# Patient Record
Sex: MALE | Race: WHITE | NOT HISPANIC OR LATINO | Employment: FULL TIME | URBAN - METROPOLITAN AREA
[De-identification: names, ages, dates, MRNs, and addresses within clinical notes are randomized per-mention and may not be internally consistent; named-entity substitution may affect disease eponyms.]

---

## 2017-01-04 ENCOUNTER — HOSPITAL ENCOUNTER (EMERGENCY)
Facility: MEDICAL CENTER | Age: 42
End: 2017-01-05
Attending: EMERGENCY MEDICINE
Payer: COMMERCIAL

## 2017-01-04 DIAGNOSIS — R73.9 HYPERGLYCEMIA: ICD-10-CM

## 2017-01-04 DIAGNOSIS — R11.2 NON-INTRACTABLE VOMITING WITH NAUSEA, UNSPECIFIED VOMITING TYPE: ICD-10-CM

## 2017-01-04 DIAGNOSIS — R11.0 CHRONIC NAUSEA: ICD-10-CM

## 2017-01-04 LAB
BASOPHILS # BLD AUTO: 0.3 % (ref 0–1.8)
BASOPHILS # BLD: 0.04 K/UL (ref 0–0.12)
EOSINOPHIL # BLD AUTO: 0.01 K/UL (ref 0–0.51)
EOSINOPHIL NFR BLD: 0.1 % (ref 0–6.9)
ERYTHROCYTE [DISTWIDTH] IN BLOOD BY AUTOMATED COUNT: 40.6 FL (ref 35.9–50)
HCT VFR BLD AUTO: 48.8 % (ref 42–52)
HGB BLD-MCNC: 17.3 G/DL (ref 14–18)
IMM GRANULOCYTES # BLD AUTO: 0.05 K/UL (ref 0–0.11)
IMM GRANULOCYTES NFR BLD AUTO: 0.4 % (ref 0–0.9)
LYMPHOCYTES # BLD AUTO: 1.59 K/UL (ref 1–4.8)
LYMPHOCYTES NFR BLD: 11.9 % (ref 22–41)
MCH RBC QN AUTO: 29.9 PG (ref 27–33)
MCHC RBC AUTO-ENTMCNC: 35.5 G/DL (ref 33.7–35.3)
MCV RBC AUTO: 84.3 FL (ref 81.4–97.8)
MONOCYTES # BLD AUTO: 0.3 K/UL (ref 0–0.85)
MONOCYTES NFR BLD AUTO: 2.2 % (ref 0–13.4)
NEUTROPHILS # BLD AUTO: 11.42 K/UL (ref 1.82–7.42)
NEUTROPHILS NFR BLD: 85.1 % (ref 44–72)
NRBC # BLD AUTO: 0 K/UL
NRBC BLD AUTO-RTO: 0 /100 WBC
PLATELET # BLD AUTO: 292 K/UL (ref 164–446)
PMV BLD AUTO: 9 FL (ref 9–12.9)
RBC # BLD AUTO: 5.79 M/UL (ref 4.7–6.1)
WBC # BLD AUTO: 13.4 K/UL (ref 4.8–10.8)

## 2017-01-04 PROCEDURE — 85025 COMPLETE CBC W/AUTO DIFF WBC: CPT

## 2017-01-04 PROCEDURE — 700111 HCHG RX REV CODE 636 W/ 250 OVERRIDE (IP): Performed by: EMERGENCY MEDICINE

## 2017-01-04 PROCEDURE — 99285 EMERGENCY DEPT VISIT HI MDM: CPT

## 2017-01-04 PROCEDURE — 700105 HCHG RX REV CODE 258: Performed by: EMERGENCY MEDICINE

## 2017-01-04 PROCEDURE — 96374 THER/PROPH/DIAG INJ IV PUSH: CPT

## 2017-01-04 PROCEDURE — 80053 COMPREHEN METABOLIC PANEL: CPT

## 2017-01-04 PROCEDURE — 96361 HYDRATE IV INFUSION ADD-ON: CPT

## 2017-01-04 PROCEDURE — 83690 ASSAY OF LIPASE: CPT

## 2017-01-04 RX ORDER — SODIUM CHLORIDE 9 MG/ML
1000 INJECTION, SOLUTION INTRAVENOUS ONCE
Status: COMPLETED | OUTPATIENT
Start: 2017-01-04 | End: 2017-01-05

## 2017-01-04 RX ORDER — METOCLOPRAMIDE 10 MG/1
10 TABLET ORAL 4 TIMES DAILY
Status: SHIPPED | COMMUNITY
End: 2017-04-12

## 2017-01-04 RX ORDER — HYDROCODONE BITARTRATE AND ACETAMINOPHEN 10; 325 MG/1; MG/1
1-2 TABLET ORAL EVERY 6 HOURS PRN
Status: SHIPPED | COMMUNITY
End: 2017-11-06

## 2017-01-04 RX ORDER — ONDANSETRON 2 MG/ML
4 INJECTION INTRAMUSCULAR; INTRAVENOUS ONCE
Status: COMPLETED | OUTPATIENT
Start: 2017-01-04 | End: 2017-01-04

## 2017-01-04 RX ADMIN — ONDANSETRON HYDROCHLORIDE 4 MG: 2 INJECTION, SOLUTION INTRAMUSCULAR; INTRAVENOUS at 23:39

## 2017-01-04 RX ADMIN — SODIUM CHLORIDE 1000 ML: 9 INJECTION, SOLUTION INTRAVENOUS at 23:39

## 2017-01-04 ASSESSMENT — PAIN SCALES - GENERAL: PAINLEVEL_OUTOF10: 6

## 2017-01-04 NOTE — ED AVS SNAPSHOT
Press Play Access Code: [email or web address]  Expires: 1/19/2017  8:46 AM    Press Play  A secure, online tool to manage your health information     [employer]’s Press Play® is a secure, online tool that connects you to your personalized health information from the privacy of your home -- day or night - making it very easy for you to manage your healthcare. Once the activation process is completed, you can even access your medical information using the Press Play anish, which is available for free in the Apple Anish store or Google Play store.     Press Play provides the following levels of access (as shown below):   My Chart Features   [facility] Primary Care Doctor [facility]  Specialists [facility]  Urgent  Care Non-[facility]  Primary Care  Doctor   Email your healthcare team securely and privately 24/7 X X X X   Manage appointments: schedule your next appointment; view details of past/upcoming appointments X      Request prescription refills. X      View recent personal medical records, including lab and immunizations X X X X   View health record, including health history, allergies, medications X X X X   Read reports about your outpatient visits, procedures, consult and ER notes X X X X   See your discharge summary, which is a recap of your hospital and/or ER visit that includes your diagnosis, lab results, and care plan. X X       How to register for Press Play:  1. Go to  [email or web address]  2. Click on the Sign Up Now box, which takes you to the New Member Sign Up page. You will need to provide the following information:  a. Enter your Press Play Access Code exactly as it appears at the top of this page. (You will not need to use this code after you’ve completed the sign-up process. If you do not sign up before the expiration date, you must request a new code.)   b. Enter your date of birth.   c. Enter your home email address.   d. Click Submit, and follow the next screen’s instructions.  3. Create a Press Play ID. This will be your Press Play  login ID and cannot be changed, so think of one that is secure and easy to remember.  4. Create a [employer] password. You can change your password at any time.  5. Enter your Password Reset Question and Answer. This can be used at a later time if you forget your password.   6. Enter your e-mail address. This allows you to receive e-mail notifications when new information is available in Locappy.  7. Click Sign Up. You can now view your health information.    For assistance activating your Loc[employer] account, call [phone]

## 2017-01-04 NOTE — ED AVS SNAPSHOT
After Visit Summary                                                                                                                Cayetano Hawkins   MRN: [id number]    Department:  [facility], Emergency Dept   Date of Visit:  1/4/2017            [facility], Emergency Dept    [address]    [place] [address]    Phone:  [phone]      You were seen by     Praneeth Tracey M.D.      Your Diagnosis Was     Non-intractable vomiting with nausea, unspecified vomiting type     R11.2       These are the medications you received during your hospitalization from 01/04/2017 2052 to 01/05/2017 0055     Date/Time Order Dose Route Action    01/04/2017 2339 NS infusion 1,000 mL 1,000 mL Intravenous New Bag    01/04/2017 2339 ondansetron (ZOFRAN) syringe/vial injection 4 mg 4 mg Intravenous Given    01/05/2017 0030 NS infusion 1,000 mL 1,000 mL Intravenous New Bag      Follow-up Information     1. Follow up with Landon Collins D.O. In 2 days.    Specialty:  Family Medicine    Contact information    [address]   [address]  [place] [address]  [phone]          2. Follow up with [facility], Emergency Dept.    Specialty:  Emergency Medicine    Why:  As needed, If symptoms worsen    Contact information    [address] [address] [phone]      Medication Information     Review all of your home medications and newly ordered medications with your primary doctor and/or pharmacist as soon as possible. Follow medication instructions as directed by your doctor and/or pharmacist.     Please keep your complete medication list with you and share with your physician. Update the information when medications are discontinued, doses are changed, or new medications (including over-the-counter products) are added; and carry medication information at all times in the event of emergency situations.               Medication List      ASK your  doctor about these medications        Instructions    gabapentin 100 MG Caps   Commonly known as:  NEURONTIN    Take 100 mg by mouth every evening.   Dose:  100 mg       hydrocodone/acetaminophen  MG Tabs   Commonly known as:  NORCO    Take 1-2 Tabs by mouth every 6 hours as needed.   Dose:  1-2 Tab       insulin detemir 100 UNIT/ML Soln   Commonly known as:  LEVEMIR    Inject 30 Units as instructed every morning.   Dose:  30 Units       metformin 500 MG Tabs   Commonly known as:  GLUCOPHAGE    Take 1,000 mg by mouth 2 times a day, with meals.   Dose:  1000 mg       * metoclopramide 10 MG Tabs   What changed:  Another medication with the same name was added. Make sure you understand how and when to take each.   Commonly known as:  REGLAN   Ask about: Which instructions should I use?    Take 10 mg by mouth 4 times a day.   Dose:  10 mg       * metoclopramide 10 MG Tabs   What changed:  You were already taking a medication with the same name, and this prescription was added. Make sure you understand how and when to take each.   Commonly known as:  REGLAN   Ask about: Which instructions should I use?    Take 1 Tab by mouth 3 times a day before meals.   Dose:  10 mg       ondansetron 4 MG Tbdp   Commonly known as:  ZOFRAN ODT    Take 1 Tab by mouth every 6 hours as needed for Nausea/Vomiting.   Dose:  4 mg       pantoprazole 40 MG Tbec   Commonly known as:  PROTONIX    Take 1 Tab by mouth every day.   Dose:  40 mg       promethazine 25 MG Tabs   Commonly known as:  PHENERGAN    Take 1 Tab by mouth every 6 hours as needed for Nausea/Vomiting.   Dose:  25 mg       * Notice:  This list has 2 medication(s) that are the same as other medications prescribed for you. Read the directions carefully, and ask your doctor or other care provider to review them with you.            Procedures and tests performed during your visit     CBC WITH DIFFERENTIAL    COMP METABOLIC PANEL    ESTIMATED GFR    IV Saline Lock    LIPASE     PO CHALLENGE        Discharge Instructions       Nausea and Vomiting  Nausea is a sick feeling that often comes before throwing up (vomiting). Vomiting is a reflex where stomach contents come out of your mouth. Vomiting can cause severe loss of body fluids (dehydration). Children and elderly adults can become dehydrated quickly, especially if they also have diarrhea. Nausea and vomiting are symptoms of a condition or disease. It is important to find the cause of your symptoms.  CAUSES   · Direct irritation of the stomach lining. This irritation can result from increased acid production (gastroesophageal reflux disease), infection, food poisoning, taking certain medicines (such as nonsteroidal anti-inflammatory drugs), alcohol use, or tobacco use.  · Signals from the brain. These signals could be caused by a headache, heat exposure, an inner ear disturbance, increased pressure in the brain from injury, infection, a tumor, or a concussion, pain, emotional stimulus, or metabolic problems.  · An obstruction in the gastrointestinal tract (bowel obstruction).  · Illnesses such as diabetes, hepatitis, gallbladder problems, appendicitis, kidney problems, cancer, sepsis, atypical symptoms of a heart attack, or eating disorders.  · Medical treatments such as chemotherapy and radiation.  · Receiving medicine that makes you sleep (general anesthetic) during surgery.  DIAGNOSIS  Your caregiver may ask for tests to be done if the problems do not improve after a few days. Tests may also be done if symptoms are severe or if the reason for the nausea and vomiting is not clear. Tests may include:  · Urine tests.  · Blood tests.  · Stool tests.  · Cultures (to look for evidence of infection).  · X-rays or other imaging studies.  Test results can help your caregiver make decisions about treatment or the need for additional tests.  TREATMENT  You need to stay well hydrated. Drink frequently but in small amounts. You may wish to drink  water, sports drinks, clear broth, or eat frozen ice pops or gelatin dessert to help stay hydrated. When you eat, eating slowly may help prevent nausea. There are also some antinausea medicines that may help prevent nausea.  HOME CARE INSTRUCTIONS   · Take all medicine as directed by your caregiver.  · If you do not have an appetite, do not force yourself to eat. However, you must continue to drink fluids.  · If you have an appetite, eat a normal diet unless your caregiver tells you differently.  · Eat a variety of complex carbohydrates (rice, wheat, potatoes, bread), lean meats, yogurt, fruits, and vegetables.  · Avoid high-fat foods because they are more difficult to digest.  · Drink enough water and fluids to keep your urine clear or pale yellow.  · If you are dehydrated, ask your caregiver for specific rehydration instructions. Signs of dehydration may include:  · Severe thirst.  · Dry lips and mouth.  · Dizziness.  · Dark urine.  · Decreasing urine frequency and amount.  · Confusion.  · Rapid breathing or pulse.  SEEK IMMEDIATE MEDICAL CARE IF:   · You have blood or brown flecks (like coffee grounds) in your vomit.  · You have black or bloody stools.  · You have a severe headache or stiff neck.  · You are confused.  · You have severe abdominal pain.  · You have chest pain or trouble breathing.  · You do not urinate at least once every 8 hours.  · You develop cold or clammy skin.  · You continue to vomit for longer than 24 to 48 hours.  · You have a fever.  MAKE SURE YOU:   · Understand these instructions.  · Will watch your condition.  · Will get help right away if you are not doing well or get worse.     This information is not intended to replace advice given to you by your health care provider. Make sure you discuss any questions you have with your health care provider.     Document Released: 12/18/2006 Document Revised: 03/11/2013 Document Reviewed: 05/16/2012  [facility] Interactive Patient Education ©2016  Elsevier Inc.      Hyperglycemia  High blood sugar (hyperglycemia) means that the level of sugar in your blood is higher than it should be. Signs of high blood sugar include:  · Feeling thirsty.  · Frequent peeing (urinating).  · Feeling tired or sleepy.  · Dry mouth.  · Vision changes.  · Feeling weak.  · Feeling hungry but losing weight.  · Numbness and tingling in your hands or feet.  · Headache.  When you ignore these signs, your blood sugar may keep going up. These problems may get worse, and other problems may begin.  HOME CARE  · Check your blood sugars as told by your doctor. Write down the numbers with the date and time.  · Take the right amount of insulin or diabetes pills at the right time. Write down the dose with date and time.  · Refill your insulin or diabetes pills before running out.  · Watch what you eat. Follow your meal plan.  · Drink liquids without sugar, such as water. Check with your doctor if you have kidney or heart disease.  · Follow your doctor's orders for exercise. Exercise at the same time of day.  · Keep your doctor's appointments.  GET HELP RIGHT AWAY IF:   · You have trouble thinking or are confused.  · You have fast breathing with fruity smelling breath.  · You pass out (faint).  · You have 2 to 3 days of high blood sugars and you do not know why.  · You have chest pain.  · You are feeling sick to your stomach (nauseous) or throwing up (vomiting).  · You have sudden vision changes.  MAKE SURE YOU:   · Understand these instructions.  · Will watch your condition.  · Will get help right away if you are not doing well or get worse.     This information is not intended to replace advice given to you by your health care provider. Make sure you discuss any questions you have with your health care provider.     Document Released: 10/15/2010 Document Revised: 01/08/2016 Document Reviewed: 10/15/2010  [employer] Interactive Patient Education ©2016 Elsevier Inc.            Patient Information       Patient Information    Following emergency treatment: all patient requiring follow-up care must return either to a private physician or a clinic if your condition worsens before you are able to obtain further medical attention, please return to the emergency room.     Billing Information    At [facility], we work to make the billing process streamlined for our patients.  Our Representatives are here to answer any questions you may have regarding your hospital bill.  If you have insurance coverage and have supplied your insurance information to us, we will submit a claim to your insurer on your behalf.  Should you have any questions regarding your bill, we can be reached online or by phone as follows:  Online: You are able pay your bills online or live chat with our representatives about any billing questions you may have. We are here to help Monday - Friday from 8:00am to 7:30pm and 9:00am - 12:00pm on Saturdays.  Please visit [email or web address]  for more information.   Phone:  [phone] or [phone]    Please note that your emergency physician, surgeon, pathologist, radiologist, anesthesiologist, and other specialists are not employed by [facility] and will therefore bill separately for their services.  Please contact them directly for any questions concerning their bills at the numbers below:     Emergency Physician Services:  [phone]  [facility]:  [phone]  [facility]:  [phone]  [facility]:  [phone]    1. Your final bill may vary from the amount quoted upon discharge if all procedures are not complete at that time, or if your doctor has additional procedures of which we are not aware. You will receive an additional bill if you return to the Emergency Department at [facility] for suture removal regardless of the facility of which the sutures were placed.     2. Please arrange for settlement of  this account at the emergency registration.    3. All self-pay accounts are due in full at the time of treatment.  If you are unable to meet this obligation then payment is expected within 4-5 days.     4. If you have had radiology studies (CT, X-ray, Ultrasound, MRI), you have received a preliminary result during your emergency department visit. Please contact the radiology department [phone] to receive a copy of your final result. Please discuss the Final result with your primary physician or with the follow up physician provided.     Crisis Hotline:  [place] Crisis Hotline:  9-850-GEWKSCP or 1-112.485.8336  [place] Crisis Hotline:    1-761.640.2331 or 666-915-7769         ED Discharge Follow Up Questions    1. In order to provide you with very good care, we would like to follow up with a phone call in the next few days.  May we have your permission to contact you?     YES /  NO    2. What is the best phone number to call you? (       )_____-__________    3. What is the best time to call you?      Morning  /  Afternoon  /  Evening                   Patient Signature:  ____________________________________________________________    Date:  ____________________________________________________________      Your appointments     Feb 14, 2017  8:30 AM   NM-GASTRIC EMPTYING(TOUGAS METHOD) with [facility]   [facility]    [address]  [place] [id number] [phone]

## 2017-01-04 NOTE — ED AVS SNAPSHOT
1/5/2017          Cayetano Godinez Colon  [address] [place] [address] [place] [address]    Dear Cayetano:    [facility] wants to ensure your discharge home is safe and you or your loved ones have had all your questions answered regarding your care after you leave the hospital.    You may receive a telephone call within two days of your discharge.  This call is to make certain you understand your discharge instructions as well as ensure we provided you with the best care possible during your stay with us.     The call will only last approximately 3-5 minutes and will be done by a nurse.    Once again, we want to ensure your discharge home is safe and that you have a clear understanding of any next steps in your care.  If you have any questions or concerns, please do not hesitate to contact us, we are here for you.  Thank you for choosing [facility] for your healthcare needs.    Sincerely,    Catalino Mary    [facility]

## 2017-01-05 VITALS
RESPIRATION RATE: 16 BRPM | HEART RATE: 79 BPM | TEMPERATURE: 98.5 F | DIASTOLIC BLOOD PRESSURE: 71 MMHG | OXYGEN SATURATION: 97 % | HEIGHT: 71 IN | SYSTOLIC BLOOD PRESSURE: 149 MMHG | BODY MASS INDEX: 25.9 KG/M2 | WEIGHT: 185 LBS

## 2017-01-05 LAB
ALBUMIN SERPL BCP-MCNC: 4.6 G/DL (ref 3.2–4.9)
ALBUMIN/GLOB SERPL: 1.5 G/DL
ALP SERPL-CCNC: 135 U/L (ref 30–99)
ALT SERPL-CCNC: 88 U/L (ref 2–50)
ANION GAP SERPL CALC-SCNC: 14 MMOL/L (ref 0–11.9)
AST SERPL-CCNC: 43 U/L (ref 12–45)
BILIRUB SERPL-MCNC: 1.2 MG/DL (ref 0.1–1.5)
BUN SERPL-MCNC: 7 MG/DL (ref 8–22)
CALCIUM SERPL-MCNC: 10.5 MG/DL (ref 8.4–10.2)
CHLORIDE SERPL-SCNC: 102 MMOL/L (ref 96–112)
CO2 SERPL-SCNC: 25 MMOL/L (ref 20–33)
CREAT SERPL-MCNC: 0.96 MG/DL (ref 0.5–1.4)
GFR SERPL CREATININE-BSD FRML MDRD: >60 ML/MIN/1.73 M 2
GLOBULIN SER CALC-MCNC: 3.1 G/DL (ref 1.9–3.5)
GLUCOSE SERPL-MCNC: 224 MG/DL (ref 65–99)
LIPASE SERPL-CCNC: 25 U/L (ref 7–58)
POTASSIUM SERPL-SCNC: 3.4 MMOL/L (ref 3.6–5.5)
PROT SERPL-MCNC: 7.7 G/DL (ref 6–8.2)
SODIUM SERPL-SCNC: 141 MMOL/L (ref 135–145)

## 2017-01-05 PROCEDURE — 700105 HCHG RX REV CODE 258: Performed by: EMERGENCY MEDICINE

## 2017-01-05 RX ORDER — HYDROCODONE BITARTRATE AND ACETAMINOPHEN 5; 325 MG/1; MG/1
1 TABLET ORAL ONCE
Status: DISCONTINUED | OUTPATIENT
Start: 2017-01-05 | End: 2017-01-05

## 2017-01-05 RX ORDER — SODIUM CHLORIDE 9 MG/ML
1000 INJECTION, SOLUTION INTRAVENOUS ONCE
Status: COMPLETED | OUTPATIENT
Start: 2017-01-05 | End: 2017-01-05

## 2017-01-05 RX ORDER — METOCLOPRAMIDE 10 MG/1
10 TABLET ORAL
Qty: 15 TAB | Refills: 0 | Status: SHIPPED | OUTPATIENT
Start: 2017-01-05 | End: 2017-04-12

## 2017-01-05 RX ADMIN — SODIUM CHLORIDE 1000 ML: 900 INJECTION, SOLUTION INTRAVENOUS at 00:30

## 2017-01-05 NOTE — ED NOTES
Discharged in good condition after discharge instructions reviewed with pt in detail, pt verbalizes understanding of all. Ambulated out with steady gait accompanied by significant other with follow up instructions in hand.

## 2017-01-05 NOTE — DISCHARGE INSTRUCTIONS
Nausea and Vomiting  Nausea is a sick feeling that often comes before throwing up (vomiting). Vomiting is a reflex where stomach contents come out of your mouth. Vomiting can cause severe loss of body fluids (dehydration). Children and elderly adults can become dehydrated quickly, especially if they also have diarrhea. Nausea and vomiting are symptoms of a condition or disease. It is important to find the cause of your symptoms.  CAUSES   · Direct irritation of the stomach lining. This irritation can result from increased acid production (gastroesophageal reflux disease), infection, food poisoning, taking certain medicines (such as nonsteroidal anti-inflammatory drugs), alcohol use, or tobacco use.  · Signals from the brain. These signals could be caused by a headache, heat exposure, an inner ear disturbance, increased pressure in the brain from injury, infection, a tumor, or a concussion, pain, emotional stimulus, or metabolic problems.  · An obstruction in the gastrointestinal tract (bowel obstruction).  · Illnesses such as diabetes, hepatitis, gallbladder problems, appendicitis, kidney problems, cancer, sepsis, atypical symptoms of a heart attack, or eating disorders.  · Medical treatments such as chemotherapy and radiation.  · Receiving medicine that makes you sleep (general anesthetic) during surgery.  DIAGNOSIS  Your caregiver may ask for tests to be done if the problems do not improve after a few days. Tests may also be done if symptoms are severe or if the reason for the nausea and vomiting is not clear. Tests may include:  · Urine tests.  · Blood tests.  · Stool tests.  · Cultures (to look for evidence of infection).  · X-rays or other imaging studies.  Test results can help your caregiver make decisions about treatment or the need for additional tests.  TREATMENT  You need to stay well hydrated. Drink frequently but in small amounts. You may wish to drink water, sports drinks, clear broth, or eat frozen  ice pops or gelatin dessert to help stay hydrated. When you eat, eating slowly may help prevent nausea. There are also some antinausea medicines that may help prevent nausea.  HOME CARE INSTRUCTIONS   · Take all medicine as directed by your caregiver.  · If you do not have an appetite, do not force yourself to eat. However, you must continue to drink fluids.  · If you have an appetite, eat a normal diet unless your caregiver tells you differently.  · Eat a variety of complex carbohydrates (rice, wheat, potatoes, bread), lean meats, yogurt, fruits, and vegetables.  · Avoid high-fat foods because they are more difficult to digest.  · Drink enough water and fluids to keep your urine clear or pale yellow.  · If you are dehydrated, ask your caregiver for specific rehydration instructions. Signs of dehydration may include:  · Severe thirst.  · Dry lips and mouth.  · Dizziness.  · Dark urine.  · Decreasing urine frequency and amount.  · Confusion.  · Rapid breathing or pulse.  SEEK IMMEDIATE MEDICAL CARE IF:   · You have blood or brown flecks (like coffee grounds) in your vomit.  · You have black or bloody stools.  · You have a severe headache or stiff neck.  · You are confused.  · You have severe abdominal pain.  · You have chest pain or trouble breathing.  · You do not urinate at least once every 8 hours.  · You develop cold or clammy skin.  · You continue to vomit for longer than 24 to 48 hours.  · You have a fever.  MAKE SURE YOU:   · Understand these instructions.  · Will watch your condition.  · Will get help right away if you are not doing well or get worse.     This information is not intended to replace advice given to you by your health care provider. Make sure you discuss any questions you have with your health care provider.     Document Released: 12/18/2006 Document Revised: 03/11/2013 Document Reviewed: 05/16/2012  [employer] Interactive Patient Education ©2016 [employer]      Hyperglycemia  High blood sugar  (hyperglycemia) means that the level of sugar in your blood is higher than it should be. Signs of high blood sugar include:  · Feeling thirsty.  · Frequent peeing (urinating).  · Feeling tired or sleepy.  · Dry mouth.  · Vision changes.  · Feeling weak.  · Feeling hungry but losing weight.  · Numbness and tingling in your hands or feet.  · Headache.  When you ignore these signs, your blood sugar may keep going up. These problems may get worse, and other problems may begin.  HOME CARE  · Check your blood sugars as told by your doctor. Write down the numbers with the date and time.  · Take the right amount of insulin or diabetes pills at the right time. Write down the dose with date and time.  · Refill your insulin or diabetes pills before running out.  · Watch what you eat. Follow your meal plan.  · Drink liquids without sugar, such as water. Check with your doctor if you have kidney or heart disease.  · Follow your doctor's orders for exercise. Exercise at the same time of day.  · Keep your doctor's appointments.  GET HELP RIGHT AWAY IF:   · You have trouble thinking or are confused.  · You have fast breathing with fruity smelling breath.  · You pass out (faint).  · You have 2 to 3 days of high blood sugars and you do not know why.  · You have chest pain.  · You are feeling sick to your stomach (nauseous) or throwing up (vomiting).  · You have sudden vision changes.  MAKE SURE YOU:   · Understand these instructions.  · Will watch your condition.  · Will get help right away if you are not doing well or get worse.     This information is not intended to replace advice given to you by your health care provider. Make sure you discuss any questions you have with your health care provider.     Document Released: 10/15/2010 Document Revised: 01/08/2016 Document Reviewed: 10/15/2010  [employer] Interactive Patient Education ©2016 Elsevier Inc.

## 2017-01-05 NOTE — ED PROVIDER NOTES
CHIEF COMPLAINT  Chief Complaint   Patient presents with   • N/V       HPI  Cayetano Hawkins is a 41 y.o. male who presents with nausea and vomiting and abdominal pain since earlier today. Reports multiple episodes of emesis throughout the day. No hematemesis or bloody stools. No recent fevers. Has had multiple episodes in the past. Is followed by gastroenterology. Reports that he has had chronic nausea and vomiting with abdominal pain symptoms for at least 4 months now. Reports that he has had an endoscopy performed that was found to be unremarkable. Is scheduled for gastric emptying and colonoscopy studies. He has a history of diabetes for approximately 10 years. Is insulin-dependent. Blood sugars usually run less than 200.    Has a history of cholecystectomy and appendectomy in the past. Uses marijuana chronically however has not used it in approximately one week. Denies chronic alcohol abuse.    REVIEW OF SYSTEMS  See HPI for further details. All other systems are negative.     PAST MEDICAL HISTORY   has a past medical history of Hepatitis C carrier; Liver disease; Hypertension; Arthritis (right hip); Backpain; Diabetes; and Infectious disease.    SOCIAL HISTORY  Social History     Social History Main Topics   • Smoking status: Former Smoker -- 0.50 packs/day for 10 years     Quit date: 01/01/2012   • Smokeless tobacco: Never Used      Comment: 5 yrs ago   • Alcohol Use: No      Comment: occ   • Drug Use: No      Comment: conor in past   • Sexual Activity: Not on file       SURGICAL HISTORY   has past surgical history that includes nano by laparoscopy; other (hepatitis c ); cholecystectomy; irrigation & debridement ortho (Right, 4/4/2016); irrigation & debridement ortho (Right, 4/10/2016); and irrigation & debridement ortho (Right, 4/13/2016).    CURRENT MEDICATIONS  Home Medications     Reviewed by Zach Chandler R.N. (Registered Nurse) on 01/04/17 at 1421  Med List Status: Complete     "Medication Last Dose Status    gabapentin (NEURONTIN) 100 MG Cap 12/27/2016 Active    hydrocodone/acetaminophen (NORCO)  MG Tab 1/1/2017 Active    insulin detemir (LEVEMIR) 100 UNIT/ML Solution 1/4/2017 Active    metformin (GLUCOPHAGE) 500 MG Tab 12/21/2016 Active    metoclopramide (REGLAN) 10 MG Tab 1/3/2017 Active    ondansetron (ZOFRAN ODT) 4 MG TABLET DISPERSIBLE 1/4/2017 Active    pantoprazole (PROTONIX) 40 MG Tablet Delayed Response  Active    promethazine (PHENERGAN) 25 MG Tab  Active                ALLERGIES  Allergies   Allergen Reactions   • Bee      Bee stings       PHYSICAL EXAM  VITAL SIGNS: /81 mmHg  Pulse 75  Temp(Src) 37.4 °C (99.3 °F)  Resp 18  Ht 1.803 m (5' 11\")  Wt 83.915 kg (185 lb)  BMI 25.81 kg/m2  SpO2 97%  Pulse ox interpretation: I interpret this pulse ox as normal.  Constitutional: Alert in no apparent distress.  HENT: No signs of trauma, Bilateral external ears normal, Nose normal.   Eyes: Pupils are equal and reactive, Conjunctiva normal, Non-icteric.   Neck: Normal range of motion, No tenderness, Supple, No stridor.   Cardiovascular: Regular rate and rhythm, no murmurs.   Thorax & Lungs: Normal breath sounds, No respiratory distress, No wheezing, No chest tenderness.   Abdomen: Bowel sounds normal, Soft, mild diffuse tenderness, No masses, No pulsatile masses. No peritoneal signs.  Skin: Warm, Dry, No erythema, No rash.   Back: No bony tenderness, No CVA tenderness.   Extremities: Intact distal pulses, No edema, No tenderness, No cyanosis  Neurologic: Alert , Normal motor function and gait, Normal sensory function, No focal deficits noted.   Psychiatric: Affect normal, Judgment normal, Mood normal.       DIAGNOSTIC STUDIES / PROCEDURES      LABS  Labs Reviewed   CBC WITH DIFFERENTIAL - Abnormal; Notable for the following:     WBC 13.4 (*)     MCHC 35.5 (*)     Neutrophils-Polys 85.10 (*)     Lymphocytes 11.90 (*)     Neutrophils (Absolute) 11.42 (*)     All other " components within normal limits   COMP METABOLIC PANEL - Abnormal; Notable for the following:     Potassium 3.4 (*)     Anion Gap 14.0 (*)     Glucose 224 (*)     Bun 7 (*)     Calcium 10.5 (*)     ALT(SGPT) 88 (*)     Alkaline Phosphatase 135 (*)     All other components within normal limits   LIPASE   ESTIMATED GFR         COURSE & MEDICAL DECISION MAKING  Pertinent Labs & Imaging studies reviewed. (See chart for details)  41-year-old male with a history of hepatitis C, cholecystectomy, appendectomy, presenting with diffuse abdominal pain associated with nausea and vomiting throughout the day. Has been evaluated here in the past for similar symptoms. Also has been evaluated by gastroenterology in the past with endoscopy that was performed and was found to be unremarkable. Patient tried to take Reglan earlier today however had an episode of vomiting.    Upon the patient's arrival, he was given IV fluids along with IV Reglan. Improvement in his symptoms. Laboratory studies were performed at were unremarkable except for minor abnormalities. Had some leukocytosis with WBC of 13.4 and of uncertain etiology/significance at this time. May be secondary to a stress response given the patient's vomiting symptoms. No tachycardia or fever. Patient is in no distress. Normal blood pressure.  Abdominal exam.    Given the patient's response to treatment, and largely unremarkable workup, I do not believe that he'll benefit from further advanced imaging such as CT at this time. Likely harm outweighs any potential benefit from the study at this time given his chronic history and unremarkable workup to this point. Is instructed to follow-up with his primary care physician and gastroenterologist for further management of his chronic abdominal pain symptoms. Again stressed to stay away from marijuana as it may exacerbate his symptoms. Also instructed to maintain good glycemic control. Uncertain of the actual etiology at this time  "regarding the patient's nausea and vomiting symptoms. Strong suggestion of gastroparesis however pending further workup by gastroenterology for further management. Discharged home with repeat prescription for Reglan as needed.    The patient will return for worsening symptoms or failure of improvement and is stable at the time of discharge. The patient verbalizes understanding in their own words.      /71 mmHg  Pulse 79  Temp(Src) 36.9 °C (98.5 °F)  Resp 16  Ht 1.803 m (5' 11\")  Wt 83.915 kg (185 lb)  BMI 25.81 kg/m2  SpO2 97%    The patient was referred to primary care where they will receive further BP management.      Landon Collins D.O.  [address]  [address]  [facility] [address]  [phone]    In 2 days      [facility], Emergency Dept  [address]  [facility] [address] [phone]    As needed, If symptoms worsen      FINAL IMPRESSION  1. Non-intractable vomiting with nausea, unspecified vomiting type    2. Chronic nausea    3. Hyperglycemia            Electronically signed by: Praneeth Tracey, 1/4/2017 11:13 PM      "

## 2017-01-19 ENCOUNTER — HOSPITAL ENCOUNTER (EMERGENCY)
Facility: MEDICAL CENTER | Age: 42
End: 2017-01-19
Attending: EMERGENCY MEDICINE
Payer: COMMERCIAL

## 2017-01-19 VITALS
WEIGHT: 180.34 LBS | HEART RATE: 70 BPM | BODY MASS INDEX: 25.25 KG/M2 | TEMPERATURE: 98.6 F | HEIGHT: 71 IN | OXYGEN SATURATION: 99 %

## 2017-01-19 DIAGNOSIS — R11.2 NAUSEA AND VOMITING, INTRACTABILITY OF VOMITING NOT SPECIFIED, UNSPECIFIED VOMITING TYPE: ICD-10-CM

## 2017-01-19 DIAGNOSIS — E87.6 HYPOKALEMIA: ICD-10-CM

## 2017-01-19 DIAGNOSIS — N30.00 ACUTE CYSTITIS WITHOUT HEMATURIA: ICD-10-CM

## 2017-01-19 DIAGNOSIS — F12.90 MARIJUANA USE: ICD-10-CM

## 2017-01-19 LAB
ALBUMIN SERPL BCP-MCNC: 5 G/DL (ref 3.2–4.9)
ALBUMIN/GLOB SERPL: 1.6 G/DL
ALP SERPL-CCNC: 111 U/L (ref 30–99)
ALT SERPL-CCNC: 46 U/L (ref 2–50)
AMPHETAMINES UR QL: NEGATIVE
ANION GAP SERPL CALC-SCNC: 16 MMOL/L (ref 0–11.9)
APPEARANCE UR: CLEAR
AST SERPL-CCNC: 36 U/L (ref 12–45)
BACTERIA #/AREA URNS HPF: ABNORMAL /HPF
BARBITURATES UR QL SCN: NEGATIVE
BASOPHILS # BLD AUTO: 0.2 % (ref 0–1.8)
BASOPHILS # BLD: 0.03 K/UL (ref 0–0.12)
BENZODIAZ UR QL SCN: NEGATIVE
BILIRUB SERPL-MCNC: 1.3 MG/DL (ref 0.1–1.5)
BILIRUB UR QL CFM: NEGATIVE
BODY FLD TYPE: ABNORMAL
BUN SERPL-MCNC: 12 MG/DL (ref 8–22)
BZE UR QL SCN: NEGATIVE
C TRACH DNA SPEC QL NAA+PROBE: NEGATIVE
CALCIUM SERPL-MCNC: 10.2 MG/DL (ref 8.4–10.2)
CHLORIDE SERPL-SCNC: 102 MMOL/L (ref 96–112)
CO2 SERPL-SCNC: 23 MMOL/L (ref 20–33)
COLOR UR: YELLOW
CREAT SERPL-MCNC: 0.98 MG/DL (ref 0.5–1.4)
EKG IMPRESSION: NORMAL
EOSINOPHIL # BLD AUTO: 0 K/UL (ref 0–0.51)
EOSINOPHIL NFR BLD: 0 % (ref 0–6.9)
EPI CELLS #/AREA URNS HPF: ABNORMAL /HPF
ERYTHROCYTE [DISTWIDTH] IN BLOOD BY AUTOMATED COUNT: 40.6 FL (ref 35.9–50)
GFR SERPL CREATININE-BSD FRML MDRD: >60 ML/MIN/1.73 M 2
GLOBULIN SER CALC-MCNC: 3.2 G/DL (ref 1.9–3.5)
GLUCOSE SERPL-MCNC: 212 MG/DL (ref 65–99)
GLUCOSE UR STRIP.AUTO-MCNC: NEGATIVE MG/DL
HCT VFR BLD AUTO: 47.5 % (ref 42–52)
HEMOCCULT SP1 SPEC QL: POSITIVE
HGB BLD-MCNC: 17.1 G/DL (ref 14–18)
IMM GRANULOCYTES # BLD AUTO: 0.06 K/UL (ref 0–0.11)
IMM GRANULOCYTES NFR BLD AUTO: 0.4 % (ref 0–0.9)
KETONES UR STRIP.AUTO-MCNC: >=80 MG/DL
LEUKOCYTE ESTERASE UR QL STRIP.AUTO: NEGATIVE
LIPASE SERPL-CCNC: 26 U/L (ref 7–58)
LYMPHOCYTES # BLD AUTO: 1.94 K/UL (ref 1–4.8)
LYMPHOCYTES NFR BLD: 13 % (ref 22–41)
MCH RBC QN AUTO: 30.1 PG (ref 27–33)
MCHC RBC AUTO-ENTMCNC: 36 G/DL (ref 33.7–35.3)
MCV RBC AUTO: 83.5 FL (ref 81.4–97.8)
MICRO URNS: ABNORMAL
MONOCYTES # BLD AUTO: 0.57 K/UL (ref 0–0.85)
MONOCYTES NFR BLD AUTO: 3.8 % (ref 0–13.4)
MUCOUS THREADS #/AREA URNS HPF: ABNORMAL /HPF
N GONORRHOEA DNA SPEC QL NAA+PROBE: NEGATIVE
NEUTROPHILS # BLD AUTO: 12.29 K/UL (ref 1.82–7.42)
NEUTROPHILS NFR BLD: 82.6 % (ref 44–72)
NITRITE UR QL STRIP.AUTO: NEGATIVE
NRBC # BLD AUTO: 0 K/UL
NRBC BLD AUTO-RTO: 0 /100 WBC
PCP UR QL SCN: NEGATIVE
PH UR STRIP.AUTO: 6.5 [PH]
PLATELET # BLD AUTO: 316 K/UL (ref 164–446)
PMV BLD AUTO: 9 FL (ref 9–12.9)
POTASSIUM SERPL-SCNC: 3.2 MMOL/L (ref 3.6–5.5)
PROT SERPL-MCNC: 8.2 G/DL (ref 6–8.2)
PROT UR QL STRIP: 30 MG/DL
RBC # BLD AUTO: 5.69 M/UL (ref 4.7–6.1)
RBC # URNS HPF: ABNORMAL /HPF
RBC UR QL AUTO: ABNORMAL
SODIUM SERPL-SCNC: 141 MMOL/L (ref 135–145)
SP GR UR STRIP.AUTO: 1.02
SPECIMEN SOURCE: NORMAL
TROPONIN I SERPL-MCNC: 0.03 NG/ML (ref 0–0.04)
UR OPIATES 2659: POSITIVE
UR THC 2511T: POSITIVE
UR TRICYCLIC 2660: NEGATIVE
WBC # BLD AUTO: 14.9 K/UL (ref 4.8–10.8)
WBC #/AREA URNS HPF: ABNORMAL /HPF

## 2017-01-19 PROCEDURE — 80305 DRUG TEST PRSMV DIR OPT OBS: CPT

## 2017-01-19 PROCEDURE — 96365 THER/PROPH/DIAG IV INF INIT: CPT

## 2017-01-19 PROCEDURE — 96361 HYDRATE IV INFUSION ADD-ON: CPT

## 2017-01-19 PROCEDURE — 85025 COMPLETE CBC W/AUTO DIFF WBC: CPT

## 2017-01-19 PROCEDURE — 700102 HCHG RX REV CODE 250 W/ 637 OVERRIDE(OP): Performed by: EMERGENCY MEDICINE

## 2017-01-19 PROCEDURE — 81001 URINALYSIS AUTO W/SCOPE: CPT

## 2017-01-19 PROCEDURE — 87491 CHLMYD TRACH DNA AMP PROBE: CPT

## 2017-01-19 PROCEDURE — 87591 N.GONORRHOEAE DNA AMP PROB: CPT

## 2017-01-19 PROCEDURE — 700105 HCHG RX REV CODE 258: Performed by: EMERGENCY MEDICINE

## 2017-01-19 PROCEDURE — 99285 EMERGENCY DEPT VISIT HI MDM: CPT

## 2017-01-19 PROCEDURE — 700111 HCHG RX REV CODE 636 W/ 250 OVERRIDE (IP): Performed by: EMERGENCY MEDICINE

## 2017-01-19 PROCEDURE — 82271 OCCULT BLOOD OTHER SOURCES: CPT

## 2017-01-19 PROCEDURE — 80053 COMPREHEN METABOLIC PANEL: CPT

## 2017-01-19 PROCEDURE — 83690 ASSAY OF LIPASE: CPT

## 2017-01-19 PROCEDURE — 93005 ELECTROCARDIOGRAM TRACING: CPT | Performed by: EMERGENCY MEDICINE

## 2017-01-19 PROCEDURE — 84484 ASSAY OF TROPONIN QUANT: CPT

## 2017-01-19 PROCEDURE — 700111 HCHG RX REV CODE 636 W/ 250 OVERRIDE (IP)

## 2017-01-19 PROCEDURE — 96375 TX/PRO/DX INJ NEW DRUG ADDON: CPT

## 2017-01-19 PROCEDURE — A9270 NON-COVERED ITEM OR SERVICE: HCPCS | Performed by: EMERGENCY MEDICINE

## 2017-01-19 PROCEDURE — 36415 COLL VENOUS BLD VENIPUNCTURE: CPT

## 2017-01-19 RX ORDER — CEFDINIR 300 MG/1
300 CAPSULE ORAL 2 TIMES DAILY
Qty: 14 CAP | Refills: 0 | Status: SHIPPED | OUTPATIENT
Start: 2017-01-19 | End: 2017-01-26

## 2017-01-19 RX ORDER — ONDANSETRON 2 MG/ML
4 INJECTION INTRAMUSCULAR; INTRAVENOUS ONCE
Status: COMPLETED | OUTPATIENT
Start: 2017-01-19 | End: 2017-01-19

## 2017-01-19 RX ORDER — POTASSIUM CHLORIDE 750 MG/1
40 TABLET, FILM COATED, EXTENDED RELEASE ORAL ONCE
Status: COMPLETED | OUTPATIENT
Start: 2017-01-19 | End: 2017-01-19

## 2017-01-19 RX ORDER — CEFTRIAXONE 2 G/1
2 INJECTION, POWDER, FOR SOLUTION INTRAMUSCULAR; INTRAVENOUS ONCE
Status: COMPLETED | OUTPATIENT
Start: 2017-01-19 | End: 2017-01-19

## 2017-01-19 RX ORDER — SODIUM CHLORIDE 9 MG/ML
1000 INJECTION, SOLUTION INTRAVENOUS ONCE
Status: COMPLETED | OUTPATIENT
Start: 2017-01-19 | End: 2017-01-19

## 2017-01-19 RX ADMIN — FAMOTIDINE 20 MG: 10 INJECTION INTRAVENOUS at 05:52

## 2017-01-19 RX ADMIN — POTASSIUM CHLORIDE 40 MEQ: 750 TABLET, FILM COATED, EXTENDED RELEASE ORAL at 06:48

## 2017-01-19 RX ADMIN — PROCHLORPERAZINE EDISYLATE 10 MG: 5 INJECTION INTRAMUSCULAR; INTRAVENOUS at 07:19

## 2017-01-19 RX ADMIN — SODIUM CHLORIDE 1000 ML: 9 INJECTION, SOLUTION INTRAVENOUS at 05:49

## 2017-01-19 RX ADMIN — HYDROMORPHONE HYDROCHLORIDE 1 MG: 1 INJECTION, SOLUTION INTRAMUSCULAR; INTRAVENOUS; SUBCUTANEOUS at 07:19

## 2017-01-19 RX ADMIN — CEFTRIAXONE 2 G: 2 INJECTION, POWDER, FOR SOLUTION INTRAMUSCULAR; INTRAVENOUS at 06:44

## 2017-01-19 RX ADMIN — ONDANSETRON 4 MG: 2 INJECTION, SOLUTION INTRAMUSCULAR; INTRAVENOUS at 05:51

## 2017-01-19 NOTE — ED AVS SNAPSHOT
Fooda Access Code: [email or web address]  Expires: 2/17/2017 12:05 PM    Fooda  A secure, online tool to manage your health information     [employer]’s Fooda® is a secure, online tool that connects you to your personalized health information from the privacy of your home -- day or night - making it very easy for you to manage your healthcare. Once the activation process is completed, you can even access your medical information using the Fooda anish, which is available for free in the Apple Anish store or Google Play store.     Fooda provides the following levels of access (as shown below):   My Chart Features   [facility] Primary Care Doctor [facility]  Specialists [facility]  Urgent  Care Non-[facility]  Primary Care  Doctor   Email your healthcare team securely and privately 24/7 X X X X   Manage appointments: schedule your next appointment; view details of past/upcoming appointments X      Request prescription refills. X      View recent personal medical records, including lab and immunizations X X X X   View health record, including health history, allergies, medications X X X X   Read reports about your outpatient visits, procedures, consult and ER notes X X X X   See your discharge summary, which is a recap of your hospital and/or ER visit that includes your diagnosis, lab results, and care plan. X X       How to register for Fooda:  1. Go to  [email or web address].  2. Click on the Sign Up Now box, which takes you to the New Member Sign Up page. You will need to provide the following information:  a. Enter your Fooda Access Code exactly as it appears at the top of this page. (You will not need to use this code after you’ve completed the sign-up process. If you do not sign up before the expiration date, you must request a new code.)   b. Enter your date of birth.   c. Enter your home email address.   d. Click Submit, and follow the next screen’s instructions.  3. Create a Fooda ID. This will be your Fooda  login ID and cannot be changed, so think of one that is secure and easy to remember.  4. Create a [employer] password. You can change your password at any time.  5. Enter your Password Reset Question and Answer. This can be used at a later time if you forget your password.   6. Enter your e-mail address. This allows you to receive e-mail notifications when new information is available in [employer]With.  7. Click Sign Up. You can now view your health information.    For assistance activating your [employer]With account, call [phone]

## 2017-01-19 NOTE — DISCHARGE INSTRUCTIONS
Nausea and Vomiting  Nausea is a sick feeling that often comes before throwing up (vomiting). Vomiting is a reflex where stomach contents come out of your mouth. Vomiting can cause severe loss of body fluids (dehydration). Children and elderly adults can become dehydrated quickly, especially if they also have diarrhea. Nausea and vomiting are symptoms of a condition or disease. It is important to find the cause of your symptoms.  CAUSES   · Direct irritation of the stomach lining. This irritation can result from increased acid production (gastroesophageal reflux disease), infection, food poisoning, taking certain medicines (such as nonsteroidal anti-inflammatory drugs), alcohol use, or tobacco use.  · Signals from the brain. These signals could be caused by a headache, heat exposure, an inner ear disturbance, increased pressure in the brain from injury, infection, a tumor, or a concussion, pain, emotional stimulus, or metabolic problems.  · An obstruction in the gastrointestinal tract (bowel obstruction).  · Illnesses such as diabetes, hepatitis, gallbladder problems, appendicitis, kidney problems, cancer, sepsis, atypical symptoms of a heart attack, or eating disorders.  · Medical treatments such as chemotherapy and radiation.  · Receiving medicine that makes you sleep (general anesthetic) during surgery.  DIAGNOSIS  Your caregiver may ask for tests to be done if the problems do not improve after a few days. Tests may also be done if symptoms are severe or if the reason for the nausea and vomiting is not clear. Tests may include:  · Urine tests.  · Blood tests.  · Stool tests.  · Cultures (to look for evidence of infection).  · X-rays or other imaging studies.  Test results can help your caregiver make decisions about treatment or the need for additional tests.  TREATMENT  You need to stay well hydrated. Drink frequently but in small amounts. You may wish to drink water, sports drinks, clear broth, or eat frozen  ice pops or gelatin dessert to help stay hydrated. When you eat, eating slowly may help prevent nausea. There are also some antinausea medicines that may help prevent nausea.  HOME CARE INSTRUCTIONS   · Take all medicine as directed by your caregiver.  · If you do not have an appetite, do not force yourself to eat. However, you must continue to drink fluids.  · If you have an appetite, eat a normal diet unless your caregiver tells you differently.  ¨ Eat a variety of complex carbohydrates (rice, wheat, potatoes, bread), lean meats, yogurt, fruits, and vegetables.  ¨ Avoid high-fat foods because they are more difficult to digest.  · Drink enough water and fluids to keep your urine clear or pale yellow.  · If you are dehydrated, ask your caregiver for specific rehydration instructions. Signs of dehydration may include:  ¨ Severe thirst.  ¨ Dry lips and mouth.  ¨ Dizziness.  ¨ Dark urine.  ¨ Decreasing urine frequency and amount.  ¨ Confusion.  ¨ Rapid breathing or pulse.  SEEK IMMEDIATE MEDICAL CARE IF:   · You have blood or brown flecks (like coffee grounds) in your vomit.  · You have black or bloody stools.  · You have a severe headache or stiff neck.  · You are confused.  · You have severe abdominal pain.  · You have chest pain or trouble breathing.  · You do not urinate at least once every 8 hours.  · You develop cold or clammy skin.  · You continue to vomit for longer than 24 to 48 hours.  · You have a fever.  MAKE SURE YOU:   · Understand these instructions.  · Will watch your condition.  · Will get help right away if you are not doing well or get worse.     This information is not intended to replace advice given to you by your health care provider. Make sure you discuss any questions you have with your health care provider.     Document Released: 12/18/2006 Document Revised: 03/11/2013 Document Reviewed: 05/16/2012  [employer] [facility] Interactive Patient Education ©2016 [employer]

## 2017-01-19 NOTE — ED NOTES
"Chief Complaint   Patient presents with   • Abdominal Pain     Bilat upper, started yesterday morning   • Nausea/Vomiting/Diarrhea     Per wife pt vomiting Q5min, pt states vomit is brown in color, diarrhea began yesterday       Pulse 88  Temp(Src) 37 °C (98.6 °F)  Ht 1.803 m (5' 10.98\")  Wt 81.8 kg (180 lb 5.4 oz)  BMI 25.16 kg/m2  SpO2 100%    "

## 2017-01-19 NOTE — ED PROVIDER NOTES
ED Provider Note    CHIEF COMPLAINT  Chief Complaint   Patient presents with   • Abdominal Pain     Bilat upper, started yesterday morning   • Nausea/Vomiting/Diarrhea     Per wife pt vomiting Q5min, pt states vomit is brown in color, diarrhea began yesterday       HPI  Cayetano Hawkins is a 41 y.o. male who presents to emergency department the chief complaint of abdominal pain. The patient localizes pain to bilateral upper quadrants. Associated with nausea and vomiting some diarrhea. Vomiting started yesterday. He's been vomiting constantly throughout the course of the day and night. He has not had any fevers. No hematemesis. No melena.    REVIEW OF SYSTEMS  See HPI for further details. All other systems are negative.     PAST MEDICAL HISTORY  Past Medical History   Diagnosis Date   • Hepatitis C carrier (CMS-HCC)    • Hypertension    • Arthritis right hip   • Backpain    • Diabetes    • Infectious disease        FAMILY HISTORY  Family History   Problem Relation Age of Onset   • Diabetes Mother    • Hyperlipidemia Mother    • Arthritis Mother    • Heart Attack Father    • Heart Disease Father    • Hypertension Father    • Stroke Father    • Hyperlipidemia Father    • Arthritis Father        SOCIAL HISTORY  Social History     Social History   • Marital Status:      Spouse Name: N/A   • Number of Children: N/A   • Years of Education: N/A     Social History Main Topics   • Smoking status: Former Smoker -- 0.50 packs/day for 10 years     Quit date: 01/01/2012   • Smokeless tobacco: Never Used      Comment: 5 yrs ago   • Alcohol Use: No      Comment: occ   • Drug Use: No      Comment: conor in past   • Sexual Activity: Not Asked     Other Topics Concern   • None     Social History Narrative       SURGICAL HISTORY  Past Surgical History   Procedure Laterality Date   • Raquel by laparoscopy     • Cholecystectomy     • Irrigation & debridement ortho Right 4/4/2016     Procedure: IRRIGATION & DEBRIDEMENT  "ORTHO FOOT - AMPUTATION RIGHT FIFTH TOE ;  Surgeon: Hitesh Sol M.D.;  Location: SURGERY [facility];  Service:    • Irrigation & debridement ortho Right 4/10/2016     Procedure: IRRIGATION & DEBRIDEMENT ORTHO-poss ray resection, poss below knee amputation ;  Surgeon: Hitesh Sol M.D.;  Location: SURGERY [facility];  Service:    • Irrigation & debridement ortho Right 4/13/2016     Procedure: IRRIGATION & DEBRIDEMENT AND CLOSURE OF ORTHO FOOT WOUND;  Surgeon: Hitesh Sol M.D.;  Location: SURGERY [facility];  Service:    • Other  hepatitis c        CURRENT MEDICATIONS  Home Medications     Reviewed by Dorita Allen R.N. (Registered Nurse) on 01/19/17 at 0532  Med List Status: Complete    Medication Last Dose Status    gabapentin (NEURONTIN) 100 MG Cap 1/17/2017 Active    hydrocodone/acetaminophen (NORCO)  MG Tab 1/17/2017 Active    insulin detemir (LEVEMIR) 100 UNIT/ML Solution 1/18/2017 Active    metformin (GLUCOPHAGE) 500 MG Tab 1/18/2017 Active    metoclopramide (REGLAN) 10 MG Tab 1/18/2017 Active    metoclopramide (REGLAN) 10 MG Tab  Active    ondansetron (ZOFRAN ODT) 4 MG TABLET DISPERSIBLE 1/10/2017 Active    pantoprazole (PROTONIX) 40 MG Tablet Delayed Response 1/15/2017 Active    promethazine (PHENERGAN) 25 MG Tab 1/19/2017 Active                ALLERGIES  Allergies   Allergen Reactions   • Bee      Bee stings       PHYSICAL EXAM  VITAL SIGNS: Pulse 70  Temp(Src) 37 °C (98.6 °F)  Ht 1.803 m (5' 10.98\")  Wt 81.8 kg (180 lb 5.4 oz)  BMI 25.16 kg/m2  SpO2 99%  Constitutional: Well developed, Well nourished, mild distress, Non-toxic appearance.   HENT: Normocephalic, Atraumatic, Bilateral external ears normal, Oropharynx slightly dry, No oral exudates, Nose normal.   Eyes: PERRL, EOMI, Conjunctiva normal, No discharge.   Neck: Normal range of motion, No tenderness, Supple, No stridor.   Lymphatic: No lymphadenopathy noted.   Cardiovascular: Normal heart rate, " Normal rhythm, No murmurs, No rubs, No gallops.   Thorax & Lungs: Normal breath sounds, No respiratory distress, No wheezing, No chest tenderness.   Abdomen: Soft, mild tenderness to palpation,point tenderness, no peritoneal signs, active bowel sounds.   Skin: Warm, Dry, No erythema, No rash.   Back: No tenderness, No CVA tenderness.   Extremities: Intact distal pulses, No edema, No tenderness, No cyanosis, No clubbing.   Neurologic: Alert & oriented x 3, Normal motor function, Normal sensory function, No focal deficits noted.       RADIOLOGY/PROCEDURES  No orders to display     Results for orders placed or performed during the hospital encounter of 01/19/17   CBC WITH DIFFERENTIAL   Result Value Ref Range    WBC 14.9 (H) 4.8 - 10.8 K/uL    RBC 5.69 4.70 - 6.10 M/uL    Hemoglobin 17.1 14.0 - 18.0 g/dL    Hematocrit 47.5 42.0 - 52.0 %    MCV 83.5 81.4 - 97.8 fL    MCH 30.1 27.0 - 33.0 pg    MCHC 36.0 (H) 33.7 - 35.3 g/dL    RDW 40.6 35.9 - 50.0 fL    Platelet Count 316 164 - 446 K/uL    MPV 9.0 9.0 - 12.9 fL    Neutrophils-Polys 82.60 (H) 44.00 - 72.00 %    Lymphocytes 13.00 (L) 22.00 - 41.00 %    Monocytes 3.80 0.00 - 13.40 %    Eosinophils 0.00 0.00 - 6.90 %    Basophils 0.20 0.00 - 1.80 %    Immature Granulocytes 0.40 0.00 - 0.90 %    Nucleated RBC 0.00 /100 WBC    Neutrophils (Absolute) 12.29 (H) 1.82 - 7.42 K/uL    Lymphs (Absolute) 1.94 1.00 - 4.80 K/uL    Monos (Absolute) 0.57 0.00 - 0.85 K/uL    Eos (Absolute) 0.00 0.00 - 0.51 K/uL    Baso (Absolute) 0.03 0.00 - 0.12 K/uL    Immature Granulocytes (abs) 0.06 0.00 - 0.11 K/uL    NRBC (Absolute) 0.00 K/uL   COMP METABOLIC PANEL   Result Value Ref Range    Sodium 141 135 - 145 mmol/L    Potassium 3.2 (L) 3.6 - 5.5 mmol/L    Chloride 102 96 - 112 mmol/L    Co2 23 20 - 33 mmol/L    Anion Gap 16.0 (H) 0.0 - 11.9    Glucose 212 (H) 65 - 99 mg/dL    Bun 12 8 - 22 mg/dL    Creatinine 0.98 0.50 - 1.40 mg/dL    Calcium 10.2 8.4 - 10.2 mg/dL    AST(SGOT) 36 12 - 45 U/L     ALT(SGPT) 46 2 - 50 U/L    Alkaline Phosphatase 111 (H) 30 - 99 U/L    Total Bilirubin 1.3 0.1 - 1.5 mg/dL    Albumin 5.0 (H) 3.2 - 4.9 g/dL    Total Protein 8.2 6.0 - 8.2 g/dL    Globulin 3.2 1.9 - 3.5 g/dL    A-G Ratio 1.6 g/dL   LIPASE   Result Value Ref Range    Lipase 26 7 - 58 U/L   URINALYSIS (UA)   Result Value Ref Range    Micro Urine Req Microscopic     Color Yellow     Character Clear     Specific Gravity 1.020 <1.035    Ph 6.5 5.0-8.0    Glucose Negative Negative mg/dL    Ketones >=80 (A) Negative mg/dL    Protein 30 (A) Negative mg/dL    Nitrite Negative Negative    Leukocyte Esterase Negative Negative    Occult Blood Trace (A) Negative   UR BILI ICTOTEST   Result Value Ref Range    Bilirubin Negative Negative   URINE MICROSCOPIC (W/UA)   Result Value Ref Range    WBC 10-20 (A) /hpf    RBC 2-5 (A) /hpf    Bacteria Few (A) None /hpf    Epithelial Cells Few Few /hpf    Mucous Threads Many /hpf   ESTIMATED GFR   Result Value Ref Range    GFR If African American >60 >60 mL/min/1.73 m 2    GFR If Non African American >60 >60 mL/min/1.73 m 2   FLUID OCCULT BLOOD   Result Value Ref Range    Fluid Type Gastric     Occult Blood Positive (A) Negative   CHLAMYDIA/GC PCR URINE OR SWAB   Result Value Ref Range    Source Urine     C. trachomatis by PCR Negative Negative    N. gonorrhoeae by PCR Negative Negative   TROPONIN   Result Value Ref Range    Troponin I 0.03 0.00 - 0.04 ng/mL   UR DRUG SCREEN([facility] ONLY)   Result Value Ref Range    Phencyclidine -Pcp Negative Negative    Benzodiazepines Negative Negative    Cocaine Metabolite Negative Negative    Amphetamines By Triage Negative Negative    Urine THC Positive (A) Negative    Codeine-Morphine Positive (A) Negative    Barbiturates Negative Negative    Tricyclic Antidepressants Negative Negative   EKG (NOW)   Result Value Ref Range    Report       [facility] Emergency Dept.    Test Date:  2017-01-19  Pt Name:    SAM HENDRIX                 Department: [facility]  MRN:        [id number]                      [facility]  Gender:     M                            Technician: BENNY  :        1975                   Requested By:MARK ANDRES  Order #:    [id number]                    Reading MD: ANJALI ARRIAZA MD    Measurements  Intervals                                Axis  Rate:       82                           P:          79  GA:         149                          QRS:        87  QRSD:       134                          T:          45  QT:         473  QTc:        553    Interpretive Statements  Sinus rhythm  Probable left atrial enlargement  Right bundle branch block  Baseline wander in lead(s) V3  Compared to ECG 2016 13:30:55  Right bundle-branch block now present  Right ventricular hypertrophy no longer present    Electronically Signed On 2017 6:42:36 PST by ANJALI ARRIAZA MD            COURSE & MEDICAL DECISION MAKING  Pertinent Labs & Imaging studies reviewed. (See chart for details)    The patient presents today with mild diffuse abdominal discomfort. He has a fairly benign abdominal exam. He said extensive nausea, vomiting, diarrhea. An IV is established. The patient given fluid bolus. He was treated with antiemetics. Initial dose of Zofran was partially effective. He is going treated with Compazine with good result.    Patient has mild hypokalemia. He was given replacement dose of potassium. On recheck the patient's abdominal pain is improved    8:00 AM the patient has done well during my period of observation. His laboratory studies are remarkable for hypokalemia. He's been treated for hypokalemia in the emergency department. He was treated with Compazine, IV fluids, pain medication with good resolution of his symptoms. Overall the patient is significantly improved. Repeat abdominal exam is benign. The patient will be discharged home in improved condition.    FINAL IMPRESSION  1. Nausea and vomiting,  intractability of vomiting not specified, unspecified vomiting type    2. Marijuana use    3. Hypokalemia    4. Acute cystitis without hematuria            Electronically signed by: Ashutosh Smith, 1/26/2017 11:25 AM

## 2017-01-19 NOTE — ED AVS SNAPSHOT
After Visit Summary                                                                                                                Cayetano Hawkins   MRN: [id number]    Department:  [facility], Emergency Dept   Date of Visit:  1/19/2017            [facility], Emergency Dept    [address] [place] [address]    Phone:  [phone]      You were seen by     Ashutosh Smith M.D.      Your Diagnosis Was     Nausea and vomiting, intractability of vomiting not specified, unspecified vomiting type     R11.2       These are the medications you received during your hospitalization from 01/19/2017 0518 to 01/19/2017 0805     Date/Time Order Dose Route Action    01/19/2017 0549 NS infusion 1,000 mL 1,000 mL Intravenous New Bag    01/19/2017 0551 ondansetron (ZOFRAN) syringe/vial injection 4 mg 4 mg Intravenous Given    01/19/2017 0552 famotidine (PEPCID) injection 20 mg 20 mg Intravenous Given    01/19/2017 0644 cefTRIAXone (ROCEPHIN) injection 2 g 2 g Intravenous Given    01/19/2017 0648 potassium chloride ER (KLOR-CON) tablet 40 mEq 40 mEq Oral Given    01/19/2017 0719 HYDROmorphone (DILAUDID) injection 1 mg 1 mg Intravenous Given    01/19/2017 0719 prochlorperazine (COMPAZINE) injection 10 mg 10 mg Intramuscular Given      Follow-up Information     1. Follow up with [facility], Emergency Dept.    Specialty:  Emergency Medicine    Why:  If symptoms worsen    Contact information    [address] [place] [address] [phone]        2. Schedule an appointment as soon as possible for a visit with Landon Collins D.O..    Specialty:  Family Medicine    Contact information    [address]   [address]  [place] [address]  [phone]        Medication Information     Review all of your home medications and newly ordered medications with your primary doctor and/or pharmacist as soon as possible. Follow medication instructions as  directed by your doctor and/or pharmacist.     Please keep your complete medication list with you and share with your physician. Update the information when medications are discontinued, doses are changed, or new medications (including over-the-counter products) are added; and carry medication information at all times in the event of emergency situations.               Medication List      START taking these medications        Instructions    cefdinir 300 MG Caps   Commonly known as:  OMNICEF    Take 1 Cap by mouth 2 times a day for 7 days.   Dose:  300 mg         ASK your doctor about these medications        Instructions    gabapentin 100 MG Caps   Commonly known as:  NEURONTIN    Take 100 mg by mouth every evening.   Dose:  100 mg       hydrocodone/acetaminophen  MG Tabs   Commonly known as:  NORCO    Take 1-2 Tabs by mouth every 6 hours as needed.   Dose:  1-2 Tab       insulin detemir 100 UNIT/ML Soln   Commonly known as:  LEVEMIR    Inject 30 Units as instructed every morning.   Dose:  30 Units       metformin 500 MG Tabs   Commonly known as:  GLUCOPHAGE    Take 1,000 mg by mouth 2 times a day, with meals.   Dose:  1000 mg       * metoclopramide 10 MG Tabs   Commonly known as:  REGLAN    Take 10 mg by mouth 4 times a day.   Dose:  10 mg       * metoclopramide 10 MG Tabs   Commonly known as:  REGLAN    Take 1 Tab by mouth 3 times a day before meals.   Dose:  10 mg       ondansetron 4 MG Tbdp   Commonly known as:  ZOFRAN ODT    Take 1 Tab by mouth every 6 hours as needed for Nausea/Vomiting.   Dose:  4 mg       pantoprazole 40 MG Tbec   Commonly known as:  PROTONIX    Take 1 Tab by mouth every day.   Dose:  40 mg       promethazine 25 MG Tabs   Commonly known as:  PHENERGAN    Take 1 Tab by mouth every 6 hours as needed for Nausea/Vomiting.   Dose:  25 mg       * Notice:  This list has 2 medication(s) that are the same as other medications prescribed for you. Read the directions carefully, and ask your  doctor or other care provider to review them with you.            Procedures and tests performed during your visit     CBC WITH DIFFERENTIAL    COMP METABOLIC PANEL    EKG (NOW)    ESTIMATED GFR    FLUID OCCULT BLOOD    IV Saline Lock    LIPASE    TROPONIN    UR BILI ICTOTEST    UR DRUG SCREEN([place] ONLY)    URINALYSIS (UA)    URINE MICROSCOPIC (W/UA)        Discharge Instructions       Nausea and Vomiting  Nausea is a sick feeling that often comes before throwing up (vomiting). Vomiting is a reflex where stomach contents come out of your mouth. Vomiting can cause severe loss of body fluids (dehydration). Children and elderly adults can become dehydrated quickly, especially if they also have diarrhea. Nausea and vomiting are symptoms of a condition or disease. It is important to find the cause of your symptoms.  CAUSES   · Direct irritation of the stomach lining. This irritation can result from increased acid production (gastroesophageal reflux disease), infection, food poisoning, taking certain medicines (such as nonsteroidal anti-inflammatory drugs), alcohol use, or tobacco use.  · Signals from the brain. These signals could be caused by a headache, heat exposure, an inner ear disturbance, increased pressure in the brain from injury, infection, a tumor, or a concussion, pain, emotional stimulus, or metabolic problems.  · An obstruction in the gastrointestinal tract (bowel obstruction).  · Illnesses such as diabetes, hepatitis, gallbladder problems, appendicitis, kidney problems, cancer, sepsis, atypical symptoms of a heart attack, or eating disorders.  · Medical treatments such as chemotherapy and radiation.  · Receiving medicine that makes you sleep (general anesthetic) during surgery.  DIAGNOSIS  Your caregiver may ask for tests to be done if the problems do not improve after a few days. Tests may also be done if symptoms are severe or if the reason for the nausea and vomiting is not clear. Tests may  include:  · Urine tests.  · Blood tests.  · Stool tests.  · Cultures (to look for evidence of infection).  · X-rays or other imaging studies.  Test results can help your caregiver make decisions about treatment or the need for additional tests.  TREATMENT  You need to stay well hydrated. Drink frequently but in small amounts. You may wish to drink water, sports drinks, clear broth, or eat frozen ice pops or gelatin dessert to help stay hydrated. When you eat, eating slowly may help prevent nausea. There are also some antinausea medicines that may help prevent nausea.  HOME CARE INSTRUCTIONS   · Take all medicine as directed by your caregiver.  · If you do not have an appetite, do not force yourself to eat. However, you must continue to drink fluids.  · If you have an appetite, eat a normal diet unless your caregiver tells you differently.  ¨ Eat a variety of complex carbohydrates (rice, wheat, potatoes, bread), lean meats, yogurt, fruits, and vegetables.  ¨ Avoid high-fat foods because they are more difficult to digest.  · Drink enough water and fluids to keep your urine clear or pale yellow.  · If you are dehydrated, ask your caregiver for specific rehydration instructions. Signs of dehydration may include:  ¨ Severe thirst.  ¨ Dry lips and mouth.  ¨ Dizziness.  ¨ Dark urine.  ¨ Decreasing urine frequency and amount.  ¨ Confusion.  ¨ Rapid breathing or pulse.  SEEK IMMEDIATE MEDICAL CARE IF:   · You have blood or brown flecks (like coffee grounds) in your vomit.  · You have black or bloody stools.  · You have a severe headache or stiff neck.  · You are confused.  · You have severe abdominal pain.  · You have chest pain or trouble breathing.  · You do not urinate at least once every 8 hours.  · You develop cold or clammy skin.  · You continue to vomit for longer than 24 to 48 hours.  · You have a fever.  MAKE SURE YOU:   · Understand these instructions.  · Will watch your condition.  · Will get help right away if  you are not doing well or get worse.     This information is not intended to replace advice given to you by your health care provider. Make sure you discuss any questions you have with your health care provider.     Document Released: 12/18/2006 Document Revised: 03/11/2013 Document Reviewed: 05/16/2012  [employer] Interactive Patient Education ©2016 [employer]            Patient Information     Patient Information    Following emergency treatment: all patient requiring follow-up care must return either to a private physician or a clinic if your condition worsens before you are able to obtain further medical attention, please return to the emergency room.     Billing Information    At [facility], we work to make the billing process streamlined for our patients.  Our Representatives are here to answer any questions you may have regarding your hospital bill.  If you have insurance coverage and have supplied your insurance information to us, we will submit a claim to your insurer on your behalf.  Should you have any questions regarding your bill, we can be reached online or by phone as follows:  Online: You are able pay your bills online or live chat with our representatives about any billing questions you may have. We are here to help Monday - Friday from 8:00am to 7:30pm and 9:00am - 12:00pm on Saturdays.  Please visit [email or web address]  for more information.   Phone:  [phone] or [phone]    Please note that your emergency physician, surgeon, pathologist, radiologist, anesthesiologist, and other specialists are not employed by [facility] and will therefore bill separately for their services.  Please contact them directly for any questions concerning their bills at the numbers below:     Emergency Physician Services:  [phone]  [facility]:  [phone]  [facility]:  [phone]  [facility]:  [phone]    1. Your  final bill may vary from the amount quoted upon discharge if all procedures are not complete at that time, or if your doctor has additional procedures of which we are not aware. You will receive an additional bill if you return to the Emergency Department at [facility] for suture removal regardless of the facility of which the sutures were placed.     2. Please arrange for settlement of this account at the emergency registration.    3. All self-pay accounts are due in full at the time of treatment.  If you are unable to meet this obligation then payment is expected within 4-5 days.     4. If you have had radiology studies (CT, X-ray, Ultrasound, MRI), you have received a preliminary result during your emergency department visit. Please contact the radiology department [phone] to receive a copy of your final result. Please discuss the Final result with your primary physician or with the follow up physician provided.     Crisis Hotline:  [place] Crisis Hotline:  0-027-QMIEOQH or 1-579.883.9637  [place] Crisis Hotline:    1-537.252.2363 or 540-924-6400         ED Discharge Follow Up Questions    1. In order to provide you with very good care, we would like to follow up with a phone call in the next few days.  May we have your permission to contact you?     YES /  NO    2. What is the best phone number to call you? (       )_____-__________    3. What is the best time to call you?      Morning  /  Afternoon  /  Evening                   Patient Signature:  ____________________________________________________________    Date:  ____________________________________________________________      Your appointments     Feb 14, 2017  8:30 AM   NM-GASTRIC EMPTYING(TOUGAS METHOD) with [facility]   [facility]    [address] [place] [id number] [phone]

## 2017-01-27 ENCOUNTER — HOSPITAL ENCOUNTER (OUTPATIENT)
Dept: LAB | Facility: MEDICAL CENTER | Age: 42
End: 2017-01-27
Attending: FAMILY MEDICINE
Payer: COMMERCIAL

## 2017-01-27 LAB
ALBUMIN SERPL BCP-MCNC: 4.4 G/DL (ref 3.2–4.9)
ALBUMIN/GLOB SERPL: 1.5 G/DL
ALP SERPL-CCNC: 130 U/L (ref 30–99)
ALT SERPL-CCNC: 64 U/L (ref 2–50)
ANION GAP SERPL CALC-SCNC: 10 MMOL/L (ref 0–11.9)
AST SERPL-CCNC: 31 U/L (ref 12–45)
BASOPHILS # BLD AUTO: 0.06 K/UL (ref 0–0.12)
BASOPHILS NFR BLD AUTO: 0.7 % (ref 0–1.8)
BILIRUB SERPL-MCNC: 0.4 MG/DL (ref 0.1–1.5)
BUN SERPL-MCNC: 9 MG/DL (ref 8–22)
CALCIUM SERPL-MCNC: 9.8 MG/DL (ref 8.5–10.5)
CHLORIDE SERPL-SCNC: 105 MMOL/L (ref 96–112)
CO2 SERPL-SCNC: 30 MMOL/L (ref 20–33)
CREAT SERPL-MCNC: 0.71 MG/DL (ref 0.5–1.4)
EOSINOPHIL # BLD: 0.24 K/UL (ref 0–0.51)
EOSINOPHIL NFR BLD AUTO: 2.6 % (ref 0–6.9)
ERYTHROCYTE [DISTWIDTH] IN BLOOD BY AUTOMATED COUNT: 41.4 FL (ref 35.9–50)
GLOBULIN SER CALC-MCNC: 2.9 G/DL (ref 1.9–3.5)
GLUCOSE SERPL-MCNC: 130 MG/DL (ref 65–99)
HCT VFR BLD AUTO: 46.1 % (ref 42–52)
HGB BLD-MCNC: 15.3 G/DL (ref 14–18)
IMM GRANULOCYTES # BLD AUTO: 0.06 K/UL (ref 0–0.11)
IMM GRANULOCYTES NFR BLD AUTO: 0.7 % (ref 0–0.9)
LYMPHOCYTES # BLD: 3.17 K/UL (ref 1–4.8)
LYMPHOCYTES NFR BLD AUTO: 34.7 % (ref 22–41)
MCH RBC QN AUTO: 29 PG (ref 27–33)
MCHC RBC AUTO-ENTMCNC: 33.2 G/DL (ref 33.7–35.3)
MCV RBC AUTO: 87.3 FL (ref 81.4–97.8)
MONOCYTES # BLD: 0.57 K/UL (ref 0–0.85)
MONOCYTES NFR BLD AUTO: 6.2 % (ref 0–13.4)
NEUTROPHILS # BLD: 5.04 K/UL (ref 1.82–7.42)
NEUTROPHILS NFR BLD AUTO: 55.1 % (ref 44–72)
NRBC # BLD AUTO: 0 K/UL
NRBC BLD-RTO: 0 /100 WBC
PLATELET # BLD AUTO: 353 K/UL (ref 164–446)
PMV BLD AUTO: 9.4 FL (ref 9–12.9)
POTASSIUM SERPL-SCNC: 3.7 MMOL/L (ref 3.6–5.5)
PROT SERPL-MCNC: 7.3 G/DL (ref 6–8.2)
RBC # BLD AUTO: 5.28 M/UL (ref 4.7–6.1)
SODIUM SERPL-SCNC: 145 MMOL/L (ref 135–145)
WBC # BLD AUTO: 9.1 K/UL (ref 4.8–10.8)

## 2017-01-27 PROCEDURE — 85025 COMPLETE CBC W/AUTO DIFF WBC: CPT

## 2017-01-27 PROCEDURE — 36415 COLL VENOUS BLD VENIPUNCTURE: CPT

## 2017-01-27 PROCEDURE — 80053 COMPREHEN METABOLIC PANEL: CPT

## 2017-04-12 ENCOUNTER — HOSPITAL ENCOUNTER (EMERGENCY)
Facility: MEDICAL CENTER | Age: 42
End: 2017-04-12
Attending: EMERGENCY MEDICINE
Payer: COMMERCIAL

## 2017-04-12 VITALS
DIASTOLIC BLOOD PRESSURE: 87 MMHG | BODY MASS INDEX: 26.05 KG/M2 | HEIGHT: 71 IN | TEMPERATURE: 98.1 F | SYSTOLIC BLOOD PRESSURE: 137 MMHG | RESPIRATION RATE: 18 BRPM | HEART RATE: 79 BPM | OXYGEN SATURATION: 90 % | WEIGHT: 186.07 LBS

## 2017-04-12 DIAGNOSIS — F12.90 MARIJUANA USE: ICD-10-CM

## 2017-04-12 DIAGNOSIS — R11.15 NON-INTRACTABLE CYCLICAL VOMITING WITH NAUSEA: ICD-10-CM

## 2017-04-12 LAB
ALBUMIN SERPL BCP-MCNC: 4.3 G/DL (ref 3.2–4.9)
ALBUMIN/GLOB SERPL: 1.4 G/DL
ALP SERPL-CCNC: 124 U/L (ref 30–99)
ALT SERPL-CCNC: 46 U/L (ref 2–50)
ANION GAP SERPL CALC-SCNC: 12 MMOL/L (ref 0–11.9)
AST SERPL-CCNC: 25 U/L (ref 12–45)
BASOPHILS # BLD AUTO: 0.2 % (ref 0–1.8)
BASOPHILS # BLD: 0.03 K/UL (ref 0–0.12)
BILIRUB SERPL-MCNC: 1.4 MG/DL (ref 0.1–1.5)
BUN SERPL-MCNC: 12 MG/DL (ref 8–22)
CALCIUM SERPL-MCNC: 9.6 MG/DL (ref 8.4–10.2)
CHLORIDE SERPL-SCNC: 105 MMOL/L (ref 96–112)
CO2 SERPL-SCNC: 22 MMOL/L (ref 20–33)
CREAT SERPL-MCNC: 1.09 MG/DL (ref 0.5–1.4)
EOSINOPHIL # BLD AUTO: 0 K/UL (ref 0–0.51)
EOSINOPHIL NFR BLD: 0 % (ref 0–6.9)
ERYTHROCYTE [DISTWIDTH] IN BLOOD BY AUTOMATED COUNT: 42.2 FL (ref 35.9–50)
GFR SERPL CREATININE-BSD FRML MDRD: >60 ML/MIN/1.73 M 2
GLOBULIN SER CALC-MCNC: 3.1 G/DL (ref 1.9–3.5)
GLUCOSE SERPL-MCNC: 299 MG/DL (ref 65–99)
HCT VFR BLD AUTO: 48.4 % (ref 42–52)
HGB BLD-MCNC: 17.1 G/DL (ref 14–18)
IMM GRANULOCYTES # BLD AUTO: 0.09 K/UL (ref 0–0.11)
IMM GRANULOCYTES NFR BLD AUTO: 0.6 % (ref 0–0.9)
LIPASE SERPL-CCNC: 16 U/L (ref 7–58)
LYMPHOCYTES # BLD AUTO: 1.52 K/UL (ref 1–4.8)
LYMPHOCYTES NFR BLD: 9.5 % (ref 22–41)
MCH RBC QN AUTO: 29.9 PG (ref 27–33)
MCHC RBC AUTO-ENTMCNC: 35.3 G/DL (ref 33.7–35.3)
MCV RBC AUTO: 84.8 FL (ref 81.4–97.8)
MONOCYTES # BLD AUTO: 0.87 K/UL (ref 0–0.85)
MONOCYTES NFR BLD AUTO: 5.4 % (ref 0–13.4)
NEUTROPHILS # BLD AUTO: 13.52 K/UL (ref 1.82–7.42)
NEUTROPHILS NFR BLD: 84.3 % (ref 44–72)
NRBC # BLD AUTO: 0 K/UL
NRBC BLD AUTO-RTO: 0 /100 WBC
PLATELET # BLD AUTO: 269 K/UL (ref 164–446)
PMV BLD AUTO: 9.4 FL (ref 9–12.9)
POTASSIUM SERPL-SCNC: 3.8 MMOL/L (ref 3.6–5.5)
PROT SERPL-MCNC: 7.4 G/DL (ref 6–8.2)
RBC # BLD AUTO: 5.71 M/UL (ref 4.7–6.1)
SODIUM SERPL-SCNC: 139 MMOL/L (ref 135–145)
WBC # BLD AUTO: 16 K/UL (ref 4.8–10.8)

## 2017-04-12 PROCEDURE — 700105 HCHG RX REV CODE 258: Performed by: EMERGENCY MEDICINE

## 2017-04-12 PROCEDURE — 96374 THER/PROPH/DIAG INJ IV PUSH: CPT

## 2017-04-12 PROCEDURE — 83690 ASSAY OF LIPASE: CPT

## 2017-04-12 PROCEDURE — 96361 HYDRATE IV INFUSION ADD-ON: CPT

## 2017-04-12 PROCEDURE — 80053 COMPREHEN METABOLIC PANEL: CPT

## 2017-04-12 PROCEDURE — 85025 COMPLETE CBC W/AUTO DIFF WBC: CPT

## 2017-04-12 PROCEDURE — 36415 COLL VENOUS BLD VENIPUNCTURE: CPT

## 2017-04-12 PROCEDURE — 96375 TX/PRO/DX INJ NEW DRUG ADDON: CPT

## 2017-04-12 PROCEDURE — 99284 EMERGENCY DEPT VISIT MOD MDM: CPT

## 2017-04-12 PROCEDURE — 700111 HCHG RX REV CODE 636 W/ 250 OVERRIDE (IP): Performed by: EMERGENCY MEDICINE

## 2017-04-12 RX ORDER — METOCLOPRAMIDE HYDROCHLORIDE 5 MG/ML
10 INJECTION INTRAMUSCULAR; INTRAVENOUS ONCE
Status: COMPLETED | OUTPATIENT
Start: 2017-04-12 | End: 2017-04-12

## 2017-04-12 RX ORDER — METOCLOPRAMIDE 10 MG/1
10 TABLET ORAL
Qty: 15 TAB | Refills: 0 | Status: SHIPPED | OUTPATIENT
Start: 2017-04-12 | End: 2017-11-06

## 2017-04-12 RX ORDER — SODIUM CHLORIDE 9 MG/ML
2000 INJECTION, SOLUTION INTRAVENOUS ONCE
Status: COMPLETED | OUTPATIENT
Start: 2017-04-12 | End: 2017-04-12

## 2017-04-12 RX ADMIN — HYDROMORPHONE HYDROCHLORIDE 1 MG: 1 INJECTION, SOLUTION INTRAMUSCULAR; INTRAVENOUS; SUBCUTANEOUS at 17:35

## 2017-04-12 RX ADMIN — SODIUM CHLORIDE 2000 ML: 9 INJECTION, SOLUTION INTRAVENOUS at 17:34

## 2017-04-12 RX ADMIN — METOCLOPRAMIDE 10 MG: 5 INJECTION, SOLUTION INTRAMUSCULAR; INTRAVENOUS at 17:34

## 2017-04-12 ASSESSMENT — PAIN SCALES - GENERAL
PAINLEVEL_OUTOF10: 8
PAINLEVEL_OUTOF10: 3

## 2017-04-12 NOTE — ED AVS SNAPSHOT
[employer] Access Code: [email or web address]  Expires: 4/16/2017 10:41 AM    [employer]  A secure, online tool to manage your health information     [employer]® is a secure, online tool that connects you to your personalized health information from the privacy of your home -- day or night - making it very easy for you to manage your healthcare. Once the activation process is completed, you can even access your medical information using the [employer] anish, which is available for free in the Apple Anish store or Google Play store.     [employer] provides the following levels of access (as shown below):   My Chart Features   [facility] Primary Care Doctor [facility]  Specialists [facility]  Urgent  Care Non-[facility]  Primary Care  Doctor   Email your healthcare team securely and privately 24/7 X X X X   Manage appointments: schedule your next appointment; view details of past/upcoming appointments X      Request prescription refills. X      View recent personal medical records, including lab and immunizations X X X X   View health record, including health history, allergies, medications X X X X   Read reports about your outpatient visits, procedures, consult and ER notes X X X X   See your discharge summary, which is a recap of your hospital and/or ER visit that includes your diagnosis, lab results, and care plan. X X       How to register for [employer]:  1. Go to  [email or web address].  2. Click on the Sign Up Now box, which takes you to the New Member Sign Up page. You will need to provide the following information:  a. Enter your [employer] Access Code exactly as it appears at the top of this page. (You will not need to use this code after you’ve completed the sign-up process. If you do not sign up before the expiration date, you must request a new code.)   b. Enter your date of birth.   c. Enter your home email address.   d. Click Submit, and follow the next screen’s instructions.  3. Create a [employer] ID. This will be your [employer]  login ID and cannot be changed, so think of one that is secure and easy to remember.  4. Create a [employer] password. You can change your password at any time.  5. Enter your Password Reset Question and Answer. This can be used at a later time if you forget your password.   6. Enter your e-mail address. This allows you to receive e-mail notifications when new information is available in [employer].  7. Click Sign Up. You can now view your health information.    For assistance activating your [employer] account, call [phone]

## 2017-04-12 NOTE — ED AVS SNAPSHOT
Home Care Instructions                                                                                                                Cayetano Hawkins   MRN: [id number]    Department:  [facility], Emergency Dept   Date of Visit:  4/12/2017            [facility], Emergency Dept    [address]    [place] [address]    Phone:  [phone]      You were seen by     Usama Merrill M.D.      Your Diagnosis Was     Non-intractable cyclical vomiting with nausea     G43.A0       These are the medications you received during your hospitalization from 04/12/2017 1531 to 04/12/2017 1919     Date/Time Order Dose Route Action    04/12/2017 1734 NS infusion 2,000 mL 2,000 mL Intravenous New Bag    04/12/2017 1734 metoclopramide (REGLAN) injection 10 mg 10 mg Intravenous Given    04/12/2017 1735 HYDROmorphone (DILAUDID) injection 1 mg 1 mg Intravenous Given      Follow-up Information     1. Follow up with Landon Collins D.O. In 1 day.    Specialty:  Family Medicine    Contact information    [address] [facility]  [address]  [place] [address]  [phone]        Medication Information     Review all of your home medications and newly ordered medications with your primary doctor and/or pharmacist as soon as possible. Follow medication instructions as directed by your doctor and/or pharmacist.     Please keep your complete medication list with you and share with your physician. Update the information when medications are discontinued, doses are changed, or new medications (including over-the-counter products) are added; and carry medication information at all times in the event of emergency situations.               Medication List      CONTINUE taking these medications        Instructions    Morning Afternoon Evening Bedtime    metoclopramide 10 MG Tabs   Commonly known as:  REGLAN        Take 1 Tab by mouth 3 times a day before meals.   Dose:  10 mg                          ASK  your doctor about these medications        Instructions    Morning Afternoon Evening Bedtime    gabapentin 100 MG Caps   Commonly known as:  NEURONTIN        Take 100 mg by mouth every evening.   Dose:  100 mg                        hydrocodone/acetaminophen  MG Tabs   Commonly known as:  NORCO        Take 1-2 Tabs by mouth every 6 hours as needed.   Dose:  1-2 Tab                        insulin detemir 100 UNIT/ML Soln   Commonly known as:  LEVEMIR        Inject 30 Units as instructed every morning.   Dose:  30 Units                        metformin 500 MG Tabs   Commonly known as:  GLUCOPHAGE        Take 1,000 mg by mouth 2 times a day, with meals.   Dose:  1000 mg                        ondansetron 4 MG Tbdp   Commonly known as:  ZOFRAN ODT        Take 1 Tab by mouth every 6 hours as needed for Nausea/Vomiting.   Dose:  4 mg                        pantoprazole 40 MG Tbec   Commonly known as:  PROTONIX        Take 1 Tab by mouth every day.   Dose:  40 mg                        promethazine 25 MG Tabs   Commonly known as:  PHENERGAN        Take 1 Tab by mouth every 6 hours as needed for Nausea/Vomiting.   Dose:  25 mg                             Where to Get Your Medications      You can get these medications from any pharmacy     Bring a paper prescription for each of these medications    - metoclopramide 10 MG Tabs            Procedures and tests performed during your visit     CBC WITH DIFFERENTIAL    COMP METABOLIC PANEL    ESTIMATED GFR    IV Saline Lock    LIPASE        Discharge Instructions       Nausea and Vomiting  Nausea is a sick feeling that often comes before throwing up (vomiting). Vomiting is a reflex where stomach contents come out of your mouth. Vomiting can cause severe loss of body fluids (dehydration). Children and elderly adults can become dehydrated quickly, especially if they also have diarrhea. Nausea and vomiting are symptoms of a condition or disease. It is important to find the  cause of your symptoms.  CAUSES   · Direct irritation of the stomach lining. This irritation can result from increased acid production (gastroesophageal reflux disease), infection, food poisoning, taking certain medicines (such as nonsteroidal anti-inflammatory drugs), alcohol use, or tobacco use.  · Signals from the brain. These signals could be caused by a headache, heat exposure, an inner ear disturbance, increased pressure in the brain from injury, infection, a tumor, or a concussion, pain, emotional stimulus, or metabolic problems.  · An obstruction in the gastrointestinal tract (bowel obstruction).  · Illnesses such as diabetes, hepatitis, gallbladder problems, appendicitis, kidney problems, cancer, sepsis, atypical symptoms of a heart attack, or eating disorders.  · Medical treatments such as chemotherapy and radiation.  · Receiving medicine that makes you sleep (general anesthetic) during surgery.  DIAGNOSIS  Your caregiver may ask for tests to be done if the problems do not improve after a few days. Tests may also be done if symptoms are severe or if the reason for the nausea and vomiting is not clear. Tests may include:  · Urine tests.  · Blood tests.  · Stool tests.  · Cultures (to look for evidence of infection).  · X-rays or other imaging studies.  Test results can help your caregiver make decisions about treatment or the need for additional tests.  TREATMENT  You need to stay well hydrated. Drink frequently but in small amounts. You may wish to drink water, sports drinks, clear broth, or eat frozen ice pops or gelatin dessert to help stay hydrated. When you eat, eating slowly may help prevent nausea. There are also some antinausea medicines that may help prevent nausea.  HOME CARE INSTRUCTIONS   · Take all medicine as directed by your caregiver.  · If you do not have an appetite, do not force yourself to eat. However, you must continue to drink fluids.  · If you have an appetite, eat a normal diet  unless your caregiver tells you differently.  ¨ Eat a variety of complex carbohydrates (rice, wheat, potatoes, bread), lean meats, yogurt, fruits, and vegetables.  ¨ Avoid high-fat foods because they are more difficult to digest.  · Drink enough water and fluids to keep your urine clear or pale yellow.  · If you are dehydrated, ask your caregiver for specific rehydration instructions. Signs of dehydration may include:  ¨ Severe thirst.  ¨ Dry lips and mouth.  ¨ Dizziness.  ¨ Dark urine.  ¨ Decreasing urine frequency and amount.  ¨ Confusion.  ¨ Rapid breathing or pulse.  SEEK IMMEDIATE MEDICAL CARE IF:   · You have blood or brown flecks (like coffee grounds) in your vomit.  · You have black or bloody stools.  · You have a severe headache or stiff neck.  · You are confused.  · You have severe abdominal pain.  · You have chest pain or trouble breathing.  · You do not urinate at least once every 8 hours.  · You develop cold or clammy skin.  · You continue to vomit for longer than 24 to 48 hours.  · You have a fever.  MAKE SURE YOU:   · Understand these instructions.  · Will watch your condition.  · Will get help right away if you are not doing well or get worse.     This information is not intended to replace advice given to you by your health care provider. Make sure you discuss any questions you have with your health care provider.     Document Released: 12/18/2006 Document Revised: 03/11/2013 Document Reviewed: 05/16/2012  [employer] Interactive Patient Education ©2016 [employer]            Patient Information     Patient Information    Following emergency treatment: all patient requiring follow-up care must return either to a private physician or a clinic if your condition worsens before you are able to obtain further medical attention, please return to the emergency room.     Billing Information    At [facility], we work to make the billing process streamlined for our patients.  Our Representatives are here to  answer any questions you may have regarding your hospital bill.  If you have insurance coverage and have supplied your insurance information to us, we will submit a claim to your insurer on your behalf.  Should you have any questions regarding your bill, we can be reached online or by phone as follows:  Online: You are able pay your bills online or live chat with our representatives about any billing questions you may have. We are here to help Monday - Friday from 8:00am to 7:30pm and 9:00am - 12:00pm on Saturdays.  Please visit [email or web address]  for more information.   Phone:  [phone] or [phone]    Please note that your emergency physician, surgeon, pathologist, radiologist, anesthesiologist, and other specialists are not employed by [facility] and will therefore bill separately for their services.  Please contact them directly for any questions concerning their bills at the numbers below:     Emergency Physician Services:  [phone]  [facility]:  [phone]  [facility]:  [phone]  [facility]:  [phone]    1. Your final bill may vary from the amount quoted upon discharge if all procedures are not complete at that time, or if your doctor has additional procedures of which we are not aware. You will receive an additional bill if you return to the Emergency Department at [facility] for suture removal regardless of the facility of which the sutures were placed.     2. Please arrange for settlement of this account at the emergency registration.    3. All self-pay accounts are due in full at the time of treatment.  If you are unable to meet this obligation then payment is expected within 4-5 days.     4. If you have had radiology studies (CT, X-ray, Ultrasound, MRI), you have received a preliminary result during your emergency department visit. Please contact the radiology department [phone] to receive  a copy of your final result. Please discuss the Final result with your primary physician or with the follow up physician provided.     Crisis Hotline:  [place] Crisis Hotline:  3-233-TSFPCLR or 1-801.412.1138  [place] Crisis Hotline:    1-196.596.4575 or 760-485-3407         ED Discharge Follow Up Questions    1. In order to provide you with very good care, we would like to follow up with a phone call in the next few days.  May we have your permission to contact you?     YES /  NO    2. What is the best phone number to call you? (       )_____-__________    3. What is the best time to call you?      Morning  /  Afternoon  /  Evening                   Patient Signature:  ____________________________________________________________    Date:  ____________________________________________________________

## 2017-04-12 NOTE — ED AVS SNAPSHOT
4/12/2017    Cayetano Godinez [place]  [address] [place] [address]    Dear Cayetano:    [facility] wants to ensure your discharge home is safe and you or your loved ones have had all of your questions answered regarding your care after you leave the hospital.    Below is a list of resources and contact information should you have any questions regarding your hospital stay, follow-up instructions, or active medical symptoms.    Questions or Concerns Regarding… Contact   Medical Questions Related to Your Discharge  (7 days a week, 8am-5pm) Contact a Nurse Care Coordinator   [phone]   Medical Questions Not Related to Your Discharge  (24 hours a day / 7 days a week)  Contact the Nurse Health Line   [phone]    Medications or Discharge Instructions Refer to your discharge packet   or contact your [facility] Primary Care Provider   [phone]   Follow-up Appointment(s) Schedule your appointment via [employer]   or contact Scheduling [phone]   Billing Review your statement via [employer]  or contact Billing [phone]   Medical Records Review your records via [employer]   or contact Medical Records [phone]     You may receive a telephone call within two days of discharge. This call is to make certain you understand your discharge instructions and have the opportunity to have any questions answered. You can also easily access your medical information, test results and upcoming appointments via the [employer] free online health management tool. You can learn more and sign up at [email or web address]. For assistance setting up your [employer] account, please call [phone].    Once again, we want to ensure your discharge home is safe and that you have a clear understanding of any next steps in your care. If you have any questions or concerns, please do not hesitate to contact us, we are here for you. Thank you for choosing [facility] for your healthcare needs.    Sincerely,    Your [facility] Healthcare Team

## 2017-04-13 ENCOUNTER — PATIENT OUTREACH (OUTPATIENT)
Dept: HEALTH INFORMATION MANAGEMENT | Facility: OTHER | Age: 42
End: 2017-04-13

## 2017-04-13 NOTE — DISCHARGE INSTRUCTIONS
Nausea and Vomiting  Nausea is a sick feeling that often comes before throwing up (vomiting). Vomiting is a reflex where stomach contents come out of your mouth. Vomiting can cause severe loss of body fluids (dehydration). Children and elderly adults can become dehydrated quickly, especially if they also have diarrhea. Nausea and vomiting are symptoms of a condition or disease. It is important to find the cause of your symptoms.  CAUSES   · Direct irritation of the stomach lining. This irritation can result from increased acid production (gastroesophageal reflux disease), infection, food poisoning, taking certain medicines (such as nonsteroidal anti-inflammatory drugs), alcohol use, or tobacco use.  · Signals from the brain. These signals could be caused by a headache, heat exposure, an inner ear disturbance, increased pressure in the brain from injury, infection, a tumor, or a concussion, pain, emotional stimulus, or metabolic problems.  · An obstruction in the gastrointestinal tract (bowel obstruction).  · Illnesses such as diabetes, hepatitis, gallbladder problems, appendicitis, kidney problems, cancer, sepsis, atypical symptoms of a heart attack, or eating disorders.  · Medical treatments such as chemotherapy and radiation.  · Receiving medicine that makes you sleep (general anesthetic) during surgery.  DIAGNOSIS  Your caregiver may ask for tests to be done if the problems do not improve after a few days. Tests may also be done if symptoms are severe or if the reason for the nausea and vomiting is not clear. Tests may include:  · Urine tests.  · Blood tests.  · Stool tests.  · Cultures (to look for evidence of infection).  · X-rays or other imaging studies.  Test results can help your caregiver make decisions about treatment or the need for additional tests.  TREATMENT  You need to stay well hydrated. Drink frequently but in small amounts. You may wish to drink water, sports drinks, clear broth, or eat frozen  ice pops or gelatin dessert to help stay hydrated. When you eat, eating slowly may help prevent nausea. There are also some antinausea medicines that may help prevent nausea.  HOME CARE INSTRUCTIONS   · Take all medicine as directed by your caregiver.  · If you do not have an appetite, do not force yourself to eat. However, you must continue to drink fluids.  · If you have an appetite, eat a normal diet unless your caregiver tells you differently.  ¨ Eat a variety of complex carbohydrates (rice, wheat, potatoes, bread), lean meats, yogurt, fruits, and vegetables.  ¨ Avoid high-fat foods because they are more difficult to digest.  · Drink enough water and fluids to keep your urine clear or pale yellow.  · If you are dehydrated, ask your caregiver for specific rehydration instructions. Signs of dehydration may include:  ¨ Severe thirst.  ¨ Dry lips and mouth.  ¨ Dizziness.  ¨ Dark urine.  ¨ Decreasing urine frequency and amount.  ¨ Confusion.  ¨ Rapid breathing or pulse.  SEEK IMMEDIATE MEDICAL CARE IF:   · You have blood or brown flecks (like coffee grounds) in your vomit.  · You have black or bloody stools.  · You have a severe headache or stiff neck.  · You are confused.  · You have severe abdominal pain.  · You have chest pain or trouble breathing.  · You do not urinate at least once every 8 hours.  · You develop cold or clammy skin.  · You continue to vomit for longer than 24 to 48 hours.  · You have a fever.  MAKE SURE YOU:   · Understand these instructions.  · Will watch your condition.  · Will get help right away if you are not doing well or get worse.     This information is not intended to replace advice given to you by your health care provider. Make sure you discuss any questions you have with your health care provider.     Document Released: 12/18/2006 Document Revised: 03/11/2013 Document Reviewed: 05/16/2012  [employer] Interactive Patient Education ©2016 [employer]

## 2017-04-13 NOTE — ED NOTES
Voiced understanding of discharge instructions and follow up care along with prescription. Left ambulating with family

## 2017-04-13 NOTE — ED PROVIDER NOTES
ED Provider Note      CHIEF COMPLAINT  Chief Complaint   Patient presents with   • Headache   • Nausea/Vomiting/Diarrhea       HPI  Cayetano Hawkins is a 42 y.o. male who presents for evaluation of nausea, vomiting, diarrhea, generalized abdominal pain, body aches and headache, he is a history of some sort of a cyclic vomiting disorder, evaluated here in the emergency department many times for similar symptoms. He tells me under the care of gastroenterologist, he's undergone multiple areas of testing, he has additional testing scheduled. He went to his gastroneurologist today who sent him here for evaluation. He states his symptoms began yesterday and since then he has not been able to eat or drink anything and he continues to vomit what appears to be digested food. He denies fever, no back pain, no focal weakness, numbness or tingling. No head injury. He has no other complaints at this time.    REVIEW OF SYSTEMS  Negative for fever, rash, chest pain, dyspnea, focal weakness, focal numbness, focal tingling. All other systems are negative.     PAST MEDICAL HISTORY  Past Medical History   Diagnosis Date   • Hepatitis C carrier (CMS-HCC)    • Hypertension    • Arthritis right hip   • Backpain    • Diabetes    • Infectious disease        FAMILY HISTORY  Family History   Problem Relation Age of Onset   • Diabetes Mother    • Hyperlipidemia Mother    • Arthritis Mother    • Heart Attack Father    • Heart Disease Father    • Hypertension Father    • Stroke Father    • Hyperlipidemia Father    • Arthritis Father        SOCIAL HISTORY  Social History   Substance Use Topics   • Smoking status: Former Smoker -- 0.50 packs/day for 10 years     Quit date: 01/01/2012   • Smokeless tobacco: Never Used      Comment: 5 yrs ago   • Alcohol Use: No      Comment: occ       SURGICAL HISTORY  Past Surgical History   Procedure Laterality Date   • Raquel by laparoscopy     • Cholecystectomy     • Irrigation & debridement ortho Right  "4/4/2016     Procedure: IRRIGATION & DEBRIDEMENT ORTHO FOOT - AMPUTATION RIGHT FIFTH TOE ;  Surgeon: Hitesh Sol M.D.;  Location: [facility];  Service:    • Irrigation & debridement ortho Right 4/10/2016     Procedure: IRRIGATION & DEBRIDEMENT ORTHO-poss ray resection, poss below knee amputation ;  Surgeon: Hitesh Sol M.D.;  Location: [facility];  Service:    • Irrigation & debridement ortho Right 4/13/2016     Procedure: IRRIGATION & DEBRIDEMENT AND CLOSURE OF ORTHO FOOT WOUND;  Surgeon: Hitesh Sol M.D.;  Location: [facility];  Service:    • Other  hepatitis c        CURRENT MEDICATIONS  I personally reviewed the medication list in the charting documentation.     ALLERGIES  Allergies   Allergen Reactions   • Bee      Bee stings       MEDICAL RECORD  I have reviewed patient's medical record and pertinent results are listed above.      PHYSICAL EXAM  VITAL SIGNS: /87 mmHg  Pulse 84  Temp(Src) 36.7 °C (98.1 °F)  Resp 18  Ht 1.803 m (5' 11\")  Wt 84.4 kg (186 lb 1.1 oz)  BMI 25.96 kg/m2   Constitutional: Well appearing, tearful, no acute distress  HENT: Mucus membranes moist.    Eyes: No scleral icterus. Normal conjunctiva   Neck: Supple, comfortable, nonpainful range of motion.   Cardiovascular: Regular heart rate and rhythm.   Thorax & Lungs: Chest is nontender.  Lungs are clear to auscultation with good air movement bilaterally.  No wheeze, rhonchi, nor rales.   Abdomen: Soft, nondistended, generalized tenderness with no focality, no rebound, guarding.  Skin: Warm, dry. No rash appreciated  Extremities/Musculoskeletal: No sign of trauma. No asymmetric calf tenderness, erythema or edema. Normal range of motion   Neurologic: Alert & oriented. No focal deficits observed.   Psychiatric: Normal affect appropriate for the clinical situation.    DIAGNOSTIC STUDIES / PROCEDURES    LABS  Results for orders placed or performed during the hospital " encounter of 04/12/17   CBC WITH DIFFERENTIAL   Result Value Ref Range    WBC 16.0 (H) 4.8 - 10.8 K/uL    RBC 5.71 4.70 - 6.10 M/uL    Hemoglobin 17.1 14.0 - 18.0 g/dL    Hematocrit 48.4 42.0 - 52.0 %    MCV 84.8 81.4 - 97.8 fL    MCH 29.9 27.0 - 33.0 pg    MCHC 35.3 33.7 - 35.3 g/dL    RDW 42.2 35.9 - 50.0 fL    Platelet Count 269 164 - 446 K/uL    MPV 9.4 9.0 - 12.9 fL    Neutrophils-Polys 84.30 (H) 44.00 - 72.00 %    Lymphocytes 9.50 (L) 22.00 - 41.00 %    Monocytes 5.40 0.00 - 13.40 %    Eosinophils 0.00 0.00 - 6.90 %    Basophils 0.20 0.00 - 1.80 %    Immature Granulocytes 0.60 0.00 - 0.90 %    Nucleated RBC 0.00 /100 WBC    Neutrophils (Absolute) 13.52 (H) 1.82 - 7.42 K/uL    Lymphs (Absolute) 1.52 1.00 - 4.80 K/uL    Monos (Absolute) 0.87 (H) 0.00 - 0.85 K/uL    Eos (Absolute) 0.00 0.00 - 0.51 K/uL    Baso (Absolute) 0.03 0.00 - 0.12 K/uL    Immature Granulocytes (abs) 0.09 0.00 - 0.11 K/uL    NRBC (Absolute) 0.00 K/uL   COMP METABOLIC PANEL   Result Value Ref Range    Sodium 139 135 - 145 mmol/L    Potassium 3.8 3.6 - 5.5 mmol/L    Chloride 105 96 - 112 mmol/L    Co2 22 20 - 33 mmol/L    Anion Gap 12.0 (H) 0.0 - 11.9    Glucose 299 (H) 65 - 99 mg/dL    Bun 12 8 - 22 mg/dL    Creatinine 1.09 0.50 - 1.40 mg/dL    Calcium 9.6 8.4 - 10.2 mg/dL    AST(SGOT) 25 12 - 45 U/L    ALT(SGPT) 46 2 - 50 U/L    Alkaline Phosphatase 124 (H) 30 - 99 U/L    Total Bilirubin 1.4 0.1 - 1.5 mg/dL    Albumin 4.3 3.2 - 4.9 g/dL    Total Protein 7.4 6.0 - 8.2 g/dL    Globulin 3.1 1.9 - 3.5 g/dL    A-G Ratio 1.4 g/dL   LIPASE   Result Value Ref Range    Lipase 16 7 - 58 U/L   ESTIMATED GFR   Result Value Ref Range    GFR If African American >60 >60 mL/min/1.73 m 2    GFR If Non African American >60 >60 mL/min/1.73 m 2         COURSE & MEDICAL DECISION MAKING  I have reviewed any medical record information, laboratory studies and radiographic results as noted above.  Differential diagnoses includes: Cyclic vomiting, dehydration,  electrolyte abnormalities, pancreatitis, hepatitis    Encounter Summary: This is a 42 y.o. male with recurring vomiting syndrome, he has had nearly 36 hours now of what he describes as constant vomiting, generalized pain, some diarrhea, no fever, no evidence of peritonitis on exam, seems like he's been under the care of GI, he is known smoked marijuana so perhaps that is concerning. In any event I will  IV fluids, check blood work, administer Reglan as this is helped in the past and then reevaluate. He also is requesting something for pain he will receive a single dose of Dilaudid -----blood work reveals leukocytosis and hyperglycemia. Patient has been reevaluated and says he feels better and is prepared to go home, will be prescribed Reglan, strict return instructions discussed, stable and appropriate for discharge.      DISPOSITION: Discharged home in stable condition      FINAL IMPRESSION  1. Non-intractable cyclical vomiting with nausea    2. Marijuana use           This dictation was created using voice recognition software. The accuracy of the dictation is limited to the abilities of the software. I expect there may be some errors of grammar and possibly content. The nursing notes were reviewed and certain aspects of this information were incorporated into this note.    Electronically signed by: Usama Merrill, 4/12/2017 5:06 PM

## 2017-05-03 ENCOUNTER — HOSPITAL ENCOUNTER (EMERGENCY)
Facility: MEDICAL CENTER | Age: 42
End: 2017-05-03
Attending: EMERGENCY MEDICINE
Payer: COMMERCIAL

## 2017-05-03 VITALS
HEART RATE: 90 BPM | TEMPERATURE: 99 F | HEIGHT: 71 IN | WEIGHT: 182.1 LBS | DIASTOLIC BLOOD PRESSURE: 111 MMHG | SYSTOLIC BLOOD PRESSURE: 214 MMHG | BODY MASS INDEX: 25.49 KG/M2 | OXYGEN SATURATION: 93 % | RESPIRATION RATE: 18 BRPM

## 2017-05-03 DIAGNOSIS — R11.15 NON-INTRACTABLE CYCLICAL VOMITING WITH NAUSEA: ICD-10-CM

## 2017-05-03 DIAGNOSIS — G47.00 INSOMNIA, UNSPECIFIED TYPE: ICD-10-CM

## 2017-05-03 LAB
ALBUMIN SERPL BCP-MCNC: 4.3 G/DL (ref 3.2–4.9)
ALBUMIN/GLOB SERPL: 1.3 G/DL
ALP SERPL-CCNC: 146 U/L (ref 30–99)
ALT SERPL-CCNC: 61 U/L (ref 2–50)
ANION GAP SERPL CALC-SCNC: 15 MMOL/L (ref 0–11.9)
AST SERPL-CCNC: 30 U/L (ref 12–45)
BASOPHILS # BLD AUTO: 0.2 % (ref 0–1.8)
BASOPHILS # BLD: 0.03 K/UL (ref 0–0.12)
BILIRUB SERPL-MCNC: 1.3 MG/DL (ref 0.1–1.5)
BUN SERPL-MCNC: 16 MG/DL (ref 8–22)
CALCIUM SERPL-MCNC: 10 MG/DL (ref 8.4–10.2)
CHLORIDE SERPL-SCNC: 100 MMOL/L (ref 96–112)
CO2 SERPL-SCNC: 21 MMOL/L (ref 20–33)
CREAT SERPL-MCNC: 1.07 MG/DL (ref 0.5–1.4)
EOSINOPHIL # BLD AUTO: 0 K/UL (ref 0–0.51)
EOSINOPHIL NFR BLD: 0 % (ref 0–6.9)
ERYTHROCYTE [DISTWIDTH] IN BLOOD BY AUTOMATED COUNT: 39.9 FL (ref 35.9–50)
GFR SERPL CREATININE-BSD FRML MDRD: >60 ML/MIN/1.73 M 2
GLOBULIN SER CALC-MCNC: 3.4 G/DL (ref 1.9–3.5)
GLUCOSE SERPL-MCNC: 372 MG/DL (ref 65–99)
HCT VFR BLD AUTO: 47.3 % (ref 42–52)
HGB BLD-MCNC: 17.3 G/DL (ref 14–18)
IMM GRANULOCYTES # BLD AUTO: 0.05 K/UL (ref 0–0.11)
IMM GRANULOCYTES NFR BLD AUTO: 0.4 % (ref 0–0.9)
LIPASE SERPL-CCNC: 24 U/L (ref 7–58)
LYMPHOCYTES # BLD AUTO: 1.99 K/UL (ref 1–4.8)
LYMPHOCYTES NFR BLD: 16.3 % (ref 22–41)
MCH RBC QN AUTO: 30.5 PG (ref 27–33)
MCHC RBC AUTO-ENTMCNC: 36.6 G/DL (ref 33.7–35.3)
MCV RBC AUTO: 83.3 FL (ref 81.4–97.8)
MONOCYTES # BLD AUTO: 0.75 K/UL (ref 0–0.85)
MONOCYTES NFR BLD AUTO: 6.1 % (ref 0–13.4)
NEUTROPHILS # BLD AUTO: 9.39 K/UL (ref 1.82–7.42)
NEUTROPHILS NFR BLD: 77 % (ref 44–72)
NRBC # BLD AUTO: 0 K/UL
NRBC BLD AUTO-RTO: 0 /100 WBC
PLATELET # BLD AUTO: 291 K/UL (ref 164–446)
PMV BLD AUTO: 9.4 FL (ref 9–12.9)
POTASSIUM SERPL-SCNC: 3.5 MMOL/L (ref 3.6–5.5)
PROT SERPL-MCNC: 7.7 G/DL (ref 6–8.2)
RBC # BLD AUTO: 5.68 M/UL (ref 4.7–6.1)
SODIUM SERPL-SCNC: 136 MMOL/L (ref 135–145)
WBC # BLD AUTO: 12.2 K/UL (ref 4.8–10.8)

## 2017-05-03 PROCEDURE — 83690 ASSAY OF LIPASE: CPT

## 2017-05-03 PROCEDURE — 99285 EMERGENCY DEPT VISIT HI MDM: CPT

## 2017-05-03 PROCEDURE — 96361 HYDRATE IV INFUSION ADD-ON: CPT

## 2017-05-03 PROCEDURE — 96375 TX/PRO/DX INJ NEW DRUG ADDON: CPT

## 2017-05-03 PROCEDURE — 85025 COMPLETE CBC W/AUTO DIFF WBC: CPT

## 2017-05-03 PROCEDURE — 700111 HCHG RX REV CODE 636 W/ 250 OVERRIDE (IP): Performed by: EMERGENCY MEDICINE

## 2017-05-03 PROCEDURE — 36415 COLL VENOUS BLD VENIPUNCTURE: CPT

## 2017-05-03 PROCEDURE — 96374 THER/PROPH/DIAG INJ IV PUSH: CPT

## 2017-05-03 PROCEDURE — 80053 COMPREHEN METABOLIC PANEL: CPT

## 2017-05-03 PROCEDURE — 700105 HCHG RX REV CODE 258: Performed by: EMERGENCY MEDICINE

## 2017-05-03 RX ORDER — METOCLOPRAMIDE HYDROCHLORIDE 5 MG/ML
10 INJECTION INTRAMUSCULAR; INTRAVENOUS ONCE
Status: COMPLETED | OUTPATIENT
Start: 2017-05-03 | End: 2017-05-03

## 2017-05-03 RX ORDER — SODIUM CHLORIDE 9 MG/ML
1000 INJECTION, SOLUTION INTRAVENOUS ONCE
Status: COMPLETED | OUTPATIENT
Start: 2017-05-03 | End: 2017-05-03

## 2017-05-03 RX ORDER — DIPHENHYDRAMINE HYDROCHLORIDE 50 MG/ML
25 INJECTION INTRAMUSCULAR; INTRAVENOUS ONCE
Status: COMPLETED | OUTPATIENT
Start: 2017-05-03 | End: 2017-05-03

## 2017-05-03 RX ORDER — PROMETHAZINE HYDROCHLORIDE 25 MG/1
25 SUPPOSITORY RECTAL EVERY 6 HOURS PRN
Qty: 20 SUPPOSITORY | Refills: 0 | Status: SHIPPED | OUTPATIENT
Start: 2017-05-03 | End: 2017-11-06

## 2017-05-03 RX ADMIN — SODIUM CHLORIDE 1000 ML: 9 INJECTION, SOLUTION INTRAVENOUS at 03:44

## 2017-05-03 RX ADMIN — METOCLOPRAMIDE 10 MG: 5 INJECTION, SOLUTION INTRAMUSCULAR; INTRAVENOUS at 03:44

## 2017-05-03 RX ADMIN — DIPHENHYDRAMINE HYDROCHLORIDE 25 MG: 50 INJECTION, SOLUTION INTRAMUSCULAR; INTRAVENOUS at 03:44

## 2017-05-03 NOTE — ED NOTES
"Rounded on pt who is sleeping at this time. Visible rise and fall of chest. Per wife, \"he's feeling much better.\"  "

## 2017-05-03 NOTE — ED PROVIDER NOTES
ED Provider Note  ER PROVIDER NOTE    Scribed for Alvaro Rubalcava M.D.  by Alvaro Rubalcava. 5/3/2017 at 3:29 AM.    Primary Care Provider: Landon Collins D.O.  Means of Arrival: self  History obtained from: pt   History limited by: none     CHIEF COMPLAINT  Chief Complaint   Patient presents with   • N/V   • Abdominal Pain       HPI  Cayetano Hawkins is a 42 y.o. male who presents to the emergency department complaining of nausea vomiting abdominal pain. Patient has a history of cyclic vomiting and chronic abdominal pain reports over the last 2 days has had some persistent nausea and vomiting as well as epigastric pain. No blood. No fevers or chills. Normal bowel movements.  Does report as well as had an abscessed tooth for which he is seeing the dentist today. Has had some difficulty sleeping secondary to his vomiting    REVIEW OF SYSTEMS  Pertinent positives include nausea and vomiting. Pertinent negatives include no fevers or chills. See HPI for details. All other systems reviewed and are negative.    PAST MEDICAL HISTORY   has a past medical history of Hepatitis C carrier; Hypertension; Arthritis (right hip); Backpain; Diabetes; and Infectious disease.    SURGICAL HISTORY   has past surgical history that includes nano by laparoscopy; cholecystectomy; irrigation & debridement ortho (Right, 4/4/2016); irrigation & debridement ortho (Right, 4/10/2016); irrigation & debridement ortho (Right, 4/13/2016); and other (hepatitis c ).    FAMILY HISTORY  Family History   Problem Relation Age of Onset   • Diabetes Mother    • Hyperlipidemia Mother    • Arthritis Mother    • Heart Attack Father    • Heart Disease Father    • Hypertension Father    • Stroke Father    • Hyperlipidemia Father    • Arthritis Father        SOCIAL HISTORY  Social History     Social History   • Marital Status:      Spouse Name: N/A   • Number of Children: N/A   • Years of Education: N/A     Social History Main Topics   • Smoking  "status: Former Smoker -- 0.50 packs/day for 10 years     Quit date: 01/01/2012   • Smokeless tobacco: Never Used      Comment: 5 yrs ago   • Alcohol Use: No      Comment: occ   • Drug Use: No      Comment: marjiuana in past   • Sexual Activity: Not Asked     Other Topics Concern   • None     Social History Narrative      History   Drug Use No     Comment: marjiuana in past       CURRENT MEDICATIONS  Home Medications     **Home medications have not yet been reviewed for this encounter**          ALLERGIES  Allergies   Allergen Reactions   • Bee      Bee stings       PHYSICAL EXAM  VITAL SIGNS: /111 mmHg  Pulse 90  Temp(Src) 37.2 °C (99 °F)  Resp 18  Ht 1.803 m (5' 11\")  Wt 82.6 kg (182 lb 1.6 oz)  BMI 25.41 kg/m2  SpO2 93%  Pulse ox interpretation: I interpret this pulse ox as normal.    Constitutional: Alert, crying, uncomfortable.  HENT: No signs of trauma, Bilateral external ears normal, Nose normal.   Eyes: Pupils are equal and reactive, Conjunctiva normal, Non-icteric.   Neck: Normal range of motion, No tenderness, Supple, No stridor.   Lymphatic: No lymphadenopathy noted.   Cardiovascular: Regular rate and rhythm, no murmurs.   Thorax & Lungs: Normal breath sounds, No respiratory distress, No wheezing, No chest tenderness.   Abdomen: Bowel sounds normal, mild epigastric tenderness No masses, No pulsatile masses. No peritoneal signs.  Skin: Warm, Dry, No erythema, No rash.   Back: No bony tenderness, No CVA tenderness.   Extremities: Intact distal pulses, No edema, No tenderness, No cyanosis, Negative Asmita's sign.  Musculoskeletal: Good range of motion in all major joints. No tenderness to palpation or major deformities noted.   Neurologic: Alert , Normal motor function, Normal sensory function, No focal deficits noted.   Psychiatric: Affect normal, Judgment normal, Mood normal.     Filed Vitals:    05/03/17 0331 05/03/17 0418   BP: 214/111    Pulse: 100 90   Temp: 37.2 °C (99 °F)    Resp: 18  " "  Height: 1.803 m (5' 11\")    Weight: 82.6 kg (182 lb 1.6 oz)    SpO2: 100% 93%         DIAGNOSTIC STUDIES / PROCEDURES        LABS  Results for orders placed or performed during the hospital encounter of 05/03/17   CBC WITH DIFFERENTIAL   Result Value Ref Range    WBC 12.2 (H) 4.8 - 10.8 K/uL    RBC 5.68 4.70 - 6.10 M/uL    Hemoglobin 17.3 14.0 - 18.0 g/dL    Hematocrit 47.3 42.0 - 52.0 %    MCV 83.3 81.4 - 97.8 fL    MCH 30.5 27.0 - 33.0 pg    MCHC 36.6 (H) 33.7 - 35.3 g/dL    RDW 39.9 35.9 - 50.0 fL    Platelet Count 291 164 - 446 K/uL    MPV 9.4 9.0 - 12.9 fL    Neutrophils-Polys 77.00 (H) 44.00 - 72.00 %    Lymphocytes 16.30 (L) 22.00 - 41.00 %    Monocytes 6.10 0.00 - 13.40 %    Eosinophils 0.00 0.00 - 6.90 %    Basophils 0.20 0.00 - 1.80 %    Immature Granulocytes 0.40 0.00 - 0.90 %    Nucleated RBC 0.00 /100 WBC    Neutrophils (Absolute) 9.39 (H) 1.82 - 7.42 K/uL    Lymphs (Absolute) 1.99 1.00 - 4.80 K/uL    Monos (Absolute) 0.75 0.00 - 0.85 K/uL    Eos (Absolute) 0.00 0.00 - 0.51 K/uL    Baso (Absolute) 0.03 0.00 - 0.12 K/uL    Immature Granulocytes (abs) 0.05 0.00 - 0.11 K/uL    NRBC (Absolute) 0.00 K/uL   COMP METABOLIC PANEL   Result Value Ref Range    Sodium 136 135 - 145 mmol/L    Potassium 3.5 (L) 3.6 - 5.5 mmol/L    Chloride 100 96 - 112 mmol/L    Co2 21 20 - 33 mmol/L    Anion Gap 15.0 (H) 0.0 - 11.9    Glucose 372 (H) 65 - 99 mg/dL    Bun 16 8 - 22 mg/dL    Creatinine 1.07 0.50 - 1.40 mg/dL    Calcium 10.0 8.4 - 10.2 mg/dL    AST(SGOT) 30 12 - 45 U/L    ALT(SGPT) 61 (H) 2 - 50 U/L    Alkaline Phosphatase 146 (H) 30 - 99 U/L    Total Bilirubin 1.3 0.1 - 1.5 mg/dL    Albumin 4.3 3.2 - 4.9 g/dL    Total Protein 7.7 6.0 - 8.2 g/dL    Globulin 3.4 1.9 - 3.5 g/dL    A-G Ratio 1.3 g/dL   LIPASE   Result Value Ref Range    Lipase 24 7 - 58 U/L   ESTIMATED GFR   Result Value Ref Range    GFR If African American >60 >60 mL/min/1.73 m 2    GFR If Non African American >60 >60 mL/min/1.73 m 2       All labs " reviewed by me.    RADIOLOGY  No orders to display     The radiologist's interpretation of all radiological studies have been reviewed by me.    COURSE & MEDICAL DECISION MAKING  Nursing notes, VS, PMSFHx reviewed in chart.    3:29 AM Patient seen and examined at bedside. Patient will be treated with IV fluids, Reglan, Benadryl, he does appear dehydrated. Ordered for CBC, CMP, lipase to evaluate his symptoms.     4:19 AM patient reevaluated, was sleeping comfortably, awoke heart rate improved, appears better hydrated after fluids states feels fully improved. No more abdominal pain, or nausea. Tolerating by mouth      Decision Making:  This is a 42 y.o. male with history of cyclical vomiting presenting with nausea and vomiting. I do believe this is an exacerbation of his chronic symptoms. He is improved here with Reglan and Benadryl as well as IV fluids. At this time I do feel he is appropriate for discharge with continued outpatient follow-up. He has Zofran and Reglan at home, will additionally provide prescription for Phenergan suppository. Given the similarity to past as well as resolution of symptoms I do not suspect this represents surgical intra-abdominal process such as perforation, obstruction and he has no lower abdominal symptoms to suggest appendicitis or colitis at this time. He is afebrile, tolerating by mouth well the time of discharge and well-appearing overall. Does have some hyperglycemia although a normal bicarb, and given his improvement again do feel he is appropriate for discharge with outpatient recheck     The patient will return for new or worsening symptoms and is stable at the time of discharge.    The patient is referred to a primary physician for blood pressure management, diabetic screening, and for all other preventative health concerns.    DISPOSITION:  Patient will be discharged home in stable condition.    FOLLOW UP:  Landon Collins D.O.  [address]  [address]  [place]  [address]  [phone]    In 2 days        OUTPATIENT MEDICATIONS:  Discharge Medication List as of 5/3/2017  4:34 AM      START taking these medications    Details   promethazine (PHENERGAN) 25 MG Suppos Insert 1 Suppository in rectum every 6 hours as needed for Nausea/Vomiting., Disp-20 Suppository, R-0, Print Rx Paper                 FINAL IMPRESSION  1. Non-intractable cyclical vomiting with nausea    2. Insomnia, unspecified type          The note accurately reflects work and decisions made by me.  Alvaro Rubalcava  5/3/2017  4:43 AM

## 2017-05-03 NOTE — DISCHARGE INSTRUCTIONS
Nausea and Vomiting  Nausea is a sick feeling that often comes before throwing up (vomiting). Vomiting is a reflex where stomach contents come out of your mouth. Vomiting can cause severe loss of body fluids (dehydration). Children and elderly adults can become dehydrated quickly, especially if they also have diarrhea. Nausea and vomiting are symptoms of a condition or disease. It is important to find the cause of your symptoms.  CAUSES   · Direct irritation of the stomach lining. This irritation can result from increased acid production (gastroesophageal reflux disease), infection, food poisoning, taking certain medicines (such as nonsteroidal anti-inflammatory drugs), alcohol use, or tobacco use.  · Signals from the brain. These signals could be caused by a headache, heat exposure, an inner ear disturbance, increased pressure in the brain from injury, infection, a tumor, or a concussion, pain, emotional stimulus, or metabolic problems.  · An obstruction in the gastrointestinal tract (bowel obstruction).  · Illnesses such as diabetes, hepatitis, gallbladder problems, appendicitis, kidney problems, cancer, sepsis, atypical symptoms of a heart attack, or eating disorders.  · Medical treatments such as chemotherapy and radiation.  · Receiving medicine that makes you sleep (general anesthetic) during surgery.  DIAGNOSIS  Your caregiver may ask for tests to be done if the problems do not improve after a few days. Tests may also be done if symptoms are severe or if the reason for the nausea and vomiting is not clear. Tests may include:  · Urine tests.  · Blood tests.  · Stool tests.  · Cultures (to look for evidence of infection).  · X-rays or other imaging studies.  Test results can help your caregiver make decisions about treatment or the need for additional tests.  TREATMENT  You need to stay well hydrated. Drink frequently but in small amounts. You may wish to drink water, sports drinks, clear broth, or eat frozen  ice pops or gelatin dessert to help stay hydrated. When you eat, eating slowly may help prevent nausea. There are also some antinausea medicines that may help prevent nausea.  HOME CARE INSTRUCTIONS   · Take all medicine as directed by your caregiver.  · If you do not have an appetite, do not force yourself to eat. However, you must continue to drink fluids.  · If you have an appetite, eat a normal diet unless your caregiver tells you differently.  ¨ Eat a variety of complex carbohydrates (rice, wheat, potatoes, bread), lean meats, yogurt, fruits, and vegetables.  ¨ Avoid high-fat foods because they are more difficult to digest.  · Drink enough water and fluids to keep your urine clear or pale yellow.  · If you are dehydrated, ask your caregiver for specific rehydration instructions. Signs of dehydration may include:  ¨ Severe thirst.  ¨ Dry lips and mouth.  ¨ Dizziness.  ¨ Dark urine.  ¨ Decreasing urine frequency and amount.  ¨ Confusion.  ¨ Rapid breathing or pulse.  SEEK IMMEDIATE MEDICAL CARE IF:   · You have blood or brown flecks (like coffee grounds) in your vomit.  · You have black or bloody stools.  · You have a severe headache or stiff neck.  · You are confused.  · You have severe abdominal pain.  · You have chest pain or trouble breathing.  · You do not urinate at least once every 8 hours.  · You develop cold or clammy skin.  · You continue to vomit for longer than 24 to 48 hours.  · You have a fever.  MAKE SURE YOU:   · Understand these instructions.  · Will watch your condition.  · Will get help right away if you are not doing well or get worse.     This information is not intended to replace advice given to you by your health care provider. Make sure you discuss any questions you have with your health care provider.     Document Released: 12/18/2006 Document Revised: 03/11/2013 Document Reviewed: 05/16/2012  [employer] Patient Education ©2016 [employer]

## 2017-05-03 NOTE — ED AVS SNAPSHOT
Home Care Instructions                                                                                                                Cayetano Hawkins   MRN: [id number]    Department:  [facility], Emergency Dept   Date of Visit:  5/3/2017            [facility], Emergency Dept    [address]    [place] [address]    Phone:  [phone]      You were seen by     Alvaro Rubalcava M.D.      Your Diagnosis Was     Non-intractable cyclical vomiting with nausea     G43.A0       These are the medications you received during your hospitalization from 05/03/2017 0323 to 05/03/2017 0434     Date/Time Order Dose Route Action    05/03/2017 0344 NS infusion 1,000 mL 1,000 mL Intravenous New Bag    05/03/2017 0344 metoclopramide (REGLAN) injection 10 mg 10 mg Intravenous Given    05/03/2017 0344 diphenhydrAMINE (BENADRYL) injection 25 mg 25 mg Intravenous Given      Follow-up Information     1. Follow up with Landon Collins D.O. In 2 days.    Specialty:  Family Medicine    Contact information    [address]  [address]  [place] [id number] [phone]        Medication Information     Review all of your home medications and newly ordered medications with your primary doctor and/or pharmacist as soon as possible. Follow medication instructions as directed by your doctor and/or pharmacist.     Please keep your complete medication list with you and share with your physician. Update the information when medications are discontinued, doses are changed, or new medications (including over-the-counter products) are added; and carry medication information at all times in the event of emergency situations.               Medication List      ASK your doctor about these medications        Instructions    Morning Afternoon Evening Bedtime    gabapentin 100 MG Caps   Commonly known as:  NEURONTIN        Take 100 mg by mouth every evening.   Dose:  100 mg                        hydrocodone/acetaminophen  MG Tabs   Commonly known as:  NORCO        Take 1-2 Tabs by mouth every 6 hours as needed.   Dose:  1-2 Tab                        insulin detemir 100 UNIT/ML Soln   Commonly known as:  LEVEMIR        Inject 30 Units as instructed every morning.   Dose:  30 Units                        metformin 500 MG Tabs   Commonly known as:  GLUCOPHAGE        Take 1,000 mg by mouth 2 times a day, with meals.   Dose:  1000 mg                        metoclopramide 10 MG Tabs   Commonly known as:  REGLAN        Take 1 Tab by mouth 3 times a day before meals.   Dose:  10 mg                        ondansetron 4 MG Tbdp   Commonly known as:  ZOFRAN ODT        Take 1 Tab by mouth every 6 hours as needed for Nausea/Vomiting.   Dose:  4 mg                        pantoprazole 40 MG Tbec   Commonly known as:  PROTONIX        Take 1 Tab by mouth every day.   Dose:  40 mg                        * promethazine 25 MG Tabs   What changed:  Another medication with the same name was added. Make sure you understand how and when to take each.   Commonly known as:  PHENERGAN   Ask about: Which instructions should I use?        Take 1 Tab by mouth every 6 hours as needed for Nausea/Vomiting.   Dose:  25 mg                        * promethazine 25 MG Supp   What changed:  You were already taking a medication with the same name, and this prescription was added. Make sure you understand how and when to take each.   Commonly known as:  PHENERGAN   Ask about: Which instructions should I use?        Insert 1 Suppository in rectum every 6 hours as needed for Nausea/Vomiting.   Dose:  25 mg                        * Notice:  This list has 2 medication(s) that are the same as other medications prescribed for you. Read the directions carefully, and ask your doctor or other care provider to review them with you.         Where to Get Your Medications      You can get these medications from any pharmacy     Bring a paper  prescription for each of these medications    - promethazine 25 MG Supp            Procedures and tests performed during your visit     CBC WITH DIFFERENTIAL    COMP METABOLIC PANEL    ESTIMATED GFR    LIPASE        Discharge Instructions       Nausea and Vomiting  Nausea is a sick feeling that often comes before throwing up (vomiting). Vomiting is a reflex where stomach contents come out of your mouth. Vomiting can cause severe loss of body fluids (dehydration). Children and elderly adults can become dehydrated quickly, especially if they also have diarrhea. Nausea and vomiting are symptoms of a condition or disease. It is important to find the cause of your symptoms.  CAUSES   · Direct irritation of the stomach lining. This irritation can result from increased acid production (gastroesophageal reflux disease), infection, food poisoning, taking certain medicines (such as nonsteroidal anti-inflammatory drugs), alcohol use, or tobacco use.  · Signals from the brain. These signals could be caused by a headache, heat exposure, an inner ear disturbance, increased pressure in the brain from injury, infection, a tumor, or a concussion, pain, emotional stimulus, or metabolic problems.  · An obstruction in the gastrointestinal tract (bowel obstruction).  · Illnesses such as diabetes, hepatitis, gallbladder problems, appendicitis, kidney problems, cancer, sepsis, atypical symptoms of a heart attack, or eating disorders.  · Medical treatments such as chemotherapy and radiation.  · Receiving medicine that makes you sleep (general anesthetic) during surgery.  DIAGNOSIS  Your caregiver may ask for tests to be done if the problems do not improve after a few days. Tests may also be done if symptoms are severe or if the reason for the nausea and vomiting is not clear. Tests may include:  · Urine tests.  · Blood tests.  · Stool tests.  · Cultures (to look for evidence of infection).  · X-rays or other imaging studies.  Test results  can help your caregiver make decisions about treatment or the need for additional tests.  TREATMENT  You need to stay well hydrated. Drink frequently but in small amounts. You may wish to drink water, sports drinks, clear broth, or eat frozen ice pops or gelatin dessert to help stay hydrated. When you eat, eating slowly may help prevent nausea. There are also some antinausea medicines that may help prevent nausea.  HOME CARE INSTRUCTIONS   · Take all medicine as directed by your caregiver.  · If you do not have an appetite, do not force yourself to eat. However, you must continue to drink fluids.  · If you have an appetite, eat a normal diet unless your caregiver tells you differently.  ¨ Eat a variety of complex carbohydrates (rice, wheat, potatoes, bread), lean meats, yogurt, fruits, and vegetables.  ¨ Avoid high-fat foods because they are more difficult to digest.  · Drink enough water and fluids to keep your urine clear or pale yellow.  · If you are dehydrated, ask your caregiver for specific rehydration instructions. Signs of dehydration may include:  ¨ Severe thirst.  ¨ Dry lips and mouth.  ¨ Dizziness.  ¨ Dark urine.  ¨ Decreasing urine frequency and amount.  ¨ Confusion.  ¨ Rapid breathing or pulse.  SEEK IMMEDIATE MEDICAL CARE IF:   · You have blood or brown flecks (like coffee grounds) in your vomit.  · You have black or bloody stools.  · You have a severe headache or stiff neck.  · You are confused.  · You have severe abdominal pain.  · You have chest pain or trouble breathing.  · You do not urinate at least once every 8 hours.  · You develop cold or clammy skin.  · You continue to vomit for longer than 24 to 48 hours.  · You have a fever.  MAKE SURE YOU:   · Understand these instructions.  · Will watch your condition.  · Will get help right away if you are not doing well or get worse.     This information is not intended to replace advice given to you by your health care provider. Make sure you discuss  any questions you have with your health care provider.     Document Released: 12/18/2006 Document Revised: 03/11/2013 Document Reviewed: 05/16/2012  Elsevier Interactive Patient Education ©2016 [employer]            Patient Information     Patient Information    Following emergency treatment: all patient requiring follow-up care must return either to a private physician or a clinic if your condition worsens before you are able to obtain further medical attention, please return to the emergency room.     Billing Information    At [facility], we work to make the billing process streamlined for our patients.  Our Representatives are here to answer any questions you may have regarding your hospital bill.  If you have insurance coverage and have supplied your insurance information to us, we will submit a claim to your insurer on your behalf.  Should you have any questions regarding your bill, we can be reached online or by phone as follows:  Online: You are able pay your bills online or live chat with our representatives about any billing questions you may have. We are here to help Monday - Friday from 8:00am to 7:30pm and 9:00am - 12:00pm on Saturdays.  Please visit [email or web address]  for more information.   Phone:  [phone] or [phone]    Please note that your emergency physician, surgeon, pathologist, radiologist, anesthesiologist, and other specialists are not employed by [facility] and will therefore bill separately for their services.  Please contact them directly for any questions concerning their bills at the numbers below:     Emergency Physician Services:  [phone]  [facility]:  [phone]  [facility]:  [phone]  [facility]:  [phone]    1. Your final bill may vary from the amount quoted upon discharge if all procedures are not complete at that time, or if your doctor has additional procedures of  which we are not aware. You will receive an additional bill if you return to the Emergency Department at [facility] for suture removal regardless of the facility of which the sutures were placed.     2. Please arrange for settlement of this account at the emergency registration.    3. All self-pay accounts are due in full at the time of treatment.  If you are unable to meet this obligation then payment is expected within 4-5 days.     4. If you have had radiology studies (CT, X-ray, Ultrasound, MRI), you have received a preliminary result during your emergency department visit. Please contact the radiology department [phone] to receive a copy of your final result. Please discuss the Final result with your primary physician or with the follow up physician provided.     Crisis Hotline:  [place] Crisis Hotline:  3-893-TJLQOCX or 1-740.547.5635  [place] Crisis Hotline:    1-631.186.2073 or 791-731-6602         ED Discharge Follow Up Questions    1. In order to provide you with very good care, we would like to follow up with a phone call in the next few days.  May we have your permission to contact you?     YES /  NO    2. What is the best phone number to call you? (       )_____-__________    3. What is the best time to call you?      Morning  /  Afternoon  /  Evening                   Patient Signature:  ____________________________________________________________    Date:  ____________________________________________________________

## 2017-05-03 NOTE — ED AVS SNAPSHOT
Oktogo Access Code: [email or web address]  Expires: 5/15/2017  9:56 AM    Oktogo  A secure, online tool to manage your health information     [employer] Oktogo® is a secure, online tool that connects you to your personalized health information from the privacy of your home -- day or night - making it very easy for you to manage your healthcare. Once the activation process is completed, you can even access your medical information using the Oktogo anish, which is available for free in the Apple Anish store or Google Play store.     Oktogo provides the following levels of access (as shown below):   My Chart Features   [facility] Primary Care Doctor [facility]  Specialists [facility]  Urgent  Care Non-[facility]  Primary Care  Doctor   Email your healthcare team securely and privately 24/7 X X X X   Manage appointments: schedule your next appointment; view details of past/upcoming appointments X      Request prescription refills. X      View recent personal medical records, including lab and immunizations X X X X   View health record, including health history, allergies, medications X X X X   Read reports about your outpatient visits, procedures, consult and ER notes X X X X   See your discharge summary, which is a recap of your hospital and/or ER visit that includes your diagnosis, lab results, and care plan. X X       How to register for Oktogo:  1. Go to  [email or web address].  2. Click on the Sign Up Now box, which takes you to the New Member Sign Up page. You will need to provide the following information:  a. Enter your Oktogo Access Code exactly as it appears at the top of this page. (You will not need to use this code after you’ve completed the sign-up process. If you do not sign up before the expiration date, you must request a new code.)   b. Enter your date of birth.   c. Enter your home email address.   d. Click Submit, and follow the next screen’s instructions.  3. Create a Oktogo ID. This will be your Oktogo  login ID and cannot be changed, so think of one that is secure and easy to remember.  4. Create a [employer] password. You can change your password at any time.  5. Enter your Password Reset Question and Answer. This can be used at a later time if you forget your password.   6. Enter your e-mail address. This allows you to receive e-mail notifications when new information is available in [employer].  7. Click Sign Up. You can now view your health information.    For assistance activating your [employer] account, call [phone]

## 2017-05-03 NOTE — ED NOTES
C/o N/V and abdominal pain x 2 days. Pt states that he came in tonight because the pain got too bad to sleep. Pt also c/o toothache and headache. Pt is crying and moaning loudly on arrival to room.

## 2017-05-03 NOTE — ED NOTES
Pt asking for ice chips but still reports nausea. Explained that he should not eat or drink anything until the nausea is under control.

## 2017-05-03 NOTE — ED NOTES
Pt and wife given verbal and written discharge instructions with one prescription. Both verbalized understanding. Pt able to ambulate out under own power.

## 2017-05-03 NOTE — ED AVS SNAPSHOT
5/3/2017    Cayetano Godinez [place]  [address] [place] [address]    Dear Cayetano:    Pending [facility] to [facility] wants to ensure your discharge home is safe and you or your loved ones have had all of your questions answered regarding your care after you leave the hospital.    Below is a list of resources and contact information should you have any questions regarding your hospital stay, follow-up instructions, or active medical symptoms.    Questions or Concerns Regarding… Contact   Medical Questions Related to Your Discharge  (7 days a week, 8am-5pm) Contact a Nurse Care Coordinator   [phone]   Medical Questions Not Related to Your Discharge  (24 hours a day / 7 days a week)  Contact the Nurse Health Line   [phone]    Medications or Discharge Instructions Refer to your discharge packet   or contact your [facility] Primary Care Provider   [phone]   Follow-up Appointment(s) Schedule your appointment via [employer]   or contact Scheduling [phone]   Billing Review your statement via [employer]  or contact Billing [phone]   Medical Records Review your records via [employer]   or contact Medical Records [phone]     You may receive a telephone call within two days of discharge. This call is to make certain you understand your discharge instructions and have the opportunity to have any questions answered. You can also easily access your medical information, test results and upcoming appointments via the Prestigos free online health management tool. You can learn more and sign up at [email or web address]. For assistance setting up your Prestigos account, please call [phone].    Once again, we want to ensure your discharge home is safe and that you have a clear understanding of any next steps in your care. If you have any questions or concerns, please do not hesitate to contact us, we are here for you. Thank you for choosing [facility] for your healthcare needs.    Sincerely,    Your [facility] Healthcare Team

## 2017-05-04 ENCOUNTER — PATIENT OUTREACH (OUTPATIENT)
Dept: HEALTH INFORMATION MANAGEMENT | Facility: OTHER | Age: 42
End: 2017-05-04

## 2017-07-28 ENCOUNTER — PATIENT OUTREACH (OUTPATIENT)
Dept: HEALTH INFORMATION MANAGEMENT | Facility: OTHER | Age: 42
End: 2017-07-28

## 2017-07-28 NOTE — PROGRESS NOTES
Outbound call to patient for med rec.  Patient is at work, will return call to [phone].  1st attempt to reach patient.

## 2017-08-09 ENCOUNTER — PATIENT OUTREACH (OUTPATIENT)
Dept: HEALTH INFORMATION MANAGEMENT | Facility: OTHER | Age: 42
End: 2017-08-09

## 2017-08-09 NOTE — PROGRESS NOTES
Outbound call to patient for med rec. Left  for patient to call [phone].  2nd attempt to reach patient.

## 2017-08-29 ENCOUNTER — PATIENT OUTREACH (OUTPATIENT)
Dept: HEALTH INFORMATION MANAGEMENT | Facility: OTHER | Age: 42
End: 2017-08-29

## 2017-08-29 NOTE — PROGRESS NOTES
Outbound call to patient for med rec. Left  for patient to call [phone].  3rd attempt to reach patient.  Letter sent.

## 2017-08-29 NOTE — LETTER
August 29, 2017        Cayetano Godinez [address] [place] [address]        Dear Cayetano:    As a clinical pharmacist with [facility], I am working with your health care provider to ensure that you able to take your medications as directed and to answer any questions that you have concerning your medications.  This service is provided to you at no cost.  The review usually takes about 10-15 minutes and will cover:  • Updating our list of your medications, including vitamins and other over-the-counter items.    • Identifying any barriers that you may have with taking your medications.   • Working with you and your health care providers to resolve any medication concerns.  • Providing you with wellness, healthy lifestyle, and disease prevention strategies.  • Possible referral to a registered nurse who can work with you to understand your health conditions and/or to a  who is able to identify if you may benefit from any services or resources available in the community.  By participating in this review, I will:   • Ensure that the medications you are taking are appropriate for you.  • Attempt to reduce your medication co-payments.  • Encourage self-management of your medications and health conditions.   • Answer any questions that you may have about your medications.  • Refer you to a registered nurse or  to assist you with any additional needs related to your health conditions or needed services.   Please contact me at [phone] to review your medications. I am available Monday through Friday from 8am to 5pm. I look forward to hearing from you.     Sincerely,        Vanessa Lacey, Patrick    Electronically Signed

## 2017-11-06 ENCOUNTER — OFFICE VISIT (OUTPATIENT)
Dept: MEDICAL GROUP | Facility: MEDICAL CENTER | Age: 42
End: 2017-11-06
Payer: COMMERCIAL

## 2017-11-06 VITALS
SYSTOLIC BLOOD PRESSURE: 118 MMHG | HEART RATE: 65 BPM | OXYGEN SATURATION: 95 % | RESPIRATION RATE: 16 BRPM | TEMPERATURE: 98.8 F | HEIGHT: 71 IN | DIASTOLIC BLOOD PRESSURE: 72 MMHG | WEIGHT: 197 LBS | BODY MASS INDEX: 27.58 KG/M2

## 2017-11-06 DIAGNOSIS — I10 ESSENTIAL HYPERTENSION: ICD-10-CM

## 2017-11-06 DIAGNOSIS — B18.2 HEPATITIS C VIRUS CARRIER STATE (HCC): ICD-10-CM

## 2017-11-06 DIAGNOSIS — E78.5 DYSLIPIDEMIA: Chronic | ICD-10-CM

## 2017-11-06 DIAGNOSIS — E11.42 DIABETIC POLYNEUROPATHY ASSOCIATED WITH TYPE 2 DIABETES MELLITUS (HCC): ICD-10-CM

## 2017-11-06 PROCEDURE — 99202 OFFICE O/P NEW SF 15 MIN: CPT | Performed by: INTERNAL MEDICINE

## 2017-11-06 ASSESSMENT — PATIENT HEALTH QUESTIONNAIRE - PHQ9: CLINICAL INTERPRETATION OF PHQ2 SCORE: 0

## 2017-11-06 NOTE — PROGRESS NOTES
CC: Establishing care multiple issues    HPI:   Cayetano presents today with the following.    1. Uncontrolled type 2 diabetes mellitus without complication, with long-term current use of insulin (CMS-HCC)  Presents with a history of diabetes last A1c over year ago was at 11. Patient has not seen a doctor in one year he is not currently taking insulins or any other medications. He reports his blood sugars are persistently at 114.    2. Diabetic polyneuropathy associated with type 2 diabetes mellitus (CMS-HCC)  Does have bilateral numbness in his feet denies any major components of pain.    3. Hepatitis C virus carrier state (CMS-HCC)  History of hepatitis C without treatment he was referred to gastroenterologist and has had one visit but never had treatment.    4. Foot amputation status, right (CMS-HCC)  Status post amputation of the right foot postinfectious with diabetic ulcer.    5. Dyslipidemia  History of dyslipidemia currently not on medications.    6. Essential hypertension  Reports he has lost a large amount of weight however one year ago weight was around the same place and his A1c was 11.        Patient Active Problem List    Diagnosis Date Noted   • Essential hypertension 06/29/2014     Priority: Medium   • Diabetes mellitus type 2, uncontrolled (CMS-HCC) 01/04/2012     Priority: Medium   • Hepatitis C 01/04/2012     Priority: Medium   • Dyslipidemia 06/29/2014     Priority: Low   • History of Jqzh-Yrgzr-Hzxfvty disease 06/05/2014     Priority: Low   • Diabetic neuropathy (CMS-HCC) 01/17/2016   • Chronic right hip pain 06/05/2014       Current Outpatient Prescriptions   Medication Sig Dispense Refill   • metformin (GLUCOPHAGE) 500 MG Tab Take 1,000 mg by mouth 2 times a day, with meals.     • pantoprazole (PROTONIX) 40 MG Tablet Delayed Response Take 1 Tab by mouth every day. (Patient not taking: Reported on 11/6/2017) 30 Tab 1   • ondansetron (ZOFRAN ODT) 4 MG TABLET DISPERSIBLE Take 1 Tab by mouth every 6  "hours as needed for Nausea/Vomiting. (Patient not taking: Reported on 11/6/2017) 20 Tab 0   • gabapentin (NEURONTIN) 100 MG Cap Take 100 mg by mouth every evening.     • insulin detemir (LEVEMIR) 100 UNIT/ML Solution Inject 30 Units as instructed every morning.       No current facility-administered medications for this visit.          Allergies as of 11/06/2017 - Reviewed 11/06/2017   Allergen Reaction Noted   • Bee  11/27/2008        ROS: As per HPI.    /72   Pulse 65   Temp 37.1 °C (98.8 °F)   Resp 16   Ht 1.803 m (5' 11\")   Wt 89.4 kg (197 lb)   SpO2 95%   BMI 27.48 kg/m²     Physical Exam:  Gen:         Alert and oriented, No apparent distress.  Neck:        No Lymphadenopathy or Bruits.  Lungs:     Clear to auscultation bilaterally  CV:          Regular rate and rhythm. No murmurs, rubs or gallops.               Ext:          No clubbing, cyanosis, edema.      Assessment and Plan.   42 y.o. male with the following issues.    1. Uncontrolled type 2 diabetes mellitus without complication, with long-term current use of insulin (CMS-HCC)  Discussed the importance of routine medical care setting for blood work will see back in 2 weeks.  - REFERRAL TO OPHTHALMOLOGY  - COMP METABOLIC PANEL; Future  - LIPID PROFILE; Future  - HEMOGLOBIN A1C; Future  - MICROALBUMIN CREAT RATIO URINE; Future    2. Diabetic polyneuropathy associated with type 2 diabetes mellitus (CMS-HCC)  Discussion about risk factors of neuropathy already experienced amputation again will see back in 2 weeks after blood work.    3. Hepatitis C virus carrier state (CMS-HCC)  Referring gastroenterology.  - REFERRAL TO GASTROENTEROLOGY    4. Foot amputation status, right (CMS-HCC)  Denies any new wounds.    5. Dyslipidemia  Rechecking cholesterol    6. Essential hypertension  Blood pressures under good control currently recheck 2 weeks likely start low-dose ACE inhibitor.      "

## 2018-02-09 ENCOUNTER — OFFICE VISIT (OUTPATIENT)
Dept: URGENT CARE | Facility: PHYSICIAN GROUP | Age: 43
End: 2018-02-09
Payer: COMMERCIAL

## 2018-02-09 VITALS
TEMPERATURE: 98.7 F | HEIGHT: 71 IN | OXYGEN SATURATION: 98 % | BODY MASS INDEX: 26.46 KG/M2 | HEART RATE: 87 BPM | DIASTOLIC BLOOD PRESSURE: 102 MMHG | SYSTOLIC BLOOD PRESSURE: 168 MMHG | WEIGHT: 189 LBS | RESPIRATION RATE: 14 BRPM

## 2018-02-09 DIAGNOSIS — R52 BODY ACHES: ICD-10-CM

## 2018-02-09 DIAGNOSIS — R11.2 NAUSEA AND VOMITING, INTRACTABILITY OF VOMITING NOT SPECIFIED, UNSPECIFIED VOMITING TYPE: ICD-10-CM

## 2018-02-09 LAB — GLUCOSE BLD-MCNC: 284 MG/DL (ref 70–100)

## 2018-02-09 PROCEDURE — 99214 OFFICE O/P EST MOD 30 MIN: CPT | Performed by: PHYSICIAN ASSISTANT

## 2018-02-09 PROCEDURE — 82962 GLUCOSE BLOOD TEST: CPT | Performed by: PHYSICIAN ASSISTANT

## 2018-02-09 RX ORDER — DIPHENHYDRAMINE HYDROCHLORIDE 50 MG/ML
25 INJECTION INTRAMUSCULAR; INTRAVENOUS ONCE
Status: DISCONTINUED | OUTPATIENT
Start: 2018-02-09 | End: 2018-02-09

## 2018-02-09 RX ORDER — PROMETHAZINE HYDROCHLORIDE 25 MG/ML
12.5 INJECTION, SOLUTION INTRAMUSCULAR; INTRAVENOUS ONCE
Status: DISCONTINUED | OUTPATIENT
Start: 2018-02-09 | End: 2018-02-09

## 2018-02-09 RX ORDER — ONDANSETRON 4 MG/1
4 TABLET, ORALLY DISINTEGRATING ORAL EVERY 8 HOURS PRN
Qty: 15 TAB | Refills: 0 | Status: SHIPPED | OUTPATIENT
Start: 2018-02-09 | End: 2018-03-07 | Stop reason: SDUPTHER

## 2018-02-09 RX ORDER — ONDANSETRON 2 MG/ML
4 INJECTION INTRAMUSCULAR; INTRAVENOUS ONCE
Status: COMPLETED | OUTPATIENT
Start: 2018-02-09 | End: 2018-02-09

## 2018-02-09 RX ADMIN — ONDANSETRON 4 MG: 2 INJECTION INTRAMUSCULAR; INTRAVENOUS at 19:21

## 2018-02-10 NOTE — PROGRESS NOTES
Chief Complaint   Patient presents with   • Emesis     x2 days        HISTORY OF PRESENT ILLNESS: Patient is a 42 y.o. male who presents today for about 36 hours of nausea, vomiting, body aches. Denies known fevers. Denies abdominal pain.  Occasional cramping preceding diarrhea.   Patient notes last time he vomited was about 1 hour ago. He feels it has slightly slowed down but not really.  He has had some diarrhea as well.  Denies bloody vomit or stools. He is not having sore throat, nasal congestion or coughing.   Patient here with SO.  He has hx of DM Type 2 which he has not been monitoring, has not been taking his insulin or checking his sugars in over two months.  He does have PCP, Dr. Murcia.  Hx of Hep C.  Patient denies any RUQ pain.  No skin or eye discoloration or itching.  No changes in urine or stool color.   No attempted interventions.     Patient Active Problem List    Diagnosis Date Noted   • Essential hypertension 06/29/2014     Priority: Medium   • Diabetes mellitus type 2, uncontrolled (CMS-[facility]) 01/04/2012     Priority: Medium   • Hepatitis C 01/04/2012     Priority: Medium   • Dyslipidemia 06/29/2014     Priority: Low   • History of Hxik-Cxeww-Ctjlqko disease 06/05/2014     Priority: Low   • Diabetic neuropathy (CMS-[facility]) 01/17/2016   • Chronic right hip pain 06/05/2014       Allergies:Bee    Current Outpatient Prescriptions Ordered in [facility]   Medication Sig Dispense Refill   • metformin (GLUCOPHAGE) 500 MG Tab Take 1,000 mg by mouth 2 times a day, with meals.     • pantoprazole (PROTONIX) 40 MG Tablet Delayed Response Take 1 Tab by mouth every day. 30 Tab 1   • ondansetron (ZOFRAN ODT) 4 MG TABLET DISPERSIBLE Take 1 Tab by mouth every 6 hours as needed for Nausea/Vomiting. 20 Tab 0   • gabapentin (NEURONTIN) 100 MG Cap Take 100 mg by mouth every evening.     • insulin detemir (LEVEMIR) 100 UNIT/ML Solution Inject 30 Units as instructed every morning.       No current Epic-ordered  "facility-administered medications on file.        Past Medical History:   Diagnosis Date   • Arthritis right hip   • Backpain    • Diabetes    • Hepatitis C carrier (CMS-HCC)    • Hypertension    • Infectious disease        Social History   Substance Use Topics   • Smoking status: Former Smoker     Packs/day: 0.50     Years: 10.00     Quit date: 2012   • Smokeless tobacco: Never Used      Comment: 5 yrs ago   • Alcohol use No      Comment: occ       Family Status   Relation Status   • Mother    • Father    • Sister Alive   • Maternal Grandmother    • Maternal Grandfather    • Paternal Grandmother    • Paternal Grandfather      Family History   Problem Relation Age of Onset   • Diabetes Mother    • Hyperlipidemia Mother    • Arthritis Mother    • Heart Attack Father    • Heart Disease Father    • Hypertension Father    • Stroke Father    • Hyperlipidemia Father    • Arthritis Father        ROS:  Review of Systems   Constitutional: SEE HPI  HENT: SEE HPI    Eyes: Negative for blurred vision.   Respiratory:SEE HPI  Cardiovascular: Negative for chest pain, palpitations, orthopnea and leg swelling.   Gastrointestinal: Negative for heartburn, nausea, vomiting and abdominal pain.   All other systems reviewed and are negative.       Exam:  Blood pressure (!) 168/102, pulse 87, temperature 37.1 °C (98.7 °F), resp. rate 14, height 1.803 m (5' 11\"), weight 85.7 kg (189 lb), SpO2 98 %.  General:  Well nourished, well developed male, patient moaning and laying on exam table.   Eyes: PERRLA, EOM within normal limits, no conjunctival injection, no scleral icterus, visual fields and acuity grossly intact.  Mouth: reasonable hygiene, no erythema exudates or tonsillar enlargement. Moist mucus membranes  Neck: no masses, range of motion within normal limits, no tracheal deviation. No lymphadenopathy  Pulmonary: Normal respiratory effort, no wheezes, crackles, or " rhonchi.  Cardiovascular: regular rate and rhythm without murmurs, rubs, or gallops.  Abdomen: Soft, nontender, nondistended. Normal bowel sounds. No hepatosplenomegaly or masses, or hernias. No rebound or guarding.  Skin: No visible rashes or lesion. Warm, pink, dry.   Extremities: no clubbing, cyanosis, or edema.  Neuro: A&O x 3. Speech normal/clear.  Normal gait.         Assessment/Plan:  1. Nausea and vomiting, intractability of vomiting not specified, unspecified vomiting type  POCT glucose    ondansetron (ZOFRAN) syringe/vial injection 4 mg    ondansetron (ZOFRAN ODT) 4 MG TABLET DISPERSIBLE    DISCONTINUED: promethazine (PHENERGAN) injection 12.5 mg    DISCONTINUED: diphenhydrAMINE (BENADRYL) injection 25 mg   2. Body aches     3. Uncontrolled type 1 diabetes mellitus with complication (CMS-HCC)         -POCT glucose 284.   -due to patient discomfort did offer ED, however he notes he felt much better after the Zofran injection given in clinic and declines further work up.  I did encourage this due to hx of Hep C and DM as I will be unable to get labs at this point in the evening however he declines as does his SO here with him.   -most consistent with self limited viral gastroenteritis.   -benign abdominal exam, no evidence of acute abdomen  -zofran tablet given in clinic, additional sent in.   -electrolyte rehydration, advance bland diet as tolerated.   -abdominal pain red flags discussed, ER precautions.  I advise that if his vomiting persists, starts having abdominal pain, or any worsening of condition he is to proceed to ED immediately.   -he is to follow up with PCP, reminder he does have pending labs with him as well and I advise he gets these done.       Supportive care, differential diagnoses, and indications for immediate follow-up discussed with patient.   Pathogenesis of diagnosis discussed including typical length and natural progression.   Instructed to return to clinic or nearest emergency  department for any change in condition, further concerns, or worsening of symptoms.  Patient states understanding of the plan of care and discharge instructions.  Instructed to make an appointment, for follow up, with their primary care provider.      Rain Ram P.A.-C.

## 2018-03-07 ENCOUNTER — HOSPITAL ENCOUNTER (OUTPATIENT)
Dept: LAB | Facility: MEDICAL CENTER | Age: 43
End: 2018-03-07
Attending: INTERNAL MEDICINE
Payer: COMMERCIAL

## 2018-03-07 ENCOUNTER — OFFICE VISIT (OUTPATIENT)
Dept: MEDICAL GROUP | Facility: MEDICAL CENTER | Age: 43
End: 2018-03-07
Payer: COMMERCIAL

## 2018-03-07 VITALS
DIASTOLIC BLOOD PRESSURE: 78 MMHG | BODY MASS INDEX: 25.62 KG/M2 | OXYGEN SATURATION: 94 % | HEART RATE: 70 BPM | WEIGHT: 183 LBS | HEIGHT: 71 IN | TEMPERATURE: 98.6 F | RESPIRATION RATE: 16 BRPM | SYSTOLIC BLOOD PRESSURE: 132 MMHG

## 2018-03-07 DIAGNOSIS — E11.42 DIABETIC POLYNEUROPATHY ASSOCIATED WITH TYPE 2 DIABETES MELLITUS (HCC): ICD-10-CM

## 2018-03-07 DIAGNOSIS — K31.84 GASTROPARESIS: ICD-10-CM

## 2018-03-07 DIAGNOSIS — R11.2 NAUSEA AND VOMITING, INTRACTABILITY OF VOMITING NOT SPECIFIED, UNSPECIFIED VOMITING TYPE: ICD-10-CM

## 2018-03-07 DIAGNOSIS — B18.2 HEPATITIS C VIRUS CARRIER STATE (HCC): ICD-10-CM

## 2018-03-07 LAB
ALBUMIN SERPL BCP-MCNC: 4.7 G/DL (ref 3.2–4.9)
ALBUMIN/GLOB SERPL: 1.4 G/DL
ALP SERPL-CCNC: 137 U/L (ref 30–99)
ALT SERPL-CCNC: 50 U/L (ref 2–50)
ANION GAP SERPL CALC-SCNC: 10 MMOL/L (ref 0–11.9)
AST SERPL-CCNC: 26 U/L (ref 12–45)
BILIRUB SERPL-MCNC: 0.6 MG/DL (ref 0.1–1.5)
BUN SERPL-MCNC: 9 MG/DL (ref 8–22)
CALCIUM SERPL-MCNC: 10 MG/DL (ref 8.5–10.5)
CHLORIDE SERPL-SCNC: 103 MMOL/L (ref 96–112)
CHOLEST SERPL-MCNC: 208 MG/DL (ref 100–199)
CO2 SERPL-SCNC: 28 MMOL/L (ref 20–33)
CREAT SERPL-MCNC: 0.75 MG/DL (ref 0.5–1.4)
EST. AVERAGE GLUCOSE BLD GHB EST-MCNC: 194 MG/DL
GLOBULIN SER CALC-MCNC: 3.3 G/DL (ref 1.9–3.5)
GLUCOSE BLD-MCNC: 169 MG/DL (ref 70–100)
GLUCOSE SERPL-MCNC: 167 MG/DL (ref 65–99)
HBA1C MFR BLD: 8.4 % (ref 0–5.6)
HDLC SERPL-MCNC: 41 MG/DL
LDLC SERPL CALC-MCNC: 126 MG/DL
POTASSIUM SERPL-SCNC: 4 MMOL/L (ref 3.6–5.5)
PROT SERPL-MCNC: 8 G/DL (ref 6–8.2)
SODIUM SERPL-SCNC: 141 MMOL/L (ref 135–145)
TRIGL SERPL-MCNC: 203 MG/DL (ref 0–149)

## 2018-03-07 PROCEDURE — 82962 GLUCOSE BLOOD TEST: CPT | Performed by: INTERNAL MEDICINE

## 2018-03-07 PROCEDURE — 36415 COLL VENOUS BLD VENIPUNCTURE: CPT

## 2018-03-07 PROCEDURE — 83036 HEMOGLOBIN GLYCOSYLATED A1C: CPT

## 2018-03-07 PROCEDURE — 83516 IMMUNOASSAY NONANTIBODY: CPT

## 2018-03-07 PROCEDURE — 87522 HEPATITIS C REVRS TRNSCRPJ: CPT

## 2018-03-07 PROCEDURE — 80061 LIPID PANEL: CPT

## 2018-03-07 PROCEDURE — 87902 NFCT AGT GNTYP ALYS HEP C: CPT

## 2018-03-07 PROCEDURE — 99214 OFFICE O/P EST MOD 30 MIN: CPT | Performed by: INTERNAL MEDICINE

## 2018-03-07 PROCEDURE — 80053 COMPREHEN METABOLIC PANEL: CPT

## 2018-03-07 RX ORDER — ONDANSETRON 4 MG/1
4 TABLET, ORALLY DISINTEGRATING ORAL EVERY 8 HOURS PRN
Qty: 15 TAB | Refills: 6 | Status: SHIPPED | OUTPATIENT
Start: 2018-03-07 | End: 2018-03-28

## 2018-03-07 NOTE — PROGRESS NOTES
CC: Follow-up diabetes complaining of nausea and vomiting.    HPI:   Cayetano presents today with the following.    1. Diabetic polyneuropathy associated with type 2 diabetes mellitus (CMS-HCC)  Presents after being seen 6 months ago not having completed blood work. He's been diabetic for some time currently not taking any medications. He is complaining of nausea and vomiting today but this is also been a recurrent theme.    2. Gastroparesis  Thought likely to have gastroparesis never completed. These that were ordered by GI doctor last year.    3. Nausea and vomiting, intractability of vomiting not specified, unspecified vomiting type  Denies any fevers or chills blood sugar checked in office today at 169.    4. Hepatitis C virus carrier state (CMS-HCC)  Reports diagnosis never been treated denying any abdominal pain or jaundice.      Patient Active Problem List    Diagnosis Date Noted   • Essential hypertension 06/29/2014     Priority: Medium   • Diabetes mellitus type 2, uncontrolled (CMS-HCC) 01/04/2012     Priority: Medium   • Hepatitis C 01/04/2012     Priority: Medium   • Dyslipidemia 06/29/2014     Priority: Low   • History of Kbqb-Bgkkk-Tkacuzb disease 06/05/2014     Priority: Low   • Gastroparesis 03/07/2018   • Diabetic neuropathy (CMS-HCC) 01/17/2016   • Chronic right hip pain 06/05/2014       Current Outpatient Prescriptions   Medication Sig Dispense Refill   • ondansetron (ZOFRAN ODT) 4 MG TABLET DISPERSIBLE Take 1 Tab by mouth every 8 hours as needed for Nausea. 15 Tab 6   • metformin (GLUCOPHAGE) 500 MG Tab Take 1,000 mg by mouth 2 times a day, with meals.     • pantoprazole (PROTONIX) 40 MG Tablet Delayed Response Take 1 Tab by mouth every day. 30 Tab 1   • ondansetron (ZOFRAN ODT) 4 MG TABLET DISPERSIBLE Take 1 Tab by mouth every 6 hours as needed for Nausea/Vomiting. 20 Tab 0   • gabapentin (NEURONTIN) 100 MG Cap Take 100 mg by mouth every evening.     • insulin detemir (LEVEMIR) 100 UNIT/ML Solution  "Inject 30 Units as instructed every morning.       No current facility-administered medications for this visit.          Allergies as of 03/07/2018 - Reviewed 02/09/2018   Allergen Reaction Noted   • Bee  11/27/2008        ROS: Denies Chest pain, SOB, LE edema.    /78   Pulse 70   Temp 37 °C (98.6 °F)   Resp 16   Ht 1.803 m (5' 11\")   Wt 83 kg (183 lb)   SpO2 94%   BMI 25.52 kg/m²      Physical Exam:  Gen:         Alert and oriented, No apparent distress.  Neck:        No Lymphadenopathy or Bruits.  Lungs:     Clear to auscultation bilaterally  CV:          Regular rate and rhythm. No murmurs, rubs or gallops.               Ext:          No clubbing, cyanosis, edema.      Assessment and Plan.   43 y.o. male with the following issues.    1. Diabetic polyneuropathy associated with type 2 diabetes mellitus (CMS-HCC)  Reordering blood work discussed the importance of routine follow-up will see back in 2-3 weeks.  - COMP METABOLIC PANEL; Future  - LIPID PROFILE; Future  - HEMOGLOBIN A1C; Future  - VENKATA-65; Future  - POCT Glucose    2. Gastroparesis  Referred back to gastroenterology.  - REFERRAL TO GASTROENTEROLOGY    3. Nausea and vomiting, intractability of vomiting not specified, unspecified vomiting type  Refilled nausea medications.  - ondansetron (ZOFRAN ODT) 4 MG TABLET DISPERSIBLE; Take 1 Tab by mouth every 8 hours as needed for Nausea.  Dispense: 15 Tab; Refill: 6    4. Hepatitis C virus carrier state (CMS-HCC)  Checking viral loads again referred back to gastroenterology.  - HCV RNA PCR-GENOTYPE REFLEX; Future      "

## 2018-03-10 LAB
HCV RNA SERPL NAA+PROBE-ACNC: ABNORMAL IU/ML
HCV RNA SERPL NAA+PROBE-LOG IU: 6.1 LOG IU
HCV RNA SERPL QL NAA+PROBE: DETECTED
PATHOLOGY STUDY: ABNORMAL

## 2018-03-12 LAB — HCV GENTYP SERPL NAA+PROBE: NORMAL

## 2018-03-14 LAB — GAD65 AB SER IA-ACNC: <5 IU/ML (ref 0–5)

## 2018-03-28 ENCOUNTER — OFFICE VISIT (OUTPATIENT)
Dept: MEDICAL GROUP | Facility: MEDICAL CENTER | Age: 43
End: 2018-03-28
Payer: COMMERCIAL

## 2018-03-28 VITALS
BODY MASS INDEX: 26.88 KG/M2 | OXYGEN SATURATION: 99 % | TEMPERATURE: 99.2 F | WEIGHT: 192 LBS | DIASTOLIC BLOOD PRESSURE: 82 MMHG | SYSTOLIC BLOOD PRESSURE: 118 MMHG | HEIGHT: 71 IN | RESPIRATION RATE: 16 BRPM | HEART RATE: 80 BPM

## 2018-03-28 DIAGNOSIS — E11.42 DIABETIC POLYNEUROPATHY ASSOCIATED WITH TYPE 2 DIABETES MELLITUS (HCC): ICD-10-CM

## 2018-03-28 DIAGNOSIS — B18.2 HEPATITIS C VIRUS CARRIER STATE (HCC): ICD-10-CM

## 2018-03-28 PROCEDURE — 99214 OFFICE O/P EST MOD 30 MIN: CPT | Performed by: INTERNAL MEDICINE

## 2018-03-28 RX ORDER — GLIMEPIRIDE 4 MG/1
4 TABLET ORAL EVERY MORNING
Qty: 30 TAB | Refills: 6 | Status: SHIPPED | OUTPATIENT
Start: 2018-03-28 | End: 2019-11-23

## 2018-03-28 RX ORDER — GABAPENTIN 300 MG/1
300 CAPSULE ORAL 3 TIMES DAILY
Qty: 90 CAP | Refills: 6 | Status: SHIPPED | OUTPATIENT
Start: 2018-03-28 | End: 2019-11-23

## 2018-03-28 RX ORDER — METFORMIN HYDROCHLORIDE 500 MG/1
500 TABLET, EXTENDED RELEASE ORAL 2 TIMES DAILY
Qty: 60 TAB | Refills: 6 | Status: SHIPPED | OUTPATIENT
Start: 2018-03-28 | End: 2018-06-28

## 2018-03-28 RX ORDER — LANCETS 30 GAUGE
EACH MISCELLANEOUS
Qty: 100 EACH | Refills: 11 | Status: ON HOLD | OUTPATIENT
Start: 2018-03-28 | End: 2018-08-16

## 2018-03-29 NOTE — PROGRESS NOTES
CC: Follow-up diabetes and hepatitis C.    HPI:   Cayetano presents today with the following.    1. Hepatitis C virus carrier state (CMS-HCC)  Confirmation of hepatitis C with viral load and typing. He is wanting to see GI doctor would like referral place today. Denying any jaundice he does have slow bowels likely possible gastroparesis.    2. Uncontrolled type 2 diabetes mellitus without complication, with long-term current use of insulin (CMS-HCC)  A1c actually than expected at 8.4 currently not on medication for some time. He has taken metformin in the past with some mild diarrhea. He's also been on insulin for diabetes is better than it has been in the past.    3. Diabetic polyneuropathy associated with type 2 diabetes mellitus (CMS-HCC)  Does complain of bilateral foot pain especially at night. He does have numbness and tingling.      Patient Active Problem List    Diagnosis Date Noted   • Essential hypertension 06/29/2014     Priority: Medium   • Diabetes mellitus type 2, uncontrolled (CMS-HCC) 01/04/2012     Priority: Medium   • Hepatitis C 01/04/2012     Priority: Medium   • Dyslipidemia 06/29/2014     Priority: Low   • History of Uzjr-Dghcv-Ckztcuv disease 06/05/2014     Priority: Low   • Gastroparesis 03/07/2018   • Diabetic neuropathy (CMS-HCC) 01/17/2016   • Chronic right hip pain 06/05/2014       Current Outpatient Prescriptions   Medication Sig Dispense Refill   • metFORMIN ER (GLUCOPHAGE XR) 500 MG TABLET SR 24 HR Take 1 Tab by mouth 2 times a day. 60 Tab 6   • glimepiride (AMARYL) 4 MG Tab Take 1 Tab by mouth every morning. 30 Tab 6   • Blood Glucose Monitoring Suppl Supplies Misc Test strips order: Test strips for insurance covered metere meter. Sig: use bid 100 Each 11   • Lancets Misc Lancets order: Lancets for diabetic testing meter. Sig: use bid 100 Each 11   • Blood Glucose Monitoring Suppl Device Meter: Dispense insurance covered meter meter. Sig. Use as directed for blood sugar monitoring. #1.  "NR. 1 Device 1   • gabapentin (NEURONTIN) 300 MG Cap Take 1 Cap by mouth 3 times a day. 90 Cap 6   • pantoprazole (PROTONIX) 40 MG Tablet Delayed Response Take 1 Tab by mouth every day. 30 Tab 1     No current facility-administered medications for this visit.          Allergies as of 03/28/2018 - Reviewed 03/28/2018   Allergen Reaction Noted   • Bee  11/27/2008         ROS: Denies Chest pain, SOB, LE edema.    /82   Pulse 80   Temp 37.3 °C (99.2 °F)   Resp 16   Ht 1.803 m (5' 11\")   Wt 87.1 kg (192 lb)   SpO2 99%   BMI 26.78 kg/m²      Physical Exam:  Gen:         Alert and oriented, No apparent distress.  Neck:        No Lymphadenopathy or Bruits.  Lungs:     Clear to auscultation bilaterally  CV:          Regular rate and rhythm. No murmurs, rubs or gallops.               Ext:          No clubbing, cyanosis, edema.      Assessment and Plan.   43 y.o. male with the following issues.    1. Hepatitis C virus carrier state (CMS-HCC)  Placed referral to GI.  - REFERRAL TO GASTROENTEROLOGY    2. Uncontrolled type 2 diabetes mellitus without complication, with long-term current use of insulin (CMS-HCC)  Starting on low-dose metformin as well as Amaryl 4 mg daily, she about side effects recheck blood work in 3 months.  - metFORMIN ER (GLUCOPHAGE XR) 500 MG TABLET SR 24 HR; Take 1 Tab by mouth 2 times a day.  Dispense: 60 Tab; Refill: 6  - Blood Glucose Monitoring Suppl Supplies Misc; Test strips order: Test strips for insurance covered metere meter. Sig: use bid  Dispense: 100 Each; Refill: 11  - Lancets Misc; Lancets order: Lancets for diabetic testing meter. Sig: use bid  Dispense: 100 Each; Refill: 11  - Blood Glucose Monitoring Suppl Device; Meter: Dispense insurance covered meter meter. Sig. Use as directed for blood sugar monitoring. #1. NR.  Dispense: 1 Device; Refill: 1  - Diabetic Monofilament Lower Extremity Exam    3. Diabetic polyneuropathy associated with type 2 diabetes mellitus " (CMS-[facility])  Neuropathy starting on gabapentin cautioned about sedation.

## 2018-06-28 ENCOUNTER — OFFICE VISIT (OUTPATIENT)
Dept: MEDICAL GROUP | Facility: MEDICAL CENTER | Age: 43
End: 2018-06-28
Payer: COMMERCIAL

## 2018-06-28 ENCOUNTER — HOSPITAL ENCOUNTER (OUTPATIENT)
Dept: RADIOLOGY | Facility: MEDICAL CENTER | Age: 43
End: 2018-06-28
Attending: INTERNAL MEDICINE
Payer: COMMERCIAL

## 2018-06-28 ENCOUNTER — HOSPITAL ENCOUNTER (OUTPATIENT)
Dept: LAB | Facility: MEDICAL CENTER | Age: 43
End: 2018-06-28
Attending: INTERNAL MEDICINE
Payer: COMMERCIAL

## 2018-06-28 VITALS
SYSTOLIC BLOOD PRESSURE: 100 MMHG | WEIGHT: 190 LBS | DIASTOLIC BLOOD PRESSURE: 60 MMHG | TEMPERATURE: 98.6 F | BODY MASS INDEX: 26.6 KG/M2 | HEIGHT: 71 IN | OXYGEN SATURATION: 95 % | HEART RATE: 74 BPM

## 2018-06-28 DIAGNOSIS — L97.519 ULCER OF RIGHT FOOT, UNSPECIFIED ULCER STAGE (HCC): ICD-10-CM

## 2018-06-28 DIAGNOSIS — K31.84 GASTROPARESIS: ICD-10-CM

## 2018-06-28 LAB
ERYTHROCYTE [SEDIMENTATION RATE] IN BLOOD BY WESTERGREN METHOD: 25 MM/HOUR (ref 0–15)
HBA1C MFR BLD: 9.3 % (ref ?–5.8)
INT CON NEG: NORMAL
INT CON POS: NORMAL

## 2018-06-28 PROCEDURE — 99214 OFFICE O/P EST MOD 30 MIN: CPT | Performed by: INTERNAL MEDICINE

## 2018-06-28 PROCEDURE — 85652 RBC SED RATE AUTOMATED: CPT

## 2018-06-28 PROCEDURE — 36415 COLL VENOUS BLD VENIPUNCTURE: CPT

## 2018-06-28 PROCEDURE — 83036 HEMOGLOBIN GLYCOSYLATED A1C: CPT | Performed by: INTERNAL MEDICINE

## 2018-06-28 PROCEDURE — 73630 X-RAY EXAM OF FOOT: CPT | Mod: RT

## 2018-06-28 RX ORDER — METOCLOPRAMIDE 5 MG/1
5 TABLET ORAL 4 TIMES DAILY
Qty: 120 TAB | Refills: 6 | Status: ON HOLD | OUTPATIENT
Start: 2018-06-28 | End: 2018-08-16

## 2018-06-28 NOTE — PROGRESS NOTES
CC: Follow-up diabetes complaining of nausea.    HPI:   Cayetano presents today with the following.    1. Uncontrolled type 2 diabetes mellitus without complication, with long-term current use of insulin (HCC)  Presents reporting severe nausea stopped taking his diabetic medications.  His A1c is still elevated at 9.  He has been on insulin in the past so now.    2. Gastroparesis  Reporting severe nausea again secondary to gastroparesis.  He reports he has been on Reglan in the past.  He does finally have an appointment with gastroenterology for his hepatitis C and gastroparesis in approximately 2 weeks.    3. Ulcer of right foot, unspecified ulcer stage (HCC)  Does have an ulceration on the right foot more hard skin and slight swelling.  Does report some subjective fevers at night.  No redness or drainage from the foot.      Patient Active Problem List    Diagnosis Date Noted   • Essential hypertension 06/29/2014     Priority: Medium   • Diabetes mellitus type 2, uncontrolled (CMS-HCC) 01/04/2012     Priority: Medium   • Hepatitis C 01/04/2012     Priority: Medium   • Dyslipidemia 06/29/2014     Priority: Low   • History of Aedf-Tmthn-Cljauoh disease 06/05/2014     Priority: Low   • Gastroparesis 03/07/2018   • Diabetic neuropathy (HCC) 01/17/2016   • Chronic right hip pain 06/05/2014       Current Outpatient Prescriptions   Medication Sig Dispense Refill   • insulin glargine (LANTUS SOLOSTAR) 100 UNIT/ML Solution Pen-injector injection Inject 20 Units as instructed every evening. 5 PEN 11   • Insulin Pen Needle (BD PEN NEEDLE VERNA U/F) 32G X 4 MM Misc e11.65 100 Each 11   • metoclopramide (REGLAN) 5 MG tablet Take 1 Tab by mouth 4 times a day. 120 Tab 6   • Blood Glucose Monitoring Suppl Supplies Misc Test strips order: Test strips for insurance covered metere meter. Sig: use bid 100 Each 11   • Blood Glucose Monitoring Suppl Device Meter: Dispense insurance covered meter meter. Sig. Use as directed for blood sugar  "monitoring. #1. NR. 1 Device 1   • glimepiride (AMARYL) 4 MG Tab Take 1 Tab by mouth every morning. 30 Tab 6   • Lancets Misc Lancets order: Lancets for diabetic testing meter. Sig: use bid 100 Each 11   • gabapentin (NEURONTIN) 300 MG Cap Take 1 Cap by mouth 3 times a day. 90 Cap 6   • pantoprazole (PROTONIX) 40 MG Tablet Delayed Response Take 1 Tab by mouth every day. 30 Tab 1     No current facility-administered medications for this visit.          Allergies as of 06/28/2018 - Reviewed 06/28/2018   Allergen Reaction Noted   • Bee  11/27/2008        ROS: Denies Chest pain, SOB, LE edema.    /60   Pulse 74   Temp 37 °C (98.6 °F)   Ht 1.803 m (5' 11\")   Wt 86.2 kg (190 lb)   SpO2 95%   BMI 26.50 kg/m²     Physical Exam:  Gen:         Alert and oriented, No apparent distress.  Neck:        No Lymphadenopathy or Bruits.  Lungs:     Clear to auscultation bilaterally  CV:          Regular rate and rhythm. No murmurs, rubs or gallops.               Ext:          No clubbing, cyanosis, edema.      Assessment and Plan.   43 y.o. male with the following issues.    1. Uncontrolled type 2 diabetes mellitus without complication, with long-term current use of insulin (HCC)  Stopping metformin placing on Lantus and continue Amaryl.  We will see back in 2 weeks for titration of insulin caution about hypoglycemia  - Diabetic Monofilament LE Exam  - POCT Hemoglobin A1C  - insulin glargine (LANTUS SOLOSTAR) 100 UNIT/ML Solution Pen-injector injection; Inject 20 Units as instructed every evening.  Dispense: 5 PEN; Refill: 11  - Insulin Pen Needle (BD PEN NEEDLE VERNA U/F) 32G X 4 MM Misc; e11.65  Dispense: 100 Each; Refill: 11  6    2. Gastroparesis  Starting on Reglan he will follow-up with gastroenterology  - metoclopramide (REGLAN) 5 MG tablet; Take 1 Tab by mouth 4 times a day.  Dispense: 120 Tab; Refill:     3. Ulcer of right foot, unspecified ulcer stage (HCC)  Severe callus formation on the lateral aspect of foot " with some slight central authorization that seems dry based.  Have written for x-rays as well as sed rate referring to wound care.  - DX-FOOT-COMPLETE 3+ RIGHT; Future  - REFERRAL TO WOUND CLINIC  - ALCI SED RATE; Future

## 2018-06-28 NOTE — LETTER
June 28, 2018     Cayetano Godinez [place]  [address] [place] [address]      Dear Cayetano:    Thank you for enrolling in [facility]. Please follow the instructions below to securely access your online medical record. [facility] allows you to send messages to your healthcare team, view certain test results, renew your prescriptions, schedule appointments, and more.     How Do I Sign Up?  1. In your Internet browser, go to  [email or web address]  2. Click on the Enter Code link in the Sign In box. You will see the New Member Sign Up page.  3. Enter your [facility] Access Code exactly as it appears below (case sensitive). You will not need to use this code after you’ve completed the sign-up process. If you do not sign up before the expiration date, you must request a new code.  [facility] Access Code: [email or web address]  Expires: 7/6/2018  3:46 AM    4. Enter your Email address and Date of Birth (mm/dd/yyyy) as indicated and click Submit. You will be taken to the next sign-up page.  5. Create a [facility] ID (case sensitive) . This will be your [facility] login ID and cannot be changed, so think of one that is secure and easy to remember.  6. Create a [facility] password  (case sensitive).   · Your password must be a length of at least 6 characters/digits.  · It must include at least 1 numeric.  · You can change your password at any time.  7. Enter your Password Reset Question and Answer. This can be used at a later time if you forget your password.   8. Enter your e-mail address. You will receive e-mail notification when new information is available in [facility].  9. Click Sign Up. You can now view your medical record.     Additional Information  Please contact [employer] Customer Support at [phone] for any questions . Remember, [employer] is NOT to be used for urgent needs. For medical emergencies, dial 911.          Introducing [facility]    [facility]’s Secure, Online Health Connection      [facility] now offers  convenient, online access to your healthcare team and personal health information.  [employer] makes managing your healthcare easier than ever, and allows you to:  • Email your healthcare provider securely and privately  • Access your test results  • Request prescription refills 24 hours a day  • View your personal medical records from home  • Schedule or change your appointments  • View your Insurance and Billing Information  • Pay bills online ? Coming soon!        Sign below to get started:  I hereby request access to [employer] application.  I agree to abide by the [employer] Terms and Conditions, which will be provided to me upon activating my account.       __________________________________        _________________________  Name (Please Print)          Date of Birth     __________________________________       _________________________  Signature          Primary Care Provider      _______________  Date                          *For Internal Use Only: Please scan this form into the patient’s chart. Click on  - Select Patient - Attach to Encounter:  - Document Type: Consent   - Document Description: MyChart Consent

## 2018-07-20 ENCOUNTER — OFFICE VISIT (OUTPATIENT)
Dept: WOUND CARE | Facility: MEDICAL CENTER | Age: 43
End: 2018-07-20
Attending: INTERNAL MEDICINE
Payer: COMMERCIAL

## 2018-07-20 DIAGNOSIS — Z89.421 HISTORY OF AMPUTATION OF LESSER TOE OF RIGHT FOOT (HCC): ICD-10-CM

## 2018-07-20 DIAGNOSIS — E11.42 DIABETIC POLYNEUROPATHY ASSOCIATED WITH TYPE 2 DIABETES MELLITUS (HCC): ICD-10-CM

## 2018-07-20 DIAGNOSIS — S92.344A CLOSED NONDISPLACED FRACTURE OF FOURTH METATARSAL BONE OF RIGHT FOOT, INITIAL ENCOUNTER: ICD-10-CM

## 2018-07-20 DIAGNOSIS — K31.84 GASTROPARESIS: ICD-10-CM

## 2018-07-20 PROCEDURE — 11042 DBRDMT SUBQ TIS 1ST 20SQCM/<: CPT | Performed by: NURSE PRACTITIONER

## 2018-07-20 PROCEDURE — 11042 DBRDMT SUBQ TIS 1ST 20SQCM/<: CPT

## 2018-07-20 ASSESSMENT — ENCOUNTER SYMPTOMS
FEVER: 0
SHORTNESS OF BREATH: 0
CHILLS: 0
COUGH: 0
VOMITING: 1
NAUSEA: 1
CLAUDICATION: 0

## 2018-07-20 NOTE — PROGRESS NOTES
Initial Provider Assessment          Patient seen in collaboration with wound care clinician, Brenda Marr RN     HISTORY OF PRESENT ILLNESS: Patient is a 43 y.o.-year-old male, well known to this clinic, with past medical history that includes uncontrolled type 2 diabetes,  Diabetic neuropathy, and previous amputation of right 5th ray, referred to the wound clinic for evaluation and treatment of a new lesion to his lateral right foot.  He also had an xray done on 6/20 that shows a stress fracture of his right 4th metatarsal.  He first noticed a callus to his lateral foot a few months ago.  His wife became worried and had him go in to see his PCP.  An xray was ordered, which showed the stress fracture  of the 4th metatarsal, but no abnormalities of the residual 5th MTH.  His doctor referred him to [facility].      He was diagnosed with type 2 diabetes in 2015, and is currently managing with glimepiride and Lantus.  He checks his blood sugars once per day and reports that have been around 130 since he started taking insulin a few weeks ago.  His A1c, checked on 6/28/18, was 9.3.  His PCP recently started him on the Lantus.  He has had previous foot ulcers, and in 2016 underwent a right 5th ray amputation.  He was prescribed orthotic shoes and inserts at that time, though he admits that he does not wear these consistently and has been wearing sandals and tennis shoes much of the time.     LATEST A1c:  9.3  Date: 6/28/18      PAST MEDICAL HISTORY:   Past Medical History:   Diagnosis Date   • Arthritis right hip   • Backpain    • Diabetes    • Hepatitis C carrier (HCC)    • Hypertension    • Infectious disease        PAST SURGICAL HISTORY:   Past Surgical History:   Procedure Laterality Date   • IRRIGATION & DEBRIDEMENT ORTHO Right 4/13/2016    Procedure: IRRIGATION & DEBRIDEMENT AND CLOSURE OF ORTHO FOOT WOUND;  Surgeon: Hitesh Sol M.D.;  Location: SURGERY [facility];  Service:    • IRRIGATION & DEBRIDEMENT  ORTHO Right 4/10/2016    Procedure: IRRIGATION & DEBRIDEMENT ORTHO-poss ray resection, poss below knee amputation ;  Surgeon: Hitesh Sol M.D.;  Location: SURGERY [facility];  Service:    • IRRIGATION & DEBRIDEMENT ORTHO Right 4/4/2016    Procedure: IRRIGATION & DEBRIDEMENT ORTHO FOOT - AMPUTATION RIGHT FIFTH TOE ;  Surgeon: Hitesh Sol M.D.;  Location: SURGERY [facility];  Service:    • OSWALDO BY LAPAROSCOPY     • CHOLECYSTECTOMY     • OTHER  hepatitis c         MEDICATIONS:   Current Outpatient Prescriptions   Medication   • insulin glargine (LANTUS SOLOSTAR) 100 UNIT/ML Solution Pen-injector injection   • Insulin Pen Needle (BD PEN NEEDLE VERNA U/F) 32G X 4 MM Misc   • metoclopramide (REGLAN) 5 MG tablet   • glimepiride (AMARYL) 4 MG Tab   • Blood Glucose Monitoring Suppl Supplies Misc   • Lancets Misc   • Blood Glucose Monitoring Suppl Device   • gabapentin (NEURONTIN) 300 MG Cap     No current facility-administered medications for this visit.        ALLERGIES:    Allergies   Allergen Reactions   • Bee      Bee stings         SOCIAL HISTORY:   Social History     Social History   • Marital status:      Spouse name: N/A   • Number of children: N/A   • Years of education: N/A     Social History Main Topics   • Smoking status: Former Smoker     Packs/day: 0.50     Years: 10.00     Quit date: 1/1/2012   • Smokeless tobacco: Never Used      Comment: 5 yrs ago   • Alcohol use No      Comment: occ   • Drug use: No      Comment: marjiuana in past   • Sexual activity: Not on file     Other Topics Concern   • Not on file     Social History Narrative   • No narrative on file        REVIEW OF SYSTEMS:   Review of Systems   Constitutional: Negative for chills and fever.   Respiratory: Negative for cough and shortness of breath.    Cardiovascular: Negative for chest pain, claudication and leg swelling.   Gastrointestinal: Positive for nausea and vomiting.        He is seeing GI specialist, is  scheduled for gastric emptying study    Genitourinary: Negative for dysuria.   Skin: Negative for itching and rash.   Neurological:        Feet are numb       PHYSICAL EXAMINATION:     Physical Exam   Constitutional: He is oriented to person, place, and time and well-developed, well-nourished, and in no distress.   HENT:   Head: Normocephalic.   Eyes: Pupils are equal, round, and reactive to light.   Cardiovascular: Intact distal pulses.    Pulmonary/Chest: Effort normal.   Musculoskeletal:   Healed amputation of right 5th ray     Neurological: He is alert and oriented to person, place, and time.   Feet insensate to light touch   Skin: Skin is warm.   Thick callus to right lateral foot, over 5th ray amp site     small, shallow, linear wound exposed with debridement of callus   Psychiatric: Affect normal.          Wound Characteristics                                                    Location:  Right lateral foot   Date: 7/20/18   Tissue Type and %: 100% red (post debridement)   Periwound: Callus   Drainage: Min ss   Exposed structures none   Wound Edges:   attached   Odor: none   S&S of Infection:   none   Edema: none   Sensation: impaired     Measurements (post-debridement)    Length (cm) 0.4   Width (cm) 0.4   Depth (cm) 0.1   Area (cm2) 0.16    Tract/undermine none           Lateral Right foot pre-debridement      Lateral Right foot post-debridement          Procedures:     Debridement :  Scalpel and forceps used to debride wound bed and periwound callus. Entire surface of wound, 0.16 cm2, debrided, excising hyperkeratinized tissue and eschar.  Tissue debrided into the subcutaneous layer.  Periwound callus, ~ 2.0 cm2, debrided to skin level, excising hyperkeratinized tissue   Wound care completed by Brenda     Patient Education:   1) Importance of tight glucose control for wound healing discussed with patient 2) Implications of loss of protective sensation (LOPS) discussed with patient- including increased  risk for amputation. 3)   Advised to check feet at least daily, moisturize feet, and to always wear protective foot wear.  4) importance of offloading foot in order for wound to heal discussed with patient.        ASSESSMENT AND PLAN:       ICD-10-CM   1. Uncontrolled type 2 diabetes mellitus with foot ulcer, without long-term current use of insulin (HCC)- Thick callus to lateral right foot, over previous ray amp site.  Pt has not been wearing prescribed orthotics.  Callus debrided in clinic today, small wound exposed, foam dressing placed to manage drainage and to reduce friction and shear.  Pt will need new orthotics.  LPS rounds 8/3 to review xrays (fx).  He is to return to the wound clinic only if the wound deteriorates, wife to change dressing every 3-4 days. Pt advised to go to ER for any increased redness, swelling, drainage or odor, or if he develops fever, chills, nausea or vomiting.  E11.621    E11.65    L97.509   2. Closed nondisplaced fracture of fourth metatarsal bone of right foot, initial encounter- Seen on Xray over a month ago. Pt denies pain, though he does have significant neuropathy.  Currently no swelling or redness.  Fx most likely has healed. Rx for offloading boot prescribed as precaution..  Pt to be seen in LPS rounds on 8/3.  He is to get a new xray a few days before rounds.  S92.646A   3. Diabetic polyneuropathy associated with type 2 diabetes mellitus (HCC)-  Implications of loss of protective sensation (LOPS) discussed with patient- including increased risk for amputation.  Advised to check feet at least daily, moisturize feet, and to always wear protective foot wear.  E11.42   4. History of amputation of lesser toe of right foot (HCC)- R 5th toe followed by I&D and ray amp in 2016 d/t infection Z89.421   5. Gastroparesis- GI managing.   K31.84

## 2018-07-20 NOTE — CERTIFICATION
Advanced Wound Care  [facility]  [address]  [address]  [place] [address]  [phone] Fax: [phone]      Initial Evaluation  For Certification Period: 7/20/2018-10/10/2018  Start of Care: 7/20/2018          Referring Physician: Alvaro Murcia M.D.  Primary Physician: Alvaro Murcia M.D.       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  He also had an xray done on 6/20 that shows a stress fracture of his right 4th metatarsal.  He first noticed a callus to his lateral foot a few months ago.  His wife became worried and had him go in to see his PCP.  An xray was ordered, which showed the stress fracture  of the 4th metatarsal, but no abnormalities of the residual 5th MTH.  His doctor referred him to [facility].       He was diagnosed with type 2 diabetes in 2015, and is currently managing with glimepiride and Lantus.  He checks his blood sugars once per day and reports that have been around 130 since he started taking insulin a few weeks ago.  His A1c, checked on 6/28/18, was 9.3.  His PCP recently started him on the Lantus.  He has had previous foot ulcers, and in 2016 underwent a right 5th ray amputation.  He was prescribed orthotic shoes and inserts at that time, though he admits that he does not wear these consistently and has been wearing sandals and tennis shoes much of the time.       Pain: Patient reports right foot soreness, also states that he has neuropathy.            Past Medical History:  Past Medical History:   Diagnosis Date   •  Arthritis right hip   • Backpain    • Diabetes    • Hepatitis C carrier (HCC)    • Hypertension    • Infectious disease        Current Medications:  Current Outpatient Prescriptions:   •  insulin glargine (LANTUS SOLOSTAR) 100 UNIT/ML Solution Pen-injector injection, Inject 20 Units as instructed every evening., Disp: 5 PEN, Rfl: 11  •  Insulin Pen Needle (BD PEN NEEDLE VERNA U/F) 32G X 4 MM Misc, e11.65, Disp: 100 Each, Rfl: 11  •  metoclopramide (REGLAN) 5 MG tablet, Take 1 Tab by mouth 4 times a day., Disp: 120 Tab, Rfl: 6  •  glimepiride (AMARYL) 4 MG Tab, Take 1 Tab by mouth every morning., Disp: 30 Tab, Rfl: 6  •  Blood Glucose Monitoring Suppl Supplies Misc, Test strips order: Test strips for insurance covered metere meter. Sig: use bid, Disp: 100 Each, Rfl: 11  •  Lancets Misc, Lancets order: Lancets for diabetic testing meter. Sig: use bid, Disp: 100 Each, Rfl: 11  •  Blood Glucose Monitoring Suppl Device, Meter: Dispense insurance covered meter meter. Sig. Use as directed for blood sugar monitoring. #1. NR., Disp: 1 Device, Rfl: 1  •  gabapentin (NEURONTIN) 300 MG Cap, Take 1 Cap by mouth 3 times a day., Disp: 90 Cap, Rfl: 6    Allergies:   Allergies   Allergen Reactions   • Bee      Bee stings       Past Surgical History:   Past Surgical History:   Procedure Laterality Date   • IRRIGATION & DEBRIDEMENT ORTHO Right 4/13/2016    Procedure: IRRIGATION & DEBRIDEMENT AND CLOSURE OF ORTHO FOOT WOUND;  Surgeon: Hitesh Sol M.D.;  Location: SURGERY [facility];  Service:    • IRRIGATION & DEBRIDEMENT ORTHO Right 4/10/2016    Procedure: IRRIGATION & DEBRIDEMENT ORTHO-poss ray resection, poss below knee amputation ;  Surgeon: Hitesh oSl M.D.;  Location: SURGERY [facility];  Service:    • IRRIGATION & DEBRIDEMENT ORTHO Right 4/4/2016    Procedure: IRRIGATION & DEBRIDEMENT ORTHO FOOT - AMPUTATION RIGHT FIFTH TOE ;  Surgeon: Hitesh Sol M.D.;  Location: [facility];  Service:     • OSWALDO BY LAPAROSCOPY     • CHOLECYSTECTOMY     • OTHER  hepatitis c        Social History:   Social History     Social History   • Marital status:      Spouse name: N/A   • Number of children: N/A   • Years of education: N/A     Occupational History   • Not on file.     Social History Main Topics   • Smoking status: Former Smoker     Packs/day: 0.50     Years: 10.00     Quit date: 1/1/2012   • Smokeless tobacco: Never Used      Comment: 5 yrs ago   • Alcohol use No      Comment: occ   • Drug use: No      Comment: marjiuana in past   • Sexual activity: Not on file     Other Topics Concern   • Not on file     Social History Narrative   • No narrative on file             Objective:      Tests and Measures:  7/20/2018: Right DP and PT pulses palpable 2+, multiphasic by doppler.  7/20/2018 RAPHAEL:     Right         Brachial 100   RAPHAEL DP=1.1, PT=1.0     Orthotic, protective, supportive devices: None. Prescription for offloading boot given to patient on 7/20/2018.    Fall Risk Assessment (anne-marie all that apply with an X):  Completed 7/20/2018-fall risk.       65 years or older        Fall within the last 2 years      Uses ambulatory devices  X Loss of protective sensation in feet      Use of prostethic/orthotic    X Presence of lower extremity/foot/toe amputation      Taking medication that increases risk (per facility policy)    Interventions Recommended (if any of the above are selected):   Use of Assistive Device: None    Supervision with ambulation: Independent   Assistance with ambulation: Independent   Home safety education: Educational material provided       Wound Characteristics                                                    Location:   Right Lateral Foot   Initial Evaluation  Date: 7/20/2018      Tissue Type and %: 100% pink moist tissue   Periwound: Intact   Drainage: Scant sanguinous after debridement.   Exposed structures None   Wound Edges:   Open   Odor: None   S&S of Infection:   None    Edema: None   Sensation: Neuropathy               Measurements:  Right Lateral Foot Initial Evaluation  Date: 7/20/2018   Length (cm) 0.4   Width (cm) 0.1   Depth (cm) 0.1   Area (cm2) 0.04 cm2   Tract/undermine None                Procedures:     Debridement: Sharp debridement by Valentin NICE, please see her note.     Cleansed with: No rinse foam cleanser to foot, NS to wound bed after debridement.                                                                         Periwound protected with: Sween cream, skin prep.   Primary dressing: Small silicone adhesive foam.   Secondary Dressing:   Other: Prescription for offloading boot given to patient.     Patient Education: Patient educated on plan of care and rationale behind dressing selection. Reviewed the s/s of infection and when to present to the ER (fever, chills, nausea/vomiting, redness that spreads from the wound, sudden increased drainage or pain), leaving dressing clean/dry/intact, to change the dressing every few days, nutrition for wound healing (increased protein), and diabetic foot care guidelines such as checking feet daily and controlling blood glucose. Patient verbalized understanding to all instructions.       Professional Collaboration: Joint visit with Valentin NICE.       Assessment:      Wound etiology: DFU    Wound Progress:  Initial Evaluation    Rationale for Treatment: Sween cream to moisturize skin. Silicone adhesive foam to absorb drainage.    Patient tolerance/compliance: Patient tolerated treatment well, agrees with plan of care.    Complicating factors: DM-II    Need for ongoing Advanced Wound Care services: Patient requires skilled therapeutic wound care services for product selection, application of product, debridement, close monitoring with clinical assessment to expedite wound healing.         Plan:      Treatment Plan and Recommendations:  Diagnosis/ICD10:  L97.519 (ICD-10-CM) - Ulcer of right foot, unspecified  ulcer stage (HCC)    Procedures/CPT: Level III Established Visit 75482, sharp debridement by provider [id number].    Frequency: LPS on 8/3/2018.      Treatment Goals: STG 2 Weeks  LTG 4 Weeks   Granulation Tissue: Resolved Resolved   Decrease Necrotic Tissue to:     Wound Phase:  Maturation Maturation   Decrease Size by:     Periwound:      Decrease tracts/undermining by:     Decrease Pain:  Neuropathy Neuropathy       At the time of each visit a thorough assessment of the patient is completed to assure the  appropriateness of our plan of care.  The dressings or modalities may need to be adapted   from the original plan to address any significant changes in the wound environment.

## 2018-07-25 ENCOUNTER — HOSPITAL ENCOUNTER (EMERGENCY)
Facility: MEDICAL CENTER | Age: 43
End: 2018-07-25
Attending: EMERGENCY MEDICINE
Payer: COMMERCIAL

## 2018-07-25 VITALS
HEART RATE: 67 BPM | BODY MASS INDEX: 25.93 KG/M2 | DIASTOLIC BLOOD PRESSURE: 104 MMHG | TEMPERATURE: 98.4 F | WEIGHT: 185.19 LBS | OXYGEN SATURATION: 96 % | SYSTOLIC BLOOD PRESSURE: 191 MMHG | RESPIRATION RATE: 16 BRPM | HEIGHT: 71 IN

## 2018-07-25 DIAGNOSIS — R11.2 NAUSEA AND VOMITING, INTRACTABILITY OF VOMITING NOT SPECIFIED, UNSPECIFIED VOMITING TYPE: ICD-10-CM

## 2018-07-25 DIAGNOSIS — R10.84 GENERALIZED ABDOMINAL PAIN: ICD-10-CM

## 2018-07-25 LAB
ALBUMIN SERPL BCP-MCNC: 4.5 G/DL (ref 3.2–4.9)
ALBUMIN/GLOB SERPL: 1.3 G/DL
ALP SERPL-CCNC: 119 U/L (ref 30–99)
ALT SERPL-CCNC: 52 U/L (ref 2–50)
ANION GAP SERPL CALC-SCNC: 11 MMOL/L (ref 0–11.9)
AST SERPL-CCNC: 31 U/L (ref 12–45)
BASOPHILS # BLD AUTO: 0.3 % (ref 0–1.8)
BASOPHILS # BLD: 0.03 K/UL (ref 0–0.12)
BILIRUB SERPL-MCNC: 1.1 MG/DL (ref 0.1–1.5)
BUN SERPL-MCNC: 12 MG/DL (ref 8–22)
CALCIUM SERPL-MCNC: 9.7 MG/DL (ref 8.4–10.2)
CHLORIDE SERPL-SCNC: 105 MMOL/L (ref 96–112)
CO2 SERPL-SCNC: 26 MMOL/L (ref 20–33)
CREAT SERPL-MCNC: 0.84 MG/DL (ref 0.5–1.4)
EOSINOPHIL # BLD AUTO: 0.01 K/UL (ref 0–0.51)
EOSINOPHIL NFR BLD: 0.1 % (ref 0–6.9)
ERYTHROCYTE [DISTWIDTH] IN BLOOD BY AUTOMATED COUNT: 43.2 FL (ref 35.9–50)
GLOBULIN SER CALC-MCNC: 3.5 G/DL (ref 1.9–3.5)
GLUCOSE SERPL-MCNC: 173 MG/DL (ref 65–99)
HCT VFR BLD AUTO: 48.6 % (ref 42–52)
HGB BLD-MCNC: 16.3 G/DL (ref 14–18)
IMM GRANULOCYTES # BLD AUTO: 0.03 K/UL (ref 0–0.11)
IMM GRANULOCYTES NFR BLD AUTO: 0.3 % (ref 0–0.9)
LIPASE SERPL-CCNC: 23 U/L (ref 7–58)
LYMPHOCYTES # BLD AUTO: 2.14 K/UL (ref 1–4.8)
LYMPHOCYTES NFR BLD: 23.5 % (ref 22–41)
MCH RBC QN AUTO: 28.5 PG (ref 27–33)
MCHC RBC AUTO-ENTMCNC: 33.5 G/DL (ref 33.7–35.3)
MCV RBC AUTO: 85.1 FL (ref 81.4–97.8)
MONOCYTES # BLD AUTO: 0.62 K/UL (ref 0–0.85)
MONOCYTES NFR BLD AUTO: 6.8 % (ref 0–13.4)
NEUTROPHILS # BLD AUTO: 6.27 K/UL (ref 1.82–7.42)
NEUTROPHILS NFR BLD: 69 % (ref 44–72)
NRBC # BLD AUTO: 0 K/UL
NRBC BLD-RTO: 0 /100 WBC
PLATELET # BLD AUTO: 263 K/UL (ref 164–446)
PMV BLD AUTO: 9.1 FL (ref 9–12.9)
POTASSIUM SERPL-SCNC: 3.7 MMOL/L (ref 3.6–5.5)
PROT SERPL-MCNC: 8 G/DL (ref 6–8.2)
RBC # BLD AUTO: 5.71 M/UL (ref 4.7–6.1)
SODIUM SERPL-SCNC: 142 MMOL/L (ref 135–145)
WBC # BLD AUTO: 9.1 K/UL (ref 4.8–10.8)

## 2018-07-25 PROCEDURE — 96374 THER/PROPH/DIAG INJ IV PUSH: CPT

## 2018-07-25 PROCEDURE — 83690 ASSAY OF LIPASE: CPT

## 2018-07-25 PROCEDURE — 700105 HCHG RX REV CODE 258: Performed by: EMERGENCY MEDICINE

## 2018-07-25 PROCEDURE — 700111 HCHG RX REV CODE 636 W/ 250 OVERRIDE (IP): Performed by: EMERGENCY MEDICINE

## 2018-07-25 PROCEDURE — 36415 COLL VENOUS BLD VENIPUNCTURE: CPT

## 2018-07-25 PROCEDURE — 80053 COMPREHEN METABOLIC PANEL: CPT

## 2018-07-25 PROCEDURE — 96375 TX/PRO/DX INJ NEW DRUG ADDON: CPT

## 2018-07-25 PROCEDURE — 99284 EMERGENCY DEPT VISIT MOD MDM: CPT

## 2018-07-25 PROCEDURE — 85025 COMPLETE CBC W/AUTO DIFF WBC: CPT

## 2018-07-25 PROCEDURE — 96361 HYDRATE IV INFUSION ADD-ON: CPT

## 2018-07-25 RX ORDER — METOCLOPRAMIDE HYDROCHLORIDE 5 MG/ML
10 INJECTION INTRAMUSCULAR; INTRAVENOUS ONCE
Status: COMPLETED | OUTPATIENT
Start: 2018-07-25 | End: 2018-07-25

## 2018-07-25 RX ORDER — DIPHENHYDRAMINE HYDROCHLORIDE 50 MG/ML
25 INJECTION INTRAMUSCULAR; INTRAVENOUS ONCE
Status: COMPLETED | OUTPATIENT
Start: 2018-07-25 | End: 2018-07-25

## 2018-07-25 RX ORDER — ONDANSETRON 4 MG/1
4 TABLET, ORALLY DISINTEGRATING ORAL EVERY 6 HOURS PRN
Qty: 10 TAB | Refills: 0 | Status: ON HOLD | OUTPATIENT
Start: 2018-07-25 | End: 2018-08-16

## 2018-07-25 RX ORDER — SODIUM CHLORIDE 9 MG/ML
1000 INJECTION, SOLUTION INTRAVENOUS ONCE
Status: COMPLETED | OUTPATIENT
Start: 2018-07-25 | End: 2018-07-25

## 2018-07-25 RX ADMIN — METOCLOPRAMIDE 10 MG: 5 INJECTION, SOLUTION INTRAMUSCULAR; INTRAVENOUS at 04:02

## 2018-07-25 RX ADMIN — SODIUM CHLORIDE 1000 ML: 9 INJECTION, SOLUTION INTRAVENOUS at 04:02

## 2018-07-25 RX ADMIN — DIPHENHYDRAMINE HYDROCHLORIDE 25 MG: 50 INJECTION, SOLUTION INTRAMUSCULAR; INTRAVENOUS at 04:01

## 2018-07-25 ASSESSMENT — PAIN SCALES - GENERAL
PAINLEVEL_OUTOF10: 6
PAINLEVEL_OUTOF10: 0

## 2018-07-25 NOTE — ED NOTES
Patient provided printed discharge instructions which included signs and symptoms to look out for, why to return to ER, and other follow up appointment to make with PCP. Patient provided prescription for zofran, information on medication, and how to . Patient stated they understand discharge instructions and had no further questions or concerns at this time. Patient discharged to home with wife. Patient ambulated out of ER with stable gait.

## 2018-07-25 NOTE — ED NOTES
Pt states he feels better and is ready to go home, but would like to get remainder of fluids first.

## 2018-07-25 NOTE — DISCHARGE INSTRUCTIONS
Abdominal Pain, Adult  Many things can cause belly (abdominal) pain. Most times, belly pain is not dangerous. Many cases of belly pain can be watched and treated at home. Sometimes belly pain is serious, though. Your doctor will try to find the cause of your belly pain.  Follow these instructions at home:  · Take over-the-counter and prescription medicines only as told by your doctor. Do not take medicines that help you poop (laxatives) unless told to by your doctor.  · Drink enough fluid to keep your pee (urine) clear or pale yellow.  · Watch your belly pain for any changes.  · Keep all follow-up visits as told by your doctor. This is important.  Contact a doctor if:  · Your belly pain changes or gets worse.  · You are not hungry, or you lose weight without trying.  · You are having trouble pooping (constipated) or have watery poop (diarrhea) for more than 2-3 days.  · You have pain when you pee or poop.  · Your belly pain wakes you up at night.  · Your pain gets worse with meals, after eating, or with certain foods.  · You are throwing up and cannot keep anything down.  · You have a fever.  Get help right away if:  · Your pain does not go away as soon as your doctor says it should.  · You cannot stop throwing up.  · Your pain is only in areas of your belly, such as the right side or the left lower part of the belly.  · You have bloody or black poop, or poop that looks like tar.  · You have very bad pain, cramping, or bloating in your belly.  · You have signs of not having enough fluid or water in your body (dehydration), such as:  ¨ Dark pee, very little pee, or no pee.  ¨ Cracked lips.  ¨ Dry mouth.  ¨ Sunken eyes.  ¨ Sleepiness.  ¨ Weakness.  This information is not intended to replace advice given to you by your health care provider. Make sure you discuss any questions you have with your health care provider.  Document Released: 06/05/2009 Document Revised: 07/07/2017 Document Reviewed: 05/31/2017  [employer]  Interactive Patient Education © 2017 [employer]    Nausea and Vomiting, Adult  Introduction  Feeling sick to your stomach (nausea) means that your stomach is upset or you feel like you have to throw up (vomit). Feeling more and more sick to your stomach can lead to throwing up. Throwing up happens when food and liquid from your stomach are thrown up and out the mouth. Throwing up can make you feel weak and cause you to get dehydrated. Dehydration can make you tired and thirsty, make you have a dry mouth, and make it so you pee (urinate) less often. Older adults and people with other diseases or a weak defense system (immune system) are at higher risk for dehydration. If you feel sick to your stomach or if you throw up, it is important to follow instructions from your doctor about how to take care of yourself.  Follow these instructions at home:  Eating and drinking  Follow these instructions as told by your doctor:  · Take an oral rehydration solution (ORS). This is a drink that is sold at pharmacies and stores.  · Drink clear fluids in small amounts as you are able, such as:  ¨ Water.  ¨ Ice chips.  ¨ Diluted fruit juice.  ¨ Low-calorie sports drinks.  · Eat bland, easy-to-digest foods in small amounts as you are able, such as:  ¨ Bananas.  ¨ Applesauce.  ¨ Rice.  ¨ Low-fat (lean) meats.  ¨ Toast.  ¨ Crackers.  · Avoid fluids that have a lot of sugar or caffeine in them.  · Avoid alcohol.  · Avoid spicy or fatty foods.  General instructions  · Drink enough fluid to keep your pee (urine) clear or pale yellow.  · Wash your hands often. If you cannot use soap and water, use hand .  · Make sure that all people in your home wash their hands well and often.  · Take over-the-counter and prescription medicines only as told by your doctor.  · Rest at home while you get better.  · Watch your condition for any changes.  · Breathe slowly and deeply when you feel sick to your stomach.  · Keep all follow-up visits as  told by your doctor. This is important.  Contact a doctor if:  · You have a fever.  · You cannot keep fluids down.  · Your symptoms get worse.  · You have new symptoms.  · You feel sick to your stomach for more than two days.  · You feel light-headed or dizzy.  · You have a headache.  · You have muscle cramps.  Get help right away if:  · You have pain in your chest, neck, arm, or jaw.  · You feel very weak or you pass out (faint).  · You throw up again and again.  · You see blood in your throw-up.  · Your throw-up looks like black coffee grounds.  · You have bloody or black poop (stools) or poop that look like tar.  · You have a very bad headache, a stiff neck, or both.  · You have a rash.  · You have very bad pain, cramping, or bloating in your belly (abdomen).  · You have trouble breathing.  · You are breathing very quickly.  · Your heart is beating very quickly.  · Your skin feels cold and clammy.  · You feel confused.  · You have pain when you pee.  · You have signs of dehydration, such as:  ¨ Dark pee, hardly any pee, or no pee.  ¨ Cracked lips.  ¨ Dry mouth.  ¨ Sunken eyes.  ¨ Sleepiness.  ¨ Weakness.  These symptoms may be an emergency. Do not wait to see if the symptoms will go away. Get medical help right away. Call your local emergency services (911 in the [place] Do not drive yourself to the hospital.   This information is not intended to replace advice given to you by your health care provider. Make sure you discuss any questions you have with your health care provider.  Document Released: 06/05/2009 Document Revised: 07/07/2017 Document Reviewed: 08/23/2016  © 2017 [employer]

## 2018-07-25 NOTE — ED PROVIDER NOTES
ER Provider Note         CHIEF COMPLAINT  Nausea vomiting and abdominal pain.    HPI  Cayetano Hawkins is a 43 y.o. male who presents to the Emergency Department with past medical history of gastroparesis as well cyclic vomiting syndrome who presents with episodes of vomiting for the past 2 days.  The patient has not been able to intake any oral foods for the past few days.  He says that after vomiting for multiple times the patient is started to have some epigastric pain.  He also mentions some soreness in his back from vomiting so many times.  The patient denies any dysuria.  The patient denies any chest pain or shortness of breath.  The patient says this is identical to many episodes he has had in the past.  He says the Reglan in his house is not working at this time.    REVIEW OF SYSTEMS  See HPI for further details. All other systems are negative.     PAST MEDICAL HISTORY   has a past medical history of Arthritis (right hip); Backpain; Diabetes; Hepatitis C carrier (HCC); Hypertension; and Infectious disease.    SURGICAL HISTORY   has a past surgical history that includes nano by laparoscopy; cholecystectomy; irrigation & debridement ortho (Right, 4/4/2016); irrigation & debridement ortho (Right, 4/10/2016); irrigation & debridement ortho (Right, 4/13/2016); and other (hepatitis c ).    SOCIAL HISTORY  Social History   Substance Use Topics   • Smoking status: Former Smoker     Packs/day: 0.50     Years: 10.00     Quit date: 1/1/2012   • Smokeless tobacco: Never Used      Comment: 5 yrs ago   • Alcohol use No      Comment: occ      History   Drug Use   • Types: Inhaled     Comment: conor       FAMILY HISTORY  Family History   Problem Relation Age of Onset   • Diabetes Mother    • Hyperlipidemia Mother    • Arthritis Mother    • Heart Attack Father    • Heart Disease Father    • Hypertension Father    • Stroke Father    • Hyperlipidemia Father    • Arthritis Father        CURRENT MEDICATIONS  Home  "Medications     Reviewed by Constance Wan R.N. (Registered Nurse) on 07/25/18 at 0414  Med List Status: <None>   Medication Last Dose Status   Blood Glucose Monitoring Suppl Device 6/28/2018 Active   Blood Glucose Monitoring Suppl Supplies Misc 6/28/2018 Active   gabapentin (NEURONTIN) 300 MG Cap > Month Active   glimepiride (AMARYL) 4 MG Tab > Month Active   insulin glargine (LANTUS SOLOSTAR) 100 UNIT/ML Solution Pen-injector injection  Active   Insulin Pen Needle (BD PEN NEEDLE VERNA U/F) 32G X 4 MM Misc  Active   Lancets Misc > Month Active   metoclopramide (REGLAN) 5 MG tablet  Active                ALLERGIES  Allergies   Allergen Reactions   • Bee      Bee stings       PHYSICAL EXAM  VITAL SIGNS: BP (!) 191/104   Pulse 78   Temp 36.9 °C (98.4 °F)   Resp 16   Ht 1.803 m (5' 11\")   Wt 84 kg (185 lb 3 oz)   SpO2 99%   BMI 25.83 kg/m²      Constitutional: Alert in mild distress  HENT: No signs of trauma, Bilateral external ears normal, Nose normal.   Eyes: Pupils are equal and reactive, Conjunctiva normal, Non-icteric.   Neck: Normal range of motion, No tenderness, Supple, No stridor.   Lymphatic: No lymphadenopathy noted.   Cardiovascular: Regular rate and rhythm, no murmurs.   Thorax & Lungs: Normal breath sounds, No respiratory distress, No wheezing, No chest tenderness.   Abdomen: Bowel sounds normal, Soft, No tenderness, No masses, No pulsatile masses. No peritoneal signs.  Skin: Warm, Dry, No erythema, No rash.   Back: No bony tenderness, No CVA tenderness.   Extremities: Intact distal pulses, No edema, No tenderness, No cyanosis.  Musculoskeletal: Good range of motion in all major joints. No tenderness to palpation or major deformities noted.   Neurologic: Alert , Normal motor function, Normal sensory function, No focal deficits noted.   Psychiatric: Affect normal, Judgment normal, Mood normal.     DIAGNOSTIC STUDIES / PROCEDURES           LABS  Labs Reviewed   CBC WITH DIFFERENTIAL - Abnormal; " Notable for the following:        Result Value    MCHC 33.5 (*)     All other components within normal limits   COMP METABOLIC PANEL - Abnormal; Notable for the following:     Glucose 173 (*)     ALT(SGPT) 52 (*)     Alkaline Phosphatase 119 (*)     All other components within normal limits   LIPASE   ESTIMATED GFR   All labs reviewed by me.      COURSE & MEDICAL DECISION MAKING  Pertinent Labs & Imaging studies reviewed. (See chart for details)    This is a 43 y.o. male that presents who presents with a episode of nausea and vomiting that is similar to his cyclic episodes in the past.  The patient does endorse continual marijuana use.  At this time I do not believe there is any intra-abdominal surgical pathology such as obstruction or appendicitis or perforation given the fact is a benign abdominal exam.  I will treat him with Reglan and Benadryl and obtain basic labs to evaluate his electrolyte status as well as for the presence or absence of pancreatitis.  I will reassess the patient after I give him IV fluids as well as Reglan and Benadryl.  He has been given IV fluids because he is not tolerating oral intake for the past 2 days.    3:47 AM - Patient seen and examined at bedside.     4:31 AM-on reexamination the patient is feeling much improved.  The patient's glucose is 173 and he has a mild ALT elevation.  His alk phos is also mildly elevated.  At this time the patient has had a positive response to IV fluids given he is feeling much better and appears more hydrated.  Repeat abdominal exam shows no tenderness at this time.  Strict return precautions were given to the patient and follow-up was arranged.        FINAL IMPRESSION  1. Nausea and vomiting, intractability of vomiting not specified, unspecified vomiting type    2. Generalized abdominal pain              Electronically signed by: Mendel Manning, 7/25/2018

## 2018-07-25 NOTE — ED NOTES
"Chief Complaint   Patient presents with   • N/V     pt states he has been n/v and retching for past 2 days and unable to keep food and fluid renan. Pt states this has happened before, and has reglan at  home, but oral zofran works better, but he is out     BP (!) 191/104   Pulse 78   Temp 36.9 °C (98.4 °F)   Resp 16   Ht 1.803 m (5' 11\")   Wt 84 kg (185 lb 3 oz)   SpO2 99%   BMI 25.83 kg/m²       "

## 2018-07-26 ENCOUNTER — PATIENT OUTREACH (OUTPATIENT)
Dept: HEALTH INFORMATION MANAGEMENT | Facility: OTHER | Age: 43
End: 2018-07-26

## 2018-07-30 ENCOUNTER — HOSPITAL ENCOUNTER (OUTPATIENT)
Dept: RADIOLOGY | Facility: MEDICAL CENTER | Age: 43
End: 2018-07-30
Attending: INTERNAL MEDICINE
Payer: COMMERCIAL

## 2018-07-30 DIAGNOSIS — R11.0 NAUSEA: ICD-10-CM

## 2018-07-30 DIAGNOSIS — A08.11 EPIDEMIC VOMITING SYNDROME: ICD-10-CM

## 2018-07-30 PROCEDURE — A9541 TC99M SULFUR COLLOID: HCPCS

## 2018-07-30 PROCEDURE — 76700 US EXAM ABDOM COMPLETE: CPT

## 2018-08-02 ENCOUNTER — HOSPITAL ENCOUNTER (OUTPATIENT)
Dept: RADIOLOGY | Facility: MEDICAL CENTER | Age: 43
End: 2018-08-02
Attending: NURSE PRACTITIONER
Payer: COMMERCIAL

## 2018-08-02 DIAGNOSIS — S92.344A CLOSED NONDISPLACED FRACTURE OF FOURTH METATARSAL BONE OF RIGHT FOOT, INITIAL ENCOUNTER: ICD-10-CM

## 2018-08-02 PROCEDURE — 73630 X-RAY EXAM OF FOOT: CPT | Mod: RT

## 2018-08-03 ENCOUNTER — OFFICE VISIT (OUTPATIENT)
Dept: WOUND CARE | Facility: MEDICAL CENTER | Age: 43
End: 2018-08-03
Attending: INTERNAL MEDICINE
Payer: COMMERCIAL

## 2018-08-03 DIAGNOSIS — E08.621 DIABETIC ULCER OF RIGHT MIDFOOT ASSOCIATED WITH DIABETES MELLITUS DUE TO UNDERLYING CONDITION, LIMITED TO BREAKDOWN OF SKIN (HCC): Primary | ICD-10-CM

## 2018-08-03 DIAGNOSIS — L97.411 DIABETIC ULCER OF RIGHT MIDFOOT ASSOCIATED WITH DIABETES MELLITUS DUE TO UNDERLYING CONDITION, LIMITED TO BREAKDOWN OF SKIN (HCC): Primary | ICD-10-CM

## 2018-08-03 DIAGNOSIS — Z89.421 HISTORY OF AMPUTATION OF LESSER TOE OF RIGHT FOOT (HCC): ICD-10-CM

## 2018-08-03 DIAGNOSIS — S92.344A CLOSED NONDISPLACED FRACTURE OF FOURTH METATARSAL BONE OF RIGHT FOOT, INITIAL ENCOUNTER: ICD-10-CM

## 2018-08-03 PROCEDURE — 99213 OFFICE O/P EST LOW 20 MIN: CPT

## 2018-08-03 PROCEDURE — 99212 OFFICE O/P EST SF 10 MIN: CPT | Performed by: ORTHOPAEDIC SURGERY

## 2018-08-03 NOTE — WOUND TEAM
Pt seen during ortho rounds with LPS team for Right lateral foot wounds.  Following physician assessment, pt wound was evaluated and dressed with non-adhesive foam and secured with hypafix tape.

## 2018-08-03 NOTE — PROGRESS NOTES
LIMB PRESERVATION SERVICE ROUNDS    Patient seen in collaboration with interdisciplinary team during LPS rounds in wound clinic for evaluation of a right lateral foot ulcer, over bony prominence, over previous 5th ray amputation site. Amputation was in April of 2016.   thick callus started to develop in April or May of this year.  Incidental finding of nondisplaced fracture of 4th MTH.  Wound debrided in wound clinic a few weeks ago, underlying ulcer exposed.  He is wearing regular tennis shoes today, instead of  prescribed Sinai boot .    Patient states blood sugars are averaging 120's    OBJECTIVE FINDINGS: Shallow open wound to lateral right foot, over bony prominence, 0.5 cm diameter, shallow, <0.1 cm depth.      Labs/diagnostic reviewed: Xray of right foot- shows fx of 4th MTh and bony prominence of residual 5th MTH    PROCEDURE   Wound care completed by  Glenna Servin RN    PLAN:   Wound Care: foam dressing to protect wound, reduce friction and to manage exudate  Imaging: no further imaging at this time  Offloading: Sinai boot (previously ordered)- Pt advised to wear when ambulating  Antibiotics: None  Surgery: Ostectomy to reduce bony prominence of 5th MTh, MEDARDO to schedule  Referral: N/A      LPS Follow up: PRN

## 2018-08-07 ENCOUNTER — HOSPITAL ENCOUNTER (EMERGENCY)
Facility: MEDICAL CENTER | Age: 43
End: 2018-08-07
Attending: EMERGENCY MEDICINE
Payer: COMMERCIAL

## 2018-08-07 VITALS
DIASTOLIC BLOOD PRESSURE: 113 MMHG | SYSTOLIC BLOOD PRESSURE: 177 MMHG | TEMPERATURE: 97.8 F | HEART RATE: 86 BPM | OXYGEN SATURATION: 94 % | RESPIRATION RATE: 18 BRPM

## 2018-08-07 DIAGNOSIS — R11.15 NON-INTRACTABLE CYCLICAL VOMITING WITH NAUSEA: ICD-10-CM

## 2018-08-07 LAB
ALBUMIN SERPL BCP-MCNC: 4.7 G/DL (ref 3.2–4.9)
ALBUMIN/GLOB SERPL: 1.3 G/DL
ALP SERPL-CCNC: 115 U/L (ref 30–99)
ALT SERPL-CCNC: 42 U/L (ref 2–50)
ANION GAP SERPL CALC-SCNC: 12 MMOL/L (ref 0–11.9)
APPEARANCE UR: CLEAR
AST SERPL-CCNC: 32 U/L (ref 12–45)
BACTERIA #/AREA URNS HPF: NEGATIVE /HPF
BASOPHILS # BLD AUTO: 0.3 % (ref 0–1.8)
BASOPHILS # BLD: 0.04 K/UL (ref 0–0.12)
BILIRUB SERPL-MCNC: 0.6 MG/DL (ref 0.1–1.5)
BILIRUB UR QL STRIP.AUTO: ABNORMAL
BUN SERPL-MCNC: 9 MG/DL (ref 8–22)
CALCIUM SERPL-MCNC: 9.9 MG/DL (ref 8.4–10.2)
CHLORIDE SERPL-SCNC: 104 MMOL/L (ref 96–112)
CO2 SERPL-SCNC: 23 MMOL/L (ref 20–33)
COLOR UR: YELLOW
CREAT SERPL-MCNC: 0.89 MG/DL (ref 0.5–1.4)
EOSINOPHIL # BLD AUTO: 0.01 K/UL (ref 0–0.51)
EOSINOPHIL NFR BLD: 0.1 % (ref 0–6.9)
EPI CELLS #/AREA URNS HPF: ABNORMAL /HPF
ERYTHROCYTE [DISTWIDTH] IN BLOOD BY AUTOMATED COUNT: 43.7 FL (ref 35.9–50)
GLOBULIN SER CALC-MCNC: 3.7 G/DL (ref 1.9–3.5)
GLUCOSE BLD-MCNC: 196 MG/DL (ref 65–99)
GLUCOSE SERPL-MCNC: 167 MG/DL (ref 65–99)
GLUCOSE UR STRIP.AUTO-MCNC: NEGATIVE MG/DL
HCT VFR BLD AUTO: 48.6 % (ref 42–52)
HGB BLD-MCNC: 16.1 G/DL (ref 14–18)
IMM GRANULOCYTES # BLD AUTO: 0.06 K/UL (ref 0–0.11)
IMM GRANULOCYTES NFR BLD AUTO: 0.4 % (ref 0–0.9)
KETONES UR STRIP.AUTO-MCNC: >=80 MG/DL
LEUKOCYTE ESTERASE UR QL STRIP.AUTO: NEGATIVE
LIPASE SERPL-CCNC: 25 U/L (ref 7–58)
LYMPHOCYTES # BLD AUTO: 2.08 K/UL (ref 1–4.8)
LYMPHOCYTES NFR BLD: 15 % (ref 22–41)
MCH RBC QN AUTO: 28.1 PG (ref 27–33)
MCHC RBC AUTO-ENTMCNC: 33.1 G/DL (ref 33.7–35.3)
MCV RBC AUTO: 85 FL (ref 81.4–97.8)
MICRO URNS: ABNORMAL
MONOCYTES # BLD AUTO: 0.47 K/UL (ref 0–0.85)
MONOCYTES NFR BLD AUTO: 3.4 % (ref 0–13.4)
MUCOUS THREADS #/AREA URNS HPF: ABNORMAL /HPF
NEUTROPHILS # BLD AUTO: 11.19 K/UL (ref 1.82–7.42)
NEUTROPHILS NFR BLD: 80.8 % (ref 44–72)
NITRITE UR QL STRIP.AUTO: NEGATIVE
NRBC # BLD AUTO: 0 K/UL
NRBC BLD-RTO: 0 /100 WBC
PH UR STRIP.AUTO: 6 [PH]
PLATELET # BLD AUTO: 341 K/UL (ref 164–446)
PMV BLD AUTO: 9 FL (ref 9–12.9)
POTASSIUM SERPL-SCNC: 3.5 MMOL/L (ref 3.6–5.5)
PROT SERPL-MCNC: 8.4 G/DL (ref 6–8.2)
PROT UR QL STRIP: 100 MG/DL
RBC # BLD AUTO: 5.72 M/UL (ref 4.7–6.1)
RBC # URNS HPF: ABNORMAL /HPF
RBC UR QL AUTO: ABNORMAL
SODIUM SERPL-SCNC: 139 MMOL/L (ref 135–145)
SP GR UR REFRACTOMETRY: 1.03
SPERM #/AREA URNS HPF: ABNORMAL /HPF
WBC # BLD AUTO: 13.9 K/UL (ref 4.8–10.8)
WBC #/AREA URNS HPF: ABNORMAL /HPF

## 2018-08-07 PROCEDURE — 85025 COMPLETE CBC W/AUTO DIFF WBC: CPT

## 2018-08-07 PROCEDURE — 36415 COLL VENOUS BLD VENIPUNCTURE: CPT

## 2018-08-07 PROCEDURE — 80053 COMPREHEN METABOLIC PANEL: CPT

## 2018-08-07 PROCEDURE — 700105 HCHG RX REV CODE 258: Performed by: EMERGENCY MEDICINE

## 2018-08-07 PROCEDURE — 82962 GLUCOSE BLOOD TEST: CPT

## 2018-08-07 PROCEDURE — 99285 EMERGENCY DEPT VISIT HI MDM: CPT

## 2018-08-07 PROCEDURE — 96374 THER/PROPH/DIAG INJ IV PUSH: CPT

## 2018-08-07 PROCEDURE — 700111 HCHG RX REV CODE 636 W/ 250 OVERRIDE (IP): Performed by: EMERGENCY MEDICINE

## 2018-08-07 PROCEDURE — 83690 ASSAY OF LIPASE: CPT

## 2018-08-07 PROCEDURE — 96361 HYDRATE IV INFUSION ADD-ON: CPT

## 2018-08-07 PROCEDURE — 81001 URINALYSIS AUTO W/SCOPE: CPT

## 2018-08-07 RX ORDER — SODIUM CHLORIDE 9 MG/ML
1000 INJECTION, SOLUTION INTRAVENOUS ONCE
Status: COMPLETED | OUTPATIENT
Start: 2018-08-07 | End: 2018-08-07

## 2018-08-07 RX ORDER — HALOPERIDOL 5 MG/ML
5 INJECTION INTRAMUSCULAR ONCE
Status: COMPLETED | OUTPATIENT
Start: 2018-08-07 | End: 2018-08-07

## 2018-08-07 RX ADMIN — HALOPERIDOL LACTATE 5 MG: 5 INJECTION, SOLUTION INTRAMUSCULAR at 14:08

## 2018-08-07 RX ADMIN — SODIUM CHLORIDE 1000 ML: 9 INJECTION, SOLUTION INTRAVENOUS at 14:29

## 2018-08-07 ASSESSMENT — ENCOUNTER SYMPTOMS
RESPIRATORY NEGATIVE: 1
FEVER: 0
NECK PAIN: 0
SORE THROAT: 0
BACK PAIN: 0
BRUISES/BLEEDS EASILY: 0
HEADACHES: 0
SHORTNESS OF BREATH: 0
EYE PAIN: 0
CARDIOVASCULAR NEGATIVE: 1
FLANK PAIN: 0
ABDOMINAL PAIN: 1
FOCAL WEAKNESS: 0
MYALGIAS: 0
BLOOD IN STOOL: 0
SEIZURES: 0
HALLUCINATIONS: 0
VOMITING: 1
WEAKNESS: 0
EYES NEGATIVE: 1
CONSTITUTIONAL NEGATIVE: 1

## 2018-08-07 ASSESSMENT — PAIN SCALES - GENERAL: PAINLEVEL_OUTOF10: 9

## 2018-08-07 NOTE — ED NOTES
Discharge paperwork and instructions given to pt, pt verbalized understanding all information. Pt ambulated out of ER.

## 2018-08-07 NOTE — ED NOTES
"Chief Complaint   Patient presents with   • Blood in Vomit     Started at 0400 this am, states \"I think it may be blood, dark colored, I am not sure.\" Pt recently seen for gastic emptying study but does not know result. Suspected gastroparesis. Denies blood in stool.    • Abdominal Pain     BP (!) 177/113   Pulse 86   Temp 36.6 °C (97.8 °F)   Resp 18   SpO2 99%     Hx DM  "

## 2018-08-07 NOTE — ED PROVIDER NOTES
"CHIEF COMPLAINT  Chief Complaint   Patient presents with   • Blood in Vomit     Started at 0400 this am, states \"I think it may be blood, dark colored, I am not sure.\" Pt recently seen for gastic emptying study but does not know result. Suspected gastroparesis. Denies blood in stool.    • Abdominal Pain       HPI  HPI     43 y.o. M p/w CC of vomiting.      Pt reports several similar episodes and states he has been told he has cyclical vomiting.  Pt denies focal abd pain, dysuria, hematuria, testicular pain, or rash.    Patient states the vomiting started at 3 AM and is nonbloody.  Patient denies any melena or hematochezia.  Patient states his last bowel movement was today and was normal.    No prior hx of varices.   No foreign travel or street food.   No persistent throat or abd pain.    Daily THC usage.     REVIEW OF SYSTEMS  Review of Systems   Constitutional: Negative.  Negative for fever.   HENT: Negative.  Negative for ear pain and sore throat.    Eyes: Negative.  Negative for pain.   Respiratory: Negative.  Negative for shortness of breath.    Cardiovascular: Negative.  Negative for chest pain.   Gastrointestinal: Positive for abdominal pain and vomiting. Negative for blood in stool.   Genitourinary: Negative for dysuria and flank pain.   Musculoskeletal: Negative for back pain, myalgias and neck pain.   Skin: Negative.  Negative for rash.   Neurological: Negative for focal weakness, seizures, weakness and headaches.   Endo/Heme/Allergies: Does not bruise/bleed easily.   Psychiatric/Behavioral: Negative for hallucinations and suicidal ideas.   All other systems reviewed and are negative.      PAST MEDICAL HISTORY   has a past medical history of Arthritis (right hip); Backpain; Diabetes; Hepatitis C carrier (HCC); Hypertension; and Infectious disease.    SOCIAL HISTORY  Social History     Social History Main Topics   • Smoking status: Former Smoker     Packs/day: 0.50     Years: 10.00     Quit date: 1/1/2012 "   • Smokeless tobacco: Never Used      Comment: 5 yrs ago   • Alcohol use No      Comment: occ   • Drug use: No   • Sexual activity: Not on file       SURGICAL HISTORY   has a past surgical history that includes nano by laparoscopy; cholecystectomy; irrigation & debridement ortho (Right, 4/4/2016); irrigation & debridement ortho (Right, 4/10/2016); irrigation & debridement ortho (Right, 4/13/2016); and other (hepatitis c ).    CURRENT MEDICATIONS  Home Medications    **Home medications have not yet been reviewed for this encounter**         ALLERGIES  Allergies   Allergen Reactions   • Bee      Bee stings       PHYSICAL EXAM  VITAL SIGNS: BP (!) 177/113   Pulse 86   Temp 36.6 °C (97.8 °F)   Resp 18   SpO2 94%  @[email or web address][[id number]::@  Pulse ox interpretation: I interpret this pulse ox as normal.    Physical Exam   Constitutional: He is oriented to person, place, and time and well-developed, well-nourished, and in no distress.   HENT:   Head: Normocephalic.   Right Ear: External ear normal.   Left Ear: External ear normal.   Mouth/Throat: No oropharyngeal exudate.   Eyes: Pupils are equal, round, and reactive to light. EOM are normal. No scleral icterus.   Neck: Normal range of motion.   Cardiovascular: Normal rate.    Pulmonary/Chest: Effort normal. No respiratory distress.   Abdominal: He exhibits no distension and no mass. There is no tenderness. There is no rebound and no guarding.   Musculoskeletal: Normal range of motion. He exhibits no deformity.   Neurological: He is alert and oriented to person, place, and time. Coordination normal.   Skin: Skin is warm and dry. No rash noted. No erythema.   Psychiatric: Affect and judgment normal.   Nursing note and vitals reviewed.      DIAGNOSTIC STUDIES / PROCEDURES    LABS/EKG  Results for orders placed or performed during the hospital encounter of 08/07/18   CBC WITH DIFFERENTIAL   Result Value Ref Range    WBC 13.9 (H) 4.8 - 10.8 K/uL    RBC 5.72 4.70 - 6.10 M/uL     Hemoglobin 16.1 14.0 - 18.0 g/dL    Hematocrit 48.6 42.0 - 52.0 %    MCV 85.0 81.4 - 97.8 fL    MCH 28.1 27.0 - 33.0 pg    MCHC 33.1 (L) 33.7 - 35.3 g/dL    RDW 43.7 35.9 - 50.0 fL    Platelet Count 341 164 - 446 K/uL    MPV 9.0 9.0 - 12.9 fL    Neutrophils-Polys 80.80 (H) 44.00 - 72.00 %    Lymphocytes 15.00 (L) 22.00 - 41.00 %    Monocytes 3.40 0.00 - 13.40 %    Eosinophils 0.10 0.00 - 6.90 %    Basophils 0.30 0.00 - 1.80 %    Immature Granulocytes 0.40 0.00 - 0.90 %    Nucleated RBC 0.00 /100 WBC    Neutrophils (Absolute) 11.19 (H) 1.82 - 7.42 K/uL    Lymphs (Absolute) 2.08 1.00 - 4.80 K/uL    Monos (Absolute) 0.47 0.00 - 0.85 K/uL    Eos (Absolute) 0.01 0.00 - 0.51 K/uL    Baso (Absolute) 0.04 0.00 - 0.12 K/uL    Immature Granulocytes (abs) 0.06 0.00 - 0.11 K/uL    NRBC (Absolute) 0.00 K/uL   COMP METABOLIC PANEL   Result Value Ref Range    Sodium 139 135 - 145 mmol/L    Potassium 3.5 (L) 3.6 - 5.5 mmol/L    Chloride 104 96 - 112 mmol/L    Co2 23 20 - 33 mmol/L    Anion Gap 12.0 (H) 0.0 - 11.9    Glucose 167 (H) 65 - 99 mg/dL    Bun 9 8 - 22 mg/dL    Creatinine 0.89 0.50 - 1.40 mg/dL    Calcium 9.9 8.4 - 10.2 mg/dL    AST(SGOT) 32 12 - 45 U/L    ALT(SGPT) 42 2 - 50 U/L    Alkaline Phosphatase 115 (H) 30 - 99 U/L    Total Bilirubin 0.6 0.1 - 1.5 mg/dL    Albumin 4.7 3.2 - 4.9 g/dL    Total Protein 8.4 (H) 6.0 - 8.2 g/dL    Globulin 3.7 (H) 1.9 - 3.5 g/dL    A-G Ratio 1.3 g/dL   LIPASE   Result Value Ref Range    Lipase 25 7 - 58 U/L   URINALYSIS,CULTURE IF INDICATED   Result Value Ref Range    Micro Urine Req Microscopic     Color Yellow     Character Clear     Ph 6.0 5.0 - 8.0    Glucose Negative Negative mg/dL    Ketones >=80 (A) Negative mg/dL    Protein 100 (A) Negative mg/dL    Bilirubin Small (A) Negative    Nitrite Negative Negative    Leukocyte Esterase Negative Negative    Occult Blood Small (A) Negative   ESTIMATED GFR   Result Value Ref Range    GFR If African American >60 >60 mL/min/1.73 m 2    GFR If  Non African American >60 >60 mL/min/1.73 m 2   REFRACTOMETER SG   Result Value Ref Range    Specific Gravity 1.029    URINE MICROSCOPIC (W/UA)   Result Value Ref Range    WBC 0-2 (A) /hpf    RBC 0-2 (A) /hpf    Bacteria Negative None /hpf    Epithelial Cells Rare Few /hpf    Mucous Threads Few /hpf    Sperm Few /hpf   ACCU-CHEK GLUCOSE   Result Value Ref Range    Glucose - Accu-Ck 196 (H) 65 - 99 mg/dL     Medications   haloperidol lactate (HALDOL) injection 5 mg (5 mg Intravenous Given 8/7/18 1408)   NS infusion 1,000 mL (0 mL Intravenous Stopped 8/7/18 1605)        RADIOLOGY  No orders to display        COURSE & MEDICAL DECISION MAKING  Pertinent Labs & Imaging studies reviewed by me. (See chart for details)    43 y.o. male PMH cyclic vomiting p/w vomiting.     Differential diagnosis includes but is not limited to:  Exam and hx c/w (G43.A0) Non-intractable cyclical vomiting with nausea   Given Dry mucous membranes and hx of vomiting. Elected to give pt 1L NS.  Pt w/ sx that feel significantly improved after meds and NS.    Neutrophil predominate leukocytosis likely 2/2 numerous episodes of vomiting and is non-specific.  However pt counseled on how presentation could represent intraabd infxn and to return if sx return or worsen.   No dysuria to suggest UTI and no UA e/o UTI    Pt tolerating PO and plan for dc    FINAL IMPRESSION  Visit Diagnoses     ICD-10-CM   1. Non-intractable cyclical vomiting with nausea G43.A0              Electronically signed by: Cesario Orlando, 8/7/2018 2:01 PM

## 2018-08-08 ENCOUNTER — PATIENT OUTREACH (OUTPATIENT)
Dept: HEALTH INFORMATION MANAGEMENT | Facility: OTHER | Age: 43
End: 2018-08-08

## 2018-08-15 ENCOUNTER — APPOINTMENT (OUTPATIENT)
Dept: ADMISSIONS | Facility: MEDICAL CENTER | Age: 43
End: 2018-08-15
Attending: ORTHOPAEDIC SURGERY
Payer: COMMERCIAL

## 2018-08-16 ENCOUNTER — HOSPITAL ENCOUNTER (OUTPATIENT)
Facility: MEDICAL CENTER | Age: 43
End: 2018-08-16
Attending: ORTHOPAEDIC SURGERY | Admitting: ORTHOPAEDIC SURGERY
Payer: COMMERCIAL

## 2018-08-16 VITALS
WEIGHT: 199.52 LBS | TEMPERATURE: 97.7 F | RESPIRATION RATE: 18 BRPM | DIASTOLIC BLOOD PRESSURE: 83 MMHG | SYSTOLIC BLOOD PRESSURE: 147 MMHG | HEIGHT: 71 IN | OXYGEN SATURATION: 96 % | HEART RATE: 56 BPM | BODY MASS INDEX: 27.93 KG/M2

## 2018-08-16 LAB
GLUCOSE BLD-MCNC: 107 MG/DL (ref 65–99)
GRAM STN SPEC: NORMAL
SIGNIFICANT IND 70042: NORMAL
SITE SITE: NORMAL
SOURCE SOURCE: NORMAL

## 2018-08-16 PROCEDURE — A9270 NON-COVERED ITEM OR SERVICE: HCPCS

## 2018-08-16 PROCEDURE — 700101 HCHG RX REV CODE 250

## 2018-08-16 PROCEDURE — 501330 HCHG SET, CYSTO IRRIG TUBING: Performed by: ORTHOPAEDIC SURGERY

## 2018-08-16 PROCEDURE — 160028 HCHG SURGERY MINUTES - 1ST 30 MINS LEVEL 3: Performed by: ORTHOPAEDIC SURGERY

## 2018-08-16 PROCEDURE — 87070 CULTURE OTHR SPECIMN AEROBIC: CPT

## 2018-08-16 PROCEDURE — 87205 SMEAR GRAM STAIN: CPT

## 2018-08-16 PROCEDURE — 160048 HCHG OR STATISTICAL LEVEL 1-5: Performed by: ORTHOPAEDIC SURGERY

## 2018-08-16 PROCEDURE — 500881 HCHG PACK, EXTREMITY: Performed by: ORTHOPAEDIC SURGERY

## 2018-08-16 PROCEDURE — 87015 SPECIMEN INFECT AGNT CONCNTJ: CPT

## 2018-08-16 PROCEDURE — 87077 CULTURE AEROBIC IDENTIFY: CPT

## 2018-08-16 PROCEDURE — 160035 HCHG PACU - 1ST 60 MINS PHASE I: Performed by: ORTHOPAEDIC SURGERY

## 2018-08-16 PROCEDURE — 700102 HCHG RX REV CODE 250 W/ 637 OVERRIDE(OP)

## 2018-08-16 PROCEDURE — 501838 HCHG SUTURE GENERAL: Performed by: ORTHOPAEDIC SURGERY

## 2018-08-16 PROCEDURE — 160002 HCHG RECOVERY MINUTES (STAT): Performed by: ORTHOPAEDIC SURGERY

## 2018-08-16 PROCEDURE — 700111 HCHG RX REV CODE 636 W/ 250 OVERRIDE (IP)

## 2018-08-16 PROCEDURE — 87075 CULTR BACTERIA EXCEPT BLOOD: CPT

## 2018-08-16 PROCEDURE — 160009 HCHG ANES TIME/MIN: Performed by: ORTHOPAEDIC SURGERY

## 2018-08-16 PROCEDURE — 160025 RECOVERY II MINUTES (STATS): Performed by: ORTHOPAEDIC SURGERY

## 2018-08-16 PROCEDURE — 82962 GLUCOSE BLOOD TEST: CPT

## 2018-08-16 PROCEDURE — 160046 HCHG PACU - 1ST 60 MINS PHASE II: Performed by: ORTHOPAEDIC SURGERY

## 2018-08-16 RX ORDER — LIDOCAINE HYDROCHLORIDE 10 MG/ML
0.5 INJECTION, SOLUTION INFILTRATION; PERINEURAL
Status: DISCONTINUED | OUTPATIENT
Start: 2018-08-16 | End: 2018-08-16 | Stop reason: HOSPADM

## 2018-08-16 RX ORDER — OXYCODONE HCL 5 MG/5 ML
SOLUTION, ORAL ORAL
Status: COMPLETED
Start: 2018-08-16 | End: 2018-08-16

## 2018-08-16 RX ORDER — SODIUM CHLORIDE 9 MG/ML
INJECTION, SOLUTION INTRAVENOUS CONTINUOUS
Status: DISCONTINUED | OUTPATIENT
Start: 2018-08-16 | End: 2018-08-16 | Stop reason: HOSPADM

## 2018-08-16 RX ORDER — VANCOMYCIN HYDROCHLORIDE 500 MG/10ML
INJECTION, POWDER, LYOPHILIZED, FOR SOLUTION INTRAVENOUS
Status: COMPLETED | OUTPATIENT
Start: 2018-08-16 | End: 2018-08-16

## 2018-08-16 RX ORDER — LIDOCAINE HYDROCHLORIDE 10 MG/ML
INJECTION, SOLUTION INFILTRATION; PERINEURAL
Status: COMPLETED
Start: 2018-08-16 | End: 2018-08-16

## 2018-08-16 RX ORDER — METOCLOPRAMIDE 5 MG/1
5 TABLET ORAL 4 TIMES DAILY PRN
COMMUNITY
End: 2019-05-23

## 2018-08-16 RX ORDER — INSULIN GLARGINE 100 [IU]/ML
20 INJECTION, SOLUTION SUBCUTANEOUS DAILY
COMMUNITY
End: 2019-11-23

## 2018-08-16 RX ADMIN — SODIUM CHLORIDE: 9 INJECTION, SOLUTION INTRAVENOUS at 08:45

## 2018-08-16 RX ADMIN — LIDOCAINE HYDROCHLORIDE 0.5 ML: 10 INJECTION, SOLUTION INFILTRATION; PERINEURAL at 08:45

## 2018-08-16 RX ADMIN — OXYCODONE HYDROCHLORIDE 10 MG: 5 SOLUTION ORAL at 12:06

## 2018-08-16 ASSESSMENT — PAIN SCALES - GENERAL
PAINLEVEL_OUTOF10: 4
PAINLEVEL_OUTOF10: 0
PAINLEVEL_OUTOF10: 0
PAINLEVEL_OUTOF10: 4
PAINLEVEL_OUTOF10: 8
PAINLEVEL_OUTOF10: 5
PAINLEVEL_OUTOF10: 5

## 2018-08-16 NOTE — H&P
8/16/2018    Cayetano Hawkins is a 43 y.o. male known to me from [facility] wound care.  He has a history of right fifth ray amputation but has developed persistent callous and recurrent ulceration on the lateral border of the foot.  No radiographic evidence osteomyelitis, no active infection.    Past Medical History:   Diagnosis Date   • Arthritis right hip    back   • Backpain     feet/hip-R,back   • Dental disorder     full dentures   • Diabetes     insulin/oral   • Gastroparesis     possible-recent gastric testing   • Hepatitis C 2009   • Hepatitis C carrier (HCC)    • Infectious disease        Past Surgical History:   Procedure Laterality Date   • IRRIGATION & DEBRIDEMENT ORTHO Right 4/13/2016    Procedure: IRRIGATION & DEBRIDEMENT AND CLOSURE OF ORTHO FOOT WOUND;  Surgeon: Hitesh Sol M.D.;  Location: SURGERY [facility];  Service:    • IRRIGATION & DEBRIDEMENT ORTHO Right 4/10/2016    Procedure: IRRIGATION & DEBRIDEMENT ORTHO-poss ray resection, poss below knee amputation ;  Surgeon: Hitesh Sol M.D.;  Location: SURGERY [facility];  Service:    • IRRIGATION & DEBRIDEMENT ORTHO Right 4/4/2016    Procedure: IRRIGATION & DEBRIDEMENT ORTHO FOOT - AMPUTATION RIGHT FIFTH TOE ;  Surgeon: Hitesh Sol M.D.;  Location: SURGERY [facility];  Service:    • OSWALDO BY LAPAROSCOPY     • CHOLECYSTECTOMY     • OTHER  hepatitis c        Medications  No current facility-administered medications on file prior to encounter.      Current Outpatient Prescriptions on File Prior to Encounter   Medication Sig Dispense Refill   • glimepiride (AMARYL) 4 MG Tab Take 1 Tab by mouth every morning. 30 Tab 6   • gabapentin (NEURONTIN) 300 MG Cap Take 1 Cap by mouth 3 times a day. 90 Cap 6       Allergies  Bee    ROS  History LCP disease.  All other systems were reviewed and found to be negative    Family History   Problem Relation Age of Onset   • Diabetes Mother    • Hyperlipidemia Mother    • Arthritis  "Mother    • Heart Attack Father    • Heart Disease Father    • Hypertension Father    • Stroke Father    • Hyperlipidemia Father    • Arthritis Father        Social History     Social History   • Marital status:      Spouse name: N/A   • Number of children: N/A   • Years of education: N/A     Social History Main Topics   • Smoking status: Former Smoker     Packs/day: 0.50     Years: 10.00     Quit date: 1/1/2012   • Smokeless tobacco: Never Used      Comment: 5 yrs ago   • Alcohol use No      Comment: occ   • Drug use: No   • Sexual activity: Not on file     Other Topics Concern   • Not on file     Social History Narrative   • No narrative on file       Physical Exam  Vitals  Blood pressure 122/81, pulse 62, temperature 36.7 °C (98 °F), resp. rate 18, height 1.803 m (5' 11\"), weight 90.5 kg (199 lb 8.3 oz), SpO2 93 %.    General: Well Developed, Well Nourished, no acute distress  Skin: Intact, thick lateral border callus  RLE: Dec but present sensation stocking dist to above ankle.  Palp PT and DP pulses.    Radiographs:  No orders to display       Laboratory Values      No results for input(s): SODIUM, POTASSIUM, CHLORIDE, CO2, GLUCOSE, BUN, CPKTOTAL in the last 72 hours.          Impression: Right recurrent ulcer after fifth ray amputation, prominent bone, no active infection    Plan:    NPO for Revision right fifth ray amp today  WBAT in boot postop  DC home postop        "

## 2018-08-16 NOTE — OP REPORT
DATE OF SERVICE:  08/16/2018    PREOPERATIVE DIAGNOSES:  1.  Diabetes mellitus type 2.  2.  Right lateral foot recurrent ulceration.  3.  Status post right fifth ray amputation.    POSTOPERATIVE DIAGNOSES:  1.  Diabetes mellitus type 2.  2.  Right lateral foot recurrent ulceration.  3.  Status post right fifth ray amputation.    PROCEDURES:  1.  Right revision fifth ray amputation.  2.  Right lateral foot callus excision.    SURGEON:  Abram Cortez MD    ASSISTANT:  Armando Lua MD    ANESTHESIA:  General.    ESTIMATED BLOOD LOSS:  5 mL    TOURNIQUET TIME:  Six minutes at 250 mmHg.    FINDINGS:  Thick lateral callus prominent palpable bone, superficial ulcer   without signs or symptoms of infection, no signs or symptoms of osteomyelitis.    SPECIMENS:  Fifth metatarsal bone cut for culture.    COMPLICATIONS:  None.    OUTCOME:  To PACU in stable condition.    HISTORY OF PRESENT ILLNESS:  This is a pleasant 43-year-old gentleman seen at   wound care.  He has had recurrent lateral foot callus and ulceration, status   post a fifth ray amputation.  X-ray showed prominent fifth metatarsal bone,   but no evidence of osteomyelitis.  He is indicated for the above-stated   procedure.  He was agreed in the preoperative holding area and identified by   name and medical record number.  The right lower extremity was marked.  He was   greeted in the preoperative holding area and identified by name and medical   record number.  The right lower extremity was marked.  Risks of procedure   including bleeding, infection, pain, continuation of symptoms, need for more   surgery were discussed and he provided a written consent.    DESCRIPTION OF PROCEDURE:  He was taken to operating room and placed on table   in supine position.  Preoperative antibiotics were administered and general   anesthesia was induced.  A nonsterile tourniquet placed on his right thigh.    Right lower extremity was prepped and draped in the usual sterile  fashion.  An   operative pause was undertaken, where all present were in agreement with   patient identification, laterality, and procedure to be performed.  The limb   was elevated for 5 minutes and the tourniquet was raised to 250 mmHg.    Callus excision:  The patient had thick callus over the proximal aspect of his   previous incision.  Forceps and scalpel were used to remove this thick callus   and unroof very superficial ulceration midsubstance of the incision with   underlying palpable bone, but the ulceration did not track to the depth of   bone.    Revision fifth ray amputation:  The proximal 2 cm of previous incision was   reopened and carried directly down to bone.  We did not see any signs or   symptoms of deep infection.  We subperiosteally dissected dorsal and plantar   and used a saw to remove prominent bone.  This bone was passed off for   culture.  A saw was then used to smooth all edges.  The tourniquet was let   down.  We did see adequate blood flow to skin flaps.  We copiously irrigated   the wound.  We packed 500 mg of vancomycin powder within the wound bed.  We   closed the deep layer with 2-0 PDS in figure-of-eight fashion and the skin was   closed with 2-0 nylon in a horizontal mattress fashion.  Xeroform gauze and   compressive wrap were placed.  The patient was awakened and extubated in   stable condition.    POSTOPERATIVE COURSE:  He will be discharged home today assuming adequate pain   control and mobilization.  Weightbearing as tolerated right lower extremity   in a Cam walker boot.  We will continue to follow him closely as well as his   cultures.       ____________________________________     MD ROSSY MONTES / BRADY    DD:  08/16/2018 11:39:50  DT:  08/16/2018 13:05:55    D#:  [id number]  Job#:  [id number]

## 2018-08-16 NOTE — DISCHARGE INSTRUCTIONS
ACTIVITY: Rest and take it easy for the first 24 hours.  A responsible adult is recommended to remain with you during that time.  It is normal to feel sleepy.  We encourage you to not do anything that requires balance, judgment or coordination.    MILD FLU-LIKE SYMPTOMS ARE NORMAL. YOU MAY EXPERIENCE GENERALIZED MUSCLE ACHES, THROAT IRRITATION, HEADACHE AND/OR SOME NAUSEA.    FOR 24 HOURS DO NOT:  Drive, operate machinery or run household appliances.  Drink beer or alcoholic beverages.   Make important decisions or sign legal documents.    SPECIAL INSTRUCTIONS:     Weight bearing as tolerated to Right lower extremity in boot  Ice and elevate  Keep dressing clean and dry  Stay ahead of pain      DIET: To avoid nausea, slowly advance diet as tolerated, avoiding spicy or greasy foods for the first day.  Add more substantial food to your diet according to your physician's instructions.  Babies can be fed formula or breast milk as soon as they are hungry.  INCREASE FLUIDS AND FIBER TO AVOID CONSTIPATION.    SURGICAL DRESSING/BATHING: Keep dressing clean and dry    FOLLOW-UP APPOINTMENT:  A follow-up appointment should be arranged with your doctor in 1-2 weeks; call to schedule.    You should CALL YOUR PHYSICIAN if you develop:  Fever greater than 101 degrees F.  Pain not relieved by medication, or persistent nausea or vomiting.  Excessive bleeding (blood soaking through dressing) or unexpected drainage from the wound.  Extreme redness or swelling around the incision site, drainage of pus or foul smelling drainage.  Inability to urinate or empty your bladder within 8 hours.  Problems with breathing or chest pain.    You should call 911 if you develop problems with breathing or chest pain.  If you are unable to contact your doctor or surgical center, you should go to the nearest emergency room or urgent care center.  Physician's telephone #: Dr. Cortez ([phone])    If any questions arise, call your doctor.  If your  doctor is not available, please feel free to call the Surgical Center at [phone].  The Center is open Monday through Friday from 7AM to 7PM.  You can also call the HEALTH HOTLINE open 24 hours/day, 7 days/week and speak to a nurse at [phone], or toll free at [phone].    A registered nurse may call you a few days after your surgery to see how you are doing after your procedure.    MEDICATIONS: Resume taking daily medication.  Take prescribed pain medication with food.  If no medication is prescribed, you may take non-aspirin pain medication if needed.  PAIN MEDICATION CAN BE VERY CONSTIPATING.  Take a stool softener or laxative such as senokot, pericolace, or milk of magnesia if needed.    Prescription given for norco.  Last pain medication given at 1206  .    If your physician has prescribed pain medication that includes Acetaminophen (Tylenol), do not take additional Acetaminophen (Tylenol) while taking the prescribed medication.    Depression / Suicide Risk    As you are discharged from this [facility] facility, it is important to learn how to keep safe from harming yourself.    Recognize the warning signs:  · Abrupt changes in personality, positive or negative- including increase in energy   · Giving away possessions  · Change in eating patterns- significant weight changes-  positive or negative  · Change in sleeping patterns- unable to sleep or sleeping all the time   · Unwillingness or inability to communicate  · Depression  · Unusual sadness, discouragement and loneliness  · Talk of wanting to die  · Neglect of personal appearance   · Rebelliousness- reckless behavior  · Withdrawal from people/activities they love  · Confusion- inability to concentrate     If you or a loved one observes any of these behaviors or has concerns about self-harm, here's what you can do:  · Talk about it- your feelings and reasons for harming yourself  · Remove any means that you might use to hurt yourself  (examples: pills, rope, extension cords, firearm)  · Get professional help from the community (Mental Health, Substance Abuse, psychological counseling)  · Do not be alone:Call your Safe Contact- someone whom you trust who will be there for you.  · Call your local CRISIS HOTLINE 889-4631 or 077-934-1917  · Call your local Children's Mobile Crisis Response Team [place] (042) 655-0344 or www.[employer]  · Call the toll free National Suicide Prevention Hotlines   · National Suicide Prevention Lifeline 799-864-WQQJ (6804)  · National Hope Line Network 800-SUICIDE (167-7858)

## 2018-08-16 NOTE — OR SURGEON
Immediate Post OP Note    PreOp Diagnosis: Right lateral foot ulcer s/p 5th ray amp    PostOp Diagnosis: Same    Procedure(s):  TOE AMPUTATION - 5TH RAY REVISION - Wound Class: Clean Contaminated    Surgeon(s):  JIE Pritchett M.D.    Anesthesiologist/Type of Anesthesia:  Anesthesiologist: Josh Stewart M.D./General    Surgical Staff:  Circulator: Nick Pinedo R.N.  Scrub Person: Shelbie Echols    Specimens removed if any: 5th MT for culture    Estimated Blood Loss: 5cc    TT: 6 min @ 250mmHg    Findings: Prominent lateral bone, no ss infection    Complications: none        8/16/2018 11:31 AM Abram Cortez M.D.

## 2018-08-16 NOTE — PROGRESS NOTES
Pt requesting to get dressed and go home, pt's d/c instructions discussed with pt and family, all questions answered

## 2018-08-17 ENCOUNTER — OFFICE VISIT (OUTPATIENT)
Dept: MEDICAL GROUP | Facility: MEDICAL CENTER | Age: 43
End: 2018-08-17
Payer: COMMERCIAL

## 2018-08-17 VITALS
HEART RATE: 71 BPM | WEIGHT: 202 LBS | RESPIRATION RATE: 16 BRPM | OXYGEN SATURATION: 98 % | BODY MASS INDEX: 28.28 KG/M2 | HEIGHT: 71 IN | SYSTOLIC BLOOD PRESSURE: 138 MMHG | TEMPERATURE: 99.6 F | DIASTOLIC BLOOD PRESSURE: 82 MMHG

## 2018-08-17 DIAGNOSIS — I10 ESSENTIAL HYPERTENSION: ICD-10-CM

## 2018-08-17 LAB
HBA1C MFR BLD: 7.3 % (ref ?–5.8)
INT CON NEG: NEGATIVE
INT CON POS: POSITIVE

## 2018-08-17 PROCEDURE — 99214 OFFICE O/P EST MOD 30 MIN: CPT | Performed by: INTERNAL MEDICINE

## 2018-08-17 PROCEDURE — 83036 HEMOGLOBIN GLYCOSYLATED A1C: CPT | Performed by: INTERNAL MEDICINE

## 2018-08-17 RX ORDER — ONDANSETRON 4 MG/1
4 TABLET, FILM COATED ORAL EVERY 4 HOURS PRN
Qty: 30 TAB | Refills: 3 | Status: SHIPPED | OUTPATIENT
Start: 2018-08-17 | End: 2019-11-23

## 2018-08-17 RX ORDER — LOSARTAN POTASSIUM 25 MG/1
25 TABLET ORAL DAILY
Qty: 90 TAB | Refills: 3 | Status: SHIPPED | OUTPATIENT
Start: 2018-08-17 | End: 2019-11-23

## 2018-08-17 NOTE — PROGRESS NOTES
CC: Follow-up diabetes, hypertension.    HPI:   Cayetano presents today with the following.    1. Uncontrolled type 2 diabetes mellitus with foot ulcer, without long-term current use of insulin ([facility])  Presents after recent revision of an amputation of his foot doing much improved.  Denies any fevers or chills is following along with specialist.  He reports his blood sugars have been under much better control.  A1c checked in office today although a bit early is now at 7.3.  He is much more diligent about checking his blood sugars.    2. Essential hypertension  Blood pressure found to be elevated today no history of the past.  Denies any chest pain or edema.      Patient Active Problem List    Diagnosis Date Noted   • Essential hypertension 06/29/2014     Priority: Medium   • Diabetes mellitus type 2, uncontrolled (CMS-[facility]) 01/04/2012     Priority: Medium   • Hepatitis C 01/04/2012     Priority: Medium   • Dyslipidemia 06/29/2014     Priority: Low   • History of Mnqy-Hcnqp-Wotzkfv disease 06/05/2014     Priority: Low   • Closed nondisplaced fracture of fourth metatarsal bone of right foot 07/20/2018   • History of amputation of lesser toe of right foot ([facility]) 07/20/2018   • Gastroparesis 03/07/2018   • Diabetic ulcer of right midfoot, limited to breakdown of skin ([facility]) 01/17/2016   • Diabetic neuropathy ([facility]) 01/17/2016   • Chronic right hip pain 06/05/2014       Current Outpatient Prescriptions   Medication Sig Dispense Refill   • losartan (COZAAR) 25 MG Tab Take 1 Tab by mouth every day. 90 Tab 3   • ondansetron (ZOFRAN) 4 MG Tab tablet Take 1 Tab by mouth every four hours as needed for Nausea/Vomiting. 30 Tab 3   • insulin glargine (LANTUS) 100 UNIT/ML Solution Inject 20 Units as instructed every day.     • metoclopramide (REGLAN) 5 MG tablet Take 5 mg by mouth 4 times a day as needed.     • glimepiride (AMARYL) 4 MG Tab Take 1 Tab by mouth every morning. 30 Tab 6   • gabapentin (NEURONTIN) 300 MG Cap Take 1 Cap by  "mouth 3 times a day. 90 Cap 6     No current facility-administered medications for this visit.          Allergies as of 08/17/2018 - Reviewed 08/17/2018   Allergen Reaction Noted   • Bee Swelling 11/27/2008        ROS: Denies Chest pain, SOB, LE edema.    /82   Pulse 71   Temp 37.6 °C (99.6 °F)   Resp 16   Ht 1.803 m (5' 11\")   Wt 91.6 kg (202 lb) Comment: with leg cast  SpO2 98%   BMI 28.17 kg/m²     Physical Exam:  Gen:         Alert and oriented, No apparent distress.  Neck:        No Lymphadenopathy or Bruits.  Lungs:     Clear to auscultation bilaterally  CV:          Regular rate and rhythm. No murmurs, rubs or gallops.               Ext:          No clubbing, cyanosis, edema.      Assessment and Plan.   43 y.o. male with the following issues.    1. Uncontrolled type 2 diabetes mellitus with foot ulcer, without long-term current use of insulin ([facility])  Currently well controlled no changes. Discussed diet and exercise recheck A1c in 6 months. Reminded about yearly eye exam.  - POCT  A1C  - losartan (COZAAR) 25 MG Tab; Take 1 Tab by mouth every day.  Dispense: 90 Tab; Refill: 3    2. Essential hypertension  Adding losartan 25 mg for renal protection.      "

## 2018-08-18 LAB
BACTERIA TISS AEROBE CULT: ABNORMAL
BACTERIA TISS AEROBE CULT: ABNORMAL
GRAM STN SPEC: ABNORMAL
SIGNIFICANT IND 70042: ABNORMAL
SITE SITE: ABNORMAL
SOURCE SOURCE: ABNORMAL

## 2018-08-21 LAB
BACTERIA SPEC ANAEROBE CULT: ABNORMAL
BACTERIA SPEC ANAEROBE CULT: ABNORMAL
SIGNIFICANT IND 70042: ABNORMAL
SITE SITE: ABNORMAL
SOURCE SOURCE: ABNORMAL

## 2018-10-04 DIAGNOSIS — L97.411: ICD-10-CM

## 2018-10-04 DIAGNOSIS — E11.621: ICD-10-CM

## 2018-12-13 DIAGNOSIS — E11.621: ICD-10-CM

## 2018-12-13 DIAGNOSIS — L97.411: ICD-10-CM

## 2019-01-17 DIAGNOSIS — E11.621: ICD-10-CM

## 2019-01-17 DIAGNOSIS — L97.411: ICD-10-CM

## 2019-02-07 ENCOUNTER — HOSPITAL ENCOUNTER (OUTPATIENT)
Dept: RADIOLOGY | Facility: MEDICAL CENTER | Age: 44
End: 2019-02-07
Attending: INTERNAL MEDICINE
Payer: COMMERCIAL

## 2019-02-07 ENCOUNTER — HOSPITAL ENCOUNTER (OUTPATIENT)
Dept: LAB | Facility: MEDICAL CENTER | Age: 44
End: 2019-02-07
Attending: INTERNAL MEDICINE
Payer: COMMERCIAL

## 2019-02-07 DIAGNOSIS — B19.20 HEPATITIS C VIRUS INFECTION WITHOUT HEPATIC COMA, UNSPECIFIED CHRONICITY: ICD-10-CM

## 2019-02-07 LAB
ALBUMIN SERPL BCP-MCNC: 4.4 G/DL (ref 3.2–4.9)
ALBUMIN/GLOB SERPL: 1.1 G/DL
ALP SERPL-CCNC: 115 U/L (ref 30–99)
ALT SERPL-CCNC: 56 U/L (ref 2–50)
ANION GAP SERPL CALC-SCNC: 6 MMOL/L (ref 0–11.9)
AST SERPL-CCNC: 34 U/L (ref 12–45)
BASOPHILS # BLD AUTO: 0.5 % (ref 0–1.8)
BASOPHILS # BLD: 0.04 K/UL (ref 0–0.12)
BILIRUB SERPL-MCNC: 0.5 MG/DL (ref 0.1–1.5)
BUN SERPL-MCNC: 11 MG/DL (ref 8–22)
CALCIUM SERPL-MCNC: 10.8 MG/DL (ref 8.5–10.5)
CHLORIDE SERPL-SCNC: 103 MMOL/L (ref 96–112)
CO2 SERPL-SCNC: 29 MMOL/L (ref 20–33)
CREAT SERPL-MCNC: 0.91 MG/DL (ref 0.5–1.4)
EOSINOPHIL # BLD AUTO: 0.2 K/UL (ref 0–0.51)
EOSINOPHIL NFR BLD: 2.3 % (ref 0–6.9)
ERYTHROCYTE [DISTWIDTH] IN BLOOD BY AUTOMATED COUNT: 48 FL (ref 35.9–50)
FASTING STATUS PATIENT QL REPORTED: NORMAL
GLOBULIN SER CALC-MCNC: 3.9 G/DL (ref 1.9–3.5)
GLUCOSE SERPL-MCNC: 135 MG/DL (ref 65–99)
HCT VFR BLD AUTO: 48.7 % (ref 42–52)
HGB BLD-MCNC: 15.9 G/DL (ref 14–18)
IMM GRANULOCYTES # BLD AUTO: 0.02 K/UL (ref 0–0.11)
IMM GRANULOCYTES NFR BLD AUTO: 0.2 % (ref 0–0.9)
INR PPP: 1.07 (ref 0.87–1.13)
LYMPHOCYTES # BLD AUTO: 2.39 K/UL (ref 1–4.8)
LYMPHOCYTES NFR BLD: 27.7 % (ref 22–41)
MCH RBC QN AUTO: 28.3 PG (ref 27–33)
MCHC RBC AUTO-ENTMCNC: 32.6 G/DL (ref 33.7–35.3)
MCV RBC AUTO: 86.7 FL (ref 81.4–97.8)
MONOCYTES # BLD AUTO: 0.36 K/UL (ref 0–0.85)
MONOCYTES NFR BLD AUTO: 4.2 % (ref 0–13.4)
NEUTROPHILS # BLD AUTO: 5.62 K/UL (ref 1.82–7.42)
NEUTROPHILS NFR BLD: 65.1 % (ref 44–72)
NRBC # BLD AUTO: 0 K/UL
NRBC BLD-RTO: 0 /100 WBC
PLATELET # BLD AUTO: 284 K/UL (ref 164–446)
PLATELET # BLD AUTO: 285 K/UL (ref 164–446)
PMV BLD AUTO: 9.6 FL (ref 9–12.9)
POTASSIUM SERPL-SCNC: 4.5 MMOL/L (ref 3.6–5.5)
PROT SERPL-MCNC: 8.3 G/DL (ref 6–8.2)
PROTHROMBIN TIME: 14 SEC (ref 12–14.6)
RBC # BLD AUTO: 5.62 M/UL (ref 4.7–6.1)
SODIUM SERPL-SCNC: 138 MMOL/L (ref 135–145)
WBC # BLD AUTO: 8.6 K/UL (ref 4.8–10.8)

## 2019-02-07 PROCEDURE — 87522 HEPATITIS C REVRS TRNSCRPJ: CPT

## 2019-02-07 PROCEDURE — 83883 ASSAY NEPHELOMETRY NOT SPEC: CPT

## 2019-02-07 PROCEDURE — 84450 TRANSFERASE (AST) (SGOT): CPT

## 2019-02-07 PROCEDURE — 82977 ASSAY OF GGT: CPT

## 2019-02-07 PROCEDURE — 84460 ALANINE AMINO (ALT) (SGPT): CPT

## 2019-02-07 PROCEDURE — 80053 COMPREHEN METABOLIC PANEL: CPT

## 2019-02-07 PROCEDURE — 36415 COLL VENOUS BLD VENIPUNCTURE: CPT

## 2019-02-07 PROCEDURE — 85025 COMPLETE CBC W/AUTO DIFF WBC: CPT

## 2019-02-07 PROCEDURE — 76700 US EXAM ABDOM COMPLETE: CPT

## 2019-02-07 PROCEDURE — 85610 PROTHROMBIN TIME: CPT

## 2019-02-07 PROCEDURE — 82105 ALPHA-FETOPROTEIN SERUM: CPT

## 2019-02-07 PROCEDURE — 84520 ASSAY OF UREA NITROGEN: CPT

## 2019-02-07 PROCEDURE — 85049 AUTOMATED PLATELET COUNT: CPT

## 2019-02-09 LAB
AFP-TM SERPL-MCNC: 1 NG/ML (ref 0–9)
HCV RNA SERPL NAA+PROBE-ACNC: ABNORMAL IU/ML
HCV RNA SERPL NAA+PROBE-LOG IU: 6.36 LOG IU/ML
HCV RNA SERPL QL NAA+PROBE: DETECTED

## 2019-02-12 LAB
A2 MACROGLOB SERPL-MCNC: 382 MG/DL (ref 131–293)
ALT SERPL-CCNC: 66 U/L (ref 5–50)
ANNOTATION COMMENT IMP: ABNORMAL
AST SERPL-CCNC: 42 U/L (ref 9–50)
BUN SERPL-SCNC: 11 MG/DL (ref 7–20)
CIRRHOMETER PT SCORE Q4850: 0.02
FIBROSIS STAGE SERPL QL: ABNORMAL
GGT SERPL-CCNC: 78 U/L (ref 7–51)
INFLAMETER META CLASS Q4853: ABNORMAL
INFLAMETER PT SCORE Q4852: 0.56
LIVER FIBR SCORE SERPL CALC.FIBROMETER: 0.54
PATHOLOGY STUDY: ABNORMAL
PROTHROM ACT/NOR PPP: 88 % (ref 90–120)

## 2019-05-22 ENCOUNTER — HOSPITAL ENCOUNTER (EMERGENCY)
Facility: MEDICAL CENTER | Age: 44
End: 2019-05-23
Attending: EMERGENCY MEDICINE
Payer: COMMERCIAL

## 2019-05-22 DIAGNOSIS — R11.15 NON-INTRACTABLE CYCLICAL VOMITING WITH NAUSEA: ICD-10-CM

## 2019-05-22 DIAGNOSIS — K29.00 ACUTE GASTRITIS WITHOUT HEMORRHAGE, UNSPECIFIED GASTRITIS TYPE: ICD-10-CM

## 2019-05-22 PROCEDURE — 80053 COMPREHEN METABOLIC PANEL: CPT

## 2019-05-22 PROCEDURE — 99285 EMERGENCY DEPT VISIT HI MDM: CPT

## 2019-05-22 PROCEDURE — 83690 ASSAY OF LIPASE: CPT

## 2019-05-22 PROCEDURE — 85025 COMPLETE CBC W/AUTO DIFF WBC: CPT

## 2019-05-23 ENCOUNTER — APPOINTMENT (OUTPATIENT)
Dept: RADIOLOGY | Facility: MEDICAL CENTER | Age: 44
End: 2019-05-23
Attending: EMERGENCY MEDICINE
Payer: COMMERCIAL

## 2019-05-23 VITALS
RESPIRATION RATE: 16 BRPM | BODY MASS INDEX: 27.35 KG/M2 | HEIGHT: 71 IN | WEIGHT: 195.33 LBS | TEMPERATURE: 98.1 F | SYSTOLIC BLOOD PRESSURE: 182 MMHG | HEART RATE: 98 BPM | OXYGEN SATURATION: 95 % | DIASTOLIC BLOOD PRESSURE: 118 MMHG

## 2019-05-23 LAB
ALBUMIN SERPL BCP-MCNC: 4.8 G/DL (ref 3.2–4.9)
ALBUMIN/GLOB SERPL: 1.5 G/DL
ALP SERPL-CCNC: 112 U/L (ref 30–99)
ALT SERPL-CCNC: 42 U/L (ref 2–50)
ANION GAP SERPL CALC-SCNC: 14 MMOL/L (ref 0–11.9)
AST SERPL-CCNC: 24 U/L (ref 12–45)
BASOPHILS # BLD AUTO: 0.3 % (ref 0–1.8)
BASOPHILS # BLD: 0.04 K/UL (ref 0–0.12)
BILIRUB SERPL-MCNC: 0.6 MG/DL (ref 0.1–1.5)
BODY FLD TYPE: NORMAL
BUN SERPL-MCNC: 14 MG/DL (ref 8–22)
CALCIUM SERPL-MCNC: 9.8 MG/DL (ref 8.5–10.5)
CHLORIDE SERPL-SCNC: 104 MMOL/L (ref 96–112)
CO2 SERPL-SCNC: 22 MMOL/L (ref 20–33)
CREAT SERPL-MCNC: 0.76 MG/DL (ref 0.5–1.4)
EOSINOPHIL # BLD AUTO: 0 K/UL (ref 0–0.51)
EOSINOPHIL NFR BLD: 0 % (ref 0–6.9)
ERYTHROCYTE [DISTWIDTH] IN BLOOD BY AUTOMATED COUNT: 42.5 FL (ref 35.9–50)
GLOBULIN SER CALC-MCNC: 3.1 G/DL (ref 1.9–3.5)
GLUCOSE BLD-MCNC: 294 MG/DL (ref 65–99)
GLUCOSE SERPL-MCNC: 295 MG/DL (ref 65–99)
HCT VFR BLD AUTO: 47.4 % (ref 42–52)
HEMOCCULT SP1 SPEC QL: NEGATIVE
HGB BLD-MCNC: 16.7 G/DL (ref 14–18)
IMM GRANULOCYTES # BLD AUTO: 0.06 K/UL (ref 0–0.11)
IMM GRANULOCYTES NFR BLD AUTO: 0.5 % (ref 0–0.9)
LIPASE SERPL-CCNC: 12 U/L (ref 11–82)
LYMPHOCYTES # BLD AUTO: 1.14 K/UL (ref 1–4.8)
LYMPHOCYTES NFR BLD: 9 % (ref 22–41)
MCH RBC QN AUTO: 30.4 PG (ref 27–33)
MCHC RBC AUTO-ENTMCNC: 35.2 G/DL (ref 33.7–35.3)
MCV RBC AUTO: 86.2 FL (ref 81.4–97.8)
MONOCYTES # BLD AUTO: 0.34 K/UL (ref 0–0.85)
MONOCYTES NFR BLD AUTO: 2.7 % (ref 0–13.4)
NEUTROPHILS # BLD AUTO: 11.13 K/UL (ref 1.82–7.42)
NEUTROPHILS NFR BLD: 87.5 % (ref 44–72)
NRBC # BLD AUTO: 0 K/UL
NRBC BLD-RTO: 0 /100 WBC
PLATELET # BLD AUTO: 307 K/UL (ref 164–446)
PMV BLD AUTO: 9.2 FL (ref 9–12.9)
POTASSIUM SERPL-SCNC: 4.2 MMOL/L (ref 3.6–5.5)
PROT SERPL-MCNC: 7.9 G/DL (ref 6–8.2)
RBC # BLD AUTO: 5.5 M/UL (ref 4.7–6.1)
SODIUM SERPL-SCNC: 140 MMOL/L (ref 135–145)
WBC # BLD AUTO: 12.7 K/UL (ref 4.8–10.8)

## 2019-05-23 PROCEDURE — 96376 TX/PRO/DX INJ SAME DRUG ADON: CPT

## 2019-05-23 PROCEDURE — 700111 HCHG RX REV CODE 636 W/ 250 OVERRIDE (IP)

## 2019-05-23 PROCEDURE — 74019 RADEX ABDOMEN 2 VIEWS: CPT

## 2019-05-23 PROCEDURE — 700111 HCHG RX REV CODE 636 W/ 250 OVERRIDE (IP): Performed by: EMERGENCY MEDICINE

## 2019-05-23 PROCEDURE — 700105 HCHG RX REV CODE 258: Performed by: EMERGENCY MEDICINE

## 2019-05-23 PROCEDURE — 96375 TX/PRO/DX INJ NEW DRUG ADDON: CPT

## 2019-05-23 PROCEDURE — 96374 THER/PROPH/DIAG INJ IV PUSH: CPT

## 2019-05-23 PROCEDURE — 82271 OCCULT BLOOD OTHER SOURCES: CPT

## 2019-05-23 PROCEDURE — 82962 GLUCOSE BLOOD TEST: CPT

## 2019-05-23 RX ORDER — ONDANSETRON 4 MG/1
4 TABLET, ORALLY DISINTEGRATING ORAL EVERY 8 HOURS PRN
Qty: 10 TAB | Refills: 0 | Status: SHIPPED | OUTPATIENT
Start: 2019-05-23 | End: 2020-03-11 | Stop reason: SDUPTHER

## 2019-05-23 RX ORDER — DICYCLOMINE HYDROCHLORIDE 10 MG/ML
20 INJECTION INTRAMUSCULAR ONCE
Status: COMPLETED | OUTPATIENT
Start: 2019-05-23 | End: 2019-05-23

## 2019-05-23 RX ORDER — ONDANSETRON 2 MG/ML
INJECTION INTRAMUSCULAR; INTRAVENOUS
Status: COMPLETED
Start: 2019-05-23 | End: 2019-05-23

## 2019-05-23 RX ORDER — SODIUM CHLORIDE 9 MG/ML
1000 INJECTION, SOLUTION INTRAVENOUS ONCE
Status: COMPLETED | OUTPATIENT
Start: 2019-05-23 | End: 2019-05-23

## 2019-05-23 RX ORDER — METOCLOPRAMIDE HYDROCHLORIDE 5 MG/ML
10 INJECTION INTRAMUSCULAR; INTRAVENOUS ONCE
Status: COMPLETED | OUTPATIENT
Start: 2019-05-23 | End: 2019-05-23

## 2019-05-23 RX ORDER — ONDANSETRON 2 MG/ML
4 INJECTION INTRAMUSCULAR; INTRAVENOUS ONCE
Status: COMPLETED | OUTPATIENT
Start: 2019-05-23 | End: 2019-05-23

## 2019-05-23 RX ORDER — LORAZEPAM 2 MG/ML
0.5 INJECTION INTRAMUSCULAR ONCE
Status: COMPLETED | OUTPATIENT
Start: 2019-05-23 | End: 2019-05-23

## 2019-05-23 RX ORDER — METOCLOPRAMIDE 10 MG/1
10 TABLET ORAL 4 TIMES DAILY
Qty: 40 TAB | Refills: 0 | Status: SHIPPED | OUTPATIENT
Start: 2019-05-23 | End: 2019-11-23

## 2019-05-23 RX ORDER — OMEPRAZOLE 20 MG/1
20 CAPSULE, DELAYED RELEASE ORAL DAILY
Qty: 30 CAP | Refills: 0 | Status: SHIPPED | OUTPATIENT
Start: 2019-05-23 | End: 2019-11-23

## 2019-05-23 RX ADMIN — FAMOTIDINE 20 MG: 10 INJECTION INTRAVENOUS at 00:19

## 2019-05-23 RX ADMIN — LORAZEPAM 0.5 MG: 2 INJECTION INTRAMUSCULAR; INTRAVENOUS at 00:19

## 2019-05-23 RX ADMIN — METOCLOPRAMIDE 10 MG: 5 INJECTION, SOLUTION INTRAMUSCULAR; INTRAVENOUS at 00:18

## 2019-05-23 RX ADMIN — ONDANSETRON 4 MG: 2 INJECTION INTRAMUSCULAR; INTRAVENOUS at 00:18

## 2019-05-23 RX ADMIN — DICYCLOMINE HYDROCHLORIDE 20 MG: 20 INJECTION, SOLUTION INTRAMUSCULAR at 00:19

## 2019-05-23 RX ADMIN — SODIUM CHLORIDE 1000 ML: 9 INJECTION, SOLUTION INTRAVENOUS at 00:19

## 2019-05-23 NOTE — ED PROVIDER NOTES
"ED Provider Note    Scribed for Luis Dinero M.D. by Praneeth Ritchie. 5/23/2019, 12:00 AM.    Primary care provider: Alvaro Murcia M.D.  Means of arrival: walk-in  History obtained from: patient and wife  History limited by: none    CHIEF COMPLAINT  Chief Complaint   Patient presents with   • Vomiting     since this am   • Pain     \"all over, since this am\"       HPI  Cayetano Hawkins is a 44 y.o. male who presents to the Emergency Department with complaints of moderate, persistent vomiting acute onset this morning. No alleviating or exacerbating factors noted. Notes associated diffuse abdominal pain described as cramping. He is accompanied by his wife who believes she saw blood in his vomiting recently. Further notes associated subjective fever and chills. He states he is still able to pass gas. Denies any chest pain. Denies any recent narcotic pain medication usage or alcohol usage. He regularly smokes marijuana, but his last usage was 2 weeks ago. He notes his vomiting today is similar to a previous episode in February which was improved upon receiving IV zofran. He has completed an endoscopy through GI which was negative. Denies any history of gastroparesis. His blood sugars have been steady around 120.     REVIEW OF SYSTEMS  Pertinent positives include vomiting, abdominal pain, subjective fever, chills. All other systems negative.    PAST MEDICAL HISTORY   has a past medical history of Arthritis (right hip); Backpain; Dental disorder; Diabetes; Gastroparesis; Hepatitis C (2009); Hepatitis C carrier (HCC); and Infectious disease.    SURGICAL HISTORY   has a past surgical history that includes nano by laparoscopy; cholecystectomy; irrigation & debridement ortho (Right, 4/4/2016); irrigation & debridement ortho (Right, 4/10/2016); irrigation & debridement ortho (Right, 4/13/2016); other (hepatitis c ); and toe amputation (Right, 8/16/2018).    SOCIAL HISTORY  Social History   Substance Use Topics " "  • Smoking status: Former Smoker     Packs/day: 0.50     Years: 10.00     Quit date: 1/1/2012   • Smokeless tobacco: Never Used      Comment: 5 yrs ago   • Alcohol use No      Comment: occ      History   Drug Use No       FAMILY HISTORY  Family History   Problem Relation Age of Onset   • Diabetes Mother    • Hyperlipidemia Mother    • Arthritis Mother    • Heart Attack Father    • Heart Disease Father    • Hypertension Father    • Stroke Father    • Hyperlipidemia Father    • Arthritis Father        CURRENT MEDICATIONS  Home Medications     Reviewed by Merle Cortez R.N. (Registered Nurse) on 05/22/19 at 2217  Med List Status: Not Addressed   Medication Last Dose Status   gabapentin (NEURONTIN) 300 MG Cap  Active   glimepiride (AMARYL) 4 MG Tab  Active   insulin glargine (LANTUS) 100 UNIT/ML Solution  Active   losartan (COZAAR) 25 MG Tab  Active   metoclopramide (REGLAN) 5 MG tablet 3/22/2019 Active   ondansetron (ZOFRAN) 4 MG Tab tablet 5/22/2019 Active                ALLERGIES  Allergies   Allergen Reactions   • Bee Swelling     Bee stings       PHYSICAL EXAM  VITAL SIGNS: BP (!) 182/118   Pulse 100   Temp 36.5 °C (97.7 °F) (Temporal)   Resp 20   Ht 1.803 m (5' 11\")   Wt 88.6 kg (195 lb 5.2 oz)   SpO2 96%   BMI 27.24 kg/m²     Constitutional: Well developed, Well nourished, moderate distress.   HENT: Normocephalic, Atraumatic, Oropharynx moist.   Eyes: Conjunctiva normal, No discharge.   Cardiovascular: Tachycardic, Normal rhythm, No murmurs, equal pulses.   Pulmonary: Normal breath sounds, No respiratory distress, No wheezing, No rales, No rhonchi.  Chest: No chest wall tenderness or deformity.   Abdomen:Soft, mild diffuse tenderness. Negative McBurney's point tenderness. No masses, no rebound, no guarding.   Back: No CVA tenderness.   Musculoskeletal: No major deformities noted, No tenderness.   Skin: Warm, Dry, No erythema, No rash.   Neurologic: Alert & oriented x 3, Normal motor function,  No " focal deficits noted.   Psychiatric: Affect normal, Judgment normal, Mood normal.     LABS  Results for orders placed or performed during the hospital encounter of 05/22/19   CBC WITH DIFFERENTIAL   Result Value Ref Range    WBC 12.7 (H) 4.8 - 10.8 K/uL    RBC 5.50 4.70 - 6.10 M/uL    Hemoglobin 16.7 14.0 - 18.0 g/dL    Hematocrit 47.4 42.0 - 52.0 %    MCV 86.2 81.4 - 97.8 fL    MCH 30.4 27.0 - 33.0 pg    MCHC 35.2 33.7 - 35.3 g/dL    RDW 42.5 35.9 - 50.0 fL    Platelet Count 307 164 - 446 K/uL    MPV 9.2 9.0 - 12.9 fL    Neutrophils-Polys 87.50 (H) 44.00 - 72.00 %    Lymphocytes 9.00 (L) 22.00 - 41.00 %    Monocytes 2.70 0.00 - 13.40 %    Eosinophils 0.00 0.00 - 6.90 %    Basophils 0.30 0.00 - 1.80 %    Immature Granulocytes 0.50 0.00 - 0.90 %    Nucleated RBC 0.00 /100 WBC    Neutrophils (Absolute) 11.13 (H) 1.82 - 7.42 K/uL    Lymphs (Absolute) 1.14 1.00 - 4.80 K/uL    Monos (Absolute) 0.34 0.00 - 0.85 K/uL    Eos (Absolute) 0.00 0.00 - 0.51 K/uL    Baso (Absolute) 0.04 0.00 - 0.12 K/uL    Immature Granulocytes (abs) 0.06 0.00 - 0.11 K/uL    NRBC (Absolute) 0.00 K/uL   COMP METABOLIC PANEL   Result Value Ref Range    Sodium 140 135 - 145 mmol/L    Potassium 4.2 3.6 - 5.5 mmol/L    Chloride 104 96 - 112 mmol/L    Co2 22 20 - 33 mmol/L    Anion Gap 14.0 (H) 0.0 - 11.9    Glucose 295 (H) 65 - 99 mg/dL    Bun 14 8 - 22 mg/dL    Creatinine 0.76 0.50 - 1.40 mg/dL    Calcium 9.8 8.5 - 10.5 mg/dL    AST(SGOT) 24 12 - 45 U/L    ALT(SGPT) 42 2 - 50 U/L    Alkaline Phosphatase 112 (H) 30 - 99 U/L    Total Bilirubin 0.6 0.1 - 1.5 mg/dL    Albumin 4.8 3.2 - 4.9 g/dL    Total Protein 7.9 6.0 - 8.2 g/dL    Globulin 3.1 1.9 - 3.5 g/dL    A-G Ratio 1.5 g/dL   FLUID OCCULT BLOOD (Gastrocult, lab)   Result Value Ref Range    Occult Blood Negative Negative   LIPASE   Result Value Ref Range    Lipase 12 11 - 82 U/L   ESTIMATED GFR   Result Value Ref Range    GFR If African American >60 >60 mL/min/1.73 m 2    GFR If Non African  American >60 >60 mL/min/1.73 m 2   ACCU-CHEK GLUCOSE   Result Value Ref Range    Glucose - Accu-Ck 294 (H) 65 - 99 mg/dL      All labs reviewed by me.      COURSE & MEDICAL DECISION MAKING  Pertinent Labs & Imaging studies reviewed. (See chart for details)    12:00 AM - Patient seen and examined at bedside. Patient will be treated with NS infusion 1000 mL, Reglan 10 mg, Zofran 4 mg, Ativan 0.5 mg, Bentyl 20 mg, Pepcid 20 mg. The patient will receive IV fluids for nausea and vomiting. Ordered DX-abdomen, lipase, fluid occult blood, CBC w/ diff, and CMP to evaluate his symptoms. The differential diagnoses include but are not limited to: cyclical vomiting vs gastroparesis vs pancreatitis vs electrolyte abnormality vs gastritis.    2:16 AM - Patient was reevaluated at bedside. Discussed lab and radiology results with the patient.  He states he is much improved after treatment. He is safe for discharge. Advised to follow-up with GI, cease smoking marijuana, and stay hydrated. He will be given a prescription for Zofran, Reglan and Prilosec.    There was a positive response to IV fluids after patient reevaluation.        Medical Decision Making: At this point time I think the patient most likely has cyclic vomiting.  He has had multiple episodes of this in the past.  I think the elevated white blood cell count is secondary to the vomiting.  Patient is been treated with anti-emetics and his abdominal pain and vomiting has gone away.  He has no pain or tenderness over the right lower quadrant I do not think appendicitis.  At this point time we will discharge him on antacid medication and antiemetics.    The patient will return for new or worsening symptoms and is stable at the time of discharge.    The patient is referred to a primary physician for blood pressure management, diabetic screening, and for all other preventative health concerns.    DISPOSITION:  Patient will be discharged home in stable condition.    FOLLOW  UP:  Alvaro Murcia M.D.  [address] [address]  [phone]    Schedule an appointment as soon as possible for a visit in 1 week        OUTPATIENT MEDICATIONS:  Discharge Medication List as of 5/23/2019  2:26 AM      START taking these medications    Details   ondansetron (ZOFRAN ODT) 4 MG TABLET DISPERSIBLE Take 1 Tab by mouth every 8 hours as needed., Disp-10 Tab, R-0, Print Rx Paper      omeprazole (PRILOSEC) 20 MG delayed-release capsule Take 1 Cap by mouth every day., Disp-30 Cap, R-0, Print Rx Paper      metoclopramide (REGLAN) 10 MG Tab Take 1 Tab by mouth 4 times a day., Disp-40 Tab, R-0, Print Rx Paper               FINAL IMPRESSION  1. Non-intractable cyclical vomiting with nausea    2. Acute gastritis without hemorrhage, unspecified gastritis type          I, Praneeth Ritchie (Ganeshibe), am scribing for, and in the presence of, Luis Dinero M.D.    Electronically signed by: Praneeth Ritchie (Scribe), 5/23/2019    ILuis M.D. personally performed the services described in this documentation, as scribed by Praneeth Ritchie in my presence, and it is both accurate and complete. C.     The note accurately reflects work and decisions made by me.  Luis Dinero  5/23/2019  4:29 AM

## 2019-05-23 NOTE — ED NOTES
Pt discharged, PIV discontinued and pressure applied to site. Pt received copy of discharge instructions and verbalized understanding. Pt ambulatory out of ED. Pt left with all personal belongings and prescription(s).

## 2019-05-23 NOTE — DISCHARGE INSTRUCTIONS
Stop using marijuana completely. Return if you have increasing abdominal pain, fever, severe vomiting or vomit significant amount of blood.

## 2019-05-23 NOTE — ED TRIAGE NOTES
"Chief Complaint   Patient presents with   • Vomiting     since this am   • Pain     \"all over, since this am\"       BP (!) 182/118   Pulse 100   Temp 36.5 °C (97.7 °F) (Temporal)   Resp 20   Ht 1.803 m (5' 11\")   Wt 88.6 kg (195 lb 5.2 oz)   SpO2 96%     Pt ambulates with a steady gait to triage actively vomiting and crying. Pt states he throws up every day for the last year but states he has been vomiting non stop all day. Pt c/o pain to the R shoulder blade. Pt denies ETOH use or regular THC use.     "

## 2019-11-23 ENCOUNTER — APPOINTMENT (OUTPATIENT)
Dept: RADIOLOGY | Facility: MEDICAL CENTER | Age: 44
DRG: 370 | End: 2019-11-23
Attending: EMERGENCY MEDICINE
Payer: COMMERCIAL

## 2019-11-23 ENCOUNTER — HOSPITAL ENCOUNTER (INPATIENT)
Facility: MEDICAL CENTER | Age: 44
LOS: 3 days | DRG: 370 | End: 2019-11-26
Attending: EMERGENCY MEDICINE | Admitting: HOSPITALIST
Payer: COMMERCIAL

## 2019-11-23 PROBLEM — K92.2 UPPER GI BLEED: Status: ACTIVE | Noted: 2019-11-23

## 2019-11-23 LAB
ALBUMIN SERPL BCP-MCNC: 4.8 G/DL (ref 3.2–4.9)
ALBUMIN/GLOB SERPL: 1.2 G/DL
ALP SERPL-CCNC: 148 U/L (ref 30–99)
ALT SERPL-CCNC: 40 U/L (ref 2–50)
ANION GAP SERPL CALC-SCNC: 20 MMOL/L (ref 0–11.9)
AST SERPL-CCNC: 19 U/L (ref 12–45)
BASOPHILS # BLD AUTO: 0.2 % (ref 0–1.8)
BASOPHILS # BLD: 0.02 K/UL (ref 0–0.12)
BILIRUB SERPL-MCNC: 0.6 MG/DL (ref 0.1–1.5)
BUN SERPL-MCNC: 15 MG/DL (ref 8–22)
CALCIUM SERPL-MCNC: 10.7 MG/DL (ref 8.4–10.2)
CHLORIDE SERPL-SCNC: 101 MMOL/L (ref 96–112)
CO2 SERPL-SCNC: 21 MMOL/L (ref 20–33)
CREAT SERPL-MCNC: 0.96 MG/DL (ref 0.5–1.4)
EOSINOPHIL # BLD AUTO: 0 K/UL (ref 0–0.51)
EOSINOPHIL NFR BLD: 0 % (ref 0–6.9)
ERYTHROCYTE [DISTWIDTH] IN BLOOD BY AUTOMATED COUNT: 40.4 FL (ref 35.9–50)
FLUAV RNA SPEC QL NAA+PROBE: NEGATIVE
FLUBV RNA SPEC QL NAA+PROBE: NEGATIVE
GLOBULIN SER CALC-MCNC: 4.1 G/DL (ref 1.9–3.5)
GLUCOSE SERPL-MCNC: 294 MG/DL (ref 65–99)
HCT VFR BLD AUTO: 48.7 % (ref 42–52)
HEMOCCULT STL QL: POSITIVE
HGB BLD-MCNC: 16.4 G/DL (ref 14–18)
IMM GRANULOCYTES # BLD AUTO: 0.06 K/UL (ref 0–0.11)
IMM GRANULOCYTES NFR BLD AUTO: 0.5 % (ref 0–0.9)
LACTATE BLD-SCNC: 2 MMOL/L (ref 0.5–2)
LIPASE SERPL-CCNC: 14 U/L (ref 7–58)
LYMPHOCYTES # BLD AUTO: 0.94 K/UL (ref 1–4.8)
LYMPHOCYTES NFR BLD: 7.3 % (ref 22–41)
MCH RBC QN AUTO: 29.1 PG (ref 27–33)
MCHC RBC AUTO-ENTMCNC: 33.7 G/DL (ref 33.7–35.3)
MCV RBC AUTO: 86.3 FL (ref 81.4–97.8)
MONOCYTES # BLD AUTO: 0.27 K/UL (ref 0–0.85)
MONOCYTES NFR BLD AUTO: 2.1 % (ref 0–13.4)
NEUTROPHILS # BLD AUTO: 11.54 K/UL (ref 1.82–7.42)
NEUTROPHILS NFR BLD: 89.9 % (ref 44–72)
NRBC # BLD AUTO: 0 K/UL
NRBC BLD-RTO: 0 /100 WBC
PLATELET # BLD AUTO: 371 K/UL (ref 164–446)
PMV BLD AUTO: 9.2 FL (ref 9–12.9)
POTASSIUM SERPL-SCNC: 4.2 MMOL/L (ref 3.6–5.5)
PROT SERPL-MCNC: 8.9 G/DL (ref 6–8.2)
RBC # BLD AUTO: 5.64 M/UL (ref 4.7–6.1)
SODIUM SERPL-SCNC: 142 MMOL/L (ref 135–145)
WBC # BLD AUTO: 12.8 K/UL (ref 4.8–10.8)

## 2019-11-23 PROCEDURE — 770006 HCHG ROOM/CARE - MED/SURG/GYN SEMI*

## 2019-11-23 PROCEDURE — 85025 COMPLETE CBC W/AUTO DIFF WBC: CPT

## 2019-11-23 PROCEDURE — 96375 TX/PRO/DX INJ NEW DRUG ADDON: CPT

## 2019-11-23 PROCEDURE — 87502 INFLUENZA DNA AMP PROBE: CPT

## 2019-11-23 PROCEDURE — 99285 EMERGENCY DEPT VISIT HI MDM: CPT

## 2019-11-23 PROCEDURE — 36415 COLL VENOUS BLD VENIPUNCTURE: CPT

## 2019-11-23 PROCEDURE — 96374 THER/PROPH/DIAG INJ IV PUSH: CPT

## 2019-11-23 PROCEDURE — 83605 ASSAY OF LACTIC ACID: CPT

## 2019-11-23 PROCEDURE — 700111 HCHG RX REV CODE 636 W/ 250 OVERRIDE (IP): Performed by: HOSPITALIST

## 2019-11-23 PROCEDURE — 80053 COMPREHEN METABOLIC PANEL: CPT

## 2019-11-23 PROCEDURE — 700105 HCHG RX REV CODE 258: Performed by: EMERGENCY MEDICINE

## 2019-11-23 PROCEDURE — 83690 ASSAY OF LIPASE: CPT

## 2019-11-23 PROCEDURE — 700117 HCHG RX CONTRAST REV CODE 255: Performed by: EMERGENCY MEDICINE

## 2019-11-23 PROCEDURE — 99222 1ST HOSP IP/OBS MODERATE 55: CPT | Performed by: HOSPITALIST

## 2019-11-23 PROCEDURE — 74177 CT ABD & PELVIS W/CONTRAST: CPT

## 2019-11-23 PROCEDURE — C9113 INJ PANTOPRAZOLE SODIUM, VIA: HCPCS | Performed by: EMERGENCY MEDICINE

## 2019-11-23 PROCEDURE — 700111 HCHG RX REV CODE 636 W/ 250 OVERRIDE (IP): Performed by: EMERGENCY MEDICINE

## 2019-11-23 PROCEDURE — 82272 OCCULT BLD FECES 1-3 TESTS: CPT

## 2019-11-23 RX ORDER — PROCHLORPERAZINE EDISYLATE 5 MG/ML
5-10 INJECTION INTRAMUSCULAR; INTRAVENOUS EVERY 4 HOURS PRN
Status: DISCONTINUED | OUTPATIENT
Start: 2019-11-23 | End: 2019-11-26 | Stop reason: HOSPADM

## 2019-11-23 RX ORDER — PANTOPRAZOLE SODIUM 40 MG/10ML
40 INJECTION, POWDER, LYOPHILIZED, FOR SOLUTION INTRAVENOUS ONCE
Status: COMPLETED | OUTPATIENT
Start: 2019-11-23 | End: 2019-11-23

## 2019-11-23 RX ORDER — PROMETHAZINE HYDROCHLORIDE 25 MG/1
12.5-25 SUPPOSITORY RECTAL EVERY 4 HOURS PRN
Status: DISCONTINUED | OUTPATIENT
Start: 2019-11-23 | End: 2019-11-26 | Stop reason: HOSPADM

## 2019-11-23 RX ORDER — BISACODYL 10 MG
10 SUPPOSITORY, RECTAL RECTAL
Status: DISCONTINUED | OUTPATIENT
Start: 2019-11-23 | End: 2019-11-26 | Stop reason: HOSPADM

## 2019-11-23 RX ORDER — SODIUM CHLORIDE 9 MG/ML
1000 INJECTION, SOLUTION INTRAVENOUS ONCE
Status: COMPLETED | OUTPATIENT
Start: 2019-11-23 | End: 2019-11-23

## 2019-11-23 RX ORDER — METOCLOPRAMIDE 10 MG/1
10 TABLET ORAL
COMMUNITY
End: 2020-03-11 | Stop reason: SDUPTHER

## 2019-11-23 RX ORDER — AMOXICILLIN 250 MG
2 CAPSULE ORAL 2 TIMES DAILY
Status: DISCONTINUED | OUTPATIENT
Start: 2019-11-23 | End: 2019-11-26 | Stop reason: HOSPADM

## 2019-11-23 RX ORDER — ONDANSETRON 2 MG/ML
4 INJECTION INTRAMUSCULAR; INTRAVENOUS EVERY 4 HOURS PRN
Status: DISCONTINUED | OUTPATIENT
Start: 2019-11-23 | End: 2019-11-26 | Stop reason: HOSPADM

## 2019-11-23 RX ORDER — POLYETHYLENE GLYCOL 3350 17 G/17G
1 POWDER, FOR SOLUTION ORAL
Status: DISCONTINUED | OUTPATIENT
Start: 2019-11-23 | End: 2019-11-26 | Stop reason: HOSPADM

## 2019-11-23 RX ORDER — PROMETHAZINE HYDROCHLORIDE 25 MG/1
12.5-25 TABLET ORAL EVERY 4 HOURS PRN
Status: DISCONTINUED | OUTPATIENT
Start: 2019-11-23 | End: 2019-11-26 | Stop reason: HOSPADM

## 2019-11-23 RX ORDER — ONDANSETRON 2 MG/ML
4 INJECTION INTRAMUSCULAR; INTRAVENOUS ONCE
Status: COMPLETED | OUTPATIENT
Start: 2019-11-23 | End: 2019-11-23

## 2019-11-23 RX ORDER — HYDROMORPHONE HYDROCHLORIDE 1 MG/ML
0.5 INJECTION, SOLUTION INTRAMUSCULAR; INTRAVENOUS; SUBCUTANEOUS ONCE
Status: COMPLETED | OUTPATIENT
Start: 2019-11-23 | End: 2019-11-23

## 2019-11-23 RX ORDER — ONDANSETRON 4 MG/1
4 TABLET, ORALLY DISINTEGRATING ORAL EVERY 4 HOURS PRN
Status: DISCONTINUED | OUTPATIENT
Start: 2019-11-23 | End: 2019-11-26 | Stop reason: HOSPADM

## 2019-11-23 RX ORDER — PANTOPRAZOLE SODIUM 40 MG/10ML
40 INJECTION, POWDER, LYOPHILIZED, FOR SOLUTION INTRAVENOUS 2 TIMES DAILY
Status: DISCONTINUED | OUTPATIENT
Start: 2019-11-24 | End: 2019-11-26

## 2019-11-23 RX ORDER — MORPHINE SULFATE 4 MG/ML
2 INJECTION, SOLUTION INTRAMUSCULAR; INTRAVENOUS
Status: DISCONTINUED | OUTPATIENT
Start: 2019-11-23 | End: 2019-11-26 | Stop reason: HOSPADM

## 2019-11-23 RX ADMIN — HYDROMORPHONE HYDROCHLORIDE 0.5 MG: 1 INJECTION, SOLUTION INTRAMUSCULAR; INTRAVENOUS; SUBCUTANEOUS at 18:08

## 2019-11-23 RX ADMIN — PROCHLORPERAZINE EDISYLATE 10 MG: 5 INJECTION INTRAMUSCULAR; INTRAVENOUS at 20:40

## 2019-11-23 RX ADMIN — PANTOPRAZOLE SODIUM 40 MG: 40 INJECTION, POWDER, LYOPHILIZED, FOR SOLUTION INTRAVENOUS at 18:31

## 2019-11-23 RX ADMIN — IOHEXOL 100 ML: 350 INJECTION, SOLUTION INTRAVENOUS at 17:34

## 2019-11-23 RX ADMIN — ONDANSETRON 4 MG: 2 INJECTION INTRAMUSCULAR; INTRAVENOUS at 16:48

## 2019-11-23 RX ADMIN — MORPHINE SULFATE 2 MG: 4 INJECTION INTRAVENOUS at 20:39

## 2019-11-23 RX ADMIN — SODIUM CHLORIDE 1000 ML: 9 INJECTION, SOLUTION INTRAVENOUS at 16:48

## 2019-11-23 RX ADMIN — SODIUM CHLORIDE 1000 ML: 9 INJECTION, SOLUTION INTRAVENOUS at 19:19

## 2019-11-23 ASSESSMENT — ENCOUNTER SYMPTOMS
DEPRESSION: 0
BRUISES/BLEEDS EASILY: 0
HEADACHES: 0
DIZZINESS: 0
BLURRED VISION: 0
SHORTNESS OF BREATH: 0
COUGH: 0
WEAKNESS: 0
NAUSEA: 1
DOUBLE VISION: 0
PALPITATIONS: 0
SORE THROAT: 0
NECK PAIN: 0
ABDOMINAL PAIN: 1
INSOMNIA: 0
MYALGIAS: 0
VOMITING: 1
FEVER: 0

## 2019-11-23 ASSESSMENT — LIFESTYLE VARIABLES: EVER_SMOKED: NEVER

## 2019-11-23 NOTE — ED TRIAGE NOTES
Pt  comes in c/o chronic N/V worse the last 2 days  Is unable to keep anything down  Having pains to abdomen and R flank area  Per wife and pt they have been referred to GI however have not been able to follow through for visits   Per pt and wife they do not know why he continues to have this issue

## 2019-11-24 LAB
ANION GAP SERPL CALC-SCNC: 14 MMOL/L (ref 0–11.9)
BASOPHILS # BLD AUTO: 0.2 % (ref 0–1.8)
BASOPHILS # BLD: 0.03 K/UL (ref 0–0.12)
BUN SERPL-MCNC: 14 MG/DL (ref 8–22)
CALCIUM SERPL-MCNC: 9.4 MG/DL (ref 8.4–10.2)
CHLORIDE SERPL-SCNC: 106 MMOL/L (ref 96–112)
CO2 SERPL-SCNC: 22 MMOL/L (ref 20–33)
CREAT SERPL-MCNC: 0.83 MG/DL (ref 0.5–1.4)
EOSINOPHIL # BLD AUTO: 0.01 K/UL (ref 0–0.51)
EOSINOPHIL NFR BLD: 0.1 % (ref 0–6.9)
ERYTHROCYTE [DISTWIDTH] IN BLOOD BY AUTOMATED COUNT: 42.1 FL (ref 35.9–50)
GLUCOSE BLD-MCNC: 125 MG/DL (ref 65–99)
GLUCOSE SERPL-MCNC: 181 MG/DL (ref 65–99)
HCT VFR BLD AUTO: 42.3 % (ref 42–52)
HCT VFR BLD AUTO: 42.8 % (ref 42–52)
HCT VFR BLD AUTO: 43 % (ref 42–52)
HCT VFR BLD AUTO: 44.7 % (ref 42–52)
HGB BLD-MCNC: 13.9 G/DL (ref 14–18)
HGB BLD-MCNC: 14 G/DL (ref 14–18)
HGB BLD-MCNC: 14.6 G/DL (ref 14–18)
HGB BLD-MCNC: 14.8 G/DL (ref 14–18)
IMM GRANULOCYTES # BLD AUTO: 0.07 K/UL (ref 0–0.11)
IMM GRANULOCYTES NFR BLD AUTO: 0.5 % (ref 0–0.9)
LYMPHOCYTES # BLD AUTO: 2.21 K/UL (ref 1–4.8)
LYMPHOCYTES NFR BLD: 15.3 % (ref 22–41)
MCH RBC QN AUTO: 29 PG (ref 27–33)
MCHC RBC AUTO-ENTMCNC: 33.1 G/DL (ref 33.7–35.3)
MCV RBC AUTO: 87.5 FL (ref 81.4–97.8)
MONOCYTES # BLD AUTO: 0.72 K/UL (ref 0–0.85)
MONOCYTES NFR BLD AUTO: 5 % (ref 0–13.4)
NEUTROPHILS # BLD AUTO: 11.44 K/UL (ref 1.82–7.42)
NEUTROPHILS NFR BLD: 78.9 % (ref 44–72)
NRBC # BLD AUTO: 0 K/UL
NRBC BLD-RTO: 0 /100 WBC
PLATELET # BLD AUTO: 269 K/UL (ref 164–446)
PMV BLD AUTO: 8.9 FL (ref 9–12.9)
POTASSIUM SERPL-SCNC: 4.3 MMOL/L (ref 3.6–5.5)
RBC # BLD AUTO: 5.11 M/UL (ref 4.7–6.1)
SODIUM SERPL-SCNC: 142 MMOL/L (ref 135–145)
WBC # BLD AUTO: 14.5 K/UL (ref 4.8–10.8)

## 2019-11-24 PROCEDURE — 700102 HCHG RX REV CODE 250 W/ 637 OVERRIDE(OP): Performed by: HOSPITALIST

## 2019-11-24 PROCEDURE — 85014 HEMATOCRIT: CPT | Mod: 91

## 2019-11-24 PROCEDURE — 82962 GLUCOSE BLOOD TEST: CPT

## 2019-11-24 PROCEDURE — 80048 BASIC METABOLIC PNL TOTAL CA: CPT

## 2019-11-24 PROCEDURE — A9270 NON-COVERED ITEM OR SERVICE: HCPCS | Performed by: HOSPITALIST

## 2019-11-24 PROCEDURE — 85018 HEMOGLOBIN: CPT | Mod: 91

## 2019-11-24 PROCEDURE — 99232 SBSQ HOSP IP/OBS MODERATE 35: CPT | Performed by: HOSPITALIST

## 2019-11-24 PROCEDURE — C9113 INJ PANTOPRAZOLE SODIUM, VIA: HCPCS | Performed by: HOSPITALIST

## 2019-11-24 PROCEDURE — 770006 HCHG ROOM/CARE - MED/SURG/GYN SEMI*

## 2019-11-24 PROCEDURE — 36415 COLL VENOUS BLD VENIPUNCTURE: CPT

## 2019-11-24 PROCEDURE — 700111 HCHG RX REV CODE 636 W/ 250 OVERRIDE (IP): Performed by: HOSPITALIST

## 2019-11-24 PROCEDURE — 85025 COMPLETE CBC W/AUTO DIFF WBC: CPT

## 2019-11-24 PROCEDURE — 83036 HEMOGLOBIN GLYCOSYLATED A1C: CPT

## 2019-11-24 RX ORDER — MORPHINE SULFATE 4 MG/ML
2 INJECTION, SOLUTION INTRAMUSCULAR; INTRAVENOUS ONCE
Status: COMPLETED | OUTPATIENT
Start: 2019-11-24 | End: 2019-11-24

## 2019-11-24 RX ORDER — HYDRALAZINE HYDROCHLORIDE 20 MG/ML
10 INJECTION INTRAMUSCULAR; INTRAVENOUS ONCE
Status: DISCONTINUED | OUTPATIENT
Start: 2019-11-24 | End: 2019-11-25

## 2019-11-24 RX ORDER — ACETAMINOPHEN 325 MG/1
650 TABLET ORAL EVERY 4 HOURS PRN
Status: DISCONTINUED | OUTPATIENT
Start: 2019-11-24 | End: 2019-11-26 | Stop reason: HOSPADM

## 2019-11-24 RX ADMIN — ONDANSETRON 4 MG: 2 INJECTION INTRAMUSCULAR; INTRAVENOUS at 10:02

## 2019-11-24 RX ADMIN — MORPHINE SULFATE 2 MG: 4 INJECTION INTRAVENOUS at 03:05

## 2019-11-24 RX ADMIN — MORPHINE SULFATE 2 MG: 4 INJECTION INTRAVENOUS at 23:07

## 2019-11-24 RX ADMIN — ONDANSETRON 4 MG: 2 INJECTION INTRAMUSCULAR; INTRAVENOUS at 03:05

## 2019-11-24 RX ADMIN — MORPHINE SULFATE 2 MG: 4 INJECTION INTRAVENOUS at 10:02

## 2019-11-24 RX ADMIN — MORPHINE SULFATE 2 MG: 4 INJECTION INTRAVENOUS at 22:32

## 2019-11-24 RX ADMIN — ONDANSETRON 4 MG: 2 INJECTION INTRAMUSCULAR; INTRAVENOUS at 16:57

## 2019-11-24 RX ADMIN — PANTOPRAZOLE SODIUM 40 MG: 40 INJECTION, POWDER, LYOPHILIZED, FOR SOLUTION INTRAVENOUS at 16:57

## 2019-11-24 RX ADMIN — PANTOPRAZOLE SODIUM 40 MG: 40 INJECTION, POWDER, LYOPHILIZED, FOR SOLUTION INTRAVENOUS at 05:26

## 2019-11-24 RX ADMIN — ACETAMINOPHEN 650 MG: 325 TABLET, FILM COATED ORAL at 15:18

## 2019-11-24 RX ADMIN — ONDANSETRON 4 MG: 2 INJECTION INTRAMUSCULAR; INTRAVENOUS at 22:40

## 2019-11-24 ASSESSMENT — ENCOUNTER SYMPTOMS
PND: 0
HEMOPTYSIS: 0
BACK PAIN: 0
CONSTIPATION: 0
DOUBLE VISION: 0
SPUTUM PRODUCTION: 0
NECK PAIN: 0
TINGLING: 0
SPEECH CHANGE: 0
ORTHOPNEA: 0
SORE THROAT: 0
PHOTOPHOBIA: 0
HEARTBURN: 0
DIZZINESS: 0
COUGH: 0
SENSORY CHANGE: 0
NERVOUS/ANXIOUS: 0
BLURRED VISION: 0
BLOOD IN STOOL: 0
HEADACHES: 0
MYALGIAS: 1
CLAUDICATION: 0
EYE PAIN: 0
DEPRESSION: 0
PALPITATIONS: 0
VOMITING: 0
MEMORY LOSS: 0
NAUSEA: 1
STRIDOR: 0
FEVER: 0
SHORTNESS OF BREATH: 0
TREMORS: 0
WEAKNESS: 0
CHILLS: 0

## 2019-11-24 ASSESSMENT — PATIENT HEALTH QUESTIONNAIRE - PHQ9
1. LITTLE INTEREST OR PLEASURE IN DOING THINGS: NOT AT ALL
2. FEELING DOWN, DEPRESSED, IRRITABLE, OR HOPELESS: NOT AT ALL
SUM OF ALL RESPONSES TO PHQ9 QUESTIONS 1 AND 2: 0

## 2019-11-24 ASSESSMENT — COGNITIVE AND FUNCTIONAL STATUS - GENERAL
MOBILITY SCORE: 24
SUGGESTED CMS G CODE MODIFIER MOBILITY: CH
DAILY ACTIVITIY SCORE: 24
SUGGESTED CMS G CODE MODIFIER DAILY ACTIVITY: CH

## 2019-11-24 ASSESSMENT — LIFESTYLE VARIABLES
HOW MANY TIMES IN THE PAST YEAR HAVE YOU HAD 5 OR MORE DRINKS IN A DAY: 0
EVER FELT BAD OR GUILTY ABOUT YOUR DRINKING: NO
CONSUMPTION TOTAL: NEGATIVE
TOTAL SCORE: 0
HAVE YOU EVER FELT YOU SHOULD CUT DOWN ON YOUR DRINKING: NO
ON A TYPICAL DAY WHEN YOU DRINK ALCOHOL HOW MANY DRINKS DO YOU HAVE: 0
EVER HAD A DRINK FIRST THING IN THE MORNING TO STEADY YOUR NERVES TO GET RID OF A HANGOVER: NO
TOTAL SCORE: 0
AVERAGE NUMBER OF DAYS PER WEEK YOU HAVE A DRINK CONTAINING ALCOHOL: 0
HAVE PEOPLE ANNOYED YOU BY CRITICIZING YOUR DRINKING: NO
TOTAL SCORE: 0
ALCOHOL_USE: NO

## 2019-11-24 NOTE — ASSESSMENT & PLAN NOTE
Uncontrolled, not on medications  A1c still pending today.  Continue Insulin-sliding scale, accu-checks and hypoglycemia protocol.  Continue with Consistent Carbohydrate diet

## 2019-11-24 NOTE — PROGRESS NOTES
[facility] Medicine Daily Progress Note    Date of Service  11/24/2019    Chief Complaint  44 y.o. male admitted 11/23/2019 with intractable nausea/vommiting    Hospital Course    per HPI      Interval Problem Update  No acute issues overnight, patient says his nausea is better, but he still feels malaise.  No further episodes of hematemesis.  Denies any melena or hematochezia.  Says his ribs ache most likely due to vomiting.    Consultants/Specialty  None    Code Status  Full Code    Disposition  TBD    Review of Systems  Review of Systems   Constitutional: Positive for malaise/fatigue. Negative for chills and fever.   HENT: Negative for congestion, hearing loss, sore throat and tinnitus.    Eyes: Negative for blurred vision, double vision, photophobia and pain.   Respiratory: Negative for cough, hemoptysis, sputum production, shortness of breath and stridor.    Cardiovascular: Negative for chest pain, palpitations, orthopnea, claudication and PND.   Gastrointestinal: Positive for nausea. Negative for blood in stool, constipation, heartburn, melena and vomiting.   Genitourinary: Negative for dysuria, frequency and urgency.   Musculoskeletal: Positive for myalgias. Negative for back pain and neck pain.   Neurological: Negative for dizziness, tingling, tremors, sensory change, speech change, weakness and headaches.   Psychiatric/Behavioral: Negative for depression, memory loss and suicidal ideas. The patient is not nervous/anxious.    All other systems reviewed and are negative.       Physical Exam  Temp:  [36.7 °C (98.1 °F)-37.9 °C (100.3 °F)] 36.8 °C (98.2 °F)  Pulse:  [] 71  Resp:  [9-24] 18  BP: (145-220)/() 155/83  SpO2:  [96 %-100 %] 97 %    Physical Exam  Vitals signs and nursing note reviewed.   Constitutional:       General: He is not in acute distress.     Appearance: Normal appearance. He is well-developed and normal weight. He is not ill-appearing, toxic-appearing or diaphoretic.   HENT:       Right Ear: External ear normal.      Left Ear: External ear normal.      Nose: Nose normal.      Mouth/Throat:      Mouth: Mucous membranes are dry.      Pharynx: Oropharynx is clear. No oropharyngeal exudate or posterior oropharyngeal erythema.   Eyes:      General: No scleral icterus.        Right eye: No discharge.         Left eye: No discharge.   Neck:      Musculoskeletal: Normal range of motion and neck supple. No neck rigidity or muscular tenderness.      Vascular: No JVD.      Trachea: No tracheal deviation.   Cardiovascular:      Rate and Rhythm: Normal rate and regular rhythm.      Heart sounds: Normal heart sounds. No murmur.   Pulmonary:      Effort: Pulmonary effort is normal. No respiratory distress.      Breath sounds: Normal breath sounds. No wheezing or rales.   Abdominal:      General: Bowel sounds are normal. There is no distension.      Palpations: Abdomen is soft. There is no mass.      Tenderness: There is no tenderness. There is no guarding or rebound.      Hernia: No hernia is present.   Musculoskeletal:         General: No swelling or tenderness.   Skin:     General: Skin is warm and dry.      Capillary Refill: Capillary refill takes less than 2 seconds.      Coloration: Skin is not jaundiced.      Findings: No erythema.   Neurological:      General: No focal deficit present.      Mental Status: He is alert and oriented to person, place, and time.      Cranial Nerves: No cranial nerve deficit.      Motor: No weakness.   Psychiatric:         Mood and Affect: Mood normal.         Behavior: Behavior normal.         Thought Content: Thought content normal.         Fluids    Intake/Output Summary (Last 24 hours) at 11/24/2019 0715  Last data filed at 11/24/2019 0600  Gross per 24 hour   Intake 1000 ml   Output 600 ml   Net 400 ml       Laboratory  Recent Labs     11/23/19  1600 11/24/19  0018 11/24/19  0610   WBC 12.8*  --  14.5*   RBC 5.64  --  5.11   HEMOGLOBIN 16.4 14.6 14.8   HEMATOCRIT 48.7  43.0 44.7   MCV 86.3  --  87.5   MCH 29.1  --  29.0   MCHC 33.7  --  33.1*   RDW 40.4  --  42.1   PLATELETCT 371  --  269   MPV 9.2  --  8.9*     Recent Labs     11/23/19  1600 11/24/19  0610   SODIUM 142 142   POTASSIUM 4.2 4.3   CHLORIDE 101 106   CO2 21 22   GLUCOSE 294* 181*   BUN 15 14   CREATININE 0.96 0.83   CALCIUM 10.7* 9.4                   Imaging  CT-ABDOMEN-PELVIS WITH   Final Result      Stable contrast-enhanced CT of the abdomen and pelvis without acute abnormality identified      Mild splenomegaly.      Cholecystectomy           Assessment/Plan  * Upper GI bleed  Assessment & Plan  Most likely secondary to Kori-Arce tear from persistent nausea vomiting from marijuana induced hyperemesis  His hemoglobin has been stable, continue to monitor daily  Patient denies having any melena or hematochezia, no further hematemesis  We will continue with IV antiemetics, IV Protonix twice daily for now  The patient continues to have worsening symptoms of hematemesis, gastroenterology will be consulted.       Intractable nausea and vomiting  Assessment & Plan  Secondary to hyperemesis for marijuana use   Emphasized the importance of marijuana cessation   We will utilize IV PRN antiemetics as well as capsaicin cream    Hepatitis C- (present on admission)  Assessment & Plan  Follow as outpatient with GI, liver functions within normal limits      Diabetes mellitus type 2, uncontrolled (CMS-HCC)- (present on admission)  Assessment & Plan  Uncontrolled, not on medications  Waiting for A1c  Continue Insulin-sliding scale, accu-checks and hypoglycemia protocol.  Continue with Consistent Carbohydrate diet          Leuckocytosis (CMS-HCC)- (present on admission)  Assessment & Plan  Most likely a leukemoid reaction, no signs of infections  Repeat CBC in the morning    Patient plan of care discussed at multidisplinary team rounds and with patient and R.N at [facility].    VTE prophylaxis: SCDs

## 2019-11-24 NOTE — PROGRESS NOTES
2 RN skin assessment done; skin not WDL. See Wound flowsheet.Presence of old bunion, intact  But flaky - right lateral foot.. Scabs on mid-back area x 3, all intact

## 2019-11-24 NOTE — CARE PLAN
Problem: Safety  Goal: Will remain free from injury  Outcome: PROGRESSING AS EXPECTED  Note:   Patient will be free from injury or fall incident- instruction for use of call light given      Problem: Knowledge Deficit  Goal: Knowledge of disease process/condition, treatment plan, diagnostic tests, and medications will improve  Outcome: PROGRESSING AS EXPECTED  Note:   Patient/ family member updated on Plan Of Care and Nurses communications with Doctors. Pt updated on POC- blood works, pain mgm't and safety 9 use of call light for needs.

## 2019-11-24 NOTE — ASSESSMENT & PLAN NOTE
Secondary to hyperemesis for marijuana use   Emphasized the importance of marijuana cessation   Continue with IV antiemetics PRN

## 2019-11-24 NOTE — PROGRESS NOTES
Pt resting in bed. VSS. No signs of distress noted. Pain and nausea better, but not fully gone. Discussed POC for the day. Questions encouraged and answered. Spouse at bedside. Requesting the pt have an EGD and colonoscopy while here. Discussed with Dr. CARMICHAEL. No indication for this at this time. Family updated.

## 2019-11-24 NOTE — CARE PLAN
Problem: Infection  Goal: Will remain free from infection  Outcome: PROGRESSING AS EXPECTED     Problem: Bowel/Gastric:  Goal: Normal bowel function is maintained or improved  Outcome: PROGRESSING AS EXPECTED  Goal: Will not experience complications related to bowel motility  Outcome: PROGRESSING AS EXPECTED     Problem: Communication  Goal: The ability to communicate needs accurately and effectively will improve  Outcome: MET     Problem: Safety  Goal: Will remain free from injury  Outcome: MET  Goal: Will remain free from falls  Outcome: MET

## 2019-11-24 NOTE — ED PROVIDER NOTES
CHIEF COMPLAINT  Chief Complaint   Patient presents with   • N/V     has Hx of chronic N/V however this got worse yesterday and today    • Fever     unknown if having fevers  has been sweating a lot     • Abdominal Pain     all quads and rads around to R back (?kidney area)       SANDRA Hawkins is a 44 y.o. male with an apparent history of chronic nausea and vomiting but he states that over the last 1 to 2 days it became worse.  He is noting generalized abdominal pain but it seems to be worse on the right side towards the flank.  He states that he has been sweating a lot and may have had fevers.  He notes no dysuria or hematuria.  He has had prior gallbladder and appendix removal.  He notes perhaps minimal sore throat no significant cough.  No trouble breathing or chest pain.  Nothing makes his symptoms better.  He states he has not recently checked his sugars but when he did a day or 2 ago they were in the 120 range.  He otherwise notes no skin infections.  No significant headache.  He has no blood in his emesis.  Mild diarrhea.    REVIEW OF SYSTEMS  All other systems are negative.     PAST MEDICAL HISTORY  Past Medical History:   Diagnosis Date   • Arthritis right hip    back   • Backpain     feet/hip-R,back   • Dental disorder     full dentures   • Diabetes     insulin/oral   • Gastroparesis     possible-recent gastric testing   • Hepatitis C 2009   • Hepatitis C carrier (HCC)    • Infectious disease        FAMILY HISTORY  Family History   Problem Relation Age of Onset   • Diabetes Mother    • Hyperlipidemia Mother    • Arthritis Mother    • Heart Attack Father    • Heart Disease Father    • Hypertension Father    • Stroke Father    • Hyperlipidemia Father    • Arthritis Father        SOCIAL HISTORY  Social History     Socioeconomic History   • Marital status:      Spouse name: Not on file   • Number of children: Not on file   • Years of education: Not on file   • Highest education level: Not  on file   Occupational History   • Not on file   Social Needs   • Financial resource strain: Not on file   • Food insecurity:     Worry: Not on file     Inability: Not on file   • Transportation needs:     Medical: Not on file     Non-medical: Not on file   Tobacco Use   • Smoking status: Former Smoker     Packs/day: 0.50     Years: 10.00     Pack years: 5.00     Last attempt to quit: 2012     Years since quittin.8   • Smokeless tobacco: Never Used   • Tobacco comment: 5 yrs ago   Substance and Sexual Activity   • Alcohol use: No     Comment: occ   • Drug use: No   • Sexual activity: Not on file   Lifestyle   • Physical activity:     Days per week: Not on file     Minutes per session: Not on file   • Stress: Not on file   Relationships   • Social connections:     Talks on phone: Not on file     Gets together: Not on file     Attends Buddhist service: Not on file     Active member of club or organization: Not on file     Attends meetings of clubs or organizations: Not on file     Relationship status: Not on file   • Intimate partner violence:     Fear of current or ex partner: Not on file     Emotionally abused: Not on file     Physically abused: Not on file     Forced sexual activity: Not on file   Other Topics Concern   • Not on file   Social History Narrative   • Not on file       SURGICAL HISTORY  Past Surgical History:   Procedure Laterality Date   • TOE AMPUTATION Right 2018    Procedure: TOE AMPUTATION - 5TH RAY REVISION;  Surgeon: Abram Cortez M.D.;  Location: [facility];  Service: Orthopedics   • IRRIGATION & DEBRIDEMENT ORTHO Right 2016    Procedure: IRRIGATION & DEBRIDEMENT AND CLOSURE OF ORTHO FOOT WOUND;  Surgeon: Hitesh Sol M.D.;  Location: [facility];  Service:    • IRRIGATION & DEBRIDEMENT ORTHO Right 4/10/2016    Procedure: IRRIGATION & DEBRIDEMENT ORTHO-poss ray resection, poss below knee amputation ;  Surgeon: Hitesh Sol M.D.;   "Location: SURGERY [facility];  Service:    • IRRIGATION & DEBRIDEMENT ORTHO Right 4/4/2016    Procedure: IRRIGATION & DEBRIDEMENT ORTHO FOOT - AMPUTATION RIGHT FIFTH TOE ;  Surgeon: Hitesh Sol M.D.;  Location: SURGERY [facility];  Service:    • OSWALDO BY LAPAROSCOPY     • CHOLECYSTECTOMY     • OTHER  hepatitis c        CURRENT MEDICATIONS  Home Medications     Reviewed by Qing Borden PhT (Pharmacy Tech) on 11/23/19 at 1636  Med List Status: Complete   Medication Last Dose Status   metoclopramide (REGLAN) 10 MG Tab FEW DAYS AGO Active   ondansetron (ZOFRAN ODT) 4 MG TABLET DISPERSIBLE 11/23/2019 Active                ALLERGIES  Allergies   Allergen Reactions   • Bee Swelling     Bee stings       PHYSICAL EXAM  VITAL SIGNS: BP (!) 169/104   Pulse 91   Temp 37.9 °C (100.3 °F) (Temporal)   Resp 18   Ht 1.803 m (5' 11\")   Wt 86 kg (189 lb 9.5 oz)   SpO2 99%   BMI 26.44 kg/m²      Constitutional: Well developed, Well nourished, No acute distress, Non-toxic appearance.   HENT: Normocephalic, Atraumatic, TMs normal, mucous membranes moist, no erythema, exudates, swelling, or masses, nares patent  Eyes: nonicteric  Neck: Supple, no meningismus  Lymphatic: No lymphadenopathy noted.   Cardiovascular: Regular rate and rhythm, no gallops rubs or murmurs  Lungs: Clear bilaterally   Abdomen: Bowel sounds normal, Soft, No tenderness, No pulsatile masses.   Skin: Warm, Dry, no rash  Back: No tenderness, No CVA tenderness.   Genitalia: Deferred  Rectal: Deferred  Extremities: No edema  Neurologic: Alert, appropriate, follows commands, moving all extremities, normal speech   Psychiatric: Affect normal    RADIOLOGY/PROCEDURES  CT-ABDOMEN-PELVIS WITH   Final Result      Stable contrast-enhanced CT of the abdomen and pelvis without acute abnormality identified      Mild splenomegaly.      Cholecystectomy        Results for orders placed or performed during the hospital encounter of 11/23/19   CBC WITH " DIFFERENTIAL   Result Value Ref Range    WBC 12.8 (H) 4.8 - 10.8 K/uL    RBC 5.64 4.70 - 6.10 M/uL    Hemoglobin 16.4 14.0 - 18.0 g/dL    Hematocrit 48.7 42.0 - 52.0 %    MCV 86.3 81.4 - 97.8 fL    MCH 29.1 27.0 - 33.0 pg    MCHC 33.7 33.7 - 35.3 g/dL    RDW 40.4 35.9 - 50.0 fL    Platelet Count 371 164 - 446 K/uL    MPV 9.2 9.0 - 12.9 fL    Neutrophils-Polys 89.90 (H) 44.00 - 72.00 %    Lymphocytes 7.30 (L) 22.00 - 41.00 %    Monocytes 2.10 0.00 - 13.40 %    Eosinophils 0.00 0.00 - 6.90 %    Basophils 0.20 0.00 - 1.80 %    Immature Granulocytes 0.50 0.00 - 0.90 %    Nucleated RBC 0.00 /100 WBC    Neutrophils (Absolute) 11.54 (H) 1.82 - 7.42 K/uL    Lymphs (Absolute) 0.94 (L) 1.00 - 4.80 K/uL    Monos (Absolute) 0.27 0.00 - 0.85 K/uL    Eos (Absolute) 0.00 0.00 - 0.51 K/uL    Baso (Absolute) 0.02 0.00 - 0.12 K/uL    Immature Granulocytes (abs) 0.06 0.00 - 0.11 K/uL    NRBC (Absolute) 0.00 K/uL   COMP METABOLIC PANEL   Result Value Ref Range    Sodium 142 135 - 145 mmol/L    Potassium 4.2 3.6 - 5.5 mmol/L    Chloride 101 96 - 112 mmol/L    Co2 21 20 - 33 mmol/L    Anion Gap 20.0 (H) 0.0 - 11.9    Glucose 294 (H) 65 - 99 mg/dL    Bun 15 8 - 22 mg/dL    Creatinine 0.96 0.50 - 1.40 mg/dL    Calcium 10.7 (H) 8.4 - 10.2 mg/dL    AST(SGOT) 19 12 - 45 U/L    ALT(SGPT) 40 2 - 50 U/L    Alkaline Phosphatase 148 (H) 30 - 99 U/L    Total Bilirubin 0.6 0.1 - 1.5 mg/dL    Albumin 4.8 3.2 - 4.9 g/dL    Total Protein 8.9 (H) 6.0 - 8.2 g/dL    Globulin 4.1 (H) 1.9 - 3.5 g/dL    A-G Ratio 1.2 g/dL   LIPASE   Result Value Ref Range    Lipase 14 7 - 58 U/L   LACTIC ACID   Result Value Ref Range    Lactic Acid 2.0 0.5 - 2.0 mmol/L   Influenza A/B By PCR (Adult - Flu Only)   Result Value Ref Range    Influenza virus A RNA Negative Negative    Influenza virus B, PCR Negative Negative   ESTIMATED GFR   Result Value Ref Range    GFR If African American >60 >60 mL/min/1.73 m 2    GFR If Non African American >60 >60 mL/min/1.73 m 2            COURSE & MEDICAL DECISION MAKING  Pertinent Labs & Imaging studies reviewed. (See chart for details)  This is a 44-year-old male with a history of chronic nausea vomiting abdominal pain who presents with 2 days of progressive nausea vomiting diarrhea as well as low-grade fever.  Fashion is a diabetic.  He notes no blood in his stool but has had coffee-ground emesis.  He otherwise notes no significant cough trouble breathing or chest pain.  He does note diffuse abdominal pain worse on the left side.  He notes no urinary symptoms.  Work-up here demonstrates a lactate of 2.  White count is elevated around 12, patient has a gap of 20, bicarb is 21, sodium is 142 and potassium is 4.2.  Patient is flu negative.  Patient was initially treated with fluids and antiemetics and had persistent vomiting despite this.  Given the patient's diabetes, persistent vomiting and coffee-ground emesis he will be admitted for observation overnight.    FINAL IMPRESSION  1. Upper GI bleed  2. Nausea, vomiting diarrhea  3.         Electronically signed by: Rajiv Godinez, 11/23/2019 5:01 PM

## 2019-11-24 NOTE — ED NOTES
Pt and mother report the pt has been having diarrhea and worsening N/V over past couple of days. Pt is diaphoretic and reports great pain to right flank and right side of abdomen.

## 2019-11-24 NOTE — H&P
Hospital Medicine History & Physical Note    Date of Service  11/23/2019    Primary Care Physician  Alvaro Murcia M.D.    Consultants  None    Code Status  Full Code    Chief Complaint  Nausea/ Vomiting    History of Presenting Illness  44 y.o. male, with history of hepatitis C and marijuana related intractable nausea and vomiting, who presented to the ED on 11/23/2019 for evaluation of bloody emesis.    NAUSEA/VOMITING:  -Patient baseline: Over the past 3 years he reports most days he throws up at least twice in the morning.  He has had multiple work-ups for this including studies for gastroparesis.   -Today, reports having over 10 episodes of vomiting in the last 6 times also noticed a tinge of blood at the end, therefore decided to come to the emergency department for further evaluation.  -He was unable to keep any thing down today.  -Patient already had 2 episodes of blood-tinged emesis here in the emergency department.  -He follows with GI outpatient: Dr. Estrada     COURSE:  -Patient has been borderline tachycardic here in the emergency department with heart rate between 88 to 100 bpm.  Is also hypertensive, most current blood pressure is 166/96.  -Initial hemoglobin is 16.4  -Also pertinent laboratory findings include elevated anion gap of 20 and a glucose of 294.  His LFTs and bilirubin are within normal limits.  Lactic acid is 2.0 per  -CT scan of the abdomen was negative for acute findings.        Review of Systems  Review of Systems   Constitutional: Negative for fever.   HENT: Negative for congestion and sore throat.    Eyes: Negative for blurred vision and double vision.   Respiratory: Negative for cough and shortness of breath.    Cardiovascular: Negative for chest pain and palpitations.   Gastrointestinal: Positive for abdominal pain, nausea and vomiting.   Genitourinary: Negative for dysuria and urgency.   Musculoskeletal: Negative for myalgias and neck pain.   Skin: Negative for itching and rash.    Neurological: Negative for dizziness, weakness and headaches.   Endo/Heme/Allergies: Does not bruise/bleed easily.   Psychiatric/Behavioral: Negative for depression. The patient does not have insomnia.        Past Medical History   has a past medical history of Arthritis (right hip), Backpain, Dental disorder, Diabetes, Gastroparesis, Hepatitis C (2009), Hepatitis C carrier (HCC), and Infectious disease.    Surgical History   has a past surgical history that includes nano by laparoscopy; cholecystectomy; irrigation & debridement ortho (Right, 4/4/2016); irrigation & debridement ortho (Right, 4/10/2016); irrigation & debridement ortho (Right, 4/13/2016); other (hepatitis c ); and toe amputation (Right, 8/16/2018).     Family History  family history includes Arthritis in his father and mother; Diabetes in his mother; Heart Attack in his father; Heart Disease in his father; Hyperlipidemia in his father and mother; Hypertension in his father; Stroke in his father.     Social History   reports that he quit smoking about 7 years ago. He has a 5.00 pack-year smoking history. He has never used smokeless tobacco. He reports that he does not drink alcohol or use drugs.    Allergies  Allergies   Allergen Reactions   • Bee Swelling     Bee stings       Medications  Prior to Admission Medications   Prescriptions Last Dose Informant Patient Reported? Taking?   metoclopramide (REGLAN) 10 MG Tab FEW DAYS AGO at [facility] Patient Yes Yes   Sig: Take 10 mg by mouth 1 time daily as needed.   ondansetron (ZOFRAN ODT) 4 MG TABLET DISPERSIBLE 11/23/2019 at 1300 Patient No No   Sig: Take 1 Tab by mouth every 8 hours as needed.      Facility-Administered Medications: None       Physical Exam  Temp:  [37.1 °C (98.8 °F)-37.9 °C (100.3 °F)] 37.6 °C (99.6 °F)  Pulse:  [] 82  Resp:  [9-24] 13  BP: (146-220)/() 146/74  SpO2:  [96 %-100 %] 96 %    Physical Exam  Constitutional:       Appearance: Normal appearance.      Comments: Looks in  pain.   HENT:      Head: Normocephalic and atraumatic.      Nose: Nose normal.      Mouth/Throat:      Mouth: Mucous membranes are moist.      Pharynx: Oropharynx is clear.   Eyes:      Extraocular Movements: Extraocular movements intact.      Pupils: Pupils are equal, round, and reactive to light.   Neck:      Musculoskeletal: Normal range of motion and neck supple.   Cardiovascular:      Rate and Rhythm: Normal rate and regular rhythm.      Pulses: Normal pulses.      Heart sounds: Normal heart sounds.   Pulmonary:      Effort: Pulmonary effort is normal.      Breath sounds: Normal breath sounds.   Abdominal:      General: Abdomen is flat. Bowel sounds are normal. There is no distension.      Palpations: Abdomen is soft.      Tenderness: There is tenderness (Mild diffuse tenderness with palpation).   Skin:     General: Skin is warm and dry.   Neurological:      General: No focal deficit present.      Mental Status: He is alert and oriented to person, place, and time.   Psychiatric:         Mood and Affect: Mood normal.         Behavior: Behavior normal.         Laboratory:  Recent Labs     11/23/19  1600   WBC 12.8*   RBC 5.64   HEMOGLOBIN 16.4   HEMATOCRIT 48.7   MCV 86.3   MCH 29.1   MCHC 33.7   RDW 40.4   PLATELETCT 371   MPV 9.2     Recent Labs     11/23/19  1600   SODIUM 142   POTASSIUM 4.2   CHLORIDE 101   CO2 21   GLUCOSE 294*   BUN 15   CREATININE 0.96   CALCIUM 10.7*     Recent Labs     11/23/19  1600   ALTSGPT 40   ASTSGOT 19   ALKPHOSPHAT 148*   TBILIRUBIN 0.6   LIPASE 14   GLUCOSE 294*         No results for input(s): NTPROBNP in the last 72 hours.      No results for input(s): TROPONINT in the last 72 hours.    Urinalysis:    No results found     Imaging:  CT-ABDOMEN-PELVIS WITH   Final Result      Stable contrast-enhanced CT of the abdomen and pelvis without acute abnormality identified      Mild splenomegaly.      Cholecystectomy            Assessment/Plan:  I anticipate this patient will require at  least two midnights for appropriate medical management, necessitating inpatient admission.    * Upper GI bleed  Assessment & Plan  -Likely related to marijuana related intractable nausea and vomiting, possible Kori-Arce tear.  -Patient has had daily vomiting for the past 3 years, typically twice daily, most recently today over 10 times.  -Hemoglobin on admission is 16.4, his blood pressure is elevated at 146/74.  -Keep patient n.p.o.  -Serial H&H overnight.  -Nausea and abdominal pain control.  -His GI is Dr. Estrada, may need to be consult in the morning.    Intractable nausea and vomiting  Assessment & Plan  -Plan as above.  I extensively discussed with him the need for him to quit taking marijuana.  Patient verbalizes understanding.    Hepatitis C- (present on admission)  Assessment & Plan  -Following with GI.  -LFTs and bilirubin within normal limits on admission.    Diabetes mellitus type 2, uncontrolled (CMS-HCC)- (present on admission)  Assessment & Plan  -Patient lost a lot of weight over the past 3 years with history of daily vomiting as described above.  He is currently not requiring any insulin since his sugar has been low.  -The initial glucose is 294, I will add a hemoglobin A1c to monitor his levels and consider possibly starting regular insulin sliding scale.      VTE prophylaxis: SCD's

## 2019-11-24 NOTE — ASSESSMENT & PLAN NOTE
Most likely secondary to Kori-Arce tear from persistent nausea vomiting from marijuana induced hyperemesis  H/H stable   No further evidence of hematemesis, melena or hematochezia.    We will discontinue IV Protonix and started on omeprazole twice daily

## 2019-11-25 PROBLEM — D72.829 LEUKOCYTOSIS: Status: ACTIVE | Noted: 2019-11-25

## 2019-11-25 LAB
ANION GAP SERPL CALC-SCNC: 12 MMOL/L (ref 0–11.9)
BASOPHILS # BLD AUTO: 0.4 % (ref 0–1.8)
BASOPHILS # BLD: 0.04 K/UL (ref 0–0.12)
BUN SERPL-MCNC: 9 MG/DL (ref 8–22)
CALCIUM SERPL-MCNC: 9 MG/DL (ref 8.4–10.2)
CHLORIDE SERPL-SCNC: 101 MMOL/L (ref 96–112)
CO2 SERPL-SCNC: 23 MMOL/L (ref 20–33)
CREAT SERPL-MCNC: 0.77 MG/DL (ref 0.5–1.4)
EOSINOPHIL # BLD AUTO: 0.09 K/UL (ref 0–0.51)
EOSINOPHIL NFR BLD: 0.9 % (ref 0–6.9)
ERYTHROCYTE [DISTWIDTH] IN BLOOD BY AUTOMATED COUNT: 41.1 FL (ref 35.9–50)
EST. AVERAGE GLUCOSE BLD GHB EST-MCNC: 212 MG/DL
GLUCOSE SERPL-MCNC: 175 MG/DL (ref 65–99)
HBA1C MFR BLD: 9 % (ref 0–5.6)
HCT VFR BLD AUTO: 41.1 % (ref 42–52)
HCT VFR BLD AUTO: 41.1 % (ref 42–52)
HCT VFR BLD AUTO: 42.7 % (ref 42–52)
HCT VFR BLD AUTO: 43.8 % (ref 42–52)
HGB BLD-MCNC: 13.8 G/DL (ref 14–18)
HGB BLD-MCNC: 13.9 G/DL (ref 14–18)
HGB BLD-MCNC: 14.1 G/DL (ref 14–18)
HGB BLD-MCNC: 14.8 G/DL (ref 14–18)
IMM GRANULOCYTES # BLD AUTO: 0.04 K/UL (ref 0–0.11)
IMM GRANULOCYTES NFR BLD AUTO: 0.4 % (ref 0–0.9)
LYMPHOCYTES # BLD AUTO: 3.15 K/UL (ref 1–4.8)
LYMPHOCYTES NFR BLD: 29.9 % (ref 22–41)
MCH RBC QN AUTO: 29.2 PG (ref 27–33)
MCHC RBC AUTO-ENTMCNC: 33.6 G/DL (ref 33.7–35.3)
MCV RBC AUTO: 87.1 FL (ref 81.4–97.8)
MONOCYTES # BLD AUTO: 0.58 K/UL (ref 0–0.85)
MONOCYTES NFR BLD AUTO: 5.5 % (ref 0–13.4)
NEUTROPHILS # BLD AUTO: 6.63 K/UL (ref 1.82–7.42)
NEUTROPHILS NFR BLD: 62.9 % (ref 44–72)
NRBC # BLD AUTO: 0 K/UL
NRBC BLD-RTO: 0 /100 WBC
PLATELET # BLD AUTO: 245 K/UL (ref 164–446)
PMV BLD AUTO: 9.1 FL (ref 9–12.9)
POTASSIUM SERPL-SCNC: 4.1 MMOL/L (ref 3.6–5.5)
RBC # BLD AUTO: 4.72 M/UL (ref 4.7–6.1)
SODIUM SERPL-SCNC: 136 MMOL/L (ref 135–145)
WBC # BLD AUTO: 10.5 K/UL (ref 4.8–10.8)

## 2019-11-25 PROCEDURE — 700105 HCHG RX REV CODE 258: Performed by: HOSPITALIST

## 2019-11-25 PROCEDURE — 85025 COMPLETE CBC W/AUTO DIFF WBC: CPT

## 2019-11-25 PROCEDURE — 36415 COLL VENOUS BLD VENIPUNCTURE: CPT

## 2019-11-25 PROCEDURE — 85018 HEMOGLOBIN: CPT | Mod: 91

## 2019-11-25 PROCEDURE — 700102 HCHG RX REV CODE 250 W/ 637 OVERRIDE(OP): Performed by: HOSPITALIST

## 2019-11-25 PROCEDURE — 700111 HCHG RX REV CODE 636 W/ 250 OVERRIDE (IP): Performed by: HOSPITALIST

## 2019-11-25 PROCEDURE — 99232 SBSQ HOSP IP/OBS MODERATE 35: CPT | Performed by: HOSPITALIST

## 2019-11-25 PROCEDURE — A9270 NON-COVERED ITEM OR SERVICE: HCPCS | Performed by: HOSPITALIST

## 2019-11-25 PROCEDURE — C9113 INJ PANTOPRAZOLE SODIUM, VIA: HCPCS | Performed by: HOSPITALIST

## 2019-11-25 PROCEDURE — 85014 HEMATOCRIT: CPT | Mod: 91

## 2019-11-25 PROCEDURE — 770006 HCHG ROOM/CARE - MED/SURG/GYN SEMI*

## 2019-11-25 PROCEDURE — 80048 BASIC METABOLIC PNL TOTAL CA: CPT

## 2019-11-25 RX ORDER — SODIUM CHLORIDE 9 MG/ML
INJECTION, SOLUTION INTRAVENOUS CONTINUOUS
Status: DISCONTINUED | OUTPATIENT
Start: 2019-11-25 | End: 2019-11-26 | Stop reason: HOSPADM

## 2019-11-25 RX ORDER — LISINOPRIL 20 MG/1
20 TABLET ORAL
Status: DISCONTINUED | OUTPATIENT
Start: 2019-11-25 | End: 2019-11-26 | Stop reason: HOSPADM

## 2019-11-25 RX ADMIN — MORPHINE SULFATE 2 MG: 4 INJECTION INTRAVENOUS at 22:22

## 2019-11-25 RX ADMIN — SENNOSIDES AND DOCUSATE SODIUM 2 TABLET: 8.6; 5 TABLET ORAL at 17:03

## 2019-11-25 RX ADMIN — PANTOPRAZOLE SODIUM 40 MG: 40 INJECTION, POWDER, LYOPHILIZED, FOR SOLUTION INTRAVENOUS at 05:05

## 2019-11-25 RX ADMIN — MORPHINE SULFATE 2 MG: 4 INJECTION INTRAVENOUS at 05:05

## 2019-11-25 RX ADMIN — ONDANSETRON 4 MG: 2 INJECTION INTRAMUSCULAR; INTRAVENOUS at 22:22

## 2019-11-25 RX ADMIN — SODIUM CHLORIDE: 9 INJECTION, SOLUTION INTRAVENOUS at 10:10

## 2019-11-25 RX ADMIN — ONDANSETRON 4 MG: 2 INJECTION INTRAMUSCULAR; INTRAVENOUS at 05:06

## 2019-11-25 RX ADMIN — ONDANSETRON 4 MG: 2 INJECTION INTRAMUSCULAR; INTRAVENOUS at 09:24

## 2019-11-25 RX ADMIN — SENNOSIDES AND DOCUSATE SODIUM 2 TABLET: 8.6; 5 TABLET ORAL at 05:06

## 2019-11-25 RX ADMIN — ONDANSETRON 4 MG: 2 INJECTION INTRAMUSCULAR; INTRAVENOUS at 17:04

## 2019-11-25 RX ADMIN — MORPHINE SULFATE 2 MG: 4 INJECTION INTRAVENOUS at 09:24

## 2019-11-25 RX ADMIN — ACETAMINOPHEN 650 MG: 325 TABLET, FILM COATED ORAL at 19:35

## 2019-11-25 RX ADMIN — SODIUM CHLORIDE: 9 INJECTION, SOLUTION INTRAVENOUS at 22:26

## 2019-11-25 RX ADMIN — PANTOPRAZOLE SODIUM 40 MG: 40 INJECTION, POWDER, LYOPHILIZED, FOR SOLUTION INTRAVENOUS at 17:02

## 2019-11-25 RX ADMIN — PROCHLORPERAZINE EDISYLATE 10 MG: 5 INJECTION INTRAMUSCULAR; INTRAVENOUS at 07:17

## 2019-11-25 RX ADMIN — LISINOPRIL 20 MG: 20 TABLET ORAL at 11:54

## 2019-11-25 ASSESSMENT — ENCOUNTER SYMPTOMS
PALPITATIONS: 0
COUGH: 0
BLOOD IN STOOL: 0
NERVOUS/ANXIOUS: 1
EYE PAIN: 0
HEMOPTYSIS: 0
VOMITING: 1
CLAUDICATION: 0
NAUSEA: 1
HEARTBURN: 0
STRIDOR: 0
TREMORS: 0
BACK PAIN: 0
CHILLS: 0
DIZZINESS: 0
PND: 0
SPUTUM PRODUCTION: 0
CONSTIPATION: 0
SORE THROAT: 0
ABDOMINAL PAIN: 1
WEAKNESS: 0
SENSORY CHANGE: 0
ORTHOPNEA: 0
BLURRED VISION: 0
TINGLING: 0
HEADACHES: 0
DOUBLE VISION: 0
FEVER: 0
NECK PAIN: 0
SHORTNESS OF BREATH: 0
MYALGIAS: 0
DEPRESSION: 0
PHOTOPHOBIA: 0
MEMORY LOSS: 0
SPEECH CHANGE: 0

## 2019-11-25 NOTE — CARE PLAN
Problem: Infection  Goal: Will remain free from infection  Outcome: PROGRESSING AS EXPECTED  Intervention: Implement standard precautions and perform hand washing before and after patient contact  Note:   Importance of frequent hand washing luis daniel. Every after use of br explained to pt for prevention of infection.     Problem: Knowledge Deficit  Goal: Knowledge of disease process/condition, treatment plan, diagnostic tests, and medications will improve  Outcome: PROGRESSING AS EXPECTED  Note:   Patient/ family member updated on Plan Of Care and Nurses communications with Doctors. Pt agree w/. Current POC-pain mgm't.and use of call light for safety, monitoring of v/s routine schedule.

## 2019-11-25 NOTE — CARE PLAN
Problem: Infection  Goal: Will remain free from infection  Outcome: PROGRESSING AS EXPECTED     Problem: Venous Thromboembolism (VTW)/Deep Vein Thrombosis (DVT) Prevention:  Goal: Patient will participate in Venous Thrombosis (VTE)/Deep Vein Thrombosis (DVT)Prevention Measures  Outcome: PROGRESSING AS EXPECTED     Problem: Knowledge Deficit  Goal: Knowledge of disease process/condition, treatment plan, diagnostic tests, and medications will improve  Outcome: PROGRESSING AS EXPECTED  Goal: Knowledge of the prescribed therapeutic regimen will improve  Outcome: PROGRESSING AS EXPECTED     Problem: Bowel/Gastric:  Goal: Will not experience complications related to bowel motility  Outcome: PROGRESSING SLOWER THAN EXPECTED

## 2019-11-25 NOTE — PROGRESS NOTES
[facility] Medicine Daily Progress Note    Date of Service  11/25/2019    Chief Complaint  44 y.o. male admitted 11/23/2019 with intractable nausea/vommiting    Hospital Course    per HPI,  44 y.o. male, with history of hepatitis C and marijuana related intractable nausea and vomiting, who presented to the ED on 11/23/2019 for evaluation of bloody emesis.     NAUSEA/VOMITING:  -Patient baseline: Over the past 3 years he reports most days he throws up at least twice in the morning.  He has had multiple work-ups for this including studies for gastroparesis.   -Today, reports having over 10 episodes of vomiting in the last 6 times also noticed a tinge of blood at the end, therefore decided to come to the emergency department for further evaluation.  -He was unable to keep any thing down today.  -Patient already had 2 episodes of blood-tinged emesis here in the emergency department.  -He follows with GI outpatient: Dr. Estrada     ER COURSE:  -Patient has been borderline tachycardic here in the emergency department with heart rate between 88 to 100 bpm.  Is also hypertensive, most current blood pressure is 166/96.  -Initial hemoglobin is 16.4  -Also pertinent laboratory findings include elevated anion gap of 20 and a glucose of 294.  His LFTs and bilirubin are within normal limits.  Lactic acid is 2.0 per  -CT scan of the abdomen was negative for acute findings.        Interval Problem Update    11/25  Patient said he had a rough night, has been nauseous and vomiting all night,.  Patient was unfortunately crying this morning when I saw him.  He only complained of nausea and mild tummy ache.  In addition to his blood pressure was elevated yesterday,  This point we will continue to provide supportive treatments  Continue to titrate his blood pressure medications    Consultants/Specialty  None    Code Status  Full Code    Disposition  TBD    Review of Systems  Review of Systems   Constitutional: Positive for malaise/fatigue.  Negative for chills and fever.   HENT: Negative for congestion, hearing loss, sore throat and tinnitus.    Eyes: Negative for blurred vision, double vision, photophobia and pain.   Respiratory: Negative for cough, hemoptysis, sputum production, shortness of breath and stridor.    Cardiovascular: Negative for chest pain, palpitations, orthopnea, claudication and PND.   Gastrointestinal: Positive for abdominal pain, nausea and vomiting. Negative for blood in stool, constipation, heartburn and melena.   Genitourinary: Negative for dysuria, frequency and urgency.   Musculoskeletal: Negative for back pain, myalgias and neck pain.   Neurological: Negative for dizziness, tingling, tremors, sensory change, speech change, weakness and headaches.   Psychiatric/Behavioral: Negative for depression, memory loss and suicidal ideas. The patient is nervous/anxious.    All other systems reviewed and are negative.       Physical Exam  Temp:  [36.8 °C (98.2 °F)-37.2 °C (98.9 °F)] 37.2 °C (98.9 °F)  Pulse:  [58-71] 62  Resp:  [18-20] 20  BP: (142-181)/(63-89) 167/89  SpO2:  [94 %-100 %] 98 %    Physical Exam  Vitals signs and nursing note reviewed.   Constitutional:       General: He is not in acute distress.     Appearance: Normal appearance. He is well-developed and normal weight. He is not ill-appearing, toxic-appearing or diaphoretic.   HENT:      Head: Normocephalic and atraumatic.      Right Ear: External ear normal.      Left Ear: External ear normal.      Nose: Nose normal.      Mouth/Throat:      Mouth: Mucous membranes are dry.      Pharynx: Oropharynx is clear. No oropharyngeal exudate or posterior oropharyngeal erythema.   Eyes:      General: No scleral icterus.        Right eye: No discharge.         Left eye: No discharge.   Neck:      Musculoskeletal: Normal range of motion and neck supple. No neck rigidity or muscular tenderness.      Vascular: No JVD.      Trachea: No tracheal deviation.   Cardiovascular:      Rate and  Rhythm: Normal rate and regular rhythm.      Heart sounds: Normal heart sounds. No murmur.   Pulmonary:      Effort: Pulmonary effort is normal. No respiratory distress.      Breath sounds: Normal breath sounds. No stridor. No wheezing, rhonchi or rales.   Abdominal:      General: Bowel sounds are normal. There is no distension.      Palpations: Abdomen is soft. There is no mass.      Tenderness: There is no tenderness. There is no guarding or rebound.      Hernia: No hernia is present.   Musculoskeletal:         General: No swelling or tenderness.   Skin:     General: Skin is warm and dry.      Capillary Refill: Capillary refill takes less than 2 seconds.      Coloration: Skin is not jaundiced.      Findings: No erythema.   Neurological:      General: No focal deficit present.      Mental Status: He is alert and oriented to person, place, and time.      Cranial Nerves: No cranial nerve deficit.      Motor: No weakness.   Psychiatric:      Comments: Anxious, was crying, couldn't tell me why         Fluids    Intake/Output Summary (Last 24 hours) at 11/25/2019 1050  Last data filed at 11/25/2019 0930  Gross per 24 hour   Intake 1560 ml   Output 1350 ml   Net 210 ml       Laboratory  Recent Labs     11/23/19  1600  11/24/19  0610  11/24/19  1834 11/25/19  0032 11/25/19  0638   WBC 12.8*  --  14.5*  --   --  10.5  --    RBC 5.64  --  5.11  --   --  4.72  --    HEMOGLOBIN 16.4   < > 14.8   < > 13.9* 13.8* 14.1   HEMATOCRIT 48.7   < > 44.7   < > 42.3 41.1* 42.7   MCV 86.3  --  87.5  --   --  87.1  --    MCH 29.1  --  29.0  --   --  29.2  --    MCHC 33.7  --  33.1*  --   --  33.6*  --    RDW 40.4  --  42.1  --   --  41.1  --    PLATELETCT 371  --  269  --   --  245  --    MPV 9.2  --  8.9*  --   --  9.1  --     < > = values in this interval not displayed.     Recent Labs     11/23/19  1600 11/24/19  0610 11/25/19  0638   SODIUM 142 142 136   POTASSIUM 4.2 4.3 4.1   CHLORIDE 101 106 101   CO2 21 22 23   GLUCOSE 294* 181*  175*   BUN 15 14 9   CREATININE 0.96 0.83 0.77   CALCIUM 10.7* 9.4 9.0                   Imaging  CT-ABDOMEN-PELVIS WITH   Final Result      Stable contrast-enhanced CT of the abdomen and pelvis without acute abnormality identified      Mild splenomegaly.      Cholecystectomy           Assessment/Plan  * Upper GI bleed  Assessment & Plan  Most likely secondary to Kori-Arce tear from persistent nausea vomiting from marijuana induced hyperemesis  H/H stable   No further evidence of hematemesis, melena or hematochezia.    We will discontinue IV Protonix and started on omeprazole twice daily    Intractable nausea and vomiting  Assessment & Plan  Secondary to hyperemesis for marijuana use   Emphasized the importance of marijuana cessation   Continue with IV antiemetics PRN    Hepatitis C- (present on admission)  Assessment & Plan  Follow as outpatient with GI, liver functions within normal limits    Diabetes mellitus type 2, uncontrolled (CMS-HCC)- (present on admission)  Assessment & Plan  Uncontrolled, not on medications  A1c still pending today.  Continue Insulin-sliding scale, accu-checks and hypoglycemia protocol.  Continue with Consistent Carbohydrate diet     Leukocytosis  Assessment & Plan  Most likely a leukemoid reaction, no signs of infections  Resolved,   CTM        Patient plan of care discussed at multidisplinary team rounds and with patient and R.N at [facility].    VTE prophylaxis: SCDs

## 2019-11-26 VITALS
RESPIRATION RATE: 16 BRPM | DIASTOLIC BLOOD PRESSURE: 89 MMHG | HEART RATE: 61 BPM | HEIGHT: 71 IN | TEMPERATURE: 97.9 F | BODY MASS INDEX: 26.54 KG/M2 | OXYGEN SATURATION: 93 % | WEIGHT: 189.6 LBS | SYSTOLIC BLOOD PRESSURE: 144 MMHG

## 2019-11-26 LAB
ALBUMIN SERPL BCP-MCNC: 4.3 G/DL (ref 3.2–4.9)
ALBUMIN/GLOB SERPL: 1.3 G/DL
ALP SERPL-CCNC: 115 U/L (ref 30–99)
ALT SERPL-CCNC: 38 U/L (ref 2–50)
ANION GAP SERPL CALC-SCNC: 13 MMOL/L (ref 0–11.9)
AST SERPL-CCNC: 24 U/L (ref 12–45)
BASOPHILS # BLD AUTO: 0.4 % (ref 0–1.8)
BASOPHILS # BLD: 0.04 K/UL (ref 0–0.12)
BILIRUB SERPL-MCNC: 0.7 MG/DL (ref 0.1–1.5)
BUN SERPL-MCNC: 10 MG/DL (ref 8–22)
CALCIUM SERPL-MCNC: 9.6 MG/DL (ref 8.4–10.2)
CHLORIDE SERPL-SCNC: 100 MMOL/L (ref 96–112)
CO2 SERPL-SCNC: 26 MMOL/L (ref 20–33)
CREAT SERPL-MCNC: 0.84 MG/DL (ref 0.5–1.4)
EOSINOPHIL # BLD AUTO: 0.09 K/UL (ref 0–0.51)
EOSINOPHIL NFR BLD: 1 % (ref 0–6.9)
ERYTHROCYTE [DISTWIDTH] IN BLOOD BY AUTOMATED COUNT: 40.4 FL (ref 35.9–50)
GLOBULIN SER CALC-MCNC: 3.3 G/DL (ref 1.9–3.5)
GLUCOSE SERPL-MCNC: 164 MG/DL (ref 65–99)
HCT VFR BLD AUTO: 45.8 % (ref 42–52)
HGB BLD-MCNC: 15.3 G/DL (ref 14–18)
IMM GRANULOCYTES # BLD AUTO: 0.03 K/UL (ref 0–0.11)
IMM GRANULOCYTES NFR BLD AUTO: 0.3 % (ref 0–0.9)
LYMPHOCYTES # BLD AUTO: 2.96 K/UL (ref 1–4.8)
LYMPHOCYTES NFR BLD: 33.2 % (ref 22–41)
MCH RBC QN AUTO: 29 PG (ref 27–33)
MCHC RBC AUTO-ENTMCNC: 33.4 G/DL (ref 33.7–35.3)
MCV RBC AUTO: 86.9 FL (ref 81.4–97.8)
MONOCYTES # BLD AUTO: 0.46 K/UL (ref 0–0.85)
MONOCYTES NFR BLD AUTO: 5.2 % (ref 0–13.4)
NEUTROPHILS # BLD AUTO: 5.33 K/UL (ref 1.82–7.42)
NEUTROPHILS NFR BLD: 59.9 % (ref 44–72)
NRBC # BLD AUTO: 0 K/UL
NRBC BLD-RTO: 0 /100 WBC
PLATELET # BLD AUTO: 296 K/UL (ref 164–446)
PMV BLD AUTO: 8.9 FL (ref 9–12.9)
POTASSIUM SERPL-SCNC: 3.7 MMOL/L (ref 3.6–5.5)
PROT SERPL-MCNC: 7.6 G/DL (ref 6–8.2)
RBC # BLD AUTO: 5.27 M/UL (ref 4.7–6.1)
SODIUM SERPL-SCNC: 139 MMOL/L (ref 135–145)
WBC # BLD AUTO: 8.9 K/UL (ref 4.8–10.8)

## 2019-11-26 PROCEDURE — 85025 COMPLETE CBC W/AUTO DIFF WBC: CPT

## 2019-11-26 PROCEDURE — A9270 NON-COVERED ITEM OR SERVICE: HCPCS | Performed by: HOSPITALIST

## 2019-11-26 PROCEDURE — 80053 COMPREHEN METABOLIC PANEL: CPT

## 2019-11-26 PROCEDURE — C9113 INJ PANTOPRAZOLE SODIUM, VIA: HCPCS | Performed by: HOSPITALIST

## 2019-11-26 PROCEDURE — 99239 HOSP IP/OBS DSCHRG MGMT >30: CPT | Performed by: HOSPITALIST

## 2019-11-26 PROCEDURE — 700102 HCHG RX REV CODE 250 W/ 637 OVERRIDE(OP): Performed by: HOSPITALIST

## 2019-11-26 PROCEDURE — 700111 HCHG RX REV CODE 636 W/ 250 OVERRIDE (IP): Performed by: HOSPITALIST

## 2019-11-26 PROCEDURE — 36415 COLL VENOUS BLD VENIPUNCTURE: CPT

## 2019-11-26 RX ORDER — LISINOPRIL 20 MG/1
20 TABLET ORAL DAILY
Qty: 30 TAB | Refills: 1 | Status: SHIPPED | OUTPATIENT
Start: 2019-11-27 | End: 2020-03-11 | Stop reason: SDUPTHER

## 2019-11-26 RX ORDER — OMEPRAZOLE 20 MG/1
20 CAPSULE, DELAYED RELEASE ORAL 2 TIMES DAILY
Status: DISCONTINUED | OUTPATIENT
Start: 2019-11-26 | End: 2019-11-26 | Stop reason: HOSPADM

## 2019-11-26 RX ADMIN — ACETAMINOPHEN 650 MG: 325 TABLET, FILM COATED ORAL at 08:28

## 2019-11-26 RX ADMIN — PANTOPRAZOLE SODIUM 40 MG: 40 INJECTION, POWDER, LYOPHILIZED, FOR SOLUTION INTRAVENOUS at 06:35

## 2019-11-26 RX ADMIN — SENNOSIDES AND DOCUSATE SODIUM 2 TABLET: 8.6; 5 TABLET ORAL at 06:35

## 2019-11-26 RX ADMIN — LISINOPRIL 20 MG: 20 TABLET ORAL at 06:35

## 2019-11-26 ASSESSMENT — PATIENT HEALTH QUESTIONNAIRE - PHQ9
2. FEELING DOWN, DEPRESSED, IRRITABLE, OR HOPELESS: NOT AT ALL
SUM OF ALL RESPONSES TO PHQ9 QUESTIONS 1 AND 2: 0
1. LITTLE INTEREST OR PLEASURE IN DOING THINGS: NOT AT ALL

## 2019-11-26 NOTE — DISCHARGE PLANNING
Patient lives at home with his spouse and the current discharge plan is for this patient to return home when medically able. No current SS needs noted. SW will continue to monitor and assist as needed.      Care Transition Team Assessment    Information Source  Information Given By: (Chart review)  Informant's Name: Cayetano         Elopement Risk  Legal Hold: No  Ambulatory or Self Mobile in Wheelchair: Yes  Disoriented: No  Psychiatric Symptoms: None  History of Wandering: No  Elopement this Admit: No  Vocalizing Wanting to Leave: No  Displays Behaviors, Body Language Wanting to Leave: No-Not at Risk for Elopement  Elopement Risk: Not at Risk for Elopement    Interdisciplinary Discharge Planning  Does Admitting Nurse Feel This Could be a Complex Discharge?: No  Primary Care Physician: Dr. Alvaro Murcia.  Lives with - Patient's Self Care Capacity: Spouse  Patient or legal guardian wants to designate a caregiver (see row info): No  Support Systems: Spouse / Significant Other, Family Member(s), Friends / Neighbors  Housing / Facility: [address]  Mobility Issues: No  Prior Services: Home-Independent  Patient Expects to be Discharged to:: Home  Assistance Needed: No  Durable Medical Equipment: Not Applicable    Discharge Preparedness  What is your plan after discharge?: Home with help  What are your discharge supports?: Spouse, Other (comment)(Friends, neighbors)  Prior Functional Level: Independent with Activities of Daily Living  Difficulity with ADLs: None  Difficulity with IADLs: None    Functional Assesment  Prior Functional Level: Independent with Activities of Daily Living    Finances  Financial Barriers to Discharge: No  Prescription Coverage: Yes    Vision / Hearing Impairment  Vision Impairment : Yes  Right Eye Vision: Wears Glasses  Left Eye Vision: Wears Glasses  Hearing Impairment : No              Domestic Abuse  Have you ever been the victim of abuse or violence?: No  Physical Abuse or Sexual Abuse:  No  Verbal Abuse or Emotional Abuse: No  Possible Abuse Reported to:: Not Applicable    Psychological Assessment  History of Substance Abuse: None  History of Psychiatric Problems: No  Non-compliant with Treatment: No  Newly Diagnosed Illness: No    Discharge Risks or Barriers  Discharge risks or barriers?: No    Anticipated Discharge Information  Anticipated discharge disposition: Home

## 2019-11-26 NOTE — PROGRESS NOTES
Bedside report received from Altagracia SALES, care of pt assumed. Pt resting in bed, s/o brought him chinese food for dinner and pt reports slight increase in pain after taking a few bites. Denies nausea at this time. Educated pt on going back to liquids and then slowly increasing diet as tolerated to more easily digestible foods until symptoms resolve. Pt agrees with plan.

## 2019-11-26 NOTE — CARE PLAN
Problem: Pain Management  Goal: Pain level will decrease to patient's comfort goal  Outcome: PROGRESSING SLOWER THAN EXPECTED  Note:   Pt was feeling better yesterday but his s/o brought him chinese food for dinner which exacerbated his nausea and pain. Pt medicated with Tylenol initially with little improvement then felt better after 2 mg Morphine. Nausea resolved with Zofran. Pt no longer having any hematemesis.      Problem: Bowel/Gastric:  Goal: Normal bowel function is maintained or improved  Outcome: PROGRESSING AS EXPECTED  Note:   Had a long discussion with patient regarding advancing diet slower next time, starting from clear liquids then advancing to full liquids as tolerated, then to GI soft before proceeding with regular foods. Pt verbalizes understanding and agrees with the plan.

## 2019-11-26 NOTE — DISCHARGE SUMMARY
Discharge Summary    CHIEF COMPLAINT ON ADMISSION  Chief Complaint   Patient presents with   • N/V     has Hx of chronic N/V however this got worse yesterday and today    • Fever     unknown if having fevers  has been sweating a lot     • Abdominal Pain     all quads and rads around to R back (?kidney area)       Reason for Admission  Vomiting; Fever     Admission Date  11/23/2019    CODE STATUS  Full Code    HPI & HOSPITAL COURSE  History of Presenting Illness per Dr. Martinez's H&P:  44 y.o. male, with history of hepatitis C and marijuana related intractable nausea and vomiting, who presented to the ED on 11/23/2019 for evaluation of bloody emesis.     NAUSEA/VOMITING:  -Patient baseline: Over the past 3 years he reports most days he throws up at least twice in the morning.  He has had multiple work-ups for this including studies for gastroparesis.   -Today, reports having over 10 episodes of vomiting in the last 6 times also noticed a tinge of blood at the end, therefore decided to come to the emergency department for further evaluation.  -He was unable to keep any thing down today.  -Patient already had 2 episodes of blood-tinged emesis here in the emergency department.  -He follows with GI outpatient: Dr. Estrada     ER COURSE:  -Patient has been borderline tachycardic here in the emergency department with heart rate between 88 to 100 bpm.  Is also hypertensive, most current blood pressure is 166/96.  -Initial hemoglobin is 16.4  -Also pertinent laboratory findings include elevated anion gap of 20 and a glucose of 294.  His LFTs and bilirubin are within normal limits.  Lactic acid is 2.0 per  -CT scan of the abdomen was negative for acute findings.       HOSPITAL COURSE: Noticed that the patient has several marijuana tattoos, I discussed with him that the cause of his recurrent vomiting and abdominal pain can very well be secondary to the use of cannabis.  Patient states that he has not used any cannabis products  for over a month.  He does grow marijuana and distributes.  I emphasized to him that he needs to completely avoid marijuana products for a prolonged period of time and see if this helps with his recurrent emesis problems.  After admission with fluids and bowel rest his blood sugars came down as well.    Therefore, he is discharged in good and stable condition to home with close outpatient follow-up.    The patient met 2-midnight criteria for an inpatient stay at the time of discharge.    Discharge Date  11/26/19    FOLLOW UP ITEMS POST DISCHARGE  Primary care as needed, follow up blood pressure.    DISCHARGE DIAGNOSES  Principal Problem:    Upper GI bleed POA: Unknown  Active Problems:    Intractable nausea and vomiting POA: Unknown    Diabetes mellitus type 2, uncontrolled (CMS-HCC) (Chronic) POA: Yes    Hepatitis C POA: Yes    Leukocytosis POA: Unknown  Resolved Problems:    * No resolved hospital problems. *      FOLLOW UP  No future appointments.  No follow-up provider specified.    MEDICATIONS ON DISCHARGE     Medication List      START taking these medications      Instructions   lisinopril 20 MG Tabs  Start taking on:  November 27, 2019  Commonly known as:  PRINIVIL   Take 1 Tab by mouth every day.  Dose:  20 mg        CONTINUE taking these medications      Instructions   metoclopramide 10 MG Tabs  Commonly known as:  REGLAN   Take 10 mg by mouth 1 time daily as needed.  Dose:  10 mg     ondansetron 4 MG Tbdp  Commonly known as:  ZOFRAN ODT   Take 1 Tab by mouth every 8 hours as needed.  Dose:  4 mg            Allergies  Allergies   Allergen Reactions   • Bee Swelling     Bee stings       DIET  Orders Placed This Encounter   Procedures   • Diet Order Diabetic, Low Fiber(GI Soft)     Standing Status:   Standing     Number of Occurrences:   1     Order Specific Question:   Diet:     Answer:   Diabetic [3]     Order Specific Question:   Diet:     Answer:   Low Fiber(GI Soft) [2]       ACTIVITY  As  tolerated.  Weight bearing as tolerated    CONSULTATIONS  none    PROCEDURES  none    LABORATORY  Lab Results   Component Value Date    SODIUM 139 11/26/2019    POTASSIUM 3.7 11/26/2019    CHLORIDE 100 11/26/2019    CO2 26 11/26/2019    GLUCOSE 164 (H) 11/26/2019    BUN 10 11/26/2019    CREATININE 0.84 11/26/2019    CREATININE 1.1 11/28/2008        Lab Results   Component Value Date    WBC 8.9 11/26/2019    HEMOGLOBIN 15.3 11/26/2019    HEMATOCRIT 45.8 11/26/2019    PLATELETCT 296 11/26/2019        Total time of the discharge process exceeds 31 minutes.

## 2019-11-26 NOTE — PROGRESS NOTES
Discharge in stable condition. PIV removed. No nausea/emesis bleeding this shift. Ambulating without assistance. Showered this AM. Pain is baseline. No needs for home. Teaching on lisinoril and monitoring BP at home provided. To follow up with PCP. Left floor walking. Leaving hospital with friend per private vehicle. All questions/concerns addressed.

## 2019-11-26 NOTE — DISCHARGE INSTRUCTIONS
Discharge Instructions per Debbi Aguirre M.D.    Follow up with primary care.    DIET: regular as tolerated.    ACTIVITY: as tolerated.    DIAGNOSIS: gastroparesis, cyclic vomiting, suspect related to use of cannabis. Hypertension.    Return to ER if you have return of nausea and vomiting.    Discharge Instructions    Discharged to home by car with relative. Discharged via wheelchair, hospital escort: Yes.  Special equipment needed: Not Applicable    Be sure to schedule a follow-up appointment with your primary care doctor or any specialists as instructed.     Discharge Plan:   Diet Plan: Discussed  Activity Level: Discussed  Confirmed Follow up Appointment: Patient to Call and Schedule Appointment  Confirmed Symptoms Management: Discussed  Medication Reconciliation Updated: Yes  Influenza Vaccine Indication: Patient Refuses    I understand that a diet low in cholesterol, fat, and sodium is recommended for good health. Unless I have been given specific instructions below for another diet, I accept this instruction as my diet prescription.   Other diet: regular     Special Instructions: None    · Is patient discharged on Warfarin / Coumadin?   No     Depression / Suicide Risk    As you are discharged from this [facility] facility, it is important to learn how to keep safe from harming yourself.    Recognize the warning signs:  · Abrupt changes in personality, positive or negative- including increase in energy   · Giving away possessions  · Change in eating patterns- significant weight changes-  positive or negative  · Change in sleeping patterns- unable to sleep or sleeping all the time   · Unwillingness or inability to communicate  · Depression  · Unusual sadness, discouragement and loneliness  · Talk of wanting to die  · Neglect of personal appearance   · Rebelliousness- reckless behavior  · Withdrawal from people/activities they love  · Confusion- inability to concentrate     If you or a loved one observes  any of these behaviors or has concerns about self-harm, here's what you can do:  · Talk about it- your feelings and reasons for harming yourself  · Remove any means that you might use to hurt yourself (examples: pills, rope, extension cords, firearm)  · Get professional help from the community (Mental Health, Substance Abuse, psychological counseling)  · Do not be alone:Call your Safe Contact- someone whom you trust who will be there for you.  · Call your local CRISIS HOTLINE 997-7459 or 755-477-7105  · Call your local Children's Mobile Crisis Response Team [place] (223) 000-1133 or www.[employer]  · Call the toll free National Suicide Prevention Hotlines   · National Suicide Prevention Lifeline 902-284-CVLY (4335)  · [place] Line Network 800-SUICIDE (167-5572)        Nausea, Adult  Introduction  Feeling sick to your stomach (nausea) means that your stomach is upset or you feel like you have to throw up (vomit). Feeling sick to your stomach is usually not serious, but it may be an early sign of a more serious medical problem. As you feel sicker to your stomach, it can lead to throwing up (vomiting). If you throw up, or if you are not able to drink enough fluids, there is a risk of dehydration. Dehydration can make you feel tired and thirsty, have a dry mouth, and pee (urinate) less often. Older adults and people who have other diseases or a weak defense (immune) system have a higher risk of dehydration.  The main goal of treating this condition is to:  Limit how often you feel sick to your stomach.  Prevent throwing up and dehydration.  Follow these instructions at home:  Follow instructions from your doctor about how to care for yourself at home.  Eating and drinking  Follow these recommendations as told by your doctor:  Take an oral rehydration solution (ORS). This is a drink that is sold at pharmacies and stores.  Drink clear fluids in small amounts as you are able, such as:  Water.  Ice  chips.  Fruit juice that has water added (diluted fruit juice).  Low-calorie sports drinks.  Eat bland, easy to digest foods in small amounts as you are able, such as:  Bananas.  Applesauce.  Rice.  Lean meats.  Toast.  Crackers.  Avoid drinking fluids that contain a lot of sugar or caffeine.  Avoid alcohol.  Avoid spicy or fatty foods.  General instructions  Drink enough fluid to keep your pee (urine) clear or pale yellow.  Wash your hands often. If you cannot use soap and water, use hand .  Make sure that all people in your household wash their hands well and often.  Rest at home while you get better.  Take over-the-counter and prescription medicines only as told by your doctor.  Breathe slowly and deeply when you feel sick to your stomach.  Watch your condition for any changes.  Keep all follow-up visits as told by your doctor. This is important.  Contact a doctor if:  You have a headache.  You have new symptoms.  You feel sicker to your stomach.  You have a fever.  You feel light-headed or dizzy.  You throw up.  You are not able to keep fluids down.  Get help right away if:  You have pain in your chest, neck, arm, or jaw.  You feel very weak or you pass out (faint).  You have throw up that is bright red or looks like coffee grounds.  You have bloody or black poop (stools), or poop that looks like tar.  You have a very bad headache, a stiff neck, or both.  You have very bad pain, cramping, or bloating in your belly.  You have a rash.  You have trouble breathing or you are breathing very quickly.  Your heart is beating very quickly.  Your skin feels cold and clammy.  You feel confused.  You have pain while peeing.  You have signs of dehydration, such as:  Dark pee, or very little or no pee.  Cracked lips.  Dry mouth.  Sunken eyes.  Sleepiness.  Weakness.  These symptoms may be an emergency. Do not wait to see if the symptoms will go away. Get medical help right away. Call your local emergency services  (911 in the [place] Do not drive yourself to the hospital.   This information is not intended to replace advice given to you by your health care provider. Make sure you discuss any questions you have with your health care provider.  Document Released: 12/06/2012 Document Revised: 05/25/2017 Document Reviewed: 08/23/2016 © 2017 Elsevier        Upper Gastrointestinal Bleeding  Upper gastrointestinal (GI) bleeding is bleeding from the swallowing tube (esophagus), stomach, or the first part of the small intestine (duodenum). If you have upper GI bleeding, you may vomit blood or have bloody or black stools.  Bleeding can range from mild to serious or even life-threatening. If there is a lot of bleeding, you may need to stay in the hospital.  What are the causes?  This condition may be caused by:  · Ulcer disease of the stomach (peptic ulcer) or duodenum. This is the most common cause of GI bleeding.  · Inflammation, irritation, or swelling of the esophagus (esophagitis).  · A tear in the esophagus.  · Cancer of the esophagus, stomach, or duodenum.  · An abnormal or weakened blood vessel in one of the upper GI structures.  · A bleeding disorder that impairs the formation of blood clots and causes easy bleeding (coagulopathy).  What increases the risk?  The following factors may make you more likely to develop this condition:  · Being older than 60 years of age.  · Being male.  · Having another long-term disease, especially liver or kidney disease.  · Having a stomach infection caused by Helicobacter pylori bacteria.  · Having frequent or severe vomiting.  · Abusing alcohol.  · Taking certain medicines for a long time, such as:  ¨ NSAIDs.  ¨ Anticoagulants.  What are the signs or symptoms?  Symptoms of this condition include:  · Vomiting blood.  · Black or maroon-colored stools.  · Bloody stools.  · Weakness or dizziness.  · Heartburn.  · Abdominal pain.  · Difficulty swallowing.  · Weight loss.  · Yellow eyes or skin  (jaundice).  · Racing heartbeat.  How is this diagnosed?  This condition may be diagnosed based on:  · Your symptoms and medical history.  · A physical exam. During the exam, your health care provider will check for signs of blood loss, such as low blood pressure and a rapid pulse.  · Tests, such as:  ¨ Blood tests to measure your blood cell count and to check for other signs of blood loss and clotting ability.  ¨ Blood tests to check your liver and kidney function.  ¨ A chest X-ray to look for a tear in the esophagus.  ¨ Endoscopy. In this procedure, a flexible scope is put down your esophagus and into your stomach or duodenum to look for the source of bleeding.  ¨ Angiogram. This may be done if the source of bleeding is not found during endoscopy. For an angiogram, X-rays are taken after a dye is injected into your bloodstream.  ¨ Nasogastric tube insertion. This is a tube passed through your nose and down into your stomach. It may be connected to a source of gentle suction to see if any blood comes out.  How is this treated?  Treatment for this condition depends on the cause of the bleeding. Active bleeding is treated at the hospital. Treatment may include:  · Getting fluids through an IV tube inserted into one of your veins.  · Getting blood through an IV tube (blood transfusion).  · Getting high doses of medicine through the IV to lower stomach acid. This may be done to treat ulcer disease.  · Having endoscopy to treat an area of bleeding with high heat (coagulation), injections, or surgical clips.  · Having a procedure that involves first doing an angiogram and then blocking blood flow to the bleeding site (embolization).  · Stopping or changing some of your regular medicines for a certain amount of time.  · Having other surgical procedures if initial treatments do not control bleeding.  Follow these instructions at home:  · Take over-the-counter and prescription medicines only as told by your health care  provider. You may need to avoid NSAIDs or other medicines that increase bleeding.  · Do not drink alcohol.  · Drink enough fluid to keep your urine clear or pale yellow.  · Follow instructions from your health care provider about eating or drinking restrictions.  · Return to your normal activities as told by your health care provider. Ask your health care provider what activities are safe for you.  · Do not use any tobacco products, such as cigarettes, chewing tobacco, and e-cigarettes. If you need help quitting, ask your health care provider.  · Keep all follow-up visits as told by your health care provider. This is important.  Contact a health care provider if:  · You have abdominal pain or heartburn.  · You have unexplained weight loss.  · You have trouble swallowing.  · You have frequent vomiting.  · You develop jaundice.  · You feel weak or dizzy.  · You need help to stop smoking or drinking alcohol.  Get help right away if:  · You have vomiting with blood.  · You have blood in your stools.  · You have severe cramps in your back or abdomen.  · Your symptoms of upper GI bleeding come back after treatment.  This information is not intended to replace advice given to you by your health care provider. Make sure you discuss any questions you have with your health care provider.  Document Released: 05/04/2017 Document Revised: 08/20/2017 Document Reviewed: 05/04/2017  [employer] Interactive Patient Education © 2017 [employer]    Lisinopril tablets  What is this medicine?  LISINOPRIL (lyse IN oh pril) is an ACE inhibitor. This medicine is used to treat high blood pressure and heart failure. It is also used to protect the heart immediately after a heart attack.  This medicine may be used for other purposes; ask your health care provider or pharmacist if you have questions.  COMMON BRAND NAME(S): Prinivil, Zestril  What should I tell my health care provider before I take this medicine?  They need to know if you have  any of these conditions:  -diabetes  -heart or blood vessel disease  -kidney disease  -low blood pressure  -previous swelling of the tongue, face, or lips with difficulty breathing, difficulty swallowing, hoarseness, or tightening of the throat  -an unusual or allergic reaction to lisinopril, other ACE inhibitors, insect venom, foods, dyes, or preservatives  -pregnant or trying to get pregnant  -breast-feeding  How should I use this medicine?  Take this medicine by mouth with a glass of water. Follow the directions on your prescription label. You may take this medicine with or without food. If it upsets your stomach, take it with food. Take your medicine at regular intervals. Do not take it more often than directed. Do not stop taking except on your doctor's advice.  Talk to your pediatrician regarding the use of this medicine in children. Special care may be needed. While this drug may be prescribed for children as young as 6 years of age for selected conditions, precautions do apply.  Overdosage: If you think you have taken too much of this medicine contact a poison control center or emergency room at once.  NOTE: This medicine is only for you. Do not share this medicine with others.  What if I miss a dose?  If you miss a dose, take it as soon as you can. If it is almost time for your next dose, take only that dose. Do not take double or extra doses.  What may interact with this medicine?  Do not take this medicine with any of the following medications:  -hymenoptera venom  -sacubitril; valsartan  This medicines may also interact with the following medications:  -aliskiren  -angiotensin receptor blockers, like losartan or valsartan  -certain medicines for diabetes  -diuretics  -everolimus  -gold compounds  -lithium  -NSAIDs, medicines for pain and inflammation, like ibuprofen or naproxen  -potassium salts or supplements  -salt substitutes  -sirolimus  -temsirolimus  This list may not describe all possible  interactions. Give your health care provider a list of all the medicines, herbs, non-prescription drugs, or dietary supplements you use. Also tell them if you smoke, drink alcohol, or use illegal drugs. Some items may interact with your medicine.  What should I watch for while using this medicine?  Visit your doctor or health care professional for regular check ups. Check your blood pressure as directed. Ask your doctor what your blood pressure should be, and when you should contact him or her.  Do not treat yourself for coughs, colds, or pain while you are using this medicine without asking your doctor or health care professional for advice. Some ingredients may increase your blood pressure.  Women should inform their doctor if they wish to become pregnant or think they might be pregnant. There is a potential for serious side effects to an unborn child. Talk to your health care professional or pharmacist for more information.  Check with your doctor or health care professional if you get an attack of severe diarrhea, nausea and vomiting, or if you sweat a lot. The loss of too much body fluid can make it dangerous for you to take this medicine.  You may get drowsy or dizzy. Do not drive, use machinery, or do anything that needs mental alertness until you know how this drug affects you. Do not stand or sit up quickly, especially if you are an older patient. This reduces the risk of dizzy or fainting spells. Alcohol can make you more drowsy and dizzy. Avoid alcoholic drinks.  Avoid salt substitutes unless you are told otherwise by your doctor or health care professional.  What side effects may I notice from receiving this medicine?  Side effects that you should report to your doctor or health care professional as soon as possible:  -allergic reactions like skin rash, itching or hives, swelling of the hands, feet, face, lips, throat, or tongue  -breathing problems  -signs and symptoms of kidney injury like trouble  passing urine or change in the amount of urine  -signs and symptoms of increased potassium like muscle weakness; chest pain; or fast, irregular heartbeat  -signs and symptoms of liver injury like dark yellow or brown urine; general ill feeling or flu-like symptoms; light-colored stools; loss of appetite; nausea; right upper belly pain; unusually weak or tired; yellowing of the eyes or skin  -signs and symptoms of low blood pressure like dizziness; feeling faint or lightheaded, falls; unusually weak or tired  -stomach pain with or without nausea and vomiting  Side effects that usually do not require medical attention (report to your doctor or health care professional if they continue or are bothersome):  -changes in taste  -cough  -dizziness  -fever  -headache  -sensitivity to light  This list may not describe all possible side effects. Call your doctor for medical advice about side effects. You may report side effects to FDA at 9-637-FDA-8287.  Where should I keep my medicine?  Keep out of the reach of children.  Store at room temperature between 15 and 30 degrees C (59 and 86 degrees F). Protect from moisture. Keep container tightly closed. Throw away any unused medicine after the expiration date.  NOTE: This sheet is a summary. It may not cover all possible information. If you have questions about this medicine, talk to your doctor, pharmacist, or health care provider.  © 2018 [employer]vier/[employer] (2017-02-06 12:52:35)

## 2019-12-06 NOTE — DOCUMENTATION QUERY
[facility]                                                                       Query Response Note      PATIENT:               SAM HENDRIX  ACCT #:                  [id number]  MRN:                     [id number]  :                      1975  ADMIT DATE:       2019 4:06 PM  DISCH DATE:        2019 12:55 PM  RESPONDING  PROVIDER #:        [id number]           QUERY TEXT:    Uncontrolled diabetes is documented in the medical record.  The  is unable to determine the specificity of this condition based on current provider documentation. There is no code in ICD 10 for uncontrolled diabetes.  Further clarification is needed:      NOTE:  If an appropriate response is not listed below, please respond with a new note.                The patient's Clinical Indicators include:  per H&P:  Diabetes mellitus type 2, uncontrolled (CMS-[facility])- (present on admission)  Assessment & Plan  -Patient lost a lot of weight over the past 3 years with history of daily vomiting as described above.  He is currently not requiring any insulin since his sugar has been low.  -The initial glucose is 294, I will add a hemoglobin A1c to monitor his levels and consider possibly starting regular insulin sliding scale.    per discharge summary:  ER COURSE:  -Patient has been borderline tachycardic here in the emergency department with heart rate between 88 to 100 bpm.  Is also hypertensive, most current blood pressure is 166/96.  -Initial hemoglobin is 16.4  -Also pertinent laboratory findings include elevated anion gap of 20 and a glucose of 294.  His LFTs and bilirubin are within normal limits.  Lactic acid is 2.0 per  -CT scan of the abdomen was negative for acute findings.  Options provided:   -- Uncontrolled diabetes meaning with hypoglycemia   -- Uncontrolled diabetes meaning with hyperglycemia   -- Findings of no clinical significance   -- Unable to  determine      Query created by: Shara Stoner on 12/1/2019 9:58 AM    RESPONSE TEXT:    Uncontrolled diabetes meaning with hyperglycemia          Electronically signed by:  SHAZIA ALCALA 12/5/2019 6:58 PM

## 2020-03-11 ENCOUNTER — OFFICE VISIT (OUTPATIENT)
Dept: MEDICAL GROUP | Facility: PHYSICIAN GROUP | Age: 45
End: 2020-03-11
Payer: COMMERCIAL

## 2020-03-11 VITALS
DIASTOLIC BLOOD PRESSURE: 80 MMHG | HEIGHT: 70 IN | OXYGEN SATURATION: 97 % | BODY MASS INDEX: 27.77 KG/M2 | HEART RATE: 71 BPM | WEIGHT: 194 LBS | TEMPERATURE: 98.9 F | SYSTOLIC BLOOD PRESSURE: 136 MMHG

## 2020-03-11 DIAGNOSIS — I10 ESSENTIAL HYPERTENSION: ICD-10-CM

## 2020-03-11 DIAGNOSIS — L97.411 DIABETIC ULCER OF RIGHT MIDFOOT ASSOCIATED WITH DIABETES MELLITUS DUE TO UNDERLYING CONDITION, LIMITED TO BREAKDOWN OF SKIN (HCC): ICD-10-CM

## 2020-03-11 DIAGNOSIS — K31.84 GASTROPARESIS: ICD-10-CM

## 2020-03-11 DIAGNOSIS — B18.2 CHRONIC HEPATITIS C WITHOUT HEPATIC COMA (HCC): ICD-10-CM

## 2020-03-11 DIAGNOSIS — E11.65 UNCONTROLLED TYPE 2 DIABETES MELLITUS WITH HYPERGLYCEMIA (HCC): Chronic | ICD-10-CM

## 2020-03-11 DIAGNOSIS — R11.2 INTRACTABLE NAUSEA AND VOMITING: ICD-10-CM

## 2020-03-11 DIAGNOSIS — F12.90 MARIJUANA SMOKER: ICD-10-CM

## 2020-03-11 DIAGNOSIS — K21.9 GASTROESOPHAGEAL REFLUX DISEASE WITHOUT ESOPHAGITIS: ICD-10-CM

## 2020-03-11 DIAGNOSIS — N49.2 SCROTAL ABSCESS: ICD-10-CM

## 2020-03-11 DIAGNOSIS — E78.5 DYSLIPIDEMIA: Chronic | ICD-10-CM

## 2020-03-11 DIAGNOSIS — L84 FOOT CALLUS: ICD-10-CM

## 2020-03-11 DIAGNOSIS — E08.621 DIABETIC ULCER OF RIGHT MIDFOOT ASSOCIATED WITH DIABETES MELLITUS DUE TO UNDERLYING CONDITION, LIMITED TO BREAKDOWN OF SKIN (HCC): ICD-10-CM

## 2020-03-11 DIAGNOSIS — E11.42 DIABETIC POLYNEUROPATHY ASSOCIATED WITH TYPE 2 DIABETES MELLITUS (HCC): ICD-10-CM

## 2020-03-11 DIAGNOSIS — K92.2 UPPER GI BLEED: ICD-10-CM

## 2020-03-11 LAB
HBA1C MFR BLD: 9.6 % (ref 0–5.6)
INT CON NEG: NEGATIVE
INT CON POS: POSITIVE

## 2020-03-11 PROCEDURE — 99214 OFFICE O/P EST MOD 30 MIN: CPT | Performed by: INTERNAL MEDICINE

## 2020-03-11 PROCEDURE — 83036 HEMOGLOBIN GLYCOSYLATED A1C: CPT | Performed by: INTERNAL MEDICINE

## 2020-03-11 RX ORDER — LANCETS 30 GAUGE
EACH MISCELLANEOUS
Qty: 100 EACH | Refills: 3 | Status: SHIPPED | OUTPATIENT
Start: 2020-03-11 | End: 2020-08-03

## 2020-03-11 RX ORDER — METOCLOPRAMIDE 10 MG/1
10 TABLET ORAL
Qty: 60 TAB | Refills: 1 | Status: SHIPPED | OUTPATIENT
Start: 2020-03-11 | End: 2020-08-03

## 2020-03-11 RX ORDER — GLUCOSAMINE HCL/CHONDROITIN SU 500-400 MG
CAPSULE ORAL
Qty: 100 EACH | Refills: 3 | Status: SHIPPED | OUTPATIENT
Start: 2020-03-11 | End: 2020-06-24

## 2020-03-11 RX ORDER — ONDANSETRON 4 MG/1
4 TABLET, ORALLY DISINTEGRATING ORAL EVERY 8 HOURS PRN
Qty: 60 TAB | Refills: 2 | Status: SHIPPED | OUTPATIENT
Start: 2020-03-11 | End: 2020-08-03

## 2020-03-11 RX ORDER — LISINOPRIL 20 MG/1
20 TABLET ORAL DAILY
Qty: 90 TAB | Refills: 1 | Status: SHIPPED | OUTPATIENT
Start: 2020-03-11 | End: 2020-09-02

## 2020-03-11 RX ORDER — DULAGLUTIDE 0.75 MG/.5ML
0.75 INJECTION, SOLUTION SUBCUTANEOUS
Qty: 1 PEN | Refills: 3 | Status: SHIPPED | OUTPATIENT
Start: 2020-03-11 | End: 2020-03-13 | Stop reason: SDUPTHER

## 2020-03-11 ASSESSMENT — FIBROSIS 4 INDEX: FIB4 SCORE: 0.61

## 2020-03-11 NOTE — PROGRESS NOTES
New Patient to establish    Chief Complaint   Patient presents with   • Establish Care       Subjective:     History of Present Illness: Patient is a 45 y.o. male who is here today to establish primary care, noncompliant with diabetes medication allergy    1. Uncontrolled type 2 diabetes mellitus with hyperglycemia (HCC)  -Patient diagnosed in 2009, currently is not on any diabetic medication  -Patient has chronic tingling numbness of the legs and feet  - Patient had seen eye doctor 2 years ago and he was fine however not sure about retinal exam, mention some blurring of the eyes  - Patient will check blood glucose at home 4 months    -Breakfast with coffee and creamer, muffin  -Lunch with noodles, fruit juice, vitamin water  -DM with steak, rice, green beans, corn, bread  -Mention no snack  - Drink with coffee creamer, flavored water with vitamin    A1c:   Lab Results   Component Value Date/Time    HBA1C 9.6 (A) 03/11/2020 1058    HBA1C 9.0 (H) 11/24/2019 0018    HBA1C 7.3 08/17/2018 1044    AVGLUC 212 11/24/2019 0018    AVGLUC 194 03/07/2018 1131    AVGLUC 292 04/02/2016 0308       5. Diabetic polyneuropathy associated with type 2 diabetes mellitus (HCC)  6. Diabetic ulcer of right midfoot associated with diabetes mellitus due to underlying condition, limited to breakdown of skin (HCC)  Foot callus, right    DATE OF SERVICE:  08/16/2018  POSTOPERATIVE DIAGNOSES:  1.  Diabetes mellitus type 2.  2.  Right lateral foot recurrent ulceration.  3.  Status post right fifth ray amputation.     PROCEDURES:  1.  Right revision fifth ray amputation.  2.  Right lateral foot callus excision.  SURGEON:  Abram Cortez MD    DATE OF SERVICE:  04/10/2016  POSTOPERATIVE DIAGNOSIS:  Right necrotic foot status post ray amputation.  PROCEDURES:  1.  Irrigation and debridement of skin, subcutaneous tissue, muscle and bone, right foot.  2.  Wound VAC placement, right foot.  SURGEON:  Hitesh Sol MD     -Patient currently have  callus formation on his plantar aspect of right foot, denied having any new ulceration    2. Gastroparesis  GERD  3. Intractable nausea and vomiting  4. Upper GI bleed  Marijuana smoker>> patient every day 1 joint, since age of 12  -Patient had cyclical vomiting for 1 to 3 days every month, occurring on since 2015  - Also has some acid reflux and worse with spicy foods, tomato sauce with burning of all the belly  - Patient also had one episode of upper GI minor bleeding with vomiting few years ago  -Associated with some loss of appetite for 2 years, unintentional loss of weight, early satiety after eating, and sometimes bleeding of the belly  - Status post gallbladder removal  - Patient was seen by [facility] GI specialist in thousand 18, recommend EGD and colonoscopy however patient never done it        7. Dyslipidemia  LIPID:  Lab Results   Component Value Date/Time    CHOLSTRLTOT 208 (H) 03/07/2018 11:31 AM    CHOLSTRLTOT 103 04/04/2016 05:15 AM     (H) 03/07/2018 11:31 AM    LDL 62 04/04/2016 05:15 AM    HDL 41 03/07/2018 11:31 AM    HDL 21 (A) 04/04/2016 05:15 AM    TRIGLYCERIDE 203 (H) 03/07/2018 11:31 AM    TRIGLYCERIDE 99 04/04/2016 05:15 AM     -Currently is not on any statin medication      8. Essential hypertension  -Does not check blood pressure at home  - Currently on lisinopril 20 mg daily, mention compliant  -However just eating regular food with access to salt  -Denied having hypertension symptoms    9. Chronic hepatitis C without hepatic coma (HCC)  -Chronic, labs in February 2019 showed positive for chronic hepatitis C with viral load of more than 2 million  - Patient never had a treatment    Per chart review  DATE OF OPERATION:  01/03/2011  POSTOPERATIVE DIAGNOSIS:  Left scrotal wall abscess.  OPERATION PERFORMED:  Incision and drainage of left scrotal wall abscess, 3 cm.  SURGEON:  Jorden Healy MD         No current outpatient medications on file prior to visit.     No current  facility-administered medications on file prior to visit.      Allergies   Allergen Reactions   • Bee Swelling     Bee stings     Patient Active Problem List    Diagnosis Date Noted   • Gastroparesis 03/07/2018     Priority: High   • Intractable nausea and vomiting 08/08/2016     Priority: High   • Essential hypertension 06/29/2014     Priority: Medium   • Diabetes mellitus type 2, uncontrolled (CMS-[facility]) 01/04/2012     Priority: Medium   • Hepatitis C 01/04/2012     Priority: Medium   • Dyslipidemia 06/29/2014     Priority: Low   • History of Otht-Jixoi-Vfoiczr disease 06/05/2014     Priority: Low   • Leukocytosis 11/25/2019   • Upper GI bleed 11/23/2019   • Closed nondisplaced fracture of fourth metatarsal bone of right foot 07/20/2018   • History of amputation of lesser toe of right foot ([facility]) 07/20/2018   • Diabetic ulcer of right midfoot, limited to breakdown of skin ([facility]) 01/17/2016   • Diabetic neuropathy ([facility]) 01/17/2016   • Chronic right hip pain 06/05/2014     Past Medical History:   Diagnosis Date   • Arthritis right hip    back   • Backpain     feet/hip-R,back   • Dental disorder     full dentures   • Diabetes     insulin/oral   • Gastroparesis     possible-recent gastric testing   • Hepatitis C 2009   • Hepatitis C carrier ([facility])    • Hyperlipidemia    • Hypertension    • Infectious disease      Past Surgical History:   Procedure Laterality Date   • TOE AMPUTATION Right 8/16/2018    Procedure: TOE AMPUTATION - 5TH RAY REVISION;  Surgeon: Abram Cortez M.D.;  Location: [facility];  Service: Orthopedics   • IRRIGATION & DEBRIDEMENT ORTHO Right 4/13/2016    Procedure: IRRIGATION & DEBRIDEMENT AND CLOSURE OF ORTHO FOOT WOUND;  Surgeon: Hitesh Sol M.D.;  Location: [facility];  Service:    • IRRIGATION & DEBRIDEMENT ORTHO Right 4/10/2016    Procedure: IRRIGATION & DEBRIDEMENT ORTHO-poss ray resection, poss below knee amputation ;  Surgeon: Hitesh Sol M.D.;   "Location: SURGERY [facility];  Service:    • IRRIGATION & DEBRIDEMENT ORTHO Right 2016    Procedure: IRRIGATION & DEBRIDEMENT ORTHO FOOT - AMPUTATION RIGHT FIFTH TOE ;  Surgeon: Hitesh Sol M.D.;  Location: SURGERY [facility];  Service:    • OSWALDO BY LAPAROSCOPY     • CHOLECYSTECTOMY     • OTHER  hepatitis c      Family History   Problem Relation Age of Onset   • Diabetes Mother    • Hyperlipidemia Mother    • Arthritis Mother    • Heart Attack Father    • Heart Disease Father    • Hypertension Father    • Stroke Father    • Hyperlipidemia Father    • Arthritis Father      Social History     Tobacco Use   • Smoking status: Former Smoker     Packs/day: 0.50     Years: 10.00     Pack years: 5.00     Last attempt to quit: 2012     Years since quittin.1   • Smokeless tobacco: Never Used   • Tobacco comment: 5 yrs ago   Substance Use Topics   • Alcohol use: No     Comment: occ   • Drug use: Yes     Types: Marijuana, Inhaled     ROS:     - Constitutional: Negative for fever, chills,    - Eye: Positive for blurry vision    -ENT: Negative for ear pain    - Respiratory: Negative for  hemoptysis    - Cardiovascular: Negative for chest pain     - Gastrointestinal: Positive for abdominal pain    - Genitourinary: Negative for dysuria    - Musculoskeletal: Negative for joint swelling    - Skin: Negative for itching    - Neurological: Negative for focal weakness     - Psychiatric/Behavioral: Negative for depression        Physical Exam:     /80 (BP Location: Left arm, Patient Position: Sitting, BP Cuff Size: Adult)   Pulse 71   Temp 37.2 °C (98.9 °F) (Temporal)   Ht 1.773 m (5' 9.8\")   Wt 88 kg (194 lb)   SpO2 97%   BMI 27.99 kg/m²   General: Normal appearing. No distress.  ENT: oropharynx without exudates.    Eyes: conjunctiva clear lids without ptosis  Pulmonary: Clear to ausculation.  Normal effort.   Cardiovascular: Regular rate and rhythm  Abdomen: Soft, nontender,  Lymph: No cervical " or supraclavicular palpable lymph nodes  Psych: Normal mood and affect.     Monofilament testing with a 10 gram force: sensation intact: decreased bilaterally  Visual Inspection: Feet with maceration, , fissures and callus of right foot  Pedal pulses: intact bilaterally      I have reviewed pertinent labs and diagnostic tests associated with this visit with specific comments listed under the assessment and plan below      Assessment and Plan:     1. Uncontrolled type 2 diabetes mellitus with hyperglycemia (HCC)  -History of noncompliance both with medication and diet    - REFERRAL TO OPHTHALMOLOGY  - POCT Hemoglobin A1C  - Diabetic Monofilament LE Exam  - Blood Glucose Meter Kit; Test blood sugar as recommended by provider. [employer]ogn[employer] blood glucose monitoring kit.  Dispense: 1 Kit; Refill: 0  - Blood Glucose Test Strips; Use one Accucheck Margarita strip to test blood sugar twice daily before meals.  Dispense: 100 Strip; Refill: 3  - Lancets; Use one Accucheck Margarita lancet to test blood sugar twice daily before meals.  Dispense: 100 Each; Refill: 3  - Alcohol Swabs; Wipe site with prep pad prior to injection.  Dispense: 100 Each; Refill: 3  - REFERRAL TO GASTROENTEROLOGY  - Dulaglutide (TRULICITY) 0.75 MG/0.5ML Solution Pen-injector; Inject 0.75 mg as instructed every 7 days.  Dispense: 1 PEN; Refill: 3  - metFORMIN (GLUCOPHAGE) 500 MG Tab; Take 1 Tab by mouth 2 times a day, with meals.  Dispense: 60 Tab; Refill: 3  - VIT B12,  FOLIC ACID  - VITAMIN D,25 HYDROXY; Future  - Lipid Profile; Future  - TSH WITH REFLEX TO FT4; Future  - REFERRAL TO PODIATRY    -Discussed diabetic diet in detail, educated regarding management of hypoglycemia, advised regular exercise, educated disease management and weight goals as well as feet care and frequent check of feet.  -Patient to check early morning fasting blood glucose with goal discussed.  - asked to have a BG log with daily fasting and 2 hr postprandial after biggest meal  and bring to next visit or send through my chart    -Discussed side effects of medication. Patient confirmed understanding of information.        2. Gastroparesis  3. Intractable nausea and vomiting  4. Upper GI bleed  Marijuana smoker    -Possibly secondary to cannabinoid cyclical vomiting as well as gastroparesis, need to rule out peptic ulcer, gastritis, GERD  - REFERRAL TO GASTROENTEROLOGY  - ondansetron (ZOFRAN ODT) 4 MG TABLET DISPERSIBLE; Take 1 Tab by mouth every 8 hours as needed.  Dispense: 60 Tab; Refill: 2  - metoclopramide (REGLAN) 10 MG Tab; Take 1 Tab by mouth 1 time daily as needed.  Dispense: 60 Tab; Refill: 1      5. Diabetic polyneuropathy associated with type 2 diabetes mellitus (HCC)  - REFERRAL TO PODIATRY    6. Diabetic ulcer of right midfoot associated with diabetes mellitus due to underlying condition, limited to breakdown of skin (HCC)  - REFERRAL TO PODIATRY>> for right foot callus that need to be healed otherwise it might complicate to infection    7. Dyslipidemia  -Order lipid panel  - Patient declines statin however will discuss further next visit    8. Essential hypertension  Latest labs    Lab Results   Component Value Date/Time    CHOLSTRLTOT 208 (H) 03/07/2018 11:31 AM     (H) 03/07/2018 11:31 AM    HDL 41 03/07/2018 11:31 AM    TRIGLYCERIDE 203 (H) 03/07/2018 11:31 AM       Lab Results   Component Value Date/Time    SODIUM 139 11/26/2019 05:48 AM    POTASSIUM 3.7 11/26/2019 05:48 AM    GLUCOSE 164 (H) 11/26/2019 05:48 AM    BUN 10 11/26/2019 05:48 AM    CREATININE 0.84 11/26/2019 05:48 AM    CREATININE 1.1 11/28/2008 03:20 AM     Lab Results   Component Value Date/Time    HBA1C 9.6 (A) 03/11/2020 10:58 AM    HBA1C 9.0 (H) 11/24/2019 12:18 AM    HBA1C 7.3 08/17/2018 10:44 AM     Lab Results   Component Value Date/Time    MALBCRT 72 (H) 06/06/2014 09:01 AM      -discussion in details on target blood pressure goal, advised monitoring BP closely at home.  Have BP log to present  at follow-up visit or send through my chart.   -Advised low-salt diet, healthy dietary option, a lot of vegetables, less carbs and no added sugars, regular exercise, also try to avoid alcohol, no NSAIDs  If symptoms worsen or persist patient can return to clinic for reevaluation.  Red flags and strict emergency room precautions discussed.  Discussed side effects of medication. Patient confirmed understanding of information.      9. Chronic hepatitis C without hepatic coma (HCC)  - REFERRAL TO GASTROENTEROLOGY    Follow Up:      Return in about 2 weeks (around 3/25/2020) for dmii, , follow up.    Please note that this dictation was created using voice recognition software. I have made every reasonable attempt to correct obvious errors, but I expect that there are errors of grammar and possibly content that I did not discover before finalizing the note.    Signed by: Candy Gupta M.D.

## 2020-03-12 ENCOUNTER — HOSPITAL ENCOUNTER (OUTPATIENT)
Dept: LAB | Facility: MEDICAL CENTER | Age: 45
End: 2020-03-12
Attending: INTERNAL MEDICINE
Payer: COMMERCIAL

## 2020-03-12 DIAGNOSIS — E11.65 UNCONTROLLED TYPE 2 DIABETES MELLITUS WITH HYPERGLYCEMIA (HCC): Chronic | ICD-10-CM

## 2020-03-12 LAB
25(OH)D3 SERPL-MCNC: 17 NG/ML (ref 30–100)
CHOLEST SERPL-MCNC: 172 MG/DL (ref 100–199)
FASTING STATUS PATIENT QL REPORTED: NORMAL
FOLATE SERPL-MCNC: 10.8 NG/ML
HDLC SERPL-MCNC: 29 MG/DL
LDLC SERPL CALC-MCNC: 108 MG/DL
TRIGL SERPL-MCNC: 175 MG/DL (ref 0–149)
TSH SERPL DL<=0.005 MIU/L-ACNC: 0.61 UIU/ML (ref 0.38–5.33)
VIT B12 SERPL-MCNC: 610 PG/ML (ref 211–911)

## 2020-03-12 PROCEDURE — 80061 LIPID PANEL: CPT

## 2020-03-12 PROCEDURE — 84443 ASSAY THYROID STIM HORMONE: CPT

## 2020-03-12 PROCEDURE — 82607 VITAMIN B-12: CPT

## 2020-03-12 PROCEDURE — 82746 ASSAY OF FOLIC ACID SERUM: CPT

## 2020-03-12 PROCEDURE — 82306 VITAMIN D 25 HYDROXY: CPT

## 2020-03-12 PROCEDURE — 36415 COLL VENOUS BLD VENIPUNCTURE: CPT

## 2020-03-12 RX ORDER — DULAGLUTIDE 0.75 MG/.5ML
0.75 INJECTION, SOLUTION SUBCUTANEOUS
Qty: 1 PEN | Refills: 0 | Status: CANCELLED | OUTPATIENT
Start: 2020-03-12

## 2020-03-12 NOTE — TELEPHONE ENCOUNTER
Patient called stating trulicty medication is expensive even with insurance and would like an alternative. Patient's wife would like to know if we could send a prescription to [facility].    Please advise.

## 2020-03-13 ENCOUNTER — TELEPHONE (OUTPATIENT)
Dept: MEDICAL GROUP | Facility: PHYSICIAN GROUP | Age: 45
End: 2020-03-13

## 2020-03-13 RX ORDER — DULAGLUTIDE 0.75 MG/.5ML
0.75 INJECTION, SOLUTION SUBCUTANEOUS
Qty: 1 PEN | Refills: 3 | Status: SHIPPED
Start: 2020-03-13 | End: 2020-06-24

## 2020-03-13 RX ORDER — LINAGLIPTIN AND METFORMIN HYDROCHLORIDE 5; 1000 MG/1; MG/1
1 TABLET, FILM COATED, EXTENDED RELEASE ORAL DAILY
Qty: 30 TAB | Refills: 3 | Status: SHIPPED
Start: 2020-03-13 | End: 2020-03-13

## 2020-03-13 NOTE — TELEPHONE ENCOUNTER
----- Message from Candy Gupta M.D. sent at 3/13/2020  8:05 AM PDT -----  Patient has no MyChart, please inform patient regarding the following:  Blood work showed improvement of cholesterol in general compared to previous results, patient also has low vitamin D>> advised to take 1000 unit vitamin D over-the-counter daily  –Please follow-up with me on 3/27/2020 for further discussion about medication and diabetes.  Thank you

## 2020-03-13 NOTE — TELEPHONE ENCOUNTER
----- Message from Candy Gupta M.D. sent at 3/13/2020 12:09 PM PDT -----  Please inform patient to take metformin 1000 mg twice daily for now until to be approved for Trulicity by healthcare pharmacy.  If not approved then I will start combination of metformin-linagliptin.  Thank you

## 2020-03-13 NOTE — TELEPHONE ENCOUNTER
Patient informed.    Currently is okay to take metformin 1000 mg twice daily for now until to be approved for Trulicity from healthcare pharmacy  -Otherwise we will start with metformin-linagliptin regime initially.   Received a voicemail from [employer] . She shared that [employer]ige has gotten approval for RTC. They had applied through [facility] and Saige was accepted. They are still waiting on [facility] approval for the funding. There is a meeting scheduled for this on August 7th. [facility] indicated that they will hold a spot for Saige in August until after the meeting about funding takes place. Laura would like Saige's records to be faxed to her ([phone], and she would like to be at a discharge meeting when this is scheduled. She thought the best idea would be for Saige to return to options until RTC can be set up. Laura indicated she can be contacted at [phone] with any other questions or concerns.

## 2020-03-17 NOTE — TELEPHONE ENCOUNTER
Good afternoon, please see if there is any availability for Trulicity probably from [facility] or other cheaper pharmacies, please also talk to insurance for alternatives.  Please also talk to Jessica for any free samples.  Please let me know.      Thank you,    Candy Gupta MD

## 2020-03-27 ENCOUNTER — OFFICE VISIT (OUTPATIENT)
Dept: MEDICAL GROUP | Facility: PHYSICIAN GROUP | Age: 45
End: 2020-03-27
Payer: COMMERCIAL

## 2020-03-27 VITALS
OXYGEN SATURATION: 96 % | HEART RATE: 70 BPM | HEIGHT: 70 IN | SYSTOLIC BLOOD PRESSURE: 106 MMHG | TEMPERATURE: 97.8 F | WEIGHT: 194 LBS | BODY MASS INDEX: 27.77 KG/M2 | DIASTOLIC BLOOD PRESSURE: 78 MMHG

## 2020-03-27 DIAGNOSIS — L97.411 DIABETIC ULCER OF RIGHT MIDFOOT ASSOCIATED WITH DIABETES MELLITUS DUE TO UNDERLYING CONDITION, LIMITED TO BREAKDOWN OF SKIN (HCC): ICD-10-CM

## 2020-03-27 DIAGNOSIS — I10 ESSENTIAL HYPERTENSION: ICD-10-CM

## 2020-03-27 DIAGNOSIS — R11.2 INTRACTABLE NAUSEA AND VOMITING: ICD-10-CM

## 2020-03-27 DIAGNOSIS — E78.5 DYSLIPIDEMIA: Chronic | ICD-10-CM

## 2020-03-27 DIAGNOSIS — Z89.421 HISTORY OF AMPUTATION OF LESSER TOE OF RIGHT FOOT (HCC): ICD-10-CM

## 2020-03-27 DIAGNOSIS — E11.42 DIABETIC POLYNEUROPATHY ASSOCIATED WITH TYPE 2 DIABETES MELLITUS (HCC): ICD-10-CM

## 2020-03-27 DIAGNOSIS — E11.65 UNCONTROLLED TYPE 2 DIABETES MELLITUS WITH HYPERGLYCEMIA (HCC): Chronic | ICD-10-CM

## 2020-03-27 DIAGNOSIS — B18.2 CHRONIC HEPATITIS C WITHOUT HEPATIC COMA (HCC): ICD-10-CM

## 2020-03-27 DIAGNOSIS — K31.84 GASTROPARESIS: ICD-10-CM

## 2020-03-27 DIAGNOSIS — E08.621 DIABETIC ULCER OF RIGHT MIDFOOT ASSOCIATED WITH DIABETES MELLITUS DUE TO UNDERLYING CONDITION, LIMITED TO BREAKDOWN OF SKIN (HCC): ICD-10-CM

## 2020-03-27 DIAGNOSIS — K92.2 UPPER GI BLEED: ICD-10-CM

## 2020-03-27 PROBLEM — D72.829 LEUKOCYTOSIS: Status: RESOLVED | Noted: 2019-11-25 | Resolved: 2020-03-27

## 2020-03-27 PROCEDURE — 99215 OFFICE O/P EST HI 40 MIN: CPT | Performed by: INTERNAL MEDICINE

## 2020-03-27 RX ORDER — BLOOD-GLUCOSE METER
EACH MISCELLANEOUS
COMMUNITY
Start: 2020-03-11 | End: 2020-06-24

## 2020-03-27 RX ORDER — ROSUVASTATIN CALCIUM 10 MG/1
10 TABLET, COATED ORAL EVERY EVENING
Qty: 30 TAB | Refills: 11 | Status: SHIPPED | OUTPATIENT
Start: 2020-03-27 | End: 2021-02-11

## 2020-03-27 RX ORDER — EMPAGLIFLOZIN, METFORMIN HYDROCHLORIDE 25; 1000 MG/1; MG/1
25-1000 TABLET, EXTENDED RELEASE ORAL DAILY
Qty: 30 TAB | Refills: 3 | Status: SHIPPED | OUTPATIENT
Start: 2020-03-27 | End: 2020-04-28

## 2020-03-27 RX ORDER — BLOOD SUGAR DIAGNOSTIC
STRIP MISCELLANEOUS
COMMUNITY
Start: 2020-03-11 | End: 2020-06-24

## 2020-03-27 ASSESSMENT — FIBROSIS 4 INDEX: FIB4 SCORE: 0.61

## 2020-03-27 NOTE — PROGRESS NOTES
Established Patient    Chief Complaint   Patient presents with   • Diabetes     FV DM        Subjective:     HPI:   Cayetano presents today with the following.    1. Upper GI bleed  2. Intractable nausea and vomiting  3. Chronic hepatitis C without hepatic coma (HCC)  4. Gastroparesis  -Last time patient referred to gastroenterology  - Again printed version given to the patient contact reference center for the above problem  -Patient agreed and understood with his wife    5. Uncontrolled type 2 diabetes mellitus with hyperglycemia (HCC)  -Currently taking metformin not sure if it is 1500 twice daily  -Patient could not afford Trulicity  - Breakfast with eggs, ham, coffee/creamer with vanilla, shrimp, vegetable  -Lunch with cheese, meat, mixed vegetable  -Dinner with waffle, eggs, salazar, burritos (rice, beans, meat, tomato), mixed vegetable, pork, fish, chicken  -Snack: None  -Drink with coffee/cream  - Patient was referred to eye doctor however have not communicate with him yet  - Check blood glucose before breakfast and some of them were about the goal  -Denied having hypoglycemia episodes    6. History of amputation of lesser toe of right foot (HCC)  10. Diabetic polyneuropathy associated with type 2 diabetes mellitus (HCC)  -Patient was seen by podiatrist  - Reported that advised to do surgery of the right foot secondary to some bony lesions per imaging    7. Essential hypertension  -Well-controlled, continue lisinopril 20 mg daily    8. Dyslipidemia  -Was not on any statin before          Patient Active Problem List    Diagnosis Date Noted   • Gastroparesis 03/07/2018     Priority: High   • Intractable nausea and vomiting 08/08/2016     Priority: High   • Essential hypertension 06/29/2014     Priority: Medium   • Diabetes mellitus type 2, uncontrolled (CMS-HCC) 01/04/2012     Priority: Medium   • Hepatitis C 01/04/2012     Priority: Medium   • Dyslipidemia 06/29/2014     Priority: Low   • History of  Ggas-Pfwtp-Ckivprw disease 06/05/2014     Priority: Low   • Foot callus 03/11/2020   • Marijuana smoker 03/11/2020   • Gastroesophageal reflux disease without esophagitis 03/11/2020   • Upper GI bleed 11/23/2019   • Closed nondisplaced fracture of fourth metatarsal bone of right foot 07/20/2018   • History of amputation of lesser toe of right foot ([facility]) 07/20/2018   • Diabetic ulcer of right midfoot, limited to breakdown of skin ([facility]) 01/17/2016   • Diabetic neuropathy ([facility]) 01/17/2016   • Chronic right hip pain 06/05/2014   • Scrotal abscess 01/04/2012       Current Outpatient Medications on File Prior to Visit   Medication Sig Dispense Refill   • ONETOUCH VERIO strip USE TO CHECK BLOOD SUGAR 2 TIMES DAILY     • Blood Glucose Monitoring Suppl (ONETOUCH VERIO) w/Device Kit USE TO CHECK BLOOD SUGAR     • metformin (GLUCOPHAGE) 1000 MG tablet Take 1 Tab by mouth 2 times a day, with meals. 60 Tab 3   • Blood Glucose Meter Kit Test blood sugar as recommended by provider. AccuchConvene Margarita blood glucose monitoring kit. 1 Kit 0   • Blood Glucose Test Strips Use one Accucheck Margarita strip to test blood sugar twice daily before meals. 100 Strip 3   • Lancets Use one Accucheck Margarita lancet to test blood sugar twice daily before meals. 100 Each 3   • Alcohol Swabs Wipe site with prep pad prior to injection. 100 Each 3   • lisinopril (PRINIVIL) 20 MG Tab Take 1 Tab by mouth every day. 90 Tab 1   • ondansetron (ZOFRAN ODT) 4 MG TABLET DISPERSIBLE Take 1 Tab by mouth every 8 hours as needed. 60 Tab 2   • metoclopramide (REGLAN) 10 MG Tab Take 1 Tab by mouth 1 time daily as needed. 60 Tab 1   • Dulaglutide (TRULICITY) 0.75 MG/0.5ML Solution Pen-injector Inject 0.75 mg as instructed every 7 days. (Patient not taking: Reported on 3/27/2020) 1 PEN 3     No current facility-administered medications on file prior to visit.        Allergies, past medical history, past surgical history, family history, social history reviewed and  "updated    ROS:     - Constitutional: Negative for fever, chills,    - Eye: Negative for blurry vision    -ENT: Negative for ear pain    - Respiratory: Negative for cough, hemoptysis    - Cardiovascular: Negative for chest pain     - Gastrointestinal: Negative for abdominal pain    - Genitourinary: Negative for dysuria    - Musculoskeletal: Negative for joint swelling    - Skin: Negative for itching    - Neurological: Negative for focal motor weakness     - Psychiatric/Behavioral: Negative for depression        Physical Exam:     /78 (BP Location: Right arm, Patient Position: Sitting, BP Cuff Size: Adult)   Pulse 70   Temp 36.6 °C (97.8 °F)   Ht 1.773 m (5' 9.8\")   Wt 88 kg (194 lb)   SpO2 96%   BMI 28.00 kg/m²   General: Normal appearing. No distress.  ENT: oropharynx without exudates.    Eyes: conjunctiva clear lids without ptosis  Pulmonary: Clear to ausculation.  Normal effort.   Cardiovascular: Regular rate and rhythm  Abdomen: Soft, nontender,  Lymph: No cervical or supraclavicular palpable lymph nodes  Psych: Normal mood and affect.     I have reviewed pertinent labs and diagnostic tests associated with this visit with specific comments listed under the assessment and plan below      Assessment and Plan:     45 y.o. male with the following issues.    1. Upper GI bleed  2. Intractable nausea and vomiting  3. Chronic hepatitis C without hepatic coma (HCC)  4. Gastroparesis  - REFERRAL TO GASTROENTEROLOGY  -Heavily advised to be seen by GI for the above:  -Patient and wife agreed    5. Uncontrolled type 2 diabetes mellitus with hyperglycemia (HCC)  - rosuvastatin (CRESTOR) 10 MG Tab; Take 1 Tab by mouth every evening.  Dispense: 30 Tab; Refill: 11  - Empagliflozin-metFORMIN HCl ER (SYNJARDY XR)  MG TABLET SR 24 HR; Take 25-1,000 mg by mouth every day.  Dispense: 30 Tab; Refill: 3  - linaGLIPtin-metFORMIN HCl ER 5-1000 MG TABLET SR 24 HR; Take 5-1,000 mg by mouth every day.  Dispense: 30 Tab; " Refill: 3    -Adderall as for Synjardy or linagliptin-metformin based on insurance and out-of-pocket    Latest labs    Lab Results   Component Value Date/Time    CHOLSTRLTOT 172 03/12/2020 09:33 AM     (H) 03/12/2020 09:33 AM    HDL 29 (A) 03/12/2020 09:33 AM    TRIGLYCERIDE 175 (H) 03/12/2020 09:33 AM       Lab Results   Component Value Date/Time    GLUCOSE 164 (H) 11/26/2019 05:48 AM    BUN 10 11/26/2019 05:48 AM    CREATININE 0.84 11/26/2019 05:48 AM    CREATININE 1.1 11/28/2008 03:20 AM     Lab Results   Component Value Date/Time    HBA1C 9.6 (A) 03/11/2020 10:58 AM    HBA1C 9.0 (H) 11/24/2019 12:18 AM    HBA1C 7.3 08/17/2018 10:44 AM     Lab Results   Component Value Date/Time    MALBCRT 72 (H) 06/06/2014 09:01 AM     -Discussed diabetic diet in detail, educated regarding management of hypoglycemia, advised regular exercise, educated disease management and weight goals as well as feet care and frequent check of feet.  -Patient to check early morning fasting blood glucose with goal discussed.  - asked to have a BG log with daily fasting and 2 hr postprandial after biggest meal and bring to next visit or send through my chart  -Discussed side effects of medication. Patient confirmed understanding of information.    -Lengthy discussion regarding healthy dietary options including a lot of vegetable, reduce carb as much as she can as well as 0 added sugar, regular exercise as tolerated, healthy protein and fat  - Shown multiple pictures and figures in detail regarding healthy lifestyle changes and healthy dietary options  -Patient would like to start these lifestyle changes to improve diabetes as well as dyslipidemia      6. History of amputation of lesser toe of right foot (HCC)  10. Diabetic polyneuropathy associated with type 2 diabetes mellitus (HCC)  -Advised to sign to get records from podiatrist      7. Essential hypertension  Well-controlled on blood pressure medication    8. Dyslipidemia  Continue  statin      Patient was seen for a total of 40 minutes face-to-face by myself, with more than half of the time spent on counseling treatment, risks/benefits, coordinating care, reviewing records, and educating patient about the aforementioned problem.     Follow Up:      Return in about 4 weeks (around 4/24/2020) for weight issues and healthy lifestyle, follow up.    Please note that this dictation was created using voice recognition software. I have made every reasonable attempt to correct obvious errors, but I expect that there are errors of grammar and possibly content that I did not discover before finalizing the note.    Signed by: Candy Gupta M.D.

## 2020-03-27 NOTE — PATIENT INSTRUCTIONS
LIFESTYLE MEDICINE:       1) Make SMART lifestyle changes: The lifestyle changes that you need to make are with regards to: nutrition, cardiovascular exercise, sleep, stress management.         2) Nutrition:     1- Reduce carbohydrates to 5% or less, no added sugar at all, even try avoid hidden carbohydrates (including breads, pasta, rice, cereals/oatmeal, potato).  Avoid soda, processed carbs and sugars including cookies, candies, donuts, jellies, avoid all sugar beverages including avoiding diet soda/Gatorade, Powerade, Nasim-Aid, ice tea.  Avoid all the sweeteners.    2- Eat a lot of vegetables (organic is much better to avoid herbicide/insecticide): make sure to eat like 7-10 cups of vegetables including green leafy vegetables, steamed or cooked with healthy oils such as olive oil, avocado oil or coconut oil.  Avoid vegetable oil (sunflower, soy, corn, canola).  Include a lot of cruciferous vegetables in your diets.    3-Try to avoid Fruits that has a lot of carbohydrate (including apples, banana, grapes), best fruits that are rich with vitamin/minerals as well as antioxidants are berries (you could take up to 1 cup of Berries a day). Avoid fruit juices ( even worse than SODA).     4-Eat healthy fat and meats from grass fed animals (grass fed cow meat with healthy fat, grass fed chicken/poultry with skin, wild-caught fish and seafood) as well as pasteurized eggs from grass fed chicken    5-when you eat dairy food, measures it is whole fat, avoid skimmed milk/free fat milk/2% milk    6-Avoid snacks between meals.  Highly recommend intermittent fasting.  You should eat only when you feel hungry.           3) Cardiovascular Exercise: The center for disease control recommends a minimum of 150 minutes per week of moderate intensity cardiovascular exercise for weight maintenance and cardiovascular health.  Set this as your initial goal, with at least 30 minutes per session. Types of exercise can include 30 minutes of  elliptical, 30 minutes of decently fast jog, 30 minutes of swimming, 30 minutes of heavy gardening (lifting big bags of fertilizer, digging deep holes/ditches).  You can cut down the minute requirements to half, by doing higher intensity sports such as a game of tennis, or soccer.        4) Sleep:    A) Goal: Obtain a minimum of 7-8hours of continuous, uninterrupted, restful sleep per night.    B) Tips for Sleep Hygiene:    I) Go to bed and wake up at consistent times whether work/school day or not.   II) Keep room dark, quiet, and comfortable.  Increase exposure to sunlight during awake times and avoid bright lights (especially anything with a backlight) at least the last 1-2hours before going to sleep.     III) Don't nap.     IV) Avoid stimulant or caffeine use more than 4 hours after wake time.      5) Stress Management: You cannot change the stresses of life necessarily, but you can change how he responds to them. One good way to manage stress is to write things down in order to help you process how to approach things in general or specifically. Another good way is to talk it out with someone you trusts, specifically your significant other or good friend. A definite great way to deal with stress is to have cardiovascular exercise!

## 2020-04-16 ENCOUNTER — TELEPHONE (OUTPATIENT)
Dept: MEDICAL GROUP | Facility: PHYSICIAN GROUP | Age: 45
End: 2020-04-16

## 2020-04-16 NOTE — TELEPHONE ENCOUNTER
DHA report on 4/16/2020:    -Diagnosis:  -GERD, right upper quadrant abdominal pain, non-intractable vomiting with nausea  -Chronic history of constipation  -Currently C without hepatic coma  -Hematemesis with nausea      Plan:  -EGD for further evaluation  - Defer hepatitis C work-up as insurance will require up-to-date testing.  Previous work-up showed genotype 1a or 1B, once tests are updated, issue treatment  -Follow-up in 3 months  - Trial of Metamucil, Colace and MiraLAX.  If patient failed over-the-counter therapy for chronic constipation, will proceed with prescription therapy  -Omeprazole 20 mg twice daily  -Ultrasound abdomen complete.

## 2020-04-24 DIAGNOSIS — E11.65 UNCONTROLLED TYPE 2 DIABETES MELLITUS WITH HYPERGLYCEMIA (HCC): Chronic | ICD-10-CM

## 2020-04-24 NOTE — TELEPHONE ENCOUNTER
Patients medication list shows metformin, linaglip+met, and Synjardy. Metformin dose would be past max.     Not sure if Metformin was suppose to be discontinued, Medication were sent to different pharmacies ([facility] and [facility]).     Please refill as you see fit or discontinue inactive meds on chart.

## 2020-04-24 NOTE — TELEPHONE ENCOUNTER
Was the patient seen in the last year in this department? Yes    Does patient have an active prescription for medications requested? No     Received Request Via: Pharmacy      Pt met protocol?: Yes, lipid labs and ov 3/20     Ref Range & Units 1mo ago    Cholesterol,Tot 100 - 199 mg/dL 172    Triglycerides 0 - 149 mg/dL 175High     HDL >=40 mg/dL 29Abnormal     LDL <100 mg/dL 108High     Resulting Agency

## 2020-04-27 NOTE — TELEPHONE ENCOUNTER
"UPDATE: Called patient's [facility] pharmacy on 04/27/2020 at 3:40pm and spoke to Technician Shaun.    He says patient's Synjardy is on hold and the Linaglitpan/Metformin has not been filled either due to price. Do not believe patient is taking either.    [facility] also says that the metformin back in March was \"automatically\" canceled on 3/11/2020 in their system (Looks like a metformin was sent to [facility] on 3/13/2020).    Please refill as you see fit. We may have to find solution for combination medication, possibly recommend splitting Combination Rx to two different Rx or alternative?  "

## 2020-04-28 DIAGNOSIS — E11.65 UNCONTROLLED TYPE 2 DIABETES MELLITUS WITH HYPERGLYCEMIA (HCC): Chronic | ICD-10-CM

## 2020-04-28 NOTE — PROGRESS NOTES
1. Uncontrolled type 2 diabetes mellitus with hyperglycemia (HCC)  - Empagliflozin 25 MG Tab; Take 25 mg by mouth every day.  Dispense: 30 Tab; Refill: 3    -Change Synjardy to Jardiance and metformin order separately 2/2 cost.

## 2020-04-30 ENCOUNTER — TELEPHONE (OUTPATIENT)
Dept: MEDICAL GROUP | Facility: PHYSICIAN GROUP | Age: 45
End: 2020-04-30

## 2020-05-05 ENCOUNTER — TELEPHONE (OUTPATIENT)
Dept: MEDICAL GROUP | Facility: PHYSICIAN GROUP | Age: 45
End: 2020-05-05

## 2020-05-05 NOTE — TELEPHONE ENCOUNTER
Digestive health biopsy April 2020:  , gastric polyps  - Gastric-type hyperplastic polyp  -No intestinal metaplasia or dysplasia seen.

## 2020-06-24 DIAGNOSIS — Z01.810 PRE-OPERATIVE CARDIOVASCULAR EXAMINATION: ICD-10-CM

## 2020-06-24 DIAGNOSIS — Z01.812 PRE-OPERATIVE LABORATORY EXAMINATION: ICD-10-CM

## 2020-06-24 LAB
ALBUMIN SERPL BCP-MCNC: 4.1 G/DL (ref 3.2–4.9)
ALBUMIN/GLOB SERPL: 1.4 G/DL
ALP SERPL-CCNC: 91 U/L (ref 30–99)
ALT SERPL-CCNC: 82 U/L (ref 2–50)
ANION GAP SERPL CALC-SCNC: 8 MMOL/L (ref 7–16)
AST SERPL-CCNC: 45 U/L (ref 12–45)
BILIRUB SERPL-MCNC: 0.2 MG/DL (ref 0.1–1.5)
BUN SERPL-MCNC: 23 MG/DL (ref 8–22)
CALCIUM SERPL-MCNC: 10 MG/DL (ref 8.4–10.2)
CHLORIDE SERPL-SCNC: 109 MMOL/L (ref 96–112)
CO2 SERPL-SCNC: 24 MMOL/L (ref 20–33)
COVID ORDER STATUS COVID19: NORMAL
CREAT SERPL-MCNC: 1.1 MG/DL (ref 0.5–1.4)
EKG IMPRESSION: NORMAL
ERYTHROCYTE [DISTWIDTH] IN BLOOD BY AUTOMATED COUNT: 45.1 FL (ref 35.9–50)
GLOBULIN SER CALC-MCNC: 2.9 G/DL (ref 1.9–3.5)
GLUCOSE SERPL-MCNC: 122 MG/DL (ref 65–99)
HCT VFR BLD AUTO: 40.7 % (ref 42–52)
HGB BLD-MCNC: 13.3 G/DL (ref 14–18)
MCH RBC QN AUTO: 29.2 PG (ref 27–33)
MCHC RBC AUTO-ENTMCNC: 32.7 G/DL (ref 33.7–35.3)
MCV RBC AUTO: 89.5 FL (ref 81.4–97.8)
PLATELET # BLD AUTO: 268 K/UL (ref 164–446)
PMV BLD AUTO: 9.3 FL (ref 9–12.9)
POTASSIUM SERPL-SCNC: 5.7 MMOL/L (ref 3.6–5.5)
PROT SERPL-MCNC: 7 G/DL (ref 6–8.2)
RBC # BLD AUTO: 4.55 M/UL (ref 4.7–6.1)
SODIUM SERPL-SCNC: 141 MMOL/L (ref 135–145)
WBC # BLD AUTO: 9.9 K/UL (ref 4.8–10.8)

## 2020-06-24 PROCEDURE — 93010 ELECTROCARDIOGRAM REPORT: CPT | Performed by: INTERNAL MEDICINE

## 2020-06-24 PROCEDURE — U0003 INFECTIOUS AGENT DETECTION BY NUCLEIC ACID (DNA OR RNA); SEVERE ACUTE RESPIRATORY SYNDROME CORONAVIRUS 2 (SARS-COV-2) (CORONAVIRUS DISEASE [COVID-19]), AMPLIFIED PROBE TECHNIQUE, MAKING USE OF HIGH THROUGHPUT TECHNOLOGIES AS DESCRIBED BY CMS-2020-01-R: HCPCS

## 2020-06-24 PROCEDURE — 85027 COMPLETE CBC AUTOMATED: CPT

## 2020-06-24 PROCEDURE — 93005 ELECTROCARDIOGRAM TRACING: CPT

## 2020-06-24 PROCEDURE — C9803 HOPD COVID-19 SPEC COLLECT: HCPCS

## 2020-06-24 PROCEDURE — 80053 COMPREHEN METABOLIC PANEL: CPT

## 2020-06-24 PROCEDURE — 36415 COLL VENOUS BLD VENIPUNCTURE: CPT

## 2020-06-24 RX ORDER — OMEPRAZOLE 20 MG/1
20 CAPSULE, DELAYED RELEASE ORAL 2 TIMES DAILY
COMMUNITY
End: 2021-03-08 | Stop reason: SDUPTHER

## 2020-06-24 RX ORDER — GABAPENTIN 100 MG/1
100 CAPSULE ORAL
COMMUNITY
End: 2021-05-07

## 2020-06-24 ASSESSMENT — FIBROSIS 4 INDEX: FIB4 SCORE: 0.61

## 2020-06-24 NOTE — OR NURSING
Pre admit apt: Pt. Instructed to continue regularly prescribed medications through day before surgery.  Instructed to take the following medications, the day of surgery, with a sip of water per anesthesia protocol:reglan, omeprazole, zofran

## 2020-06-25 LAB
SARS-COV-2 RNA RESP QL NAA+PROBE: NOTDETECTED
SPECIMEN SOURCE: NORMAL

## 2020-06-26 ENCOUNTER — HOSPITAL ENCOUNTER (OUTPATIENT)
Facility: MEDICAL CENTER | Age: 45
End: 2020-06-26
Attending: PODIATRIST | Admitting: PODIATRIST
Payer: COMMERCIAL

## 2020-06-26 ENCOUNTER — APPOINTMENT (OUTPATIENT)
Dept: RADIOLOGY | Facility: MEDICAL CENTER | Age: 45
End: 2020-06-26
Attending: PODIATRIST
Payer: COMMERCIAL

## 2020-06-26 ENCOUNTER — ANESTHESIA EVENT (OUTPATIENT)
Dept: SURGERY | Facility: MEDICAL CENTER | Age: 45
End: 2020-06-26
Payer: COMMERCIAL

## 2020-06-26 ENCOUNTER — ANESTHESIA (OUTPATIENT)
Dept: SURGERY | Facility: MEDICAL CENTER | Age: 45
End: 2020-06-26
Payer: COMMERCIAL

## 2020-06-26 VITALS
HEART RATE: 72 BPM | HEIGHT: 71 IN | BODY MASS INDEX: 27.07 KG/M2 | RESPIRATION RATE: 16 BRPM | OXYGEN SATURATION: 98 % | TEMPERATURE: 97.3 F | DIASTOLIC BLOOD PRESSURE: 91 MMHG | WEIGHT: 193.34 LBS | SYSTOLIC BLOOD PRESSURE: 160 MMHG

## 2020-06-26 LAB
GLUCOSE BLD-MCNC: 216 MG/DL (ref 65–99)
PATHOLOGY CONSULT NOTE: NORMAL

## 2020-06-26 PROCEDURE — 160002 HCHG RECOVERY MINUTES (STAT): Performed by: PODIATRIST

## 2020-06-26 PROCEDURE — 160048 HCHG OR STATISTICAL LEVEL 1-5: Performed by: PODIATRIST

## 2020-06-26 PROCEDURE — 700101 HCHG RX REV CODE 250: Performed by: ANESTHESIOLOGY

## 2020-06-26 PROCEDURE — 160046 HCHG PACU - 1ST 60 MINS PHASE II: Performed by: PODIATRIST

## 2020-06-26 PROCEDURE — 700102 HCHG RX REV CODE 250 W/ 637 OVERRIDE(OP): Performed by: PODIATRIST

## 2020-06-26 PROCEDURE — 700111 HCHG RX REV CODE 636 W/ 250 OVERRIDE (IP): Performed by: ANESTHESIOLOGY

## 2020-06-26 PROCEDURE — A6454 SELF-ADHER BAND W>=3" <5"/YD: HCPCS | Performed by: PODIATRIST

## 2020-06-26 PROCEDURE — 500112 HCHG BONE WAX: Performed by: PODIATRIST

## 2020-06-26 PROCEDURE — 160025 RECOVERY II MINUTES (STATS): Performed by: PODIATRIST

## 2020-06-26 PROCEDURE — 700101 HCHG RX REV CODE 250: Performed by: PODIATRIST

## 2020-06-26 PROCEDURE — A9270 NON-COVERED ITEM OR SERVICE: HCPCS | Performed by: ANESTHESIOLOGY

## 2020-06-26 PROCEDURE — 501838 HCHG SUTURE GENERAL: Performed by: PODIATRIST

## 2020-06-26 PROCEDURE — 500881 HCHG PACK, EXTREMITY: Performed by: PODIATRIST

## 2020-06-26 PROCEDURE — 82962 GLUCOSE BLOOD TEST: CPT

## 2020-06-26 PROCEDURE — 700111 HCHG RX REV CODE 636 W/ 250 OVERRIDE (IP): Performed by: PODIATRIST

## 2020-06-26 PROCEDURE — A9270 NON-COVERED ITEM OR SERVICE: HCPCS | Performed by: PODIATRIST

## 2020-06-26 PROCEDURE — 160041 HCHG SURGERY MINUTES - EA ADDL 1 MIN LEVEL 4: Performed by: PODIATRIST

## 2020-06-26 PROCEDURE — 160036 HCHG PACU - EA ADDL 30 MINS PHASE I: Performed by: PODIATRIST

## 2020-06-26 PROCEDURE — 700105 HCHG RX REV CODE 258: Performed by: PODIATRIST

## 2020-06-26 PROCEDURE — 88311 DECALCIFY TISSUE: CPT

## 2020-06-26 PROCEDURE — 160035 HCHG PACU - 1ST 60 MINS PHASE I: Performed by: PODIATRIST

## 2020-06-26 PROCEDURE — 88304 TISSUE EXAM BY PATHOLOGIST: CPT

## 2020-06-26 PROCEDURE — 160009 HCHG ANES TIME/MIN: Performed by: PODIATRIST

## 2020-06-26 PROCEDURE — 160029 HCHG SURGERY MINUTES - 1ST 30 MINS LEVEL 4: Performed by: PODIATRIST

## 2020-06-26 PROCEDURE — 700102 HCHG RX REV CODE 250 W/ 637 OVERRIDE(OP): Performed by: ANESTHESIOLOGY

## 2020-06-26 RX ORDER — HYDROMORPHONE HYDROCHLORIDE 1 MG/ML
0.1 INJECTION, SOLUTION INTRAMUSCULAR; INTRAVENOUS; SUBCUTANEOUS
Status: DISCONTINUED | OUTPATIENT
Start: 2020-06-26 | End: 2020-06-26 | Stop reason: HOSPADM

## 2020-06-26 RX ORDER — ONDANSETRON 2 MG/ML
4 INJECTION INTRAMUSCULAR; INTRAVENOUS
Status: COMPLETED | OUTPATIENT
Start: 2020-06-26 | End: 2020-06-26

## 2020-06-26 RX ORDER — OXYCODONE HYDROCHLORIDE AND ACETAMINOPHEN 5; 325 MG/1; MG/1
1 TABLET ORAL
Status: COMPLETED | OUTPATIENT
Start: 2020-06-26 | End: 2020-06-26

## 2020-06-26 RX ORDER — MIDAZOLAM HYDROCHLORIDE 1 MG/ML
1 INJECTION INTRAMUSCULAR; INTRAVENOUS
Status: DISCONTINUED | OUTPATIENT
Start: 2020-06-26 | End: 2020-06-26 | Stop reason: HOSPADM

## 2020-06-26 RX ORDER — OXYCODONE HYDROCHLORIDE AND ACETAMINOPHEN 5; 325 MG/1; MG/1
2 TABLET ORAL
Status: COMPLETED | OUTPATIENT
Start: 2020-06-26 | End: 2020-06-26

## 2020-06-26 RX ORDER — DIPHENHYDRAMINE HYDROCHLORIDE 50 MG/ML
12.5 INJECTION INTRAMUSCULAR; INTRAVENOUS
Status: DISCONTINUED | OUTPATIENT
Start: 2020-06-26 | End: 2020-06-26 | Stop reason: HOSPADM

## 2020-06-26 RX ORDER — ONDANSETRON 2 MG/ML
INJECTION INTRAMUSCULAR; INTRAVENOUS PRN
Status: DISCONTINUED | OUTPATIENT
Start: 2020-06-26 | End: 2020-06-26 | Stop reason: SURG

## 2020-06-26 RX ORDER — LABETALOL HYDROCHLORIDE 5 MG/ML
5 INJECTION, SOLUTION INTRAVENOUS
Status: DISCONTINUED | OUTPATIENT
Start: 2020-06-26 | End: 2020-06-26 | Stop reason: HOSPADM

## 2020-06-26 RX ORDER — LIDOCAINE HYDROCHLORIDE 10 MG/ML
INJECTION, SOLUTION INFILTRATION; PERINEURAL
Status: DISCONTINUED | OUTPATIENT
Start: 2020-06-26 | End: 2020-06-26 | Stop reason: HOSPADM

## 2020-06-26 RX ORDER — HYDROMORPHONE HYDROCHLORIDE 1 MG/ML
0.4 INJECTION, SOLUTION INTRAMUSCULAR; INTRAVENOUS; SUBCUTANEOUS
Status: DISCONTINUED | OUTPATIENT
Start: 2020-06-26 | End: 2020-06-26 | Stop reason: HOSPADM

## 2020-06-26 RX ORDER — HYDRALAZINE HYDROCHLORIDE 20 MG/ML
5 INJECTION INTRAMUSCULAR; INTRAVENOUS
Status: DISCONTINUED | OUTPATIENT
Start: 2020-06-26 | End: 2020-06-26 | Stop reason: HOSPADM

## 2020-06-26 RX ORDER — HALOPERIDOL 5 MG/ML
1 INJECTION INTRAMUSCULAR
Status: DISCONTINUED | OUTPATIENT
Start: 2020-06-26 | End: 2020-06-26 | Stop reason: HOSPADM

## 2020-06-26 RX ORDER — BUPIVACAINE HYDROCHLORIDE 5 MG/ML
INJECTION, SOLUTION PERINEURAL
Status: DISCONTINUED | OUTPATIENT
Start: 2020-06-26 | End: 2020-06-26 | Stop reason: HOSPADM

## 2020-06-26 RX ORDER — MEPERIDINE HYDROCHLORIDE 25 MG/ML
12.5 INJECTION INTRAMUSCULAR; INTRAVENOUS; SUBCUTANEOUS
Status: DISCONTINUED | OUTPATIENT
Start: 2020-06-26 | End: 2020-06-26 | Stop reason: HOSPADM

## 2020-06-26 RX ORDER — METOPROLOL TARTRATE 1 MG/ML
1 INJECTION, SOLUTION INTRAVENOUS
Status: DISCONTINUED | OUTPATIENT
Start: 2020-06-26 | End: 2020-06-26 | Stop reason: HOSPADM

## 2020-06-26 RX ORDER — SODIUM CHLORIDE, SODIUM LACTATE, POTASSIUM CHLORIDE, CALCIUM CHLORIDE 600; 310; 30; 20 MG/100ML; MG/100ML; MG/100ML; MG/100ML
INJECTION, SOLUTION INTRAVENOUS CONTINUOUS
Status: DISCONTINUED | OUTPATIENT
Start: 2020-06-26 | End: 2020-06-26 | Stop reason: HOSPADM

## 2020-06-26 RX ORDER — HYDROMORPHONE HYDROCHLORIDE 1 MG/ML
0.2 INJECTION, SOLUTION INTRAMUSCULAR; INTRAVENOUS; SUBCUTANEOUS
Status: DISCONTINUED | OUTPATIENT
Start: 2020-06-26 | End: 2020-06-26 | Stop reason: HOSPADM

## 2020-06-26 RX ORDER — CEFAZOLIN SODIUM 1 G/3ML
INJECTION, POWDER, FOR SOLUTION INTRAMUSCULAR; INTRAVENOUS PRN
Status: DISCONTINUED | OUTPATIENT
Start: 2020-06-26 | End: 2020-06-26 | Stop reason: SURG

## 2020-06-26 RX ADMIN — FENTANYL CITRATE 100 MCG: 50 INJECTION, SOLUTION INTRAMUSCULAR; INTRAVENOUS at 08:21

## 2020-06-26 RX ADMIN — HALOPERIDOL LACTATE 1 MG: 5 INJECTION, SOLUTION INTRAMUSCULAR at 09:36

## 2020-06-26 RX ADMIN — ONDANSETRON 4 MG: 2 INJECTION INTRAMUSCULAR; INTRAVENOUS at 08:21

## 2020-06-26 RX ADMIN — LABETALOL HYDROCHLORIDE 5 MG: 5 INJECTION, SOLUTION INTRAVENOUS at 09:18

## 2020-06-26 RX ADMIN — ONDANSETRON 4 MG: 2 INJECTION INTRAMUSCULAR; INTRAVENOUS at 08:57

## 2020-06-26 RX ADMIN — OXYCODONE HYDROCHLORIDE AND ACETAMINOPHEN 1 TABLET: 5; 325 TABLET ORAL at 09:49

## 2020-06-26 RX ADMIN — PROPOFOL 200 MG: 10 INJECTION, EMULSION INTRAVENOUS at 08:21

## 2020-06-26 RX ADMIN — LABETALOL HYDROCHLORIDE 5 MG: 5 INJECTION, SOLUTION INTRAVENOUS at 09:39

## 2020-06-26 RX ADMIN — POVIDONE-IODINE 15 ML: 10 SOLUTION TOPICAL at 07:10

## 2020-06-26 RX ADMIN — MIDAZOLAM HYDROCHLORIDE 2 MG: 1 INJECTION, SOLUTION INTRAMUSCULAR; INTRAVENOUS at 08:21

## 2020-06-26 RX ADMIN — SODIUM CHLORIDE, POTASSIUM CHLORIDE, SODIUM LACTATE AND CALCIUM CHLORIDE: 600; 310; 30; 20 INJECTION, SOLUTION INTRAVENOUS at 07:13

## 2020-06-26 RX ADMIN — CEFAZOLIN 2 G: 1 INJECTION, POWDER, FOR SOLUTION INTRAVENOUS at 08:22

## 2020-06-26 RX ADMIN — EPHEDRINE SULFATE 25 MG: 50 INJECTION INTRAMUSCULAR; INTRAVENOUS; SUBCUTANEOUS at 08:21

## 2020-06-26 RX ADMIN — LABETALOL HYDROCHLORIDE 5 MG: 5 INJECTION, SOLUTION INTRAVENOUS at 09:12

## 2020-06-26 ASSESSMENT — FIBROSIS 4 INDEX: FIB4 SCORE: 0.85

## 2020-06-26 ASSESSMENT — PAIN SCALES - GENERAL: PAIN_LEVEL: 1

## 2020-06-26 NOTE — ANESTHESIA PREPROCEDURE EVALUATION
Relevant Problems   NEURO   (+) History of Asvj-Wghfn-Poqikxs disease      CARDIAC   (+) Essential hypertension      GI   (+) Gastroesophageal reflux disease without esophagitis         (+) Hepatitis C       Physical Exam    Airway   Mallampati: II  TM distance: >3 FB  Neck ROM: full       Cardiovascular - normal exam  Rhythm: regular  Rate: normal  (-) murmur     Dental - normal exam           Pulmonary - normal exam  Breath sounds clear to auscultation     Abdominal    Neurological - normal exam                 Anesthesia Plan    ASA 3   ASA physical status 3 criteria: COPD    Plan - general             Induction: intravenous    Postoperative Plan: Postoperative administration of opioids is intended.    Pertinent diagnostic labs and testing reviewed    Informed Consent:    Anesthetic plan and risks discussed with patient.    Use of blood products discussed with: patient whom consented to blood products.

## 2020-06-26 NOTE — OP REPORT
DATE OF SERVICE:  06/26/2020    PREOPERATIVE DIAGNOSIS:  Hypertrophied bone at a previous amputation site   causing ulcerations.    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    PROCEDURE PERFORMED:  Excision of hypertrophied bone from the right foot.    SURGEON:  GOSIA Kurtz DPM    ANESTHESIOLOGIST:  Juan Armenta MD    ANESTHESIA TYPE:  General anesthesia with a local infiltration of a 1:1   mixture of 1% lidocaine plain and 0.5% Marcaine plain, totaling 10 mL.    HEMOSTASIS:  Pneumatic ankle tourniquet set at 250 mmHg x47 minutes.    INJECTABLES:  None.    MATERIALS USED:  3-0 and 4-0 Vicryl, and 4-0 and 3-0 nylon.    ESTIMATED BLOOD LOSS:  Less than 3 mL.    SPECIMENS:  Bone.    CONDITION:  Stable.    INDICATIONS FOR PROCEDURE:  This is a 45-year-old male patient who presented   to my office several months ago complaining of pain in the right foot.  He is   a diabetic with neuropathy, but does have a history of a previous partial foot   amputation or part of the fifth ray was amputated.  At the bottom of the   right foot, at the base of the fifth metatarsal, there was significant callus   formation with a bony prominence.  X-rays were taken, noted to have   significant hypertrophy of the bone.  There was also a small fracture of the   fourth metatarsal.  He was placed in a boot after debridement of the   hyperkeratotic tissue.  We discussed long-term treatment options and suggested  Excision of the hypertrophied bone and creating a smoother surface to help remove   any bony prominences.  This procedure was agreed upon, but it was delayed   because of the coronavirus.  We were able to get that scheduled.  All the   risks and benefits were discussed with the patient.  He was given the   opportunity to ask questions as well as his wife.  No promises or guarantees   were given, nor were they implied.  N.p.o. status was confirmed and the   consent form  was signed, reviewed, and placed in the patient's chart.    DESCRIPTION OF PROCEDURE:  Under mild sedation, he was brought to the   operating room and placed on the operating table in supine position.  He was   given Ancef for prophylactic antibiotics.  The right foot was then scrubbed,   prepped and draped in the usual aseptic manner.  A critical pause was then   performed to identify the correct patient, surgical site, and procedure.  All   OR staff and surgeon were in agreement.    Attention was then directed to the lateral aspect of the right foot where a   curvilinear incision was made at the dorsal plantar junction of the skin   centered over the base of the fifth metatarsal.  Dissection continued down to   the subcutaneous tissues utilizing both sharp and blunt dissection.  Care was   taken to visualize and retract all vital neural and vascular structures.    There were no bleeders to cauterize today.  Once I got down to the level of   the bone, I was able to take the oscillating bone saw and plantar plane the   plantar aspect of the base of the fifth metatarsal and the cuboid.  The bone   rasp, power rasp was then used to smooth any bony prominences plantarly and   laterally.  X-rays were taken to verify the amount of correction and the bone   that was excised was sent to pathology for further examination.  The bone did   appear to be quite sclerotic clinically and appeared to be very hard and   dense.  The wound was then flushed with copious amounts of sterile normal   saline.  A bone wax was then applied to the plantar surface of the surgical   area and the soft tissue structures were reapproximated and coapted utilizing   3-0 Vicryl and the subcutaneous structures were reapproximated utilizing 4-0   Vicryl.  I started closing the skin with 4-0 nylon, but because of the density   of the callused area on the plantar surface of the foot, I needed to switch   to a 3-0 nylon.    The patient's wound was then  dressed with Xeroform, 4x4, and soft roll.    Tourniquet was then deflated and prompt hyperemic response was noted to all   aspects of the right foot.  A layer of Coban was then lightly placed around   the dressings.    The patient tolerated the procedure and anesthesia well.  He was then   transferred from the operating room to the recovery room with vital signs   stable and vascular status intact.  Following a period of postop monitoring,   the patient will be discharged home with the following written and oral   postoperative instructions:    1.  The patient is to keep the dressing clean, dry, and intact.  If the   dressing is wet or soiled, he is to contact the office immediately.  2.  He already had a boot and he is to wear that at all times while   ambulating.  3.  He is given instructions for icing and elevation to help with   postoperative pain and swelling.  4.  He is also given a prescription for Norco 5/325 to be taken as directed   for postoperative pain.  5.  He is to follow up in my office in 1 week, but can call with any other   questions or concerns.       ____________________________________     F. SLIME Farmer / BRADY    DD:  06/26/2020 08:42:57  DT:  06/26/2020 09:59:34    D#:  [id number]  Job#:  [id number]

## 2020-06-26 NOTE — OR NURSING
0831 Pt to PACU from OR via gurney, respirations spontaneous and non-labored via OPA. Right foot surgical sites clean, dry and intact, pt not arousable on calling. Ice pack placed on right foot surgical sight and cap refill < 2 seconds to right toes.  0842 Report to HENRRY Zimmer.

## 2020-06-26 NOTE — ANESTHESIA POSTPROCEDURE EVALUATION
Patient: Cayetano Hawkins    Procedure Summary     Date:  06/26/20 Room / Location:   OR  / SURGERY [facility]    Anesthesia Start:  0718 Anesthesia Stop:      Procedure:  EXCISION, BONE - OSTEPHYTIC BONE, FOOT (Right Foot) Diagnosis:  (OSTEOPHYTE RIGHT FOOT)    Surgeon:  Willie Kurtz D.P.M. Responsible Provider:  Juan Armenta M.D.    Anesthesia Type:  general ASA Status:  3          Final Anesthesia Type: general  Last vitals  BP   Blood Pressure: 151/86    Temp   36.7 °C (98.1 °F)    Pulse   Pulse: 71   Resp   16    SpO2   98 %      Anesthesia Post Evaluation    Patient location during evaluation: PACU  Patient participation: complete - patient participated  Level of consciousness: awake and alert  Pain score: 1    Airway patency: patent  Anesthetic complications: no  Cardiovascular status: hemodynamically stable  Respiratory status: acceptable  Hydration status: euvolemic    PONV: none           Nurse Pain Score: 0 (NPRS)

## 2020-06-26 NOTE — DISCHARGE INSTRUCTIONS
ACTIVITY: Rest and take it easy for the first 24 hours.  A responsible adult is recommended to remain with you during that time.  It is normal to feel sleepy.  We encourage you to not do anything that requires balance, judgment or coordination.    MILD FLU-LIKE SYMPTOMS ARE NORMAL. YOU MAY EXPERIENCE GENERALIZED MUSCLE ACHES, THROAT IRRITATION, HEADACHE AND/OR SOME NAUSEA.    FOR 24 HOURS DO NOT:  Drive, operate machinery or run household appliances.  Drink beer or alcoholic beverages.   Make important decisions or sign legal documents.    SPECIAL INSTRUCTIONS: Weight bearing as tolerated with boot, Ice and elevate R foot--do not ice toes    DIET: To avoid nausea, slowly advance diet as tolerated, avoiding spicy or greasy foods for the first day.  Add more substantial food to your diet according to your physician's instructions. INCREASE FLUIDS AND FIBER TO AVOID CONSTIPATION.    SURGICAL DRESSING/BATHING: Keep dressing clean, dry and intact until otherwise specified by MD    FOLLOW-UP APPOINTMENT:  A follow-up appointment should be arranged with your doctor; call to schedule.    You should CALL YOUR PHYSICIAN if you develop:  Fever greater than 101 degrees F.  Pain not relieved by medication, or persistent nausea or vomiting.  Excessive bleeding (blood soaking through dressing) or unexpected drainage from the wound.  Extreme redness or swelling around the incision site, drainage of pus or foul smelling drainage.  Inability to urinate or empty your bladder within 8 hours.  Problems with breathing or chest pain.    You should call 911 if you develop problems with breathing or chest pain.  If you are unable to contact your doctor or surgical center, you should go to the nearest emergency room or urgent care center.  Physician's telephone #: BRIANR [phone]    If any questions arise, call your doctor.  If your doctor is not available, please feel free to call the Surgical Center at [phone].  The Center is open  Monday through Friday from 7AM to 7PM.  You can also call the HEALTH HOTLINE open 24 hours/day, 7 days/week and speak to a nurse at [phone], or toll free at [phone].    A registered nurse may call you a few days after your surgery to see how you are doing after your procedure.    MEDICATIONS: Resume taking daily medication.  Take prescribed pain medication with food.  If no medication is prescribed, you may take non-aspirin pain medication if needed.  PAIN MEDICATION CAN BE VERY CONSTIPATING.  Take a stool softener or laxative such as senokot, pericolace, or milk of magnesia if needed.    Prescription given preoperatively.  Last pain medication given at 9:50 am (1 Percocet 5/325 mg).    If your physician has prescribed pain medication that includes Acetaminophen (Tylenol), do not take additional Acetaminophen (Tylenol) while taking the prescribed medication.    Depression / Suicide Risk    As you are discharged from this [facility] facility, it is important to learn how to keep safe from harming yourself.    Recognize the warning signs:  · Abrupt changes in personality, positive or negative- including increase in energy   · Giving away possessions  · Change in eating patterns- significant weight changes-  positive or negative  · Change in sleeping patterns- unable to sleep or sleeping all the time   · Unwillingness or inability to communicate  · Depression  · Unusual sadness, discouragement and loneliness  · Talk of wanting to die  · Neglect of personal appearance   · Rebelliousness- reckless behavior  · Withdrawal from people/activities they love  · Confusion- inability to concentrate     If you or a loved one observes any of these behaviors or has concerns about self-harm, here's what you can do:  · Talk about it- your feelings and reasons for harming yourself  · Remove any means that you might use to hurt yourself (examples: pills, rope, extension cords, firearm)  · Get professional help from the  community (Mental Health, Substance Abuse, psychological counseling)  · Do not be alone:Call your Safe Contact- someone whom you trust who will be there for you.  · Call your local CRISIS HOTLINE 170-9951 or 687-828-3163  · Call your local Children's Mobile Crisis Response Team [place] (228) 008-0733 or www.zlien  · Call the toll free National Suicide Prevention Hotlines   · National Suicide Prevention Lifeline 066-480-VPWA (7173)  · National Hope Line Network 800-SUICIDE (971-5242)

## 2020-06-26 NOTE — OR NURSING
0634: Brought patient back to pre-op and assumed care.  0715: Patient allergies and NPO status verified, home medication reconciliation completed and belongings secured. Patient verbalizes understanding of pain scale, expected course of stay and plan of care. Surgical site verified with patient. IV access established. Sequentials placed on legs.

## 2020-06-26 NOTE — OR NURSING
"1025 patient to stage 2 ; pt dressed with assist by CNA. Dressing to R foot CDI with walking boot in place. Ice pack removed from under boot and secured for transfer.  Patient settled in recliner chair post short ambulation from [facility]. Pt denies nausea and pain to R foot on arrival. Reports hx of LBP and hip pain. Pt able to wiggle R foot with pink/warm toes and <3 sec cap refill. Pt states \"I'm feeling much better\".   1035 SBP within parameters no treatment needed in stage II. Pt instructed to resume today Lisinopril dose at home; pt states verbal understanding and states \"my wife has it in her purse here I think\".   1050 Reviewed discharge instructions with pt/wife in PACU II. Handwrote instructions to take Lisinopril AM dose when he arrives home; both state verbal understanding.   1054 D/Silviano to care of family post uneventful stay in PACU 2.    "

## 2020-06-26 NOTE — ANESTHESIA PROCEDURE NOTES
Airway  Performed by: Juan Armenta M.D.  Authorized by: Juan Aremnta M.D.     Location:  OR  Urgency:  Elective  Indications for Airway Management:  Anesthesia      Spontaneous Ventilation: absent    Sedation Level:  Deep  Preoxygenated: Yes    Final Airway Type:  Supraglottic airway  Final Supraglottic Airway:  Standard LMA    SGA Size:  4  Number of Attempts at Approach:  1

## 2020-06-26 NOTE — OR SURGEON
Immediate Post OP Note    PreOp Diagnosis: Hypertrophied bone at amputation site causing ulcerations    PostOp Diagnosis: Hypertrophied bone at amputation site causing ulcerations    Procedure(s):  EXCISION, BONE - OSTEPHYTIC BONE, FOOT - Wound Class: Clean    Surgeon(s):  Willie Kurtz D.P.M.    Anesthesiologist/Type of Anesthesia:  Anesthesiologist: Juan Armenta M.D./General    Surgical Staff:  Circulator: Raj Bradshaw R.N.  Relief Circulator: Heather C Cogan, R.N.  Scrub Person: Julieta Isaac    Specimens removed if any:  ID Type Source Tests Collected by Time Destination   A : hypertrophy bone right foot Bone Foot PATHOLOGY SPECIMEN Willie Kurtz D.P.M. 6/26/2020  7:55 AM        Estimated Blood Loss: < 3 mL    Findings: See op report    Complications: None        6/26/2020 8:33 AM Willie Kurtz D.P.M.

## 2020-06-26 NOTE — OR NURSING
0842: Took over care of pt in PACU/post R foot procedure/osteophytic bone removal, RN report, OPA in place, VSS  0843: OPA dc'd, NC oxygen  0853: Pt having some nausea/medicated/see MAR,Pt also given QueaseEase and cool washcloth to forehead, RLE elevated on pillows with ice pack/walking boot, R toes warm/+cap refill, Denies pain at present  0915: Medicating for elevated BP/see MAR  0935: Nausea coming back/medicating/see MAR, BP also remains elevated/see flowsheet/see MAR  0948: Nausea improved-oral pain meds given/rates pain 8/10-mostly hip/back, Weaning oxygen as tolerated, BP improving  1010: RA sats approp, No nausea-sips of water/juice, Pain is mostly chronic back/hip but min pain to RLE, Close to Stg II transfer

## 2020-08-03 ENCOUNTER — APPOINTMENT (OUTPATIENT)
Dept: RADIOLOGY | Facility: MEDICAL CENTER | Age: 45
DRG: 571 | End: 2020-08-03
Attending: EMERGENCY MEDICINE
Payer: COMMERCIAL

## 2020-08-03 ENCOUNTER — HOSPITAL ENCOUNTER (INPATIENT)
Facility: MEDICAL CENTER | Age: 45
LOS: 2 days | DRG: 571 | End: 2020-08-05
Attending: EMERGENCY MEDICINE | Admitting: HOSPITALIST
Payer: COMMERCIAL

## 2020-08-03 ENCOUNTER — ANESTHESIA (OUTPATIENT)
Dept: SURGERY | Facility: MEDICAL CENTER | Age: 45
DRG: 571 | End: 2020-08-03
Payer: COMMERCIAL

## 2020-08-03 ENCOUNTER — ANESTHESIA EVENT (OUTPATIENT)
Dept: SURGERY | Facility: MEDICAL CENTER | Age: 45
DRG: 571 | End: 2020-08-03
Payer: COMMERCIAL

## 2020-08-03 DIAGNOSIS — L02.619 FOOT ABSCESS: ICD-10-CM

## 2020-08-03 DIAGNOSIS — L02.619 CELLULITIS AND ABSCESS OF FOOT: ICD-10-CM

## 2020-08-03 DIAGNOSIS — L03.119 CELLULITIS AND ABSCESS OF FOOT: ICD-10-CM

## 2020-08-03 DIAGNOSIS — L03.115 CELLULITIS OF RIGHT LOWER EXTREMITY: ICD-10-CM

## 2020-08-03 PROBLEM — E11.65 TYPE 2 DIABETES MELLITUS WITH HYPERGLYCEMIA, WITHOUT LONG-TERM CURRENT USE OF INSULIN (HCC): Status: ACTIVE | Noted: 2020-08-03

## 2020-08-03 LAB
ALBUMIN SERPL BCP-MCNC: 3.8 G/DL (ref 3.2–4.9)
ALBUMIN/GLOB SERPL: 0.9 G/DL
ALP SERPL-CCNC: 129 U/L (ref 30–99)
ALT SERPL-CCNC: 59 U/L (ref 2–50)
ANION GAP SERPL CALC-SCNC: 16 MMOL/L (ref 7–16)
AST SERPL-CCNC: 34 U/L (ref 12–45)
BASOPHILS # BLD AUTO: 0.4 % (ref 0–1.8)
BASOPHILS # BLD: 0.04 K/UL (ref 0–0.12)
BILIRUB SERPL-MCNC: 0.3 MG/DL (ref 0.1–1.5)
BUN SERPL-MCNC: 20 MG/DL (ref 8–22)
CALCIUM SERPL-MCNC: 9.5 MG/DL (ref 8.5–10.5)
CHLORIDE SERPL-SCNC: 100 MMOL/L (ref 96–112)
CO2 SERPL-SCNC: 23 MMOL/L (ref 20–33)
COVID ORDER STATUS COVID19: NORMAL
CREAT SERPL-MCNC: 0.96 MG/DL (ref 0.5–1.4)
EOSINOPHIL # BLD AUTO: 0.09 K/UL (ref 0–0.51)
EOSINOPHIL NFR BLD: 0.8 % (ref 0–6.9)
ERYTHROCYTE [DISTWIDTH] IN BLOOD BY AUTOMATED COUNT: 41.5 FL (ref 35.9–50)
GLOBULIN SER CALC-MCNC: 4.3 G/DL (ref 1.9–3.5)
GLUCOSE SERPL-MCNC: 138 MG/DL (ref 65–99)
HCT VFR BLD AUTO: 37.3 % (ref 42–52)
HGB BLD-MCNC: 11.8 G/DL (ref 14–18)
IMM GRANULOCYTES # BLD AUTO: 0.05 K/UL (ref 0–0.11)
IMM GRANULOCYTES NFR BLD AUTO: 0.5 % (ref 0–0.9)
LACTATE BLD-SCNC: 1.8 MMOL/L (ref 0.5–2)
LYMPHOCYTES # BLD AUTO: 2.57 K/UL (ref 1–4.8)
LYMPHOCYTES NFR BLD: 23.5 % (ref 22–41)
MCH RBC QN AUTO: 28.1 PG (ref 27–33)
MCHC RBC AUTO-ENTMCNC: 31.6 G/DL (ref 33.7–35.3)
MCV RBC AUTO: 88.8 FL (ref 81.4–97.8)
MONOCYTES # BLD AUTO: 0.53 K/UL (ref 0–0.85)
MONOCYTES NFR BLD AUTO: 4.9 % (ref 0–13.4)
NEUTROPHILS # BLD AUTO: 7.64 K/UL (ref 1.82–7.42)
NEUTROPHILS NFR BLD: 69.9 % (ref 44–72)
NRBC # BLD AUTO: 0 K/UL
NRBC BLD-RTO: 0 /100 WBC
PLATELET # BLD AUTO: 473 K/UL (ref 164–446)
PMV BLD AUTO: 8.6 FL (ref 9–12.9)
POTASSIUM SERPL-SCNC: 4.6 MMOL/L (ref 3.6–5.5)
PROT SERPL-MCNC: 8.1 G/DL (ref 6–8.2)
RBC # BLD AUTO: 4.2 M/UL (ref 4.7–6.1)
SODIUM SERPL-SCNC: 139 MMOL/L (ref 135–145)
WBC # BLD AUTO: 10.9 K/UL (ref 4.8–10.8)

## 2020-08-03 PROCEDURE — 87205 SMEAR GRAM STAIN: CPT

## 2020-08-03 PROCEDURE — 700105 HCHG RX REV CODE 258: Performed by: EMERGENCY MEDICINE

## 2020-08-03 PROCEDURE — U0003 INFECTIOUS AGENT DETECTION BY NUCLEIC ACID (DNA OR RNA); SEVERE ACUTE RESPIRATORY SYNDROME CORONAVIRUS 2 (SARS-COV-2) (CORONAVIRUS DISEASE [COVID-19]), AMPLIFIED PROBE TECHNIQUE, MAKING USE OF HIGH THROUGHPUT TECHNOLOGIES AS DESCRIBED BY CMS-2020-01-R: HCPCS

## 2020-08-03 PROCEDURE — 87070 CULTURE OTHR SPECIMN AEROBIC: CPT

## 2020-08-03 PROCEDURE — 96365 THER/PROPH/DIAG IV INF INIT: CPT

## 2020-08-03 PROCEDURE — C9803 HOPD COVID-19 SPEC COLLECT: HCPCS | Performed by: HOSPITALIST

## 2020-08-03 PROCEDURE — 770001 HCHG ROOM/CARE - MED/SURG/GYN PRIV*

## 2020-08-03 PROCEDURE — 83605 ASSAY OF LACTIC ACID: CPT

## 2020-08-03 PROCEDURE — 87186 SC STD MICRODIL/AGAR DIL: CPT

## 2020-08-03 PROCEDURE — 700111 HCHG RX REV CODE 636 W/ 250 OVERRIDE (IP): Performed by: EMERGENCY MEDICINE

## 2020-08-03 PROCEDURE — 80053 COMPREHEN METABOLIC PANEL: CPT

## 2020-08-03 PROCEDURE — A9270 NON-COVERED ITEM OR SERVICE: HCPCS | Performed by: HOSPITALIST

## 2020-08-03 PROCEDURE — 96366 THER/PROPH/DIAG IV INF ADDON: CPT

## 2020-08-03 PROCEDURE — 700102 HCHG RX REV CODE 250 W/ 637 OVERRIDE(OP): Performed by: HOSPITALIST

## 2020-08-03 PROCEDURE — 87077 CULTURE AEROBIC IDENTIFY: CPT

## 2020-08-03 PROCEDURE — 96367 TX/PROPH/DG ADDL SEQ IV INF: CPT

## 2020-08-03 PROCEDURE — 99223 1ST HOSP IP/OBS HIGH 75: CPT | Mod: CS | Performed by: HOSPITALIST

## 2020-08-03 PROCEDURE — 73630 X-RAY EXAM OF FOOT: CPT | Mod: RT

## 2020-08-03 PROCEDURE — 87040 BLOOD CULTURE FOR BACTERIA: CPT

## 2020-08-03 PROCEDURE — 96375 TX/PRO/DX INJ NEW DRUG ADDON: CPT

## 2020-08-03 PROCEDURE — 99285 EMERGENCY DEPT VISIT HI MDM: CPT

## 2020-08-03 PROCEDURE — 85025 COMPLETE CBC W/AUTO DIFF WBC: CPT

## 2020-08-03 PROCEDURE — 700105 HCHG RX REV CODE 258: Performed by: HOSPITALIST

## 2020-08-03 PROCEDURE — 87147 CULTURE TYPE IMMUNOLOGIC: CPT

## 2020-08-03 RX ORDER — MORPHINE SULFATE 4 MG/ML
1-4 INJECTION, SOLUTION INTRAMUSCULAR; INTRAVENOUS
Status: DISCONTINUED | OUTPATIENT
Start: 2020-08-03 | End: 2020-08-05 | Stop reason: HOSPADM

## 2020-08-03 RX ORDER — METOCLOPRAMIDE 10 MG/1
10 TABLET ORAL EVERY MORNING
Status: DISCONTINUED | OUTPATIENT
Start: 2020-08-04 | End: 2020-08-05 | Stop reason: HOSPADM

## 2020-08-03 RX ORDER — ONDANSETRON 2 MG/ML
4 INJECTION INTRAMUSCULAR; INTRAVENOUS EVERY 4 HOURS PRN
Status: DISCONTINUED | OUTPATIENT
Start: 2020-08-03 | End: 2020-08-05 | Stop reason: HOSPADM

## 2020-08-03 RX ORDER — POLYETHYLENE GLYCOL 3350 17 G/17G
1 POWDER, FOR SOLUTION ORAL
Status: DISCONTINUED | OUTPATIENT
Start: 2020-08-03 | End: 2020-08-05 | Stop reason: HOSPADM

## 2020-08-03 RX ORDER — SODIUM CHLORIDE, SODIUM LACTATE, POTASSIUM CHLORIDE, CALCIUM CHLORIDE 600; 310; 30; 20 MG/100ML; MG/100ML; MG/100ML; MG/100ML
INJECTION, SOLUTION INTRAVENOUS CONTINUOUS
Status: DISCONTINUED | OUTPATIENT
Start: 2020-08-03 | End: 2020-08-05

## 2020-08-03 RX ORDER — ONDANSETRON 2 MG/ML
4 INJECTION INTRAMUSCULAR; INTRAVENOUS ONCE
Status: COMPLETED | OUTPATIENT
Start: 2020-08-03 | End: 2020-08-03

## 2020-08-03 RX ORDER — BISACODYL 10 MG
10 SUPPOSITORY, RECTAL RECTAL
Status: DISCONTINUED | OUTPATIENT
Start: 2020-08-03 | End: 2020-08-05 | Stop reason: HOSPADM

## 2020-08-03 RX ORDER — CLINDAMYCIN PHOSPHATE 600 MG/50ML
600 INJECTION, SOLUTION INTRAVENOUS ONCE
Status: DISCONTINUED | OUTPATIENT
Start: 2020-08-03 | End: 2020-08-03

## 2020-08-03 RX ORDER — ONDANSETRON 8 MG/1
8 TABLET, ORALLY DISINTEGRATING ORAL
COMMUNITY
End: 2021-03-08 | Stop reason: SDUPTHER

## 2020-08-03 RX ORDER — AMOXICILLIN 250 MG
2 CAPSULE ORAL 2 TIMES DAILY
Status: DISCONTINUED | OUTPATIENT
Start: 2020-08-03 | End: 2020-08-05 | Stop reason: HOSPADM

## 2020-08-03 RX ORDER — LISINOPRIL 20 MG/1
20 TABLET ORAL DAILY
Status: DISCONTINUED | OUTPATIENT
Start: 2020-08-04 | End: 2020-08-05

## 2020-08-03 RX ORDER — ACETAMINOPHEN 325 MG/1
650 TABLET ORAL EVERY 6 HOURS PRN
Status: DISCONTINUED | OUTPATIENT
Start: 2020-08-03 | End: 2020-08-05 | Stop reason: HOSPADM

## 2020-08-03 RX ORDER — ROSUVASTATIN CALCIUM 5 MG/1
10 TABLET, COATED ORAL EVERY EVENING
Status: DISCONTINUED | OUTPATIENT
Start: 2020-08-03 | End: 2020-08-05 | Stop reason: HOSPADM

## 2020-08-03 RX ORDER — GABAPENTIN 100 MG/1
100 CAPSULE ORAL
Status: DISCONTINUED | OUTPATIENT
Start: 2020-08-03 | End: 2020-08-05 | Stop reason: HOSPADM

## 2020-08-03 RX ORDER — METOCLOPRAMIDE 10 MG/1
10 TABLET ORAL EVERY MORNING
COMMUNITY
End: 2020-10-13 | Stop reason: SDUPTHER

## 2020-08-03 RX ORDER — OXYCODONE HYDROCHLORIDE 5 MG/1
5-10 TABLET ORAL EVERY 4 HOURS PRN
Status: DISCONTINUED | OUTPATIENT
Start: 2020-08-03 | End: 2020-08-05 | Stop reason: HOSPADM

## 2020-08-03 RX ADMIN — SODIUM CHLORIDE, POTASSIUM CHLORIDE, SODIUM LACTATE AND CALCIUM CHLORIDE: 600; 310; 30; 20 INJECTION, SOLUTION INTRAVENOUS at 22:33

## 2020-08-03 RX ADMIN — ROSUVASTATIN CALCIUM 10 MG: 5 TABLET, FILM COATED ORAL at 22:33

## 2020-08-03 RX ADMIN — AMPICILLIN AND SULBACTAM 3 G: 2; 1 INJECTION, POWDER, FOR SOLUTION INTRAVENOUS at 19:20

## 2020-08-03 RX ADMIN — ONDANSETRON 4 MG: 2 INJECTION INTRAMUSCULAR; INTRAVENOUS at 19:51

## 2020-08-03 RX ADMIN — OXYCODONE 5 MG: 5 TABLET ORAL at 22:46

## 2020-08-03 RX ADMIN — GABAPENTIN 100 MG: 100 CAPSULE ORAL at 22:33

## 2020-08-03 RX ADMIN — VANCOMYCIN HYDROCHLORIDE 2250 MG: 500 INJECTION, POWDER, LYOPHILIZED, FOR SOLUTION INTRAVENOUS at 19:51

## 2020-08-03 SDOH — ECONOMIC STABILITY: FOOD INSECURITY: WITHIN THE PAST 12 MONTHS, THE FOOD YOU BOUGHT JUST DIDN'T LAST AND YOU DIDN'T HAVE MONEY TO GET MORE.: NEVER TRUE

## 2020-08-03 SDOH — ECONOMIC STABILITY: FOOD INSECURITY: WITHIN THE PAST 12 MONTHS, YOU WORRIED THAT YOUR FOOD WOULD RUN OUT BEFORE YOU GOT MONEY TO BUY MORE.: NEVER TRUE

## 2020-08-03 SDOH — ECONOMIC STABILITY: TRANSPORTATION INSECURITY
IN THE PAST 12 MONTHS, HAS THE LACK OF TRANSPORTATION KEPT YOU FROM MEDICAL APPOINTMENTS OR FROM GETTING MEDICATIONS?: NO

## 2020-08-03 SDOH — ECONOMIC STABILITY: TRANSPORTATION INSECURITY
IN THE PAST 12 MONTHS, HAS LACK OF TRANSPORTATION KEPT YOU FROM MEETINGS, WORK, OR FROM GETTING THINGS NEEDED FOR DAILY LIVING?: NO

## 2020-08-03 ASSESSMENT — COGNITIVE AND FUNCTIONAL STATUS - GENERAL
SUGGESTED CMS G CODE MODIFIER DAILY ACTIVITY: CH
WALKING IN HOSPITAL ROOM: A LITTLE
SUGGESTED CMS G CODE MODIFIER MOBILITY: CJ
DAILY ACTIVITIY SCORE: 24
CLIMB 3 TO 5 STEPS WITH RAILING: A LITTLE
MOBILITY SCORE: 22

## 2020-08-03 ASSESSMENT — LIFESTYLE VARIABLES
TOTAL SCORE: 0
TOTAL SCORE: 0
AVERAGE NUMBER OF DAYS PER WEEK YOU HAVE A DRINK CONTAINING ALCOHOL: 0
ON A TYPICAL DAY WHEN YOU DRINK ALCOHOL HOW MANY DRINKS DO YOU HAVE: 0
ALCOHOL_USE: NO
CONSUMPTION TOTAL: NEGATIVE
HAVE YOU EVER FELT YOU SHOULD CUT DOWN ON YOUR DRINKING: NO
EVER_SMOKED: NEVER
HAVE PEOPLE ANNOYED YOU BY CRITICIZING YOUR DRINKING: NO
TOTAL SCORE: 0
HOW MANY TIMES IN THE PAST YEAR HAVE YOU HAD 5 OR MORE DRINKS IN A DAY: 0
EVER FELT BAD OR GUILTY ABOUT YOUR DRINKING: NO
EVER HAD A DRINK FIRST THING IN THE MORNING TO STEADY YOUR NERVES TO GET RID OF A HANGOVER: NO
DOES PATIENT WANT TO STOP DRINKING: NO

## 2020-08-03 ASSESSMENT — ENCOUNTER SYMPTOMS
FEVER: 0
VOMITING: 1
SHORTNESS OF BREATH: 0
DIAPHORESIS: 1
NAUSEA: 1

## 2020-08-03 ASSESSMENT — COPD QUESTIONNAIRES
HAVE YOU SMOKED AT LEAST 100 CIGARETTES IN YOUR ENTIRE LIFE: NO/DON'T KNOW
IN THE PAST 12 MONTHS DO YOU DO LESS THAN YOU USED TO BECAUSE OF YOUR BREATHING PROBLEMS: DISAGREE/UNSURE
COPD SCREENING SCORE: 0
DURING THE PAST 4 WEEKS HOW MUCH DID YOU FEEL SHORT OF BREATH: NONE/LITTLE OF THE TIME
DO YOU EVER COUGH UP ANY MUCUS OR PHLEGM?: NO/ONLY WITH OCCASIONAL COLDS OR INFECTIONS

## 2020-08-03 ASSESSMENT — FIBROSIS 4 INDEX
FIB4 SCORE: 0.85
FIB4 SCORE: 0.43

## 2020-08-03 NOTE — ED TRIAGE NOTES
Chief Complaint   Patient presents with   • Sent by MD     6/26/20 pt had foot surgery. pt now stats MD on vacation and cant get into office. pt states pus weeping from wound, fevers and pain.

## 2020-08-04 LAB
ANION GAP SERPL CALC-SCNC: 11 MMOL/L (ref 7–16)
BUN SERPL-MCNC: 14 MG/DL (ref 8–22)
CALCIUM SERPL-MCNC: 8.3 MG/DL (ref 8.5–10.5)
CHLORIDE SERPL-SCNC: 101 MMOL/L (ref 96–112)
CO2 SERPL-SCNC: 24 MMOL/L (ref 20–33)
CREAT SERPL-MCNC: 0.77 MG/DL (ref 0.5–1.4)
GLUCOSE SERPL-MCNC: 176 MG/DL (ref 65–99)
GRAM STN SPEC: NORMAL
GRAM STN SPEC: NORMAL
POTASSIUM SERPL-SCNC: 4 MMOL/L (ref 3.6–5.5)
SARS-COV-2 RNA RESP QL NAA+PROBE: NOTDETECTED
SIGNIFICANT IND 70042: NORMAL
SIGNIFICANT IND 70042: NORMAL
SITE SITE: NORMAL
SITE SITE: NORMAL
SODIUM SERPL-SCNC: 136 MMOL/L (ref 135–145)
SOURCE SOURCE: NORMAL
SOURCE SOURCE: NORMAL
SPECIMEN SOURCE: NORMAL

## 2020-08-04 PROCEDURE — 160048 HCHG OR STATISTICAL LEVEL 1-5: Performed by: PODIATRIST

## 2020-08-04 PROCEDURE — A6266 IMPREG GAUZE NO H20/SAL/YARD: HCPCS | Performed by: PODIATRIST

## 2020-08-04 PROCEDURE — 87205 SMEAR GRAM STAIN: CPT

## 2020-08-04 PROCEDURE — 700111 HCHG RX REV CODE 636 W/ 250 OVERRIDE (IP): Performed by: ANESTHESIOLOGY

## 2020-08-04 PROCEDURE — 700111 HCHG RX REV CODE 636 W/ 250 OVERRIDE (IP): Performed by: HOSPITALIST

## 2020-08-04 PROCEDURE — A9270 NON-COVERED ITEM OR SERVICE: HCPCS | Performed by: HOSPITALIST

## 2020-08-04 PROCEDURE — 160027 HCHG SURGERY MINUTES - 1ST 30 MINS LEVEL 2: Performed by: PODIATRIST

## 2020-08-04 PROCEDURE — 87015 SPECIMEN INFECT AGNT CONCNTJ: CPT

## 2020-08-04 PROCEDURE — 160038 HCHG SURGERY MINUTES - EA ADDL 1 MIN LEVEL 2: Performed by: PODIATRIST

## 2020-08-04 PROCEDURE — 160009 HCHG ANES TIME/MIN: Performed by: PODIATRIST

## 2020-08-04 PROCEDURE — 36415 COLL VENOUS BLD VENIPUNCTURE: CPT

## 2020-08-04 PROCEDURE — 501838 HCHG SUTURE GENERAL: Performed by: PODIATRIST

## 2020-08-04 PROCEDURE — 87075 CULTR BACTERIA EXCEPT BLOOD: CPT

## 2020-08-04 PROCEDURE — 700105 HCHG RX REV CODE 258

## 2020-08-04 PROCEDURE — A6454 SELF-ADHER BAND W>=3" <5"/YD: HCPCS | Performed by: PODIATRIST

## 2020-08-04 PROCEDURE — 700105 HCHG RX REV CODE 258: Performed by: HOSPITALIST

## 2020-08-04 PROCEDURE — 700101 HCHG RX REV CODE 250: Performed by: PODIATRIST

## 2020-08-04 PROCEDURE — 770001 HCHG ROOM/CARE - MED/SURG/GYN PRIV*

## 2020-08-04 PROCEDURE — 160002 HCHG RECOVERY MINUTES (STAT): Performed by: PODIATRIST

## 2020-08-04 PROCEDURE — 87070 CULTURE OTHR SPECIMN AEROBIC: CPT

## 2020-08-04 PROCEDURE — 700102 HCHG RX REV CODE 250 W/ 637 OVERRIDE(OP): Performed by: HOSPITALIST

## 2020-08-04 PROCEDURE — 160035 HCHG PACU - 1ST 60 MINS PHASE I: Performed by: PODIATRIST

## 2020-08-04 PROCEDURE — 700105 HCHG RX REV CODE 258: Performed by: ANESTHESIOLOGY

## 2020-08-04 PROCEDURE — 160036 HCHG PACU - EA ADDL 30 MINS PHASE I: Performed by: PODIATRIST

## 2020-08-04 PROCEDURE — 0JBQ0ZZ EXCISION OF RIGHT FOOT SUBCUTANEOUS TISSUE AND FASCIA, OPEN APPROACH: ICD-10-PCS | Performed by: PODIATRIST

## 2020-08-04 PROCEDURE — 700101 HCHG RX REV CODE 250: Performed by: ANESTHESIOLOGY

## 2020-08-04 PROCEDURE — 87077 CULTURE AEROBIC IDENTIFY: CPT

## 2020-08-04 PROCEDURE — 80048 BASIC METABOLIC PNL TOTAL CA: CPT

## 2020-08-04 PROCEDURE — 99232 SBSQ HOSP IP/OBS MODERATE 35: CPT | Performed by: FAMILY MEDICINE

## 2020-08-04 PROCEDURE — 500881 HCHG PACK, EXTREMITY: Performed by: PODIATRIST

## 2020-08-04 RX ORDER — HYDRALAZINE HYDROCHLORIDE 20 MG/ML
5 INJECTION INTRAMUSCULAR; INTRAVENOUS
Status: DISCONTINUED | OUTPATIENT
Start: 2020-08-04 | End: 2020-08-04 | Stop reason: HOSPADM

## 2020-08-04 RX ORDER — HALOPERIDOL 5 MG/ML
1 INJECTION INTRAMUSCULAR
Status: DISCONTINUED | OUTPATIENT
Start: 2020-08-04 | End: 2020-08-04 | Stop reason: HOSPADM

## 2020-08-04 RX ORDER — BUPIVACAINE HYDROCHLORIDE 5 MG/ML
INJECTION, SOLUTION EPIDURAL; INTRACAUDAL
Status: DISCONTINUED | OUTPATIENT
Start: 2020-08-04 | End: 2020-08-04 | Stop reason: HOSPADM

## 2020-08-04 RX ORDER — DIPHENHYDRAMINE HYDROCHLORIDE 50 MG/ML
6.25 INJECTION INTRAMUSCULAR; INTRAVENOUS
Status: DISCONTINUED | OUTPATIENT
Start: 2020-08-04 | End: 2020-08-04 | Stop reason: HOSPADM

## 2020-08-04 RX ORDER — LIDOCAINE HYDROCHLORIDE 20 MG/ML
INJECTION, SOLUTION EPIDURAL; INFILTRATION; INTRACAUDAL; PERINEURAL PRN
Status: DISCONTINUED | OUTPATIENT
Start: 2020-08-04 | End: 2020-08-04 | Stop reason: SURG

## 2020-08-04 RX ORDER — OXYCODONE HCL 5 MG/5 ML
5 SOLUTION, ORAL ORAL
Status: DISCONTINUED | OUTPATIENT
Start: 2020-08-04 | End: 2020-08-04 | Stop reason: HOSPADM

## 2020-08-04 RX ORDER — OXYCODONE HCL 5 MG/5 ML
10 SOLUTION, ORAL ORAL
Status: DISCONTINUED | OUTPATIENT
Start: 2020-08-04 | End: 2020-08-04 | Stop reason: HOSPADM

## 2020-08-04 RX ORDER — SODIUM CHLORIDE 9 MG/ML
INJECTION, SOLUTION INTRAVENOUS
Status: COMPLETED
Start: 2020-08-04 | End: 2020-08-04

## 2020-08-04 RX ORDER — SODIUM CHLORIDE, SODIUM LACTATE, POTASSIUM CHLORIDE, CALCIUM CHLORIDE 600; 310; 30; 20 MG/100ML; MG/100ML; MG/100ML; MG/100ML
INJECTION, SOLUTION INTRAVENOUS CONTINUOUS
Status: DISCONTINUED | OUTPATIENT
Start: 2020-08-04 | End: 2020-08-04 | Stop reason: HOSPADM

## 2020-08-04 RX ORDER — MEPERIDINE HYDROCHLORIDE 25 MG/ML
12.5 INJECTION INTRAMUSCULAR; INTRAVENOUS; SUBCUTANEOUS
Status: DISCONTINUED | OUTPATIENT
Start: 2020-08-04 | End: 2020-08-04 | Stop reason: HOSPADM

## 2020-08-04 RX ORDER — ONDANSETRON 2 MG/ML
4 INJECTION INTRAMUSCULAR; INTRAVENOUS
Status: COMPLETED | OUTPATIENT
Start: 2020-08-04 | End: 2020-08-04

## 2020-08-04 RX ORDER — ONDANSETRON 2 MG/ML
INJECTION INTRAMUSCULAR; INTRAVENOUS PRN
Status: DISCONTINUED | OUTPATIENT
Start: 2020-08-04 | End: 2020-08-04 | Stop reason: SURG

## 2020-08-04 RX ADMIN — AMPICILLIN AND SULBACTAM 3 G: 2; 1 INJECTION, POWDER, FOR SOLUTION INTRAVENOUS at 05:19

## 2020-08-04 RX ADMIN — HALOPERIDOL LACTATE 1 MG: 5 INJECTION, SOLUTION INTRAMUSCULAR at 02:18

## 2020-08-04 RX ADMIN — ONDANSETRON 4 MG: 2 INJECTION INTRAMUSCULAR; INTRAVENOUS at 06:21

## 2020-08-04 RX ADMIN — AMPICILLIN AND SULBACTAM 3 G: 2; 1 INJECTION, POWDER, FOR SOLUTION INTRAVENOUS at 23:57

## 2020-08-04 RX ADMIN — LIDOCAINE HYDROCHLORIDE 60 MG: 20 INJECTION, SOLUTION EPIDURAL; INFILTRATION; INTRACAUDAL at 01:11

## 2020-08-04 RX ADMIN — HYDRALAZINE HYDROCHLORIDE 5 MG: 20 INJECTION INTRAMUSCULAR; INTRAVENOUS at 02:45

## 2020-08-04 RX ADMIN — AMPICILLIN AND SULBACTAM 3 G: 2; 1 INJECTION, POWDER, FOR SOLUTION INTRAVENOUS at 17:23

## 2020-08-04 RX ADMIN — FENTANYL CITRATE 50 MCG: 50 INJECTION INTRAMUSCULAR; INTRAVENOUS at 02:16

## 2020-08-04 RX ADMIN — FENTANYL CITRATE 50 MCG: 50 INJECTION INTRAMUSCULAR; INTRAVENOUS at 01:11

## 2020-08-04 RX ADMIN — OXYCODONE 10 MG: 5 TABLET ORAL at 21:42

## 2020-08-04 RX ADMIN — METOCLOPRAMIDE 10 MG: 10 TABLET ORAL at 05:19

## 2020-08-04 RX ADMIN — ROSUVASTATIN CALCIUM 10 MG: 5 TABLET, FILM COATED ORAL at 17:22

## 2020-08-04 RX ADMIN — DIPHENHYDRAMINE HYDROCHLORIDE 6.25 MG: 50 INJECTION INTRAMUSCULAR; INTRAVENOUS at 02:21

## 2020-08-04 RX ADMIN — PROPOFOL 200 MG: 10 INJECTION, EMULSION INTRAVENOUS at 01:11

## 2020-08-04 RX ADMIN — VANCOMYCIN HYDROCHLORIDE 1000 MG: 500 INJECTION, POWDER, LYOPHILIZED, FOR SOLUTION INTRAVENOUS at 13:30

## 2020-08-04 RX ADMIN — OXYCODONE 5 MG: 5 TABLET ORAL at 11:46

## 2020-08-04 RX ADMIN — HYDRALAZINE HYDROCHLORIDE 5 MG: 20 INJECTION INTRAMUSCULAR; INTRAVENOUS at 03:03

## 2020-08-04 RX ADMIN — ONDANSETRON 4 MG: 2 INJECTION INTRAMUSCULAR; INTRAVENOUS at 01:39

## 2020-08-04 RX ADMIN — ONDANSETRON 4 MG: 2 INJECTION INTRAMUSCULAR; INTRAVENOUS at 02:13

## 2020-08-04 RX ADMIN — SODIUM CHLORIDE, POTASSIUM CHLORIDE, SODIUM LACTATE AND CALCIUM CHLORIDE: 600; 310; 30; 20 INJECTION, SOLUTION INTRAVENOUS at 02:13

## 2020-08-04 RX ADMIN — SODIUM CHLORIDE, POTASSIUM CHLORIDE, SODIUM LACTATE AND CALCIUM CHLORIDE: 600; 310; 30; 20 INJECTION, SOLUTION INTRAVENOUS at 02:15

## 2020-08-04 RX ADMIN — SODIUM CHLORIDE, POTASSIUM CHLORIDE, SODIUM LACTATE AND CALCIUM CHLORIDE: 600; 310; 30; 20 INJECTION, SOLUTION INTRAVENOUS at 21:43

## 2020-08-04 RX ADMIN — AMPICILLIN AND SULBACTAM 3 G: 2; 1 INJECTION, POWDER, FOR SOLUTION INTRAVENOUS at 11:44

## 2020-08-04 RX ADMIN — HYDRALAZINE HYDROCHLORIDE 5 MG: 20 INJECTION INTRAMUSCULAR; INTRAVENOUS at 02:22

## 2020-08-04 RX ADMIN — DOCUSATE SODIUM 50 MG AND SENNOSIDES 8.6 MG 2 TABLET: 8.6; 5 TABLET, FILM COATED ORAL at 05:19

## 2020-08-04 RX ADMIN — LISINOPRIL 20 MG: 20 TABLET ORAL at 05:19

## 2020-08-04 RX ADMIN — AMPICILLIN AND SULBACTAM 3 G: 2; 1 INJECTION, POWDER, FOR SOLUTION INTRAVENOUS at 00:06

## 2020-08-04 RX ADMIN — SODIUM CHLORIDE 500 ML: 9 INJECTION, SOLUTION INTRAVENOUS at 00:06

## 2020-08-04 RX ADMIN — VANCOMYCIN HYDROCHLORIDE 1000 MG: 500 INJECTION, POWDER, LYOPHILIZED, FOR SOLUTION INTRAVENOUS at 21:43

## 2020-08-04 ASSESSMENT — COGNITIVE AND FUNCTIONAL STATUS - GENERAL
CLIMB 3 TO 5 STEPS WITH RAILING: A LITTLE
SUGGESTED CMS G CODE MODIFIER DAILY ACTIVITY: CH
MOBILITY SCORE: 22
SUGGESTED CMS G CODE MODIFIER MOBILITY: CJ
WALKING IN HOSPITAL ROOM: A LITTLE
DAILY ACTIVITIY SCORE: 24

## 2020-08-04 ASSESSMENT — ENCOUNTER SYMPTOMS
NAUSEA: 0
FEVER: 0
DOUBLE VISION: 0
VOMITING: 0
BLURRED VISION: 0
COUGH: 0
PHOTOPHOBIA: 0
ORTHOPNEA: 0
HEARTBURN: 0
WEIGHT LOSS: 0
TINGLING: 0
SPUTUM PRODUCTION: 0
CHILLS: 0
DIZZINESS: 0
HEMOPTYSIS: 0
PALPITATIONS: 0
HEADACHES: 0

## 2020-08-04 ASSESSMENT — PAIN SCALES - GENERAL: PAIN_LEVEL: 3

## 2020-08-04 NOTE — ANESTHESIA TIME REPORT
Anesthesia Start and Stop Event Times     Date Time Event    8/4/2020 0105 Ready for Procedure     0109 Anesthesia Start     0151 Anesthesia Stop        Responsible Staff  08/04/20    Name Role Begin End    Nick De Santiago M.D. Anesth 0109 0151        Preop Diagnosis (Free Text):  Pre-op Diagnosis     Cellulitis and abscess of foot        Preop Diagnosis (Codes):    Post op Diagnosis  Cellulitis and abscess of toe of left foot      Premium Reason  B. 1st Call    Comments:

## 2020-08-04 NOTE — H&P
Hospital Medicine History & Physical Note    Date of Service  8/3/2020    Primary Care Physician  Candy Gupta M.D.    Code Status  Full Code    Chief Complaint  Chief Complaint   Patient presents with   • Sent by MD     6/26/20 pt had foot surgery. pt now stats MD on vacation and cant get into office. pt states pus weeping from wound, fevers and pain.        History of Presenting Illness  45 y.o. male who presented 8/3/2020 with right foot pain  Mr. Hawkins is a prior right fifth toe amputation 2015.  He had surgery by Dr. Kurtz podiatry on 6/26/2020.  He saw him on 7/22/2020 as he had some clear drainage and it was thought to be probably a seroma.  Today he has had increased swelling and redness of his right foot and today took a dry skin off and pus drained out of the wound.*Also started vomiting this morning and has been having night sweats for the last 2 nights.  Presents emergency room with these complaints and here appears to have an abscess with cellulitis. Dr. Kurtz called from the emergency room and he initially requested that he is transferred to [facility] as is where he does his surgeries but agreed to do surgery here.  Patient will be given IV antibiotics and a rapid COVID for preoperative screening n.p.o. status at midnight for surgery in the morning  Patient denies exposure to persons known to have COVID, denies changes in his taste or smell, no shortness of breath, no cough.    Review of Systems  Review of Systems   Constitutional: Positive for diaphoresis. Negative for fever.   Respiratory: Negative for shortness of breath.    Cardiovascular: Negative for chest pain.   Gastrointestinal: Positive for nausea and vomiting.   All other systems reviewed and are negative.      Past Medical History   has a past medical history of Arthritis (right hip), Backpain, Dental disorder (06/2020), Diabetes, Gastroparesis, Hepatitis C (2009), Hepatitis C carrier (HCC), High cholesterol, Hyperlipidemia,  Hypertension, Infectious disease, and Leukocytosis (11/25/2019).    Surgical History   has a past surgical history that includes nano by laparoscopy; irrigation & debridement ortho (Right, 4/4/2016); irrigation & debridement ortho (Right, 4/10/2016); irrigation & debridement ortho (Right, 4/13/2016); other (hepatitis c ); toe amputation (Right, 8/16/2018); cholecystectomy (2011); appendectomy (1988); and ostectomy (Right, 6/26/2020).     Family History  family history includes Arthritis in his father and mother; Diabetes in his mother; Heart Attack in his father; Heart Disease in his father; Hyperlipidemia in his father and mother; Hypertension in his father; Stroke in his father.     Social History   reports that he quit smoking about 8 years ago. He has a 5.00 pack-year smoking history. He has never used smokeless tobacco. He reports current drug use. Drugs: Marijuana and Inhaled. He reports that he does not drink alcohol.    Allergies  Allergies   Allergen Reactions   • Bee Swelling     Bee stings       Medications  Prior to Admission Medications   Prescriptions Last Dose Informant Patient Reported? Taking?   gabapentin (NEURONTIN) 100 MG Cap 8/2/2020 at 2200 Patient Yes No   Sig: Take 100 mg by mouth every bedtime.   linaGLIPtin-metFORMIN HCl ER 5-1000 MG TABLET SR 24 HR 8/3/2020 at 0900 Patient Yes Yes   Sig: Take 1 Tab by mouth 2 Times a Day.   lisinopril (PRINIVIL) 20 MG Tab 8/3/2020 at 0900 Patient No No   Sig: Take 1 Tab by mouth every day.   metoclopramide (REGLAN) 10 MG Tab 8/3/2020 at 0900 Patient Yes Yes   Sig: Take 10 mg by mouth every morning.   omeprazole (PRILOSEC) 20 MG delayed-release capsule 8/3/2020 at 0900 Patient Yes No   Sig: Take 20 mg by mouth 2 times a day.   ondansetron (ZOFRAN ODT) 8 MG TABLET DISPERSIBLE 8/2/2020 at 2200 Patient Yes Yes   Sig: Take 8 mg by mouth every bedtime.   rosuvastatin (CRESTOR) 10 MG Tab 8/2/2020 at 2100 Patient No No   Sig: Take 1 Tab by mouth every evening.       Facility-Administered Medications: None       Physical Exam  Temp:  [36.6 °C (97.9 °F)-37.2 °C (98.9 °F)] 37.2 °C (98.9 °F)  Pulse:  [76-84] 84  Resp:  [16] 16  BP: (148-187)/(91-96) 187/96  SpO2:  [97 %-100 %] 100 %    Physical Exam  Vitals signs and nursing note reviewed.   Constitutional:       Appearance: He is ill-appearing.      Comments: He is in distress as he is vomiting   HENT:      Head: Normocephalic and atraumatic.      Mouth/Throat:      Mouth: Mucous membranes are dry.      Pharynx: Oropharynx is clear.   Eyes:      Extraocular Movements: Extraocular movements intact.      Conjunctiva/sclera: Conjunctivae normal.   Neck:      Musculoskeletal: Normal range of motion and neck supple.   Cardiovascular:      Rate and Rhythm: Normal rate and regular rhythm.      Heart sounds: No murmur.   Pulmonary:      Effort: Pulmonary effort is normal.      Breath sounds: Normal breath sounds.   Abdominal:      General: There is no distension.      Tenderness: There is no abdominal tenderness.   Musculoskeletal:      Comments: Right foot missing fifth toe, foot is red and swollen on the lateral aspect.  Foot is tender to the touch.  There is swelling and mild erythema midway up the shin    He has right thenar wasting   Skin:     General: Skin is warm and dry.   Neurological:      General: No focal deficit present.      Mental Status: He is alert and oriented to person, place, and time.   Psychiatric:         Mood and Affect: Mood normal.         Behavior: Behavior normal.         Laboratory:  Recent Labs     08/03/20  1836   WBC 10.9*   RBC 4.20*   HEMOGLOBIN 11.8*   HEMATOCRIT 37.3*   MCV 88.8   MCH 28.1   MCHC 31.6*   RDW 41.5   PLATELETCT 473*   MPV 8.6*     Recent Labs     08/03/20  1836   SODIUM 139   POTASSIUM 4.6   CHLORIDE 100   CO2 23   GLUCOSE 138*   BUN 20   CREATININE 0.96   CALCIUM 9.5     Recent Labs     08/03/20  1836   ALTSGPT 59*   ASTSGOT 34   ALKPHOSPHAT 129*   TBILIRUBIN 0.3   GLUCOSE 138*          No results for input(s): NTPROBNP in the last 72 hours.      No results for input(s): TROPONINT in the last 72 hours.    Imaging:  DX-FOOT-COMPLETE 3+ RIGHT   Final Result      1.  Possible erosive change of the medial navicular. Differential diagnosis includes erosive arthritis or septic arthritis especially if there is clinical evidence for infection or an overlying open wound      2.  Subacute, fracture of the base of 4th metatarsal      3.  Prior amputation at the base of the 5th metatarsal            Assessment/Plan:  I anticipate this patient will require at least two midnights for appropriate medical management, necessitating inpatient admission.    * Cellulitis and abscess of foot- (present on admission)  Assessment & Plan  Right foot abscess draining pus and has associated cellulitis  WBC 10.9  IV vancomycin and unasyn ordered  Dr. Kurtz DPM aware and has requested admit, NPO midnight, with likely debridement on 8/4  IV and oral pain medications  Stat COVID ordered for pre-operative clearance.    Type 2 diabetes mellitus with hyperglycemia, without long-term current use of insulin (HCC)- (present on admission)  Assessment & Plan  Glucose 138 in the ER  He is on metformin which will be continued  He does not require sliding scale insulin      Hepatitis C- (present on admission)  Assessment & Plan  Hx of    History of amputation of lesser toe of right foot (HCC)- (present on admission)  Assessment & Plan  In 2015

## 2020-08-04 NOTE — PROGRESS NOTES
"Pharmacy Kinetics 45 y.o. male on vancomycin day # 2 2020    Currently on Vancomycin 1000 mg (12mg/kg) iv q8hr (0400,1200,2000)    Provider specified end date:TBD    Indication for Treatment: post op foot infection     Pertinent history per medical record: Admitted on 8/3/2020 with right foot pain  Mr. Hawkins is a prior right fifth toe amputation .  He had surgery by Dr. Kurtz podiatry on 2020.  He saw him on 2020 as he had some clear drainage and it was thought to be probably a seroma.  Today he has had increased swelling and redness of his right foot and today took a dry skin off and pus drained out of the wound. Also started vomiting this morning and has been having night sweats for the last 2 nights.  Presents emergency room with these complaints and here appears to have an abscess with cellulitis. Dr. Kurtz called from the emergency room and he initially requested that he is transferred to [facility] as is where he does his surgeries but agreed to do surgery here.     Other antibiotics: Unasyn     Allergies: Bee      List concerns for renal function: none     Pertinent cultures to date:   8/3: Blood cultures x2 - in process  Reportedly hx of MRSA    Recent Labs     20  1836   WBC 10.9*   NEUTSPOLYS 69.90     Recent Labs     20  1836 20  0708   BUN 20 14   CREATININE 0.96 0.77   ALBUMIN 3.8  --      No results for input(s): VANCOTROUGH, VANCOPEAK, VANCORANDOM in the last 72 hours.    Intake/Output Summary (Last 24 hours) at 2020 0937  Last data filed at 2020 0806  Gross per 24 hour   Intake 1941.67 ml   Output 75 ml   Net 1866.67 ml      /84   Pulse 72   Temp 36.5 °C (97.7 °F) (Temporal)   Resp 18   Ht 1.803 m (5' 11\")   Wt 81.2 kg (179 lb 0.2 oz)   SpO2 98%  Temp (24hrs), Av.9 °C (98.5 °F), Min:36.5 °C (97.7 °F), Max:37.7 °C (99.9 °F)      A/P   1. Vancomycin dose change: Not indicated at this time- continue with Vancomycin 1000 mg (12mg/kg) iv " q8hr (0400,1200,2000)  2. Next vancomycin level: 8/5/20 @ 1130 (ordered)  3. Goal trough:10-15 mcg/mL  4. Comments: Patient underwent I and D this morning. Patient was on vancomycin previously (Epic notes indicate 2016) and tolerated q8H dosing at that time. Scr stable. CMP X 3 days ordered to monitor renal function while on Vancomycin. Will obtain vancomycin trough to ensure therapeutic levels. Pharmacy will continue to monitor.    Rebeka Washington, YolandaD

## 2020-08-04 NOTE — OR SURGEON
Immediate Post OP Note    PreOp Diagnosis: Infected right foot with cellulitis and purulent drainage    PostOp Diagnosis: Infected right foot with cellulitis and purulent drainage    Procedure(s):  INCISION AND DRAINAGE - Wound Class: Dirty or Infected    Surgeon(s):  Willie Kurtz D.P.M.    Anesthesiologist/Type of Anesthesia:  Anesthesiologist: Nick De Santiago M.D./General    Surgical Staff:  Circulator: Bhargavi Ramos R.N.; Vinita Barajas R.N.  Scrub Person: Emelyn Jacinto    Specimens removed if any:  ID Type Source Tests Collected by Time Destination   1 : BONE RIGHT FOOT Bone Foot AEROBIC/ANAEROBIC CULTURE (SURGERY) Willie Kurtz D.P.M. 8/4/2020  1:21 AM        Estimated Blood Loss: 3-5 mL    Findings: See op report    Complications: None        8/4/2020 2:07 AM Willie Kurtz D.P.M.

## 2020-08-04 NOTE — PROGRESS NOTES
"Pharmacy Kinetics 45 y.o. male on vancomycin day #1 2020    Received vancomycin loading dose    Indication for Treatment: post op foot infection    Pertinent history per medical record: Admitted on 8/3/2020 with right foot pain  Mr. Hawkins is a prior right fifth toe amputation .  He had surgery by Dr. Kurtz podiatry on 2020.  He saw him on 2020 as he had some clear drainage and it was thought to be probably a seroma.  Today he has had increased swelling and redness of his right foot and today took a dry skin off and pus drained out of the wound. Also started vomiting this morning and has been having night sweats for the last 2 nights.  Presents emergency room with these complaints and here appears to have an abscess with cellulitis. Dr. Kurtz called from the emergency room and he initially requested that he is transferred to [facility] as is where he does his surgeries but agreed to do surgery here.    Other antibiotics: Unasyn    Allergies: Bee     List concerns for renal function: none    Pertinent cultures to date:   Blood cultures x2 - in process  Reportedly hx of MRSA    Recent Labs     20  1836   WBC 10.9*   NEUTSPOLYS 69.90     Recent Labs     20  1836   BUN 20   CREATININE 0.96   ALBUMIN 3.8     No results for input(s): VANCOTROUGH, VANCOPEAK, VANCORANDOM in the last 72 hours.    Intake/Output Summary (Last 24 hours) at 2020 0031  Last data filed at 8/3/2020 2310  Gross per 24 hour   Intake 0 ml   Output --   Net 0 ml      /86   Pulse 73   Temp 37.7 °C (99.9 °F) (Temporal)   Resp 18   Ht 1.803 m (5' 11\")   Wt 81.2 kg (179 lb 0.2 oz)   SpO2 95%  Temp (24hrs), Av.2 °C (99 °F), Min:36.6 °C (97.9 °F), Max:37.7 °C (99.9 °F)      A/P   1. Vancomycin dose change: start vancomycin 12 mg/kg q8hr (1000mg)  2. Next vancomycin level: Prior to the 4th or 5th dose (not ordered)  3. Goal trough: 10-15 mcg/ml  4. Comments: Dosing as above. Pharmacy will follow. " Narrow therapy as able.    Jorge A Terry, PharmD, BCPS

## 2020-08-04 NOTE — ASSESSMENT & PLAN NOTE
Glucose 138 in the ER  He is on metformin which will be continued  He does not require sliding scale insulin

## 2020-08-04 NOTE — PROGRESS NOTES
[facility] Medicine Daily Progress Note    Date of Service  8/4/2020    Chief Complaint  45 y.o. male admitted 8/3/2020 with  right foot pain    Hospital Course     45 y.o. male who presented 8/3/2020 with right foot pain  Mr. Hawkins is a prior right fifth toe amputation 2015.  He had surgery by Dr. Kurtz podiatry on 6/26/2020.  He saw him on 7/22/2020 as he had some clear drainage and it was thought to be probably a seroma.  Today he has had increased swelling and redness of his right foot and today took a dry skin off and pus drained out of the wound.*Also started vomiting this morning and has been having night sweats for the last 2 nights.  Presents emergency room with these complaints and here appears to have an abscess with cellulitis. Dr. Kurtz called from the emergency room and he initially requested that he is transferred to [facility] as is where he does his surgeries but agreed to do surgery here.  Patient will be given IV antibiotics and a rapid COVID for preoperative screening n.p.o. status at midnight for surgery in the morning  Patient denies exposure to persons known to have COVID, denies changes in his taste or smell, no shortness of breath, no cough.    Interval Problem Update  S/p I&D    Consultants/Specialty  PODIATRY    Code Status  full    Disposition  pendinbg    Review of Systems  Review of Systems   Constitutional: Negative for chills, fever and weight loss.   HENT: Negative for ear pain, hearing loss and tinnitus.    Eyes: Negative for blurred vision, double vision and photophobia.   Respiratory: Negative for cough, hemoptysis and sputum production.    Cardiovascular: Negative for chest pain, palpitations and orthopnea.   Gastrointestinal: Negative for heartburn, nausea and vomiting.   Genitourinary: Negative for dysuria, frequency and urgency.   Musculoskeletal: Positive for joint pain.   Skin: Negative for itching.   Neurological: Negative for dizziness, tingling and headaches.         Physical Exam  Temp:  [36.5 °C (97.7 °F)-37.7 °C (99.9 °F)] 36.9 °C (98.4 °F)  Pulse:  [65-95] 65  Resp:  [13-23] 18  BP: ()/() 144/83  SpO2:  [95 %-100 %] 99 %    Physical Exam  Constitutional:       Appearance: Normal appearance.   HENT:      Head: Normocephalic and atraumatic.      Nose: Nose normal.      Mouth/Throat:      Mouth: Mucous membranes are dry.   Eyes:      Extraocular Movements: Extraocular movements intact.      Pupils: Pupils are equal, round, and reactive to light.   Neck:      Musculoskeletal: Normal range of motion and neck supple.   Cardiovascular:      Rate and Rhythm: Normal rate and regular rhythm.      Pulses: Normal pulses.      Heart sounds: Normal heart sounds.   Pulmonary:      Effort: Pulmonary effort is normal.      Breath sounds: Normal breath sounds.   Abdominal:      General: Abdomen is flat.      Palpations: Abdomen is soft.   Musculoskeletal:      Comments: Dressing on the right foot is noted   Skin:     General: Skin is warm and dry.   Neurological:      Mental Status: He is alert. Mental status is at baseline.         Fluids    Intake/Output Summary (Last 24 hours) at 8/4/2020 1438  Last data filed at 8/4/2020 1130  Gross per 24 hour   Intake 2181.67 ml   Output 75 ml   Net 2106.67 ml       Laboratory  Recent Labs     08/03/20  1836   WBC 10.9*   RBC 4.20*   HEMOGLOBIN 11.8*   HEMATOCRIT 37.3*   MCV 88.8   MCH 28.1   MCHC 31.6*   RDW 41.5   PLATELETCT 473*   MPV 8.6*     Recent Labs     08/03/20  1836 08/04/20  0708   SODIUM 139 136   POTASSIUM 4.6 4.0   CHLORIDE 100 101   CO2 23 24   GLUCOSE 138* 176*   BUN 20 14   CREATININE 0.96 0.77   CALCIUM 9.5 8.3*                   Imaging       Assessment/Plan  * Cellulitis and abscess of foot- (present on admission)  Assessment & Plan  Right foot abscess draining pus and has associated cellulitis  WBC 10.9  s/p I&D  PODIATRY INPUT IS NOTED  On vancomycin  On unasyn  Foot xray resiult is noted      Type 2 diabetes  mellitus with hyperglycemia, without long-term current use of insulin (HCC)- (present on admission)  Assessment & Plan  Glucose 138 in the ER  He is on metformin which will be continued  He does not require sliding scale insulin      Hepatitis C- (present on admission)  Assessment & Plan  Hx of    History of amputation of lesser toe of right foot (HCC)- (present on admission)  Assessment & Plan  In 2015       VTE prophylaxis: lovenox

## 2020-08-04 NOTE — OP REPORT
DATE OF SERVICE:  08/04/2020    PREOPERATIVE DIAGNOSIS:  Infected right foot with purulent drainage.    POSTOPERATIVE DIAGNOSIS:  Infected right foot with purulent drainage.    PROCEDURE PERFORMED:  Incision and drainage with debridement of nonviable   tissue of the right foot.    SURGEON:  Juan Kurtz DPM    ANESTHESIOLOGIST:  Nick De Santiago MD    ANESTHESIA TYPE:  General anesthesia.    HEMOSTASIS:  None.    INJECTABLES:  A 10 mL of 1% lidocaine plain and 0.5% Marcaine plain in a 1:1   mixture.    MATERIALS USED:  A 1/2-inch Iodosorb packing.    ESTIMATED BLOOD LOSS:  3-5 mL.    SPECIMENS:  Bone, which was sent to pathology for culture.    CONDITION:  Stable.    INDICATIONS FOR PROCEDURE:  I operated on the patient approximately 4-6 weeks   ago to remove exostosis of the right foot after a partial fifth ray   amputation.  He continued to have some swelling after the procedure.  He had   some mild drainage, which appeared to be from a seroma from the surgical site.    The [facility]  called me yesterday stating that he was being admitted for   infection from the right foot with pain and swelling.  They stated that at   that time they were able to express some purulent drainage.  X-rays and   cultures were done.  We went ahead and scheduled the incision and drainage   procedure.  The risks and benefits were discussed with the patient.  All of   his questions answered to his satisfaction.  No promises or guarantees were   given, nor were they implied.  The consent form was signed, reviewed, and   placed in the patient's chart.    DESCRIPTION OF PROCEDURE:  Preoperatively, the patient was on vancomycin and   under mild sedation, he was brought to the operating room and placed on the   operating table in supine position.  The right foot was then scrubbed,   prepped, and draped in the usual aseptic manner.  A critical pause then   performed to identify the correct patient, surgical site, and procedure.  All   OR  staff and surgeon were in agreement.    A tourniquet was not used.  Attention was directed to the lateral aspect of   the right foot where a 4-5 cm linear incision was made in the right foot over   the base of the fifth metatarsal.  The incision was deepened through the   subcutaneous tissues.  There was noted to be a mild amount of purulent   drainage that was emitted.  Underneath that area, there was significant   nonviable tissue that was white in color.  This tissue was debrided utilizing   a rongeur and curette.  I also felt prominent bone spurs that are also seen on   x-rays, which appeared to be fibrous tissue with some bone mixed in.  I also   debrided this tissue, it was sent to pathology for further examination.  Once   I was satisfied with the amount of nonviable tissue that was removed, the   wound was washed with pulsatile lavage of 3 L of sterile water infused with   30 mL of Betadine.  Following the lavage, the outer gloves were removed and a   new sterile field was created and the wound was then packed with the iodoform   1/2-inch packing and the wound was then covered with Xeroform, 4x4, Sydnee,   and soft roll.  A layer of Coban was then lightly placed around the dressings.    The patient tolerated the procedure and anesthesia well.  He was then   transferred from the operating room to the recovery room with vital signs   stable and vascular status intact.  Following a period of postoperative   monitoring, he will be sent back up to the floor for further evaluation.  He   was given strict instructions to wear his surgical shoe at all times while   ambulating.  He will be able to resume inpatient medications per   recommendation from the admitting physician.  They are to call with any   questions or concerns.  He will then follow up in my office upon discharge.       ____________________________________     FSLIME Farrell    DD:  08/04/2020 07:30:40  DT:  08/04/2020 08:24:59    D#:   [id number]  Job#:  [id number]

## 2020-08-04 NOTE — OR NURSING
Pt is AOx4. Pain is tolerable after pain medications. Pt was hypertensive, medicated pt per MAR. Pt has dressing on R foot (iodoform strip packingm petroleum gauze, dry gauze, kerlix, soft-roll, and coban), CDI. Pt has <3 sec cap refill on R foot and no numbness/tingling. Pt is wearing 2 gold rings. Called report to HENRRY Li. All questions answered, no other needs at this time.

## 2020-08-04 NOTE — ANESTHESIA PREPROCEDURE EVALUATION
Relevant Problems   NEURO   (+) History of Nnjn-Dxoms-Mekblrs disease      CARDIAC   (+) Essential hypertension      GI   (+) Gastroesophageal reflux disease without esophagitis         (+) Hepatitis C      ENDO   (+) Type 2 diabetes mellitus with hyperglycemia, without long-term current use of insulin (HCC)      Other   (+) Cellulitis and abscess of foot   (+) Diabetic neuropathy (HCC)   (+) Marijuana smoker       Physical Exam    Airway   Mallampati: II  TM distance: >3 FB  Neck ROM: full       Cardiovascular - normal exam  Rhythm: regular  Rate: normal  (-) murmur     Dental - normal exam           Pulmonary - normal exam  Breath sounds clear to auscultation     Abdominal    Neurological - normal exam                 Anesthesia Plan    ASA 2- EMERGENT   ASA physical status emergent criteria: acutely contaminated wound or identified infection source    Plan - general       Airway plan will be LMA        Induction: intravenous    Postoperative Plan: Postoperative administration of opioids is intended.    Pertinent diagnostic labs and testing reviewed    Informed Consent:    Anesthetic plan and risks discussed with patient.

## 2020-08-04 NOTE — ANESTHESIA PROCEDURE NOTES
Airway    Date/Time: 8/4/2020 1:12 AM  Performed by: Nick De Santiago M.D.  Authorized by: Nick De Santiago M.D.     Location:  OR  Urgency:  Elective  Difficult Airway: No    Indications for Airway Management:  Anesthesia      Spontaneous Ventilation: absent    Sedation Level:  Deep  Preoxygenated: Yes    Mask Difficulty Assessment:  1 - vent by mask  Final Airway Type:  Supraglottic airway  Final Supraglottic Airway:  Standard LMA    SGA Size:  4  Number of Attempts at Approach:  1

## 2020-08-04 NOTE — ASSESSMENT & PLAN NOTE
Right foot abscess draining pus and has associated cellulitis  WBC 10.9  s/p I&D  PODIATRY INPUT IS NOTED  On vancomycin  On unasyn  Foot xray resiult is noted

## 2020-08-04 NOTE — PROGRESS NOTES
Pt arrived to T420 via gurney, ambulated to bed.     Report received from ED RN .  Assessment complete.  A&O x 4. Patient calls appropriately.  Patient ambulates with no assist. Bed alarm off.   Patient has 7/10 pain. Pain managed with prescribed medications.  Denies N&V. Tolerating NPO diet.  + void, + flatus, - BM.  Patient denies SOB.  SCD's off.  Review plan with of care with patient. Call light and personal belongings with in reach. Hourly rounding in place. All needs met at this time.

## 2020-08-04 NOTE — PROGRESS NOTES
Pt A&Ox4.  RLE with sx dressing in place, CMS intact.  States pain well controlled at this time. Offloading shoe in use with ambulation.  Tolerating clear diet, intermittent n/v. + bowel sounds, + flatus, LBM PTA.  Saturating >90% on RA.  Pt ambulates independently with steady gait demonstrated.  Updated on plan of care. Safety education provided. Bed locked in low. Call light within reach. Rounding in place.

## 2020-08-04 NOTE — ANESTHESIA POSTPROCEDURE EVALUATION
Patient: Cayetano Hawkins    Procedure Summary     Date:  08/04/20 Room / Location:  [facility] OR 09 / SURGERY [facility]    Anesthesia Start:  0109 Anesthesia Stop:  0151    Procedure:  INCISION AND DRAINAGE (Right Foot) Diagnosis:  (Cellulitis and abscess of foot)    Surgeon:  Willie Kurtz D.P.M. Responsible Provider:  Nick De Santiago M.D.    Anesthesia Type:  general ASA Status:  2 - Emergent          Final Anesthesia Type: general  Last vitals  BP   Blood Pressure: (!) 187/95    Temp   37.3 °C (99.1 °F)    Pulse   Pulse: 73   Resp   18    SpO2   99 %      Anesthesia Post Evaluation    Patient location during evaluation: PACU  Patient participation: complete - patient participated  Level of consciousness: sleepy but conscious  Pain score: 3    Airway patency: patent  Anesthetic complications: no  Cardiovascular status: hemodynamically stable  Respiratory status: acceptable  Hydration status: euvolemic    PONV: none           Nurse Pain Score: 7 (NPRS)

## 2020-08-04 NOTE — ANESTHESIA QCDR
[facility] Clinical Data Registry (for Quality Improvement)     Postoperative nausea/vomiting risk protocol (Adult = 18 yrs and Pediatric 3-17 yrs)- (430 and 463)  General inhalation anesthetic (NOT TIVA) with PONV risk factors: No  Provision of anti-emetic therapy with at least 2 different classes of agents: N/A  Patient DID NOT receive anti-emetic therapy and reason is documented in Medical Record: N/A    Multimodal Pain Management- (477)  Non-emergent surgery AND patient age >= 18: No  Use of Multimodal Pain Management, two or more drugs and/or interventions, NOT including systemic opioids:   Exception: Documented allergy to multiple classes of analgesics:     Smoking Abstinence (404)  Patient is current smoker (cigarette, pipe, e-cig, marijuanna): Yes  Elective Surgery: No  Abstinence instructions provided prior to day of surgery:   Patient abstained from smoking on day of surgery:     Pre-Op Beta-Blocker in Isolated CABG (44)  Isolated CABG AND patient age >= 18: No  Beta-blocker admin within 24 hours of surgical incision:   Exception:of medical reason(s) for not administering beta blocker within 24 hours prior to surgical incision (e.g., not  indicated,other medical reason):     PACU assessment of acute postoperative pain prior to Anesthesia Care End- Applies to Patients Age = 18- (ABG7)  Initial PACU pain score is which of the following: < 7/10  Patient unable to report pain score: N/A    Post-anesthetic transfer of care checklist/protocol to PACU/ICU- (426 and 427)  Upon conclusion of case, patient transferred to which of the following locations: PACU/Non-ICU  Use of transfer checklist/protocol: Yes  Exclusion: Service Performed in Patient Hospital Room (and thus did not require transfer): N/A  Unplanned admission to ICU related to anesthesia service up through end of PACU care- (MD51)  Unplanned admission to ICU (not initially anticipated at anesthesia start time): No

## 2020-08-04 NOTE — PROGRESS NOTES
2 RN Skin check    PIV R forearm   Dressing on R foot, iodoform strip packing, petroleum gauze, dry gauze, kerlix and coban.    All other skin intact.     Patient turns self from side to side, pillows in use for support and positioning.

## 2020-08-04 NOTE — ED NOTES
Med rec updated and complete. Allergies reviewed.  VIA interview with family ( wife) at bedside. No antibiotic use in last 14 days.      Home pharmacy [facility].

## 2020-08-04 NOTE — PROGRESS NOTES
ER CM notified by patient's nurse that Dr Forbes, patient's podiatrist, has changed his mind and now wants the patient to be admitted here at [facility] (instead of being admitted at [facility]). Patient's nurse to notify patient's wife that patient will be admitted here.    ER CM has notified the NAM at [facility], the admitting hospitalist, Bed Control, and the Transfer Center RN of [facility].

## 2020-08-04 NOTE — DISCHARGE PLANNING
JUAN MANUEL HOLLOWAY notified by admitting hospitalist that the patient had his foot surgery at [facility]. Hospitalist has contacted the podiatrist, and the podiatrist is requesting that the patient be admitted to [facility] instead of [facility].    JUAN MANUEL HOLLOWAY has spoken with the NAM at [facility] and awaiting bed assignment on their surgical floor. Admitting hospitalist here to write discharge-and-readmit orders that can be released when patient arrives on the surgical floor at [facility]. Plan for patient's wive to transport patient via POV (cleared by admitting hospitalist here) to [facility].

## 2020-08-04 NOTE — CARE PLAN
Problem: Pain Management  Goal: Pain level will decrease to patient's comfort goal  Outcome: PROGRESSING AS EXPECTED  Note: Pt rates pain as tolerable most of the time, PRN medications available and utilized infrequently.     Problem: Infection  Goal: Will remain free from infection  Outcome: PROGRESSING AS EXPECTED  Note: IV abx in use. Awaiting culture results. Monitoring labs, vital signs. Standard precautions in use.

## 2020-08-04 NOTE — DISCHARGE PLANNING
NIDHI Crews at [facility], called back. Patient will be going to their surgical floor, bed 216, bed 1. Report can be called to Abhi at [phone].    Dr Rogers, hospitalist, will write discharge-and-readmit orders. These orders are to be released when patient arrives there. Per Dr Rogers, patient's wife can transport patient via POV.

## 2020-08-05 VITALS
SYSTOLIC BLOOD PRESSURE: 164 MMHG | HEART RATE: 58 BPM | BODY MASS INDEX: 25.06 KG/M2 | OXYGEN SATURATION: 98 % | TEMPERATURE: 97.5 F | RESPIRATION RATE: 18 BRPM | WEIGHT: 179.01 LBS | HEIGHT: 71 IN | DIASTOLIC BLOOD PRESSURE: 102 MMHG

## 2020-08-05 LAB
ALBUMIN SERPL BCP-MCNC: 3 G/DL (ref 3.2–4.9)
ALBUMIN/GLOB SERPL: 0.9 G/DL
ALP SERPL-CCNC: 226 U/L (ref 30–99)
ALT SERPL-CCNC: 88 U/L (ref 2–50)
ANION GAP SERPL CALC-SCNC: 11 MMOL/L (ref 7–16)
AST SERPL-CCNC: 119 U/L (ref 12–45)
BASOPHILS # BLD AUTO: 0.3 % (ref 0–1.8)
BASOPHILS # BLD: 0.02 K/UL (ref 0–0.12)
BILIRUB SERPL-MCNC: 0.4 MG/DL (ref 0.1–1.5)
BUN SERPL-MCNC: 8 MG/DL (ref 8–22)
CALCIUM SERPL-MCNC: 8.7 MG/DL (ref 8.5–10.5)
CHLORIDE SERPL-SCNC: 101 MMOL/L (ref 96–112)
CO2 SERPL-SCNC: 24 MMOL/L (ref 20–33)
CREAT SERPL-MCNC: 0.74 MG/DL (ref 0.5–1.4)
EOSINOPHIL # BLD AUTO: 0.11 K/UL (ref 0–0.51)
EOSINOPHIL NFR BLD: 1.4 % (ref 0–6.9)
ERYTHROCYTE [DISTWIDTH] IN BLOOD BY AUTOMATED COUNT: 41.4 FL (ref 35.9–50)
GLOBULIN SER CALC-MCNC: 3.4 G/DL (ref 1.9–3.5)
GLUCOSE SERPL-MCNC: 136 MG/DL (ref 65–99)
HCT VFR BLD AUTO: 31.8 % (ref 42–52)
HGB BLD-MCNC: 10.6 G/DL (ref 14–18)
IMM GRANULOCYTES # BLD AUTO: 0.03 K/UL (ref 0–0.11)
IMM GRANULOCYTES NFR BLD AUTO: 0.4 % (ref 0–0.9)
LYMPHOCYTES # BLD AUTO: 1.93 K/UL (ref 1–4.8)
LYMPHOCYTES NFR BLD: 25.2 % (ref 22–41)
MCH RBC QN AUTO: 29.6 PG (ref 27–33)
MCHC RBC AUTO-ENTMCNC: 33.3 G/DL (ref 33.7–35.3)
MCV RBC AUTO: 88.8 FL (ref 81.4–97.8)
MONOCYTES # BLD AUTO: 0.49 K/UL (ref 0–0.85)
MONOCYTES NFR BLD AUTO: 6.4 % (ref 0–13.4)
NEUTROPHILS # BLD AUTO: 5.09 K/UL (ref 1.82–7.42)
NEUTROPHILS NFR BLD: 66.3 % (ref 44–72)
NRBC # BLD AUTO: 0 K/UL
NRBC BLD-RTO: 0 /100 WBC
PLATELET # BLD AUTO: 364 K/UL (ref 164–446)
PMV BLD AUTO: 8.4 FL (ref 9–12.9)
POTASSIUM SERPL-SCNC: 4.1 MMOL/L (ref 3.6–5.5)
PROT SERPL-MCNC: 6.4 G/DL (ref 6–8.2)
RBC # BLD AUTO: 3.58 M/UL (ref 4.7–6.1)
SODIUM SERPL-SCNC: 136 MMOL/L (ref 135–145)
VANCOMYCIN TROUGH SERPL-MCNC: 12.6 UG/ML (ref 10–20)
WBC # BLD AUTO: 7.7 K/UL (ref 4.8–10.8)

## 2020-08-05 PROCEDURE — 85025 COMPLETE CBC W/AUTO DIFF WBC: CPT

## 2020-08-05 PROCEDURE — 99239 HOSP IP/OBS DSCHRG MGMT >30: CPT | Performed by: FAMILY MEDICINE

## 2020-08-05 PROCEDURE — 80202 ASSAY OF VANCOMYCIN: CPT

## 2020-08-05 PROCEDURE — 700111 HCHG RX REV CODE 636 W/ 250 OVERRIDE (IP): Performed by: HOSPITALIST

## 2020-08-05 PROCEDURE — 700102 HCHG RX REV CODE 250 W/ 637 OVERRIDE(OP): Performed by: HOSPITALIST

## 2020-08-05 PROCEDURE — A9270 NON-COVERED ITEM OR SERVICE: HCPCS | Performed by: HOSPITALIST

## 2020-08-05 PROCEDURE — 97161 PT EVAL LOW COMPLEX 20 MIN: CPT

## 2020-08-05 PROCEDURE — 80053 COMPREHEN METABOLIC PANEL: CPT

## 2020-08-05 PROCEDURE — 700105 HCHG RX REV CODE 258: Performed by: HOSPITALIST

## 2020-08-05 PROCEDURE — 36415 COLL VENOUS BLD VENIPUNCTURE: CPT

## 2020-08-05 RX ORDER — TRAMADOL HYDROCHLORIDE 50 MG/1
50 TABLET ORAL EVERY 6 HOURS PRN
Qty: 20 TAB | Refills: 0 | Status: SHIPPED | OUTPATIENT
Start: 2020-08-05 | End: 2020-08-10

## 2020-08-05 RX ORDER — SULFAMETHOXAZOLE AND TRIMETHOPRIM 800; 160 MG/1; MG/1
1 TABLET ORAL 2 TIMES DAILY
Qty: 16 TAB | Refills: 0 | Status: SHIPPED | OUTPATIENT
Start: 2020-08-05 | End: 2020-08-13

## 2020-08-05 RX ADMIN — ACETAMINOPHEN 650 MG: 325 TABLET, FILM COATED ORAL at 10:55

## 2020-08-05 RX ADMIN — AMPICILLIN AND SULBACTAM 3 G: 2; 1 INJECTION, POWDER, FOR SOLUTION INTRAVENOUS at 05:20

## 2020-08-05 RX ADMIN — DOCUSATE SODIUM 50 MG AND SENNOSIDES 8.6 MG 2 TABLET: 8.6; 5 TABLET, FILM COATED ORAL at 05:19

## 2020-08-05 RX ADMIN — LISINOPRIL 20 MG: 20 TABLET ORAL at 05:19

## 2020-08-05 RX ADMIN — VANCOMYCIN HYDROCHLORIDE 1000 MG: 500 INJECTION, POWDER, LYOPHILIZED, FOR SOLUTION INTRAVENOUS at 12:04

## 2020-08-05 RX ADMIN — AMPICILLIN AND SULBACTAM 3 G: 2; 1 INJECTION, POWDER, FOR SOLUTION INTRAVENOUS at 10:59

## 2020-08-05 RX ADMIN — VANCOMYCIN HYDROCHLORIDE 1000 MG: 500 INJECTION, POWDER, LYOPHILIZED, FOR SOLUTION INTRAVENOUS at 03:31

## 2020-08-05 RX ADMIN — OXYCODONE 10 MG: 5 TABLET ORAL at 07:35

## 2020-08-05 RX ADMIN — OXYCODONE 5 MG: 5 TABLET ORAL at 03:27

## 2020-08-05 RX ADMIN — OXYCODONE 10 MG: 5 TABLET ORAL at 12:04

## 2020-08-05 RX ADMIN — METOCLOPRAMIDE 10 MG: 10 TABLET ORAL at 05:19

## 2020-08-05 ASSESSMENT — COGNITIVE AND FUNCTIONAL STATUS - GENERAL
SUGGESTED CMS G CODE MODIFIER MOBILITY: CH
MOBILITY SCORE: 24

## 2020-08-05 ASSESSMENT — ACTIVITIES OF DAILY LIVING (ADL): TOILETING: INDEPENDENT

## 2020-08-05 ASSESSMENT — GAIT ASSESSMENTS
DEVIATION: ANTALGIC
GAIT LEVEL OF ASSIST: SUPERVISED
DISTANCE (FEET): 100

## 2020-08-05 NOTE — CARE PLAN
Problem: Infection  Goal: Will remain free from infection  Outcome: PROGRESSING AS EXPECTED  Note: IV abx in use, monitoring labs and vital signs. Lab called with culture results, page out to MD.     Problem: Pain Management  Goal: Pain level will decrease to patient's comfort goal  Outcome: PROGRESSING AS EXPECTED  Note: Pt rates pain as tolerable most of the time, PRN medications available and utilized infrequently.

## 2020-08-05 NOTE — PROGRESS NOTES
"Pharmacy Kinetics 45 y.o. male on vancomycin day # 3 2020    Currently on Vancomycin 1000 mg (12mg/kg) iv q8hr (0400,1200,2000)     Provider specified end date:TBD     Indication for Treatment: post op foot infection     Pertinent history per medical record: Admitted on 8/3/2020 with right foot pain  Mr. Hawkins is a prior right fifth toe amputation .  He had surgery by Dr. Kurtz podiatry on 2020.  He saw him on 2020 as he had some clear drainage and it was thought to be probably a seroma.  Today he has had increased swelling and redness of his right foot and today took a dry skin off and pus drained out of the wound. Also started vomiting this morning and has been having night sweats for the last 2 nights.  Presents emergency room with these complaints and here appears to have an abscess with cellulitis. Dr. Kurtz called from the emergency room and he initially requested that he is transferred to [facility] as is where he does his surgeries but agreed to do surgery here.     Other antibiotics: Unasyn     Allergies: Bee      List concerns for renal function: none     Pertinent cultures to date:   8/3: Blood cultures x2 - NGTD  8/3: Foot: MRSA    Reportedly hx of MRSA    Recent Labs     20  1836 20  1149   WBC 10.9* 7.7   NEUTSPOLYS 69.90 66.30     Recent Labs     20  1836 20  0708 20  1149   BUN 20 14 8   CREATININE 0.96 0.77 0.74   ALBUMIN 3.8  --  3.0*     Recent Labs     20  1149   VANCOTROUGH 12.6       Intake/Output Summary (Last 24 hours) at 2020 1359  Last data filed at 2020 1200  Gross per 24 hour   Intake 2311.67 ml   Output --   Net 2311.67 ml      BP (!) 164/102   Pulse (!) 58   Temp 36.4 °C (97.5 °F) (Temporal)   Resp 18   Ht 1.803 m (5' 11\")   Wt 81.2 kg (179 lb 0.2 oz)   SpO2 98%  Temp (24hrs), Av.5 °C (97.7 °F), Min:36.2 °C (97.2 °F), Max:36.7 °C (98.1 °F)      A/P   1. Vancomycin dose change: Not indicated- continue " Vancomycin 1000 mg (12mg/kg) iv q8hr (0400,1200,2000)  2. Next vancomycin level: 2-3 days or sooner pending renal function (will need to be ordered)  3. Goal trough: 10-15mcg/mL  4. Comments: vancomycin trough resulted therapeutic. Kidney function stable. Cultures with MRSA. Per chart review, patient may d/c today; however,in the event that patient doesn't d/c current regimen appropriate and pharmacy will continue to monitor.    Rebeka Washington, PharmD, BCPS

## 2020-08-05 NOTE — DISCHARGE SUMMARY
Discharge Summary    CHIEF COMPLAINT ON ADMISSION  Chief Complaint   Patient presents with   • Sent by MD     6/26/20 pt had foot surgery. pt now stats MD on vacation and cant get into office. pt states pus weeping from wound, fevers and pain.        Reason for Admission  Sent by MD     Admission Date  8/3/2020    CODE STATUS  Full Code    HPI & HOSPITAL COURSE  This is a 45 y.o. male here with   right foot pain  Mr. Hawkins is a prior right fifth toe amputation 2015.  He had surgery by Dr. Kurtz podiatry on 6/26/2020.  He saw him on 7/22/2020 as he had some clear drainage and it was thought to be probably a seroma.  Today he has had increased swelling and redness of his right foot and today took a dry skin off and pus drained out of the wound.*Also started vomiting this morning and has been having night sweats for the last 2 nights.  Presents emergency room with these complaints and here appears to have an abscess with cellulitis. Dr. Kurtz called from the emergency room and he initially requested that he is transferred to [facility] as is where he does his surgeries but agreed to do surgery here.  Patient will be given IV antibiotics and a rapid COVID for preoperative screening n.p.o. status at midnight for surgery in the morning  Patient denies exposure to persons known to have COVID, denies changes in his taste or smell, no shortness of breath, no cough.  He was admitted with cellulitis and abscess of the right foot and was started on iv antibiotics.  He was seen by podiatrist and and was taken to the O.R and had INCISION AND DRAINAGE .  His foot xray showed:1.  Possible erosive change of the medial navicular. Differential diagnosis includes erosive arthritis or septic arthritis especially if there is clinical evidence for infection or an overlying open wound     2.  Subacute, fracture of the base of 4th metatarsal     3.  Prior amputation at the base of the 5th metatarsal.  His blood cultures and wound  and bone cultures are negative so far.  Pt and ot worked with the pt and no need.  Pt will be on bactrim for 8 more days and will be discharged home if he is cleared by podiatry.        Therefore, he is discharged in good and stable condition to home with close outpatient follow-up.    The patient met 2-midnight criteria for an inpatient stay at the time of discharge.    Discharge Date  8/5/20    FOLLOW UP ITEMS POST DISCHARGE  Podiatry next week    DISCHARGE DIAGNOSES  Principal Problem:    Cellulitis and abscess of foot POA: Yes  Active Problems:    Type 2 diabetes mellitus with hyperglycemia, without long-term current use of insulin (HCC) POA: Yes    Hepatitis C POA: Yes    History of amputation of lesser toe of right foot (HCC) POA: Yes      Overview: 5th ray amp 2016  Resolved Problems:    * No resolved hospital problems. *      FOLLOW UP  No future appointments.  JOSEFINA Martinez.P.MAb  [id number]  [address]  [facility] [address]  [phone]    Go in 1 week        MEDICATIONS ON DISCHARGE     Medication List      START taking these medications      Instructions   sulfamethoxazole-trimethoprim 800-160 MG tablet  Commonly known as: BACTRIM DS   Take 1 Tab by mouth 2 times a day for 8 days.  Dose: 1 Tab     tramadol 50 MG Tabs  Commonly known as: ULTRAM   Take 1 Tab by mouth every 6 hours as needed for up to 5 days.  Dose: 50 mg        CONTINUE taking these medications      Instructions   gabapentin 100 MG Caps  Commonly known as: NEURONTIN   Take 100 mg by mouth every bedtime.  Dose: 100 mg     linaGLIPtin-metFORMIN HCl ER 5-1000 MG Tb24   Take 1 Tab by mouth 2 Times a Day.  Dose: 1 Tab     lisinopril 20 MG Tabs  Commonly known as: PRINIVIL   Take 1 Tab by mouth every day.  Dose: 20 mg     metoclopramide 10 MG Tabs  Commonly known as: REGLAN   Take 10 mg by mouth every morning.  Dose: 10 mg     omeprazole 20 MG delayed-release capsule  Commonly known as: PRILOSEC   Take 20 mg by mouth 2 times a  day.  Dose: 20 mg     ondansetron 8 MG Tbdp  Commonly known as: ZOFRAN ODT   Take 8 mg by mouth every bedtime.  Dose: 8 mg     rosuvastatin 10 MG Tabs  Commonly known as: CRESTOR   Take 1 Tab by mouth every evening.  Dose: 10 mg            Allergies  Allergies   Allergen Reactions   • Bee Swelling     Bee stings       DIET  Orders Placed This Encounter   Procedures   • Diet Order Cardiac     Standing Status:   Standing     Number of Occurrences:   1     Order Specific Question:   Diet:     Answer:   Cardiac [6]     Order Specific Question:   Calorie modifications:     Answer:   1800 kcals [4]       ACTIVITY  As tolerated.  Weight bearing as tolerated    CONSULTATIONS  podiatry    PROCEDURES  I&D    LABORATORY  Lab Results   Component Value Date    SODIUM 136 08/04/2020    POTASSIUM 4.0 08/04/2020    CHLORIDE 101 08/04/2020    CO2 24 08/04/2020    GLUCOSE 176 (H) 08/04/2020    BUN 14 08/04/2020    CREATININE 0.77 08/04/2020    CREATININE 1.1 11/28/2008        Lab Results   Component Value Date    WBC 7.7 08/05/2020    HEMOGLOBIN 10.6 (L) 08/05/2020    HEMATOCRIT 31.8 (L) 08/05/2020    PLATELETCT 364 08/05/2020        Total time of the discharge process exceeds 35 minutes.

## 2020-08-05 NOTE — THERAPY
Physical Therapy   Initial Evaluation     Patient Name: Cayetano Hawkins  Age:  45 y.o., Sex:  male  Medical Record #: [id number]  Today's Date: 8/5/2020     Precautions: Weight Bearing As Tolerated Right Lower Extremity    Assessment    Pt admitted w/ right foot pain.  He had sx from his podiatrist on 6/26/2020.  Found to have an abscess/cellulitis and is now s/p I&D.  He was indep PTA, living in a single story house w/ no stairs/steps.  Today, he presents able to move in/ouf of bed and ambulate w/ a post op shoe, w/o loss of balance or need of AD.  He does own crutches and canes.  No acute PT needs.  PT will be available for d/c needs only.     Plan    Recommend Physical Therapy for Evaluation only       DC Equipment Recommendations: Anticipate that the patient will have no further physical therapy needs after discharge from the hospital.                   Objective       08/05/20 1043   Prior Living Situation   Housing / Facility 1 Story House   Steps Into Home 0   Steps In Home 0   Equipment Owned Crutches;Single Point Cane   Lives with - Patient's Self Care Capacity Spouse;Child Less than 18 Years of Age   Prior Level of Functional Mobility   Bed Mobility Independent   Transfer Status Independent   Ambulation Independent   Assistive Devices Used None   Gait Analysis   Gait Level Of Assist Supervised   Assistive Device None   Distance (Feet) 100   Bed Mobility    Supine to Sit Supervised   Sit to Supine Supervised   Scooting Supervised   Functional Mobility   Sit to Stand Supervised   Bed, Chair, Wheelchair Transfer Supervised   Toilet Transfers Supervised   Anticipated Discharge Equipment and Recommendations   DC Equipment Recommendations None

## 2020-08-05 NOTE — PROGRESS NOTES
Discharging Patient home per physician order.  Discharged with spouse.  Demonstrated understanding of follow up appointments, home medications, prescriptions, and nursing care instructions for incision and drainage after care.  Ambulating without assistance, voiding without difficulty, pain well controlled, tolerating oral medications, oxygen saturation greater than 90% on RA, tolerating diet. All questions answered.  Patient able to state several reasons why to return to the ED or seek medical attention. Belongings with patient at time of discharge.

## 2020-08-05 NOTE — DISCHARGE INSTRUCTIONS
Discharge Instructions    Discharged to home by car with relative. Discharged via wheelchair, hospital escort: Yes.  Special equipment needed: Not Applicable    Be sure to schedule a follow-up appointment with your primary care doctor or any specialists as instructed.     Discharge Plan:   Diet Plan: Discussed  Activity Level: Discussed  Confirmed Follow up Appointment: Patient to Call and Schedule Appointment  Confirmed Symptoms Management: Discussed  Medication Reconciliation Updated: Yes    I understand that a diet low in cholesterol, fat, and sodium is recommended for good health. Unless I have been given specific instructions below for another diet, I accept this instruction as my diet prescription.   Other diet: diabetic    Special Instructions: None    · Is patient discharged on Warfarin / Coumadin?   No     Depression / Suicide Risk    As you are discharged from this [facility] facility, it is important to learn how to keep safe from harming yourself.    Recognize the warning signs:  · Abrupt changes in personality, positive or negative- including increase in energy   · Giving away possessions  · Change in eating patterns- significant weight changes-  positive or negative  · Change in sleeping patterns- unable to sleep or sleeping all the time   · Unwillingness or inability to communicate  · Depression  · Unusual sadness, discouragement and loneliness  · Talk of wanting to die  · Neglect of personal appearance   · Rebelliousness- reckless behavior  · Withdrawal from people/activities they love  · Confusion- inability to concentrate     If you or a loved one observes any of these behaviors or has concerns about self-harm, here's what you can do:  · Talk about it- your feelings and reasons for harming yourself  · Remove any means that you might use to hurt yourself (examples: pills, rope, extension cords, firearm)  · Get professional help from the community (Mental Health, Substance Abuse, psychological  counseling)  · Do not be alone:Call your Safe Contact- someone whom you trust who will be there for you.  · Call your local CRISIS HOTLINE 557-5151 or 461-373-8294  · Call your local Children's Mobile Crisis Response Team [place] (126) 354-6453 or www.[employer]  · Call the toll free National Suicide Prevention Hotlines   · National Suicide Prevention Lifeline 928-403-FAKU (9229)  · National Hope Line Network 800-SUICIDE (199-4971)      Incision and Drainage, Care After  This sheet gives you information about how to care for yourself after your procedure. Your health care provider may also give you more specific instructions. If you have problems or questions, contact your health care provider.  What can I expect after the procedure?  After the procedure, it is common to have:  · Pain or discomfort around the incision site.  · Blood, fluid, or pus (drainage) from the incision.  · Redness and firm skin around the incision site.  Follow these instructions at home:  Medicines  · Take over-the-counter and prescription medicines only as told by your health care provider.  · If you were prescribed an antibiotic medicine, use or take it as told by your health care provider. Do not stop using the antibiotic even if you start to feel better.  Wound care  Follow instructions from your health care provider about how to take care of your wound. Make sure you:  · Wash your hands with soap and water before and after you change your bandage (dressing). If soap and water are not available, use hand .  · Change your dressing and packing as told by your health care provider.  ? If your dressing is dry or stuck when you try to remove it, moisten or wet the dressing with saline or water so that it can be removed without harming your skin or tissues.  ? If your wound is packed, leave it in place until your health care provider tells you to remove it. To remove the packing, moisten or wet the packing with saline or  water so that it can be removed without harming your skin or tissues.  · Leave stitches (sutures), skin glue, or adhesive strips in place. These skin closures may need to stay in place for 2 weeks or longer. If adhesive strip edges start to loosen and curl up, you may trim the loose edges. Do not remove adhesive strips completely unless your health care provider tells you to do that.  Check your wound every day for signs of infection. Check for:  · More redness, swelling, or pain.  · More fluid or blood.  · Warmth.  · Pus or a bad smell.  If you were sent home with a drain tube in place, follow instructions from your health care provider about:  · How to empty it.  · How to care for it at home.    General instructions  · Rest the affected area.  · Do not take baths, swim, or use a hot tub until your health care provider approves. Ask your health care provider if you may take showers. You may only be allowed to take sponge baths.  · Return to your normal activities as told by your health care provider. Ask your health care provider what activities are safe for you. Your health care provider may put you on activity or lifting restrictions.  · The incision will continue to drain. It is normal to have some clear or slightly bloody drainage. The amount of drainage should lessen each day.  · Do not apply any creams, ointments, or liquids unless you have been told to by your health care provider.  · Keep all follow-up visits as told by your health care provider. This is important.  Contact a health care provider if:  · Your cyst or abscess returns.  · You have a fever or chills.  · You have more redness, swelling, or pain around your incision.  · You have more fluid or blood coming from your incision.  · Your incision feels warm to the touch.  · You have pus or a bad smell coming from your incision.  · You have red streaks above or below the incision site.  Get help right away if:  · You have severe pain or  bleeding.  · You cannot eat or drink without vomiting.  · You have decreased urine output.  · You become short of breath.  · You have chest pain.  · You cough up blood.  · The affected area becomes numb or starts to tingle.  These symptoms may represent a serious problem that is an emergency. Do not wait to see if the symptoms will go away. Get medical help right away. Call your local emergency services (911 in the [place] Do not drive yourself to the hospital.  Summary  · After this procedure, it is common to have fluid, blood, or pus coming from the surgery site.  · Follow all home care instructions. You will be told how to take care of your incision, how to check for infection, and how to take medicines.  · If you were prescribed an antibiotic medicine, take it as told by your health care provider. Do not stop taking the antibiotic even if you start to feel better.  · Contact a health care provider if you have increased redness, swelling, or pain around your incision. Get help right away if you have chest pain, you vomit, you cough up blood, or you have shortness of breath.  · Keep all follow-up visits as told by your health care provider. This is important.  This information is not intended to replace advice given to you by your health care provider. Make sure you discuss any questions you have with your health care provider.  Document Released: 03/11/2013 Document Revised: 11/18/2019 Document Reviewed: 11/18/2019  Elsevier Patient Education © 2020 [employer]

## 2020-08-05 NOTE — PROGRESS NOTES
Bedside report received.  Assessment complete.  A&O x 4. Patient calls appropriately.  Patient ambulates with no assist. Bed alarm off.   Patient has 9/10 pain. Pain managed with prescribed medications.  Denies N&V. Tolerating clear liquid diet.  Surgical dressing on right foot CDI.  + void, + flatus, - BM.  Patient denies SOB.  SCD's off.  Review plan with of care with patient. Call light and personal belongings with in reach. Hourly rounding in place. All needs met at this time.

## 2020-08-05 NOTE — PROGRESS NOTES
Pt A&Ox4.  RLE with sx dressing in place, CMS intact.  C/o more pain this AM, rates 9/10, medicated per MAR. Pt describes as throbbing. Offloading shoe in use with ambulation.  IV abx in use.  Tolerating clear diet, intermittent n/v. + bowel sounds, + flatus, LBM PTA.  Saturating >90% on RA.  Pt ambulates independently with steady gait demonstrated.  Updated on plan of care. Safety education provided. Bed locked in low. Call light within reach. Rounding in place.

## 2020-08-06 LAB
BACTERIA TISS AEROBE CULT: ABNORMAL
BACTERIA TISS AEROBE CULT: ABNORMAL
BACTERIA WND AEROBE CULT: ABNORMAL
BACTERIA WND AEROBE CULT: ABNORMAL
GRAM STN SPEC: ABNORMAL
GRAM STN SPEC: ABNORMAL
SIGNIFICANT IND 70042: ABNORMAL
SIGNIFICANT IND 70042: ABNORMAL
SITE SITE: ABNORMAL
SITE SITE: ABNORMAL
SOURCE SOURCE: ABNORMAL
SOURCE SOURCE: ABNORMAL

## 2020-08-08 LAB
BACTERIA SPEC ANAEROBE CULT: NORMAL
SIGNIFICANT IND 70042: NORMAL
SITE SITE: NORMAL
SOURCE SOURCE: NORMAL

## 2020-08-09 LAB
BACTERIA BLD CULT: NORMAL
BACTERIA BLD CULT: NORMAL
SIGNIFICANT IND 70042: NORMAL
SIGNIFICANT IND 70042: NORMAL
SITE SITE: NORMAL
SITE SITE: NORMAL
SOURCE SOURCE: NORMAL
SOURCE SOURCE: NORMAL

## 2020-09-02 RX ORDER — LISINOPRIL 20 MG/1
TABLET ORAL
Qty: 30 TAB | Refills: 0 | Status: SHIPPED | OUTPATIENT
Start: 2020-09-02 | End: 2020-09-29

## 2020-10-13 ENCOUNTER — TELEPHONE (OUTPATIENT)
Dept: MEDICAL GROUP | Facility: PHYSICIAN GROUP | Age: 45
End: 2020-10-13

## 2020-10-13 DIAGNOSIS — E11.65 UNCONTROLLED TYPE 2 DIABETES MELLITUS WITH HYPERGLYCEMIA (HCC): Chronic | ICD-10-CM

## 2020-10-13 RX ORDER — METOCLOPRAMIDE 10 MG/1
10 TABLET ORAL EVERY MORNING
Qty: 90 TAB | Refills: 1 | Status: SHIPPED | OUTPATIENT
Start: 2020-10-13 | End: 2021-03-08 | Stop reason: SDUPTHER

## 2021-01-04 DIAGNOSIS — E11.65 UNCONTROLLED TYPE 2 DIABETES MELLITUS WITH HYPERGLYCEMIA (HCC): Chronic | ICD-10-CM

## 2021-02-11 DIAGNOSIS — E11.65 UNCONTROLLED TYPE 2 DIABETES MELLITUS WITH HYPERGLYCEMIA (HCC): Chronic | ICD-10-CM

## 2021-02-11 RX ORDER — ROSUVASTATIN CALCIUM 10 MG/1
TABLET, COATED ORAL
Qty: 90 TABLET | Refills: 1 | Status: SHIPPED | OUTPATIENT
Start: 2021-02-11 | End: 2021-03-08 | Stop reason: SDUPTHER

## 2021-02-11 NOTE — TELEPHONE ENCOUNTER
Pt has had OV within the 12 month protocol and lipid panel is current. 6 month supply sent to pharmacy.   Lab Results   Component Value Date/Time    CHOLSTRLTOT 172 03/12/2020 09:33 AM     (H) 03/12/2020 09:33 AM    HDL 29 (A) 03/12/2020 09:33 AM    TRIGLYCERIDE 175 (H) 03/12/2020 09:33 AM       Lab Results   Component Value Date/Time    SODIUM 136 08/05/2020 11:49 AM    POTASSIUM 4.1 08/05/2020 11:49 AM    CHLORIDE 101 08/05/2020 11:49 AM    CO2 24 08/05/2020 11:49 AM    GLUCOSE 136 (H) 08/05/2020 11:49 AM    BUN 8 08/05/2020 11:49 AM    CREATININE 0.74 08/05/2020 11:49 AM    CREATININE 1.1 11/28/2008 03:20 AM     Lab Results   Component Value Date/Time    ALKPHOSPHAT 226 (H) 08/05/2020 11:49 AM    ASTSGOT 119 (H) 08/05/2020 11:49 AM    ALTSGPT 88 (H) 08/05/2020 11:49 AM    TBILIRUBIN 0.4 08/05/2020 11:49 AM

## 2021-03-01 RX ORDER — LISINOPRIL 20 MG/1
TABLET ORAL
Qty: 90 TABLET | Refills: 0 | Status: SHIPPED | OUTPATIENT
Start: 2021-03-01 | End: 2021-03-08 | Stop reason: SDUPTHER

## 2021-03-04 ENCOUNTER — TELEPHONE (OUTPATIENT)
Dept: MEDICAL GROUP | Facility: PHYSICIAN GROUP | Age: 46
End: 2021-03-04

## 2021-03-04 NOTE — TELEPHONE ENCOUNTER
ESTABLISHED PATIENT PRE-VISIT PLANNING     Patient was NOT contacted to complete PVP.     Note: Patient will not be contacted if there is no indication to call.     1.  Reviewed notes from the last few office visits within the medical group: Yes    2.  If any orders were placed at last visit or intended to be done for this visit (i.e. 6 mos follow-up), do we have Results/Consult Notes?         •  Labs - Labs were not ordered at last office visit.  Note: If patient appointment is for lab review and patient did not complete labs, check with provider if OK to reschedule patient until labs completed.       •  Imaging - Imaging was not ordered at last office visit.       •  Referrals - No referrals were ordered at last office visit.    3. Is this appointment scheduled as a Hospital Follow-Up? No    4.  Immunizations were updated in Epic using Reconcile Outside Information activity? Yes    5.  Patient is due for the following Health Maintenance Topics:   Health Maintenance Due   Topic Date Due   • IMM DTaP/Tdap/Td Vaccine (1 - Tdap) 03/07/1993   • URINE ACR / MICROALBUMIN  06/06/2015   • RETINAL SCREENING  03/07/2019   • A1C SCREENING  09/11/2020   • DIABETES MONOFILAMENT / LE EXAM  03/11/2021   • FASTING LIPID PROFILE  03/12/2021       6.  AHA (Pulse8) form printed for Provider? N/A

## 2021-03-08 ENCOUNTER — OFFICE VISIT (OUTPATIENT)
Dept: MEDICAL GROUP | Facility: PHYSICIAN GROUP | Age: 46
End: 2021-03-08
Payer: COMMERCIAL

## 2021-03-08 VITALS
HEIGHT: 70 IN | HEART RATE: 74 BPM | WEIGHT: 203 LBS | DIASTOLIC BLOOD PRESSURE: 68 MMHG | TEMPERATURE: 99 F | SYSTOLIC BLOOD PRESSURE: 110 MMHG | BODY MASS INDEX: 29.06 KG/M2 | OXYGEN SATURATION: 96 %

## 2021-03-08 DIAGNOSIS — E11.42 DIABETIC POLYNEUROPATHY ASSOCIATED WITH TYPE 2 DIABETES MELLITUS (HCC): ICD-10-CM

## 2021-03-08 DIAGNOSIS — Z23 NEED FOR VACCINATION: ICD-10-CM

## 2021-03-08 DIAGNOSIS — I10 ESSENTIAL HYPERTENSION: ICD-10-CM

## 2021-03-08 DIAGNOSIS — E11.65 UNCONTROLLED TYPE 2 DIABETES MELLITUS WITH HYPERGLYCEMIA (HCC): Chronic | ICD-10-CM

## 2021-03-08 DIAGNOSIS — E08.621 DIABETIC ULCER OF RIGHT MIDFOOT ASSOCIATED WITH DIABETES MELLITUS DUE TO UNDERLYING CONDITION, LIMITED TO BREAKDOWN OF SKIN (HCC): ICD-10-CM

## 2021-03-08 DIAGNOSIS — F12.90 MARIJUANA SMOKER: ICD-10-CM

## 2021-03-08 DIAGNOSIS — K21.9 GASTROESOPHAGEAL REFLUX DISEASE WITHOUT ESOPHAGITIS: ICD-10-CM

## 2021-03-08 DIAGNOSIS — K31.84 GASTROPARESIS: ICD-10-CM

## 2021-03-08 DIAGNOSIS — L97.411 DIABETIC ULCER OF RIGHT MIDFOOT ASSOCIATED WITH DIABETES MELLITUS DUE TO UNDERLYING CONDITION, LIMITED TO BREAKDOWN OF SKIN (HCC): ICD-10-CM

## 2021-03-08 DIAGNOSIS — R11.2 INTRACTABLE NAUSEA AND VOMITING: ICD-10-CM

## 2021-03-08 LAB
HBA1C MFR BLD: 9.5 % (ref 0–5.6)
INT CON NEG: NEGATIVE
INT CON POS: POSITIVE

## 2021-03-08 PROCEDURE — 99214 OFFICE O/P EST MOD 30 MIN: CPT | Mod: 25 | Performed by: INTERNAL MEDICINE

## 2021-03-08 PROCEDURE — 83036 HEMOGLOBIN GLYCOSYLATED A1C: CPT | Performed by: INTERNAL MEDICINE

## 2021-03-08 PROCEDURE — 90471 IMMUNIZATION ADMIN: CPT | Performed by: INTERNAL MEDICINE

## 2021-03-08 PROCEDURE — 90715 TDAP VACCINE 7 YRS/> IM: CPT | Performed by: INTERNAL MEDICINE

## 2021-03-08 RX ORDER — METOCLOPRAMIDE 10 MG/1
10 TABLET ORAL
Qty: 90 TABLET | Refills: 1 | Status: SHIPPED | OUTPATIENT
Start: 2021-03-08 | End: 2021-05-07

## 2021-03-08 RX ORDER — OMEPRAZOLE 20 MG/1
20 CAPSULE, DELAYED RELEASE ORAL 2 TIMES DAILY
Qty: 180 CAPSULE | Refills: 0 | Status: SHIPPED | OUTPATIENT
Start: 2021-03-08 | End: 2021-06-01

## 2021-03-08 RX ORDER — ONDANSETRON 8 MG/1
8 TABLET, ORALLY DISINTEGRATING ORAL EVERY 12 HOURS PRN
Qty: 60 TABLET | Refills: 0 | Status: ON HOLD | OUTPATIENT
Start: 2021-03-08 | End: 2021-05-11

## 2021-03-08 RX ORDER — GLIMEPIRIDE 4 MG/1
4 TABLET ORAL EVERY MORNING
Qty: 90 TABLET | Refills: 1 | Status: SHIPPED | OUTPATIENT
Start: 2021-03-08 | End: 2021-09-22

## 2021-03-08 RX ORDER — LISINOPRIL 20 MG/1
20 TABLET ORAL
Qty: 90 TABLET | Refills: 1 | Status: SHIPPED | OUTPATIENT
Start: 2021-03-08 | End: 2021-11-03

## 2021-03-08 RX ORDER — ROSUVASTATIN CALCIUM 10 MG/1
10 TABLET, COATED ORAL
Qty: 90 TABLET | Refills: 1 | Status: SHIPPED | OUTPATIENT
Start: 2021-03-08 | End: 2021-12-16

## 2021-03-08 ASSESSMENT — FIBROSIS 4 INDEX: FIB4 SCORE: 1.64

## 2021-03-08 ASSESSMENT — PATIENT HEALTH QUESTIONNAIRE - PHQ9: CLINICAL INTERPRETATION OF PHQ2 SCORE: 0

## 2021-03-08 NOTE — PROGRESS NOTES
Established Patient    Chief Complaint   Patient presents with   • Foot Problem   • Medication Refill     lisinopril    • Vomiting     in the morning        Subjective:     HPI:   Cayetano presents today with the following.      3. Uncontrolled type 2 diabetes mellitus with hyperglycemia ([facility])  A1c:   Lab Results   Component Value Date/Time    HBA1C 9.6 (A) 03/11/2020 1058    HBA1C 9.0 (H) 11/24/2019 0018    HBA1C 7.3 08/17/2018 1044    AVGLUC 212 11/24/2019 0018    AVGLUC 194 03/07/2018 1131    AVGLUC 292 04/02/2016 0308   >> Today A1c is 9.5, patient have not follow-up with me over a year, she is taking Metformin 1000 twice daily, he was on glimepiride before 4 mg daily, he also could not afford Jardiance or Trulicity, patient check blood glucose before breakfast and most of the number is 120s-140s, however randomly goes above 200, denied having other related symptoms      4. Marijuana smoker  5. Intractable nausea and vomiting  6. Gastroparesis  >> Chronic condition, history also smoking marijuana to drink twice a day for GI symptoms as well which make it worse, he is also taking Reglan as well as Zofran as needed for nausea and vomiting, he was seen by GI before with EGD in April 2020 with evidence of reflux esophagitis      8. Diabetic ulcer of right midfoot associated with diabetes mellitus due to underlying condition, limited to breakdown of skin ([facility])  9. Diabetic polyneuropathy associated with type 2 diabetes mellitus ([facility])  -Currently seen by podiatrist, he has some discharge from his previous right foot diabetic ulcer, patient controlled with taking Augmentin for 10 days, regular dressing and following up with podiatrist on regular basis almost weekly    Patient Active Problem List    Diagnosis Date Noted   • Type 2 diabetes mellitus with hyperglycemia, without long-term current use of insulin ([facility]) 08/03/2020   • Gastroparesis 03/07/2018   • Intractable nausea and vomiting 08/08/2016   • Essential  "hypertension 06/29/2014   • Hepatitis C 01/04/2012   • Dyslipidemia 06/29/2014   • History of Wvbe-Hkmmj-Jrjvlgn disease 06/05/2014   • Cellulitis and abscess of foot 08/03/2020   • Foot callus 03/11/2020   • Marijuana smoker 03/11/2020   • Gastroesophageal reflux disease without esophagitis 03/11/2020   • Upper GI bleed 11/23/2019   • Closed nondisplaced fracture of fourth metatarsal bone of right foot 07/20/2018   • History of amputation of lesser toe of right foot ([facility]) 07/20/2018   • Diabetic ulcer of right midfoot, limited to breakdown of skin ([facility]) 01/17/2016   • Diabetic neuropathy ([facility]) 01/17/2016   • Chronic right hip pain 06/05/2014   • Scrotal abscess 01/04/2012       Current Outpatient Medications on File Prior to Visit   Medication Sig Dispense Refill   • silver sulfADIAZINE (SILVADENE) 1 % Cream 1 GM APPLY ON THE SKIN TWICE A DAY 1 PEA SIZE TO THE WOUND AND COVER WITH A BAND AID     • gabapentin (NEURONTIN) 100 MG Cap Take 100 mg by mouth every bedtime.       No current facility-administered medications on file prior to visit.       Allergies, past medical history, past surgical history, family history, social history reviewed and updated    ROS:  All other systems reviewed and are negative except as stated in the HPI     Physical Exam:     /68 (BP Location: Left arm, Patient Position: Sitting, BP Cuff Size: Adult)   Pulse 74   Temp 37.2 °C (99 °F) (Temporal)   Ht 1.773 m (5' 9.8\")   Wt 92.1 kg (203 lb)   SpO2 96%   BMI 29.29 kg/m²   General: Normal appearing. No distress.  ENT: oropharynx without exudates.    Eyes: conjunctiva clear lids without ptosis  Pulmonary: Clear to ausculation.  Normal effort.   Cardiovascular: Regular rate and rhythm  Abdomen: Soft, nontender,  Lymph: No cervical or supraclavicular palpable lymph nodes  Psych: Normal mood and affect.     I have reviewed pertinent labs and diagnostic tests associated with this visit with specific comments listed under the " assessment and plan below      Assessment and Plan:     46 y.o. male with the following issues.    1. Need for vaccination  - Tdap Vaccine =>8YO IM    2. Gastroesophageal reflux disease without esophagitis  - omeprazole (PRILOSEC) 20 MG delayed-release capsule; Take 1 capsule by mouth 2 times a day.  Dispense: 180 capsule; Refill: 0    3. Uncontrolled type 2 diabetes mellitus with hyperglycemia (HCC)  - US-EXTREMITY ARTERY LOWER UNILAT W/RAPHAEL (COMBO) RIGHT; Future  - REFERRAL TO ENDOCRINOLOGY>> for further management, also potential access to free medication  - metformin (GLUCOPHAGE) 1000 MG tablet; TAKE 1 TABLET BY MOUTH TWICE A DAY WITH MEALS  Dispense: 180 tablet; Refill: 1  - rosuvastatin (CRESTOR) 10 MG Tab; Take 1 tablet by mouth every day. N THE EVENING  Dispense: 90 tablet; Refill: 1  - glimepiride (AMARYL) 4 MG Tab; Take 1 tablet by mouth every morning.  Dispense: 90 tablet; Refill: 1  - Comp Metabolic Panel; Future  - HEMOGLOBIN A1C; Future  - Lipid Profile; Future  - MAGNESIUM; Future  - VITAMIN D,25 HYDROXY; Future  - VITAMIN B12; Future  - TSH WITH REFLEX TO FT4; Future  - MICROALBUMIN CREAT RATIO URINE; Future    -Discussed diabetic diet in detail, educated regarding management of hypoglycemia, advised regular exercise, educated disease management and weight goals as well as feet care and frequent check of feet.  -Patient to check early morning fasting blood glucose with goal discussed.  - asked to have a BG log with daily fasting and 2 hr postprandial after biggest meal and bring to next visit or send through my chart  -Discussed side effects of medication. Patient confirmed understanding of information.      4. Marijuana smoker  5. Intractable nausea and vomiting  6. Gastroparesis  - metoclopramide (REGLAN) 10 MG Tab; Take 1 tablet by mouth 1 time a day as needed.  Dispense: 90 tablet; Refill: 1  - ondansetron (ZOFRAN ODT) 8 MG TABLET DISPERSIBLE; Take 1 tablet by mouth every 12 hours as needed for  Nausea.  Dispense: 60 tablet; Refill: 0  >> Educated in detail regarding side effect and complication of Reglan intake for chronic condition including extrapyramidal symptoms. Patient understands    7. Essential hypertension  - lisinopril (PRINIVIL) 20 MG Tab; Take 1 tablet by mouth every day.  Dispense: 90 tablet; Refill: 1    8. Diabetic ulcer of right midfoot associated with diabetes mellitus due to underlying condition, limited to breakdown of skin (HCC)  9. Diabetic polyneuropathy associated with type 2 diabetes mellitus (HCC)  - US-EXTREMITY ARTERY LOWER UNILAT W/RAPHAEL (COMBO) RIGHT; Future>> to make sure there is no vascular damage of the lower extremity      Follow Up:      Return in about 3 months (around 6/8/2021) for follow up.   Chronic condition,    Please note that this dictation was created using voice recognition software. I have made every reasonable attempt to correct obvious errors, but I expect that there are errors of grammar and possibly content that I did not discover before finalizing the note.    Signed by: Candy Gupta M.D.

## 2021-03-19 ENCOUNTER — HOSPITAL ENCOUNTER (OUTPATIENT)
Dept: RADIOLOGY | Facility: MEDICAL CENTER | Age: 46
End: 2021-03-19
Attending: INTERNAL MEDICINE
Payer: COMMERCIAL

## 2021-03-19 DIAGNOSIS — E08.621 DIABETIC ULCER OF RIGHT MIDFOOT ASSOCIATED WITH DIABETES MELLITUS DUE TO UNDERLYING CONDITION, LIMITED TO BREAKDOWN OF SKIN (HCC): ICD-10-CM

## 2021-03-19 DIAGNOSIS — L97.411 DIABETIC ULCER OF RIGHT MIDFOOT ASSOCIATED WITH DIABETES MELLITUS DUE TO UNDERLYING CONDITION, LIMITED TO BREAKDOWN OF SKIN (HCC): ICD-10-CM

## 2021-03-19 DIAGNOSIS — E11.42 DIABETIC POLYNEUROPATHY ASSOCIATED WITH TYPE 2 DIABETES MELLITUS (HCC): ICD-10-CM

## 2021-03-19 DIAGNOSIS — E11.65 UNCONTROLLED TYPE 2 DIABETES MELLITUS WITH HYPERGLYCEMIA (HCC): Chronic | ICD-10-CM

## 2021-03-19 PROCEDURE — 93922 UPR/L XTREMITY ART 2 LEVELS: CPT

## 2021-04-09 ENCOUNTER — HOSPITAL ENCOUNTER (OUTPATIENT)
Dept: RADIOLOGY | Facility: MEDICAL CENTER | Age: 46
End: 2021-04-09
Attending: PODIATRIST
Payer: COMMERCIAL

## 2021-04-09 DIAGNOSIS — L97.519 NON-HEALING ULCER OF RIGHT FOOT, UNSPECIFIED ULCER STAGE (HCC): ICD-10-CM

## 2021-04-09 PROCEDURE — 700117 HCHG RX CONTRAST REV CODE 255: Performed by: PODIATRIST

## 2021-04-09 PROCEDURE — A9576 INJ PROHANCE MULTIPACK: HCPCS | Performed by: PODIATRIST

## 2021-04-09 PROCEDURE — 73720 MRI LWR EXTREMITY W/O&W/DYE: CPT | Mod: RT

## 2021-04-09 RX ADMIN — GADOTERIDOL 20 ML: 279.3 INJECTION, SOLUTION INTRAVENOUS at 10:14

## 2021-05-07 RX ORDER — IBUPROFEN 600 MG/1
600 TABLET ORAL
COMMUNITY
End: 2021-05-14

## 2021-05-08 ENCOUNTER — PRE-ADMISSION TESTING (OUTPATIENT)
Dept: ADMISSIONS | Facility: MEDICAL CENTER | Age: 46
End: 2021-05-08
Attending: ORTHOPAEDIC SURGERY
Payer: COMMERCIAL

## 2021-05-08 DIAGNOSIS — Z01.812 PRE-OPERATIVE LABORATORY EXAMINATION: ICD-10-CM

## 2021-05-08 LAB — COVID ORDER STATUS COVID19: NORMAL

## 2021-05-08 PROCEDURE — C9803 HOPD COVID-19 SPEC COLLECT: HCPCS

## 2021-05-08 PROCEDURE — U0003 INFECTIOUS AGENT DETECTION BY NUCLEIC ACID (DNA OR RNA); SEVERE ACUTE RESPIRATORY SYNDROME CORONAVIRUS 2 (SARS-COV-2) (CORONAVIRUS DISEASE [COVID-19]), AMPLIFIED PROBE TECHNIQUE, MAKING USE OF HIGH THROUGHPUT TECHNOLOGIES AS DESCRIBED BY CMS-2020-01-R: HCPCS

## 2021-05-08 PROCEDURE — U0005 INFEC AGEN DETEC AMPLI PROBE: HCPCS

## 2021-05-09 LAB
SARS-COV-2 RNA RESP QL NAA+PROBE: NOTDETECTED
SPECIMEN SOURCE: NORMAL

## 2021-05-10 ENCOUNTER — ANESTHESIA EVENT (OUTPATIENT)
Dept: SURGERY | Facility: MEDICAL CENTER | Age: 46
End: 2021-05-10
Payer: COMMERCIAL

## 2021-05-10 ENCOUNTER — PRE-ADMISSION TESTING (OUTPATIENT)
Dept: ADMISSIONS | Facility: MEDICAL CENTER | Age: 46
End: 2021-05-10
Attending: ORTHOPAEDIC SURGERY
Payer: COMMERCIAL

## 2021-05-10 DIAGNOSIS — Z01.810 PRE-OPERATIVE CARDIOVASCULAR EXAMINATION: ICD-10-CM

## 2021-05-10 DIAGNOSIS — Z01.812 PRE-OPERATIVE LABORATORY EXAMINATION: ICD-10-CM

## 2021-05-10 LAB
ALBUMIN SERPL BCP-MCNC: 4 G/DL (ref 3.2–4.9)
ALBUMIN/GLOB SERPL: 1.3 G/DL
ALP SERPL-CCNC: 104 U/L (ref 30–99)
ALT SERPL-CCNC: 60 U/L (ref 2–50)
ANION GAP SERPL CALC-SCNC: 7 MMOL/L (ref 7–16)
AST SERPL-CCNC: 34 U/L (ref 12–45)
BILIRUB SERPL-MCNC: 0.2 MG/DL (ref 0.1–1.5)
BUN SERPL-MCNC: 28 MG/DL (ref 8–22)
CALCIUM SERPL-MCNC: 9.7 MG/DL (ref 8.5–10.5)
CHLORIDE SERPL-SCNC: 108 MMOL/L (ref 96–112)
CO2 SERPL-SCNC: 26 MMOL/L (ref 20–33)
CREAT SERPL-MCNC: 1.31 MG/DL (ref 0.5–1.4)
EKG IMPRESSION: NORMAL
ERYTHROCYTE [DISTWIDTH] IN BLOOD BY AUTOMATED COUNT: 48.2 FL (ref 35.9–50)
EST. AVERAGE GLUCOSE BLD GHB EST-MCNC: 151 MG/DL
GLOBULIN SER CALC-MCNC: 3.1 G/DL (ref 1.9–3.5)
GLUCOSE SERPL-MCNC: 65 MG/DL (ref 65–99)
HBA1C MFR BLD: 6.9 % (ref 4–5.6)
HCT VFR BLD AUTO: 43.2 % (ref 42–52)
HGB BLD-MCNC: 13.3 G/DL (ref 14–18)
MCH RBC QN AUTO: 28.6 PG (ref 27–33)
MCHC RBC AUTO-ENTMCNC: 30.8 G/DL (ref 33.7–35.3)
MCV RBC AUTO: 92.9 FL (ref 81.4–97.8)
PLATELET # BLD AUTO: 303 K/UL (ref 164–446)
PMV BLD AUTO: 9.7 FL (ref 9–12.9)
POTASSIUM SERPL-SCNC: 5.3 MMOL/L (ref 3.6–5.5)
PROT SERPL-MCNC: 7.1 G/DL (ref 6–8.2)
RBC # BLD AUTO: 4.65 M/UL (ref 4.7–6.1)
SODIUM SERPL-SCNC: 141 MMOL/L (ref 135–145)
WBC # BLD AUTO: 9.4 K/UL (ref 4.8–10.8)

## 2021-05-10 PROCEDURE — 83036 HEMOGLOBIN GLYCOSYLATED A1C: CPT

## 2021-05-10 PROCEDURE — 93010 ELECTROCARDIOGRAM REPORT: CPT | Performed by: INTERNAL MEDICINE

## 2021-05-10 PROCEDURE — 85027 COMPLETE CBC AUTOMATED: CPT

## 2021-05-10 PROCEDURE — 93005 ELECTROCARDIOGRAM TRACING: CPT

## 2021-05-10 PROCEDURE — 80053 COMPREHEN METABOLIC PANEL: CPT

## 2021-05-10 PROCEDURE — 36415 COLL VENOUS BLD VENIPUNCTURE: CPT

## 2021-05-10 NOTE — OR NURSING
COVID-19 Pre-surgery screenin. Do you have an undiagnosed respiratory illness or symptoms such as coughing or sneezing? No (Yes/No)  a. Onset of Sx No  b. Acute vs. chronic respiratory illness No    2. Do you have an unexplained fever greater than 100.4 degrees Fahrenheit or 38 degrees Celsius?     No    3. Have you had direct exposure to a patient who tested positive for Covid-19?    No     4. Have you had any loss of your sense of taste or smell? Have you had N/V or sore throat? No    Patient has been informed of visitor policy and asked to wear a mask upon entering the hospital   Yes

## 2021-05-11 ENCOUNTER — HOSPITAL ENCOUNTER (OUTPATIENT)
Facility: MEDICAL CENTER | Age: 46
End: 2021-05-11
Attending: ORTHOPAEDIC SURGERY | Admitting: ORTHOPAEDIC SURGERY
Payer: COMMERCIAL

## 2021-05-11 ENCOUNTER — ANESTHESIA (OUTPATIENT)
Dept: SURGERY | Facility: MEDICAL CENTER | Age: 46
End: 2021-05-11
Payer: COMMERCIAL

## 2021-05-11 VITALS
RESPIRATION RATE: 18 BRPM | WEIGHT: 200.4 LBS | OXYGEN SATURATION: 98 % | TEMPERATURE: 97.6 F | DIASTOLIC BLOOD PRESSURE: 89 MMHG | HEART RATE: 58 BPM | BODY MASS INDEX: 28.06 KG/M2 | HEIGHT: 71 IN | SYSTOLIC BLOOD PRESSURE: 147 MMHG

## 2021-05-11 LAB
GLUCOSE BLD-MCNC: 86 MG/DL (ref 65–99)
GRAM STN SPEC: NORMAL
GRAM STN SPEC: NORMAL
PATHOLOGY CONSULT NOTE: NORMAL
SIGNIFICANT IND 70042: NORMAL
SIGNIFICANT IND 70042: NORMAL
SITE SITE: NORMAL
SITE SITE: NORMAL
SOURCE SOURCE: NORMAL
SOURCE SOURCE: NORMAL

## 2021-05-11 PROCEDURE — 500881 HCHG PACK, EXTREMITY: Performed by: ORTHOPAEDIC SURGERY

## 2021-05-11 PROCEDURE — 700111 HCHG RX REV CODE 636 W/ 250 OVERRIDE (IP): Performed by: STUDENT IN AN ORGANIZED HEALTH CARE EDUCATION/TRAINING PROGRAM

## 2021-05-11 PROCEDURE — 160002 HCHG RECOVERY MINUTES (STAT): Performed by: ORTHOPAEDIC SURGERY

## 2021-05-11 PROCEDURE — 160028 HCHG SURGERY MINUTES - 1ST 30 MINS LEVEL 3: Performed by: ORTHOPAEDIC SURGERY

## 2021-05-11 PROCEDURE — 700105 HCHG RX REV CODE 258: Performed by: ORTHOPAEDIC SURGERY

## 2021-05-11 PROCEDURE — 87205 SMEAR GRAM STAIN: CPT | Mod: 91

## 2021-05-11 PROCEDURE — 87186 SC STD MICRODIL/AGAR DIL: CPT

## 2021-05-11 PROCEDURE — 160039 HCHG SURGERY MINUTES - EA ADDL 1 MIN LEVEL 3: Performed by: ORTHOPAEDIC SURGERY

## 2021-05-11 PROCEDURE — 160048 HCHG OR STATISTICAL LEVEL 1-5: Performed by: ORTHOPAEDIC SURGERY

## 2021-05-11 PROCEDURE — 700101 HCHG RX REV CODE 250: Performed by: ORTHOPAEDIC SURGERY

## 2021-05-11 PROCEDURE — 88305 TISSUE EXAM BY PATHOLOGIST: CPT

## 2021-05-11 PROCEDURE — 88304 TISSUE EXAM BY PATHOLOGIST: CPT

## 2021-05-11 PROCEDURE — 88311 DECALCIFY TISSUE: CPT

## 2021-05-11 PROCEDURE — A9270 NON-COVERED ITEM OR SERVICE: HCPCS | Performed by: STUDENT IN AN ORGANIZED HEALTH CARE EDUCATION/TRAINING PROGRAM

## 2021-05-11 PROCEDURE — 700111 HCHG RX REV CODE 636 W/ 250 OVERRIDE (IP): Performed by: ORTHOPAEDIC SURGERY

## 2021-05-11 PROCEDURE — 87075 CULTR BACTERIA EXCEPT BLOOD: CPT

## 2021-05-11 PROCEDURE — A6223 GAUZE >16<=48 NO W/SAL W/O B: HCPCS | Performed by: ORTHOPAEDIC SURGERY

## 2021-05-11 PROCEDURE — 87015 SPECIMEN INFECT AGNT CONCNTJ: CPT

## 2021-05-11 PROCEDURE — 160009 HCHG ANES TIME/MIN: Performed by: ORTHOPAEDIC SURGERY

## 2021-05-11 PROCEDURE — 700101 HCHG RX REV CODE 250: Performed by: STUDENT IN AN ORGANIZED HEALTH CARE EDUCATION/TRAINING PROGRAM

## 2021-05-11 PROCEDURE — 160035 HCHG PACU - 1ST 60 MINS PHASE I: Performed by: ORTHOPAEDIC SURGERY

## 2021-05-11 PROCEDURE — A9270 NON-COVERED ITEM OR SERVICE: HCPCS | Performed by: ORTHOPAEDIC SURGERY

## 2021-05-11 PROCEDURE — 160046 HCHG PACU - 1ST 60 MINS PHASE II: Performed by: ORTHOPAEDIC SURGERY

## 2021-05-11 PROCEDURE — 501838 HCHG SUTURE GENERAL: Performed by: ORTHOPAEDIC SURGERY

## 2021-05-11 PROCEDURE — 87077 CULTURE AEROBIC IDENTIFY: CPT

## 2021-05-11 PROCEDURE — 700102 HCHG RX REV CODE 250 W/ 637 OVERRIDE(OP): Performed by: STUDENT IN AN ORGANIZED HEALTH CARE EDUCATION/TRAINING PROGRAM

## 2021-05-11 PROCEDURE — 82962 GLUCOSE BLOOD TEST: CPT

## 2021-05-11 PROCEDURE — 160025 RECOVERY II MINUTES (STATS): Performed by: ORTHOPAEDIC SURGERY

## 2021-05-11 PROCEDURE — 501330 HCHG SET, CYSTO IRRIG TUBING: Performed by: ORTHOPAEDIC SURGERY

## 2021-05-11 PROCEDURE — 87070 CULTURE OTHR SPECIMN AEROBIC: CPT

## 2021-05-11 DEVICE — GRAFT AMNIOFIX HUMAN TISSUE L4 X 4IN: Type: IMPLANTABLE DEVICE | Status: FUNCTIONAL

## 2021-05-11 RX ORDER — MEPERIDINE HYDROCHLORIDE 25 MG/ML
12.5 INJECTION INTRAMUSCULAR; INTRAVENOUS; SUBCUTANEOUS
Status: DISCONTINUED | OUTPATIENT
Start: 2021-05-11 | End: 2021-05-11 | Stop reason: HOSPADM

## 2021-05-11 RX ORDER — BUPIVACAINE HYDROCHLORIDE 5 MG/ML
INJECTION, SOLUTION EPIDURAL; INTRACAUDAL
Status: DISCONTINUED
Start: 2021-05-11 | End: 2021-05-11 | Stop reason: HOSPADM

## 2021-05-11 RX ORDER — OXYCODONE HCL 5 MG/5 ML
10 SOLUTION, ORAL ORAL
Status: DISCONTINUED | OUTPATIENT
Start: 2021-05-11 | End: 2021-05-11 | Stop reason: HOSPADM

## 2021-05-11 RX ORDER — METOCLOPRAMIDE HYDROCHLORIDE 5 MG/ML
INJECTION INTRAMUSCULAR; INTRAVENOUS PRN
Status: DISCONTINUED | OUTPATIENT
Start: 2021-05-11 | End: 2021-05-11 | Stop reason: SURG

## 2021-05-11 RX ORDER — OXYCODONE HCL 5 MG/5 ML
5 SOLUTION, ORAL ORAL
Status: DISCONTINUED | OUTPATIENT
Start: 2021-05-11 | End: 2021-05-11 | Stop reason: HOSPADM

## 2021-05-11 RX ORDER — LIDOCAINE HYDROCHLORIDE 20 MG/ML
INJECTION, SOLUTION EPIDURAL; INFILTRATION; INTRACAUDAL; PERINEURAL PRN
Status: DISCONTINUED | OUTPATIENT
Start: 2021-05-11 | End: 2021-05-11 | Stop reason: SURG

## 2021-05-11 RX ORDER — MIDAZOLAM HYDROCHLORIDE 1 MG/ML
INJECTION INTRAMUSCULAR; INTRAVENOUS PRN
Status: DISCONTINUED | OUTPATIENT
Start: 2021-05-11 | End: 2021-05-11 | Stop reason: SURG

## 2021-05-11 RX ORDER — SODIUM CHLORIDE, SODIUM LACTATE, POTASSIUM CHLORIDE, CALCIUM CHLORIDE 600; 310; 30; 20 MG/100ML; MG/100ML; MG/100ML; MG/100ML
INJECTION, SOLUTION INTRAVENOUS CONTINUOUS
Status: DISCONTINUED | OUTPATIENT
Start: 2021-05-11 | End: 2021-05-11 | Stop reason: HOSPADM

## 2021-05-11 RX ORDER — DIPHENHYDRAMINE HYDROCHLORIDE 50 MG/ML
12.5 INJECTION INTRAMUSCULAR; INTRAVENOUS
Status: DISCONTINUED | OUTPATIENT
Start: 2021-05-11 | End: 2021-05-11 | Stop reason: HOSPADM

## 2021-05-11 RX ORDER — ONDANSETRON 2 MG/ML
4 INJECTION INTRAMUSCULAR; INTRAVENOUS
Status: COMPLETED | OUTPATIENT
Start: 2021-05-11 | End: 2021-05-11

## 2021-05-11 RX ORDER — ACETAMINOPHEN 500 MG
1000 TABLET ORAL ONCE
Status: COMPLETED | OUTPATIENT
Start: 2021-05-11 | End: 2021-05-11

## 2021-05-11 RX ORDER — HALOPERIDOL 5 MG/ML
1 INJECTION INTRAMUSCULAR
Status: DISCONTINUED | OUTPATIENT
Start: 2021-05-11 | End: 2021-05-11 | Stop reason: HOSPADM

## 2021-05-11 RX ORDER — VANCOMYCIN HYDROCHLORIDE 1 G/20ML
INJECTION, POWDER, LYOPHILIZED, FOR SOLUTION INTRAVENOUS
Status: DISCONTINUED
Start: 2021-05-11 | End: 2021-05-11 | Stop reason: HOSPADM

## 2021-05-11 RX ORDER — VANCOMYCIN HYDROCHLORIDE 1 G/20ML
INJECTION, POWDER, LYOPHILIZED, FOR SOLUTION INTRAVENOUS
Status: COMPLETED | OUTPATIENT
Start: 2021-05-11 | End: 2021-05-11

## 2021-05-11 RX ORDER — BUPIVACAINE HYDROCHLORIDE 5 MG/ML
INJECTION, SOLUTION EPIDURAL; INTRACAUDAL
Status: DISCONTINUED | OUTPATIENT
Start: 2021-05-11 | End: 2021-05-11 | Stop reason: HOSPADM

## 2021-05-11 RX ORDER — DEXAMETHASONE SODIUM PHOSPHATE 4 MG/ML
INJECTION, SOLUTION INTRA-ARTICULAR; INTRALESIONAL; INTRAMUSCULAR; INTRAVENOUS; SOFT TISSUE PRN
Status: DISCONTINUED | OUTPATIENT
Start: 2021-05-11 | End: 2021-05-11 | Stop reason: SURG

## 2021-05-11 RX ORDER — KETOROLAC TROMETHAMINE 30 MG/ML
INJECTION, SOLUTION INTRAMUSCULAR; INTRAVENOUS PRN
Status: DISCONTINUED | OUTPATIENT
Start: 2021-05-11 | End: 2021-05-11 | Stop reason: SURG

## 2021-05-11 RX ORDER — CEFAZOLIN SODIUM 1 G/3ML
INJECTION, POWDER, FOR SOLUTION INTRAMUSCULAR; INTRAVENOUS PRN
Status: DISCONTINUED | OUTPATIENT
Start: 2021-05-11 | End: 2021-05-11 | Stop reason: SURG

## 2021-05-11 RX ORDER — HYDROMORPHONE HYDROCHLORIDE 1 MG/ML
0.2 INJECTION, SOLUTION INTRAMUSCULAR; INTRAVENOUS; SUBCUTANEOUS
Status: DISCONTINUED | OUTPATIENT
Start: 2021-05-11 | End: 2021-05-11 | Stop reason: HOSPADM

## 2021-05-11 RX ORDER — ONDANSETRON 2 MG/ML
INJECTION INTRAMUSCULAR; INTRAVENOUS PRN
Status: DISCONTINUED | OUTPATIENT
Start: 2021-05-11 | End: 2021-05-11 | Stop reason: SURG

## 2021-05-11 RX ORDER — HYDROMORPHONE HYDROCHLORIDE 1 MG/ML
0.1 INJECTION, SOLUTION INTRAMUSCULAR; INTRAVENOUS; SUBCUTANEOUS
Status: DISCONTINUED | OUTPATIENT
Start: 2021-05-11 | End: 2021-05-11 | Stop reason: HOSPADM

## 2021-05-11 RX ORDER — HYDROMORPHONE HYDROCHLORIDE 1 MG/ML
0.4 INJECTION, SOLUTION INTRAMUSCULAR; INTRAVENOUS; SUBCUTANEOUS
Status: DISCONTINUED | OUTPATIENT
Start: 2021-05-11 | End: 2021-05-11 | Stop reason: HOSPADM

## 2021-05-11 RX ADMIN — POVIDONE IODINE 15 ML: 100 SOLUTION TOPICAL at 08:28

## 2021-05-11 RX ADMIN — ONDANSETRON 4 MG: 2 INJECTION INTRAMUSCULAR; INTRAVENOUS at 10:06

## 2021-05-11 RX ADMIN — FENTANYL CITRATE 25 MCG: 50 INJECTION, SOLUTION INTRAMUSCULAR; INTRAVENOUS at 10:12

## 2021-05-11 RX ADMIN — ONDANSETRON 4 MG: 2 INJECTION INTRAMUSCULAR; INTRAVENOUS at 10:42

## 2021-05-11 RX ADMIN — LIDOCAINE HYDROCHLORIDE 60 MG: 20 INJECTION, SOLUTION EPIDURAL; INFILTRATION; INTRACAUDAL at 09:40

## 2021-05-11 RX ADMIN — SODIUM CHLORIDE, POTASSIUM CHLORIDE, SODIUM LACTATE AND CALCIUM CHLORIDE: 600; 310; 30; 20 INJECTION, SOLUTION INTRAVENOUS at 08:29

## 2021-05-11 RX ADMIN — MIDAZOLAM HYDROCHLORIDE 2 MG: 1 INJECTION, SOLUTION INTRAMUSCULAR; INTRAVENOUS at 09:39

## 2021-05-11 RX ADMIN — FENTANYL CITRATE 50 MCG: 50 INJECTION, SOLUTION INTRAMUSCULAR; INTRAVENOUS at 09:40

## 2021-05-11 RX ADMIN — KETOROLAC TROMETHAMINE 30 MG: 30 INJECTION, SOLUTION INTRAMUSCULAR at 10:11

## 2021-05-11 RX ADMIN — METOCLOPRAMIDE 10 MG: 5 INJECTION, SOLUTION INTRAMUSCULAR; INTRAVENOUS at 09:32

## 2021-05-11 RX ADMIN — PROPOFOL 180 MG: 10 INJECTION, EMULSION INTRAVENOUS at 09:40

## 2021-05-11 RX ADMIN — DEXAMETHASONE SODIUM PHOSPHATE 4 MG: 4 INJECTION, SOLUTION INTRA-ARTICULAR; INTRALESIONAL; INTRAMUSCULAR; INTRAVENOUS; SOFT TISSUE at 09:43

## 2021-05-11 RX ADMIN — ACETAMINOPHEN 1000 MG: 500 TABLET ORAL at 08:28

## 2021-05-11 RX ADMIN — CEFAZOLIN 2 G: 330 INJECTION, POWDER, FOR SOLUTION INTRAMUSCULAR; INTRAVENOUS at 10:15

## 2021-05-11 ASSESSMENT — FIBROSIS 4 INDEX
FIB4 SCORE: 0.68
FIB4 SCORE: 0.68

## 2021-05-11 NOTE — OP REPORT
DATE OF SERVICE:  05/11/2021     PREOPERATIVE DIAGNOSES:  1.  Diabetes mellitus.  2.  Cavus foot.  3.  Recurrent right plantar lateral foot ulceration.     POSTOPERATIVE DIAGNOSES:  1.  Diabetes mellitus.  2.  Cavus foot.  3.  Recurrent right plantar lateral foot ulceration.     PROCEDURES:   1.  Right tendo Achilles lengthening.  2.  Right foot irrigation and debridement of multiple compartments.  3.  Partial excision, right fifth metatarsal, fourth metatarsal and cuboid.  4.  Fifth ray amputation.  5.  Allograft biologic placement.     IMPLANTS:  AmnioFix 4x4 cm.     SURGEON:  Abram Cortez MD     ASSISTANT:  ARLET Gómez     ANESTHESIA:  General with 30 mL local 0.5% Marcaine without epinephrine.     ESTIMATED BLOOD LOSS:  25 mL.     SPECIMENS:  1.  Plantar fifth metatarsal bone for culture.  2.  Clean cut fifth metatarsal for both culture and pathology.     COMPLICATIONS:  None.     OUTCOME:  PACU in stable condition.     HISTORY OF PRESENT ILLNESS:  This is a pleasant 46-year-old gentleman known to   me.  I had initially seen in wound care with chronic lateral foot wound.  We   performed a fifth ray amputation and got him to heal.  He unfortunately more   recently has had a recurrent wound breakdown.  He is indicated for the   above-stated procedure.  Greeted in the preoperative holding area and   identified by name and medical record number.  The right lower extremity was   marked.  Risks of the procedure including bleeding, infection, pain, wound   breakdown, loss of limb, and need for more surgery were discussed.  He   provided written consent.  I also have placed a referral to both wound care   and infectious disease.     DESCRIPTION OF PROCEDURE:  Taken to the operating room and placed on the table   in supine position.  Preoperative antibiotics were held.  General anesthesia   was induced.  Nonsterile tourniquet was placed on his right thigh.  Right   lower extremity prepped and draped in  the usual sterile fashion.  An operative   pause was taken where all present were in agreement with patient   identification, laterality and procedure to be performed.  The limb was   elevated for 5 minutes, tourniquet raised to 250 mmHg.  We then made three   half cuts in the Achilles tendon, two medial and one lateral,  by 2   cm each.  We obtained 20 degrees of additional dorsiflexion with the knee in   extension.  The Lin test remained negative.  Each of these incisions was   closed with a simple 3-0 nylon.     We reopened the proximal aspect of his previous lateral incision along his   foot.  We extended it proximally about 3 additional centimeters.  Sharp   dissection was carried down to the residual fifth metatarsal, the fourth   metatarsal and the cuboid.  There was significant prominent plantar bone, but   I did not encounter any gross purulence.  We used an oscillating saw to remove   part of the fifth, fourth and cuboid in line with the plantar aspect of the   foot and these were sent for culture.  We removed some loose osseous   fragments.  We let the tourniquet down.  We irrigated 3 liters of sterile   normal saline through the foot using a curette to excise any additional   nonviable tissue.  We then used a clean curette to remove some bony samples   from the residual fifth metatarsal and sent for both culture and pathology.    We then placed vancomycin powder within the wound bed as well as a 4x4 cm   biologic AmnioFix sheath along the cut surfaces of the bone, to Kerr[place] them,   try to promote healing from within and decrease scar formation.  We then   closed the deep layer with 2-0 PDS.  The skin was then closed with interrupted   2-0 nylon in a horizontal mattress fashion.  This was then oversewn with a   baseball stitch.  Adaptic gauze and a compressive wrap were placed.  He was   then placed back in his boot.     POSTOPERATIVE COURSE:  He will discharge home today assuming adequate  pain   control, mobilization, weightbearing as tolerated in the boot leg.  I would   like him to remain in the boot at all times.  He will follow up with wound   care regarding his persistent plantar wound and follow up with infectious   disease.  I will place him on Bactrim until he sees infectious disease.  I   will continue to follow him closely in my clinic.  Boot to remain in place for   at least 6 weeks.        ______________________________  MD RSOSY MONTES/SUB/ANABEL    DD:  05/11/2021 10:46  DT:  05/11/2021 12:07    Job#:  [id number]

## 2021-05-11 NOTE — OR NURSING
1028 Pt arrived to PACU from OR via gurney. Report received from OR RN and anesthesiologist. Monitor applied. Oral airway in place. Respirations even and unlabored. RLE ace wrap in place, CDI    1037 Pt awakening, oral airway DC'd    1042 pt denies pain, c/o nausea, zofran given    1100 VSS, pt denies nausea and pain, tolerating ice water, meets phase 2 criteria    1130 Handoff to Gertrude RN in phase 2

## 2021-05-11 NOTE — ANESTHESIA POSTPROCEDURE EVALUATION
Patient: Cayetano Hawkins    Procedure Summary     Date: 05/11/21 Room / Location: [facility] ROOM 21 / SURGERY SAME DAY [facility]    Anesthesia Start: 0935 Anesthesia Stop: 1028    Procedures:       IRRIGATION AND DEBRIDEMENT, WOUND - FOOT (Right Foot)      LENGTHENING, TENDON - ACHILLES (Right Foot)      AMPUTATION, TOE - 5TH RAY AND PARTIAL CUBOID EXCISION WITH PARTIAL 4TH METATARSAL EXCISION. (Right Foot) Diagnosis: (ULCERATION RIGHT PLANTAR FOOT)    Surgeons: Abram Cortez M.D. Responsible Provider: Richard Worthington M.D.    Anesthesia Type: general ASA Status: 2          Final Anesthesia Type: general  Last vitals  BP   Blood Pressure: 142/83    Temp   36.4 °C (97.6 °F)    Pulse   61   Resp   18    SpO2   96 %      Anesthesia Post Evaluation    Patient location during evaluation: PACU  Patient participation: complete - patient participated  Level of consciousness: awake and alert    Airway patency: patent  Anesthetic complications: no  Cardiovascular status: hemodynamically stable  Respiratory status: acceptable  Hydration status: euvolemic    PONV: none          No complications documented.     Nurse Pain Score: 0 (NPRS)

## 2021-05-11 NOTE — OR NURSING
1125 Received report from HENRRY Clayton.    1129 Received pt from PACU with HENRRY Clayton.  VSS, in no signs of distress. Pt aware of POC.    1148 Discharge instructions given to pt and family, both verbalize understanding.  Pt meets discharge criteria. Able to dress and steady on feet.    1154 Pt discharged, taken to car in WC by CNA.  All questions/concerns addressed.

## 2021-05-11 NOTE — OR SURGEON
Immediate Post OP Note    PreOp Diagnosis: Right lateral foot plantar wound, cavus foot      PostOp Diagnosis: Same      Procedure(s):  IRRIGATION AND DEBRIDEMENT, WOUND - FOOT - Wound Class: Contaminated  LENGTHENING, TENDON - ACHILLES - Wound Class: Clean  AMPUTATION, TOE - 5TH RAY AND PARTIAL CUBOID EXCISION WITH PARTIAL 4TH METATARSAL EXCISION. - Wound Class: Contaminated    Surgeon(s):  Abram Cortez M.D.    Anesthesiologist/Type of Anesthesia:  Anesthesiologist: Richard Worthington M.D./General    Surgical Staff:  Assistant: LILLIANA Barth  Circulator: Ivon Weber R.N.  Scrub Person: Domenic Goyal    Specimens removed if any:  ID Type Source Tests Collected by Time Destination   1 : Right plantar foot bone Bone Foot AEROBIC/ANAEROBIC CULTURE (SURGERY) Abram Cortez M.D. 5/11/2021  9:55 AM    2 : Right 5th metatarsal clean cut Bone Foot AEROBIC/ANAEROBIC CULTURE (SURGERY) Abram Cortez M.D. 5/11/2021 10:07 AM    A : Right 5th metatarsal Bone Foot PATHOLOGY SPECIMEN Abram Cortez M.D. 5/11/2021 10:07 AM        Estimated Blood Loss: 25cc    TT: 17 min @ 250mmHg    Findings: No gross purulence, prominent bone plantar lateral, significant pressure relief with MIKE    Complications: None        5/11/2021 10:38 AM Abram Cortez M.D.

## 2021-05-11 NOTE — DISCHARGE INSTRUCTIONS
ACTIVITY: Rest and take it easy for the first 24 hours.  A responsible adult is recommended to remain with you during that time.  It is normal to feel sleepy.  We encourage you to not do anything that requires balance, judgment or coordination.    MILD FLU-LIKE SYMPTOMS ARE NORMAL. YOU MAY EXPERIENCE GENERALIZED MUSCLE ACHES, THROAT IRRITATION, HEADACHE AND/OR SOME NAUSEA.    FOR 24 HOURS DO NOT:  Drive, operate machinery or run household appliances.  Drink beer or alcoholic beverages.   Make important decisions or sign legal documents.    SPECIAL INSTRUCTIONS:   weight bearing as tolerated  Right lower extremity in boot at all times  Ice and elevate  Stay ahead of pain  Make wound care and infectious disease appointments asap    DIET: To avoid nausea, slowly advance diet as tolerated, avoiding spicy or greasy foods for the first day.  Add more substantial food to your diet according to your physician's instructions.  Babies can be fed formula or breast milk as soon as they are hungry.  INCREASE FLUIDS AND FIBER TO AVOID CONSTIPATION.    SURGICAL DRESSING/BATHING: Keep dressing clean and dry    FOLLOW-UP APPOINTMENT:  A follow-up appointment should be arranged with your doctor; call to schedule.    You should CALL YOUR PHYSICIAN if you develop:  Fever greater than 101 degrees F.  Pain not relieved by medication, or persistent nausea or vomiting.  Excessive bleeding (blood soaking through dressing) or unexpected drainage from the wound.  Extreme redness or swelling around the incision site, drainage of pus or foul smelling drainage.  Inability to urinate or empty your bladder within 8 hours.  Problems with breathing or chest pain.    You should call 911 if you develop problems with breathing or chest pain.  If you are unable to contact your doctor or surgical center, you should go to the nearest emergency room or urgent care center.  Physician's telephone #: Dr Abram Cortez [phone]    If any questions arise,  call your doctor.  If your doctor is not available, please feel free to call the Surgical Center at [phone]. The Contact Center is open Monday through Friday 7AM to 5PM and may speak to a nurse at [phone], or toll free at [phone].     A registered nurse may call you a few days after your surgery to see how you are doing after your procedure.    MEDICATIONS: Resume taking daily medication.  Take prescribed pain medication with food.  If no medication is prescribed, you may take non-aspirin pain medication if needed.  PAIN MEDICATION CAN BE VERY CONSTIPATING.  Take a stool softener or laxative such as senokot, pericolace, or milk of magnesia if needed.    Prescription given prior to surgery.  Last pain medication given: none.    If your physician has prescribed pain medication that includes Acetaminophen (Tylenol), do not take additional Acetaminophen (Tylenol) while taking the prescribed medication.    Depression / Suicide Risk    As you are discharged from this [facility] facility, it is important to learn how to keep safe from harming yourself.    Recognize the warning signs:  · Abrupt changes in personality, positive or negative- including increase in energy   · Giving away possessions  · Change in eating patterns- significant weight changes-  positive or negative  · Change in sleeping patterns- unable to sleep or sleeping all the time   · Unwillingness or inability to communicate  · Depression  · Unusual sadness, discouragement and loneliness  · Talk of wanting to die  · Neglect of personal appearance   · Rebelliousness- reckless behavior  · Withdrawal from people/activities they love  · Confusion- inability to concentrate     If you or a loved one observes any of these behaviors or has concerns about self-harm, here's what you can do:  · Talk about it- your feelings and reasons for harming yourself  · Remove any means that you might use to hurt yourself (examples: pills, rope, extension  cords, firearm)  · Get professional help from the community (Mental Health, Substance Abuse, psychological counseling)  · Do not be alone:Call your Safe Contact- someone whom you trust who will be there for you.  · Call your local CRISIS HOTLINE 745-6579 or 333-820-7595  · Call your local Children's Mobile Crisis Response Team [place] (714) 806-8910 or www.[employer]  · Call the toll free National Suicide Prevention Hotlines   · National Suicide Prevention Lifeline 043-589-ZSHS (9400)  · National Hope Line Network 800-SUICIDE (407-2028)

## 2021-05-11 NOTE — ANESTHESIA TIME REPORT
Anesthesia Start and Stop Event Times     Date Time Event    5/11/2021 0927 Ready for Procedure     0935 Anesthesia Start     1028 Anesthesia Stop        Responsible Staff  05/11/21    Name Role Begin End    Richard Worthington M.D. Anesth 0935 1028        Preop Diagnosis (Free Text):  Pre-op Diagnosis     ULCERATION RIGHT PLANTAR FOOT        Preop Diagnosis (Codes):    Post op Diagnosis  Foot ulceration, right, with unspecified severity (HCC)      Premium Reason  Non-Premium    Comments:

## 2021-05-11 NOTE — ANESTHESIA PROCEDURE NOTES
Airway    Date/Time: 5/11/2021 9:42 AM  Performed by: Richard Worthington M.D.  Authorized by: Richard Worthington M.D.     Location:  OR  Urgency:  Elective  Difficult Airway: No    Indications for Airway Management:  Anesthesia      Spontaneous Ventilation: absent    Sedation Level:  Deep  Preoxygenated: Yes    Mask Difficulty Assessment:  1 - vent by mask  Final Airway Type:  Supraglottic airway  Final Supraglottic Airway:  Standard LMA    SGA Size:  5  Number of Attempts at Approach:  1

## 2021-05-11 NOTE — ANESTHESIA PREPROCEDURE EVALUATION
Relevant Problems   NEURO   (+) History of Tpdt-Pqioq-Hxqxnfz disease      CARDIAC   (+) Essential hypertension      GI   (+) Gastroesophageal reflux disease without esophagitis         (+) Hepatitis C      ENDO   (+) Type 2 diabetes mellitus with hyperglycemia, without long-term current use of insulin (HCC)       Physical Exam    Airway   Mallampati: II  TM distance: >3 FB  Neck ROM: full       Cardiovascular - normal exam  Rhythm: regular  Rate: normal  (-) murmur     Dental - normal exam           Pulmonary - normal exam  Breath sounds clear to auscultation     Abdominal    Neurological - normal exam                 Anesthesia Plan    ASA 2       Plan - general       Airway plan will be LMA          Induction: intravenous    Postoperative Plan: Postoperative administration of opioids is intended.    Pertinent diagnostic labs and testing reviewed    Informed Consent:    Anesthetic plan and risks discussed with patient.    Use of blood products discussed with: patient whom consented to blood products.

## 2021-05-14 ENCOUNTER — OFFICE VISIT (OUTPATIENT)
Dept: INFECTIOUS DISEASES | Facility: MEDICAL CENTER | Age: 46
End: 2021-05-14
Payer: COMMERCIAL

## 2021-05-14 ENCOUNTER — OFFICE VISIT (OUTPATIENT)
Dept: WOUND CARE | Facility: MEDICAL CENTER | Age: 46
End: 2021-05-14
Attending: ORTHOPAEDIC SURGERY
Payer: COMMERCIAL

## 2021-05-14 VITALS
HEART RATE: 72 BPM | WEIGHT: 201 LBS | SYSTOLIC BLOOD PRESSURE: 118 MMHG | DIASTOLIC BLOOD PRESSURE: 80 MMHG | RESPIRATION RATE: 16 BRPM | BODY MASS INDEX: 28.14 KG/M2 | OXYGEN SATURATION: 95 % | HEIGHT: 71 IN | TEMPERATURE: 99.4 F

## 2021-05-14 VITALS
OXYGEN SATURATION: 97 % | TEMPERATURE: 98.7 F | SYSTOLIC BLOOD PRESSURE: 143 MMHG | DIASTOLIC BLOOD PRESSURE: 82 MMHG | HEART RATE: 74 BPM | RESPIRATION RATE: 18 BRPM

## 2021-05-14 DIAGNOSIS — E11.65 TYPE 2 DIABETES MELLITUS WITH HYPERGLYCEMIA, WITHOUT LONG-TERM CURRENT USE OF INSULIN (HCC): ICD-10-CM

## 2021-05-14 DIAGNOSIS — Z87.39 HISTORY OF LEGG-CALVE-PERTHES DISEASE: ICD-10-CM

## 2021-05-14 DIAGNOSIS — L08.9 DIABETIC FOOT INFECTION (HCC): ICD-10-CM

## 2021-05-14 DIAGNOSIS — L84 FOOT CALLUS: ICD-10-CM

## 2021-05-14 DIAGNOSIS — E13.621 DIABETIC ULCER OF RIGHT MIDFOOT ASSOCIATED WITH DIABETES MELLITUS OF OTHER TYPE, LIMITED TO BREAKDOWN OF SKIN (HCC): ICD-10-CM

## 2021-05-14 DIAGNOSIS — A49.8 METHICILLIN RESISTANT STAPHYLOCOCCUS EPIDERMIDIS INFECTION: ICD-10-CM

## 2021-05-14 DIAGNOSIS — Z98.890 STATUS POST ACHILLES TENDON REPAIR: ICD-10-CM

## 2021-05-14 DIAGNOSIS — A49.02 MRSA (METHICILLIN RESISTANT STAPHYLOCOCCUS AUREUS): ICD-10-CM

## 2021-05-14 DIAGNOSIS — E11.628 DIABETIC FOOT INFECTION (HCC): ICD-10-CM

## 2021-05-14 DIAGNOSIS — Z16.29 METHICILLIN RESISTANT STAPHYLOCOCCUS EPIDERMIDIS INFECTION: ICD-10-CM

## 2021-05-14 DIAGNOSIS — B95.8 STAPHYLOCOCCAL INFECTION: ICD-10-CM

## 2021-05-14 DIAGNOSIS — L97.411 DIABETIC ULCER OF RIGHT MIDFOOT ASSOCIATED WITH DIABETES MELLITUS OF OTHER TYPE, LIMITED TO BREAKDOWN OF SKIN (HCC): ICD-10-CM

## 2021-05-14 DIAGNOSIS — E08.41 DIABETIC MONONEUROPATHY ASSOCIATED WITH DIABETES MELLITUS DUE TO UNDERLYING CONDITION (HCC): ICD-10-CM

## 2021-05-14 LAB
BACTERIA SPEC ANAEROBE CULT: NORMAL
BACTERIA TISS AEROBE CULT: NORMAL
GRAM STN SPEC: NORMAL
SIGNIFICANT IND 70042: NORMAL
SIGNIFICANT IND 70042: NORMAL
SITE SITE: NORMAL
SITE SITE: NORMAL
SOURCE SOURCE: NORMAL
SOURCE SOURCE: NORMAL

## 2021-05-14 PROCEDURE — 99204 OFFICE O/P NEW MOD 45 MIN: CPT | Performed by: NURSE PRACTITIONER

## 2021-05-14 PROCEDURE — 99214 OFFICE O/P EST MOD 30 MIN: CPT

## 2021-05-14 RX ORDER — OXYCODONE HYDROCHLORIDE 5 MG/1
TABLET ORAL
COMMUNITY
Start: 2021-05-11 | End: 2021-06-08

## 2021-05-14 RX ORDER — HYDROXYZINE PAMOATE 50 MG/1
CAPSULE ORAL
COMMUNITY
Start: 2021-05-07 | End: 2021-12-16

## 2021-05-14 RX ORDER — SULFAMETHOXAZOLE AND TRIMETHOPRIM 800; 160 MG/1; MG/1
1 TABLET ORAL 2 TIMES DAILY
Qty: 20 TABLET | Refills: 0 | Status: SHIPPED | OUTPATIENT
Start: 2021-05-14 | End: 2021-05-14 | Stop reason: SDUPTHER

## 2021-05-14 RX ORDER — SULFAMETHOXAZOLE AND TRIMETHOPRIM 800; 160 MG/1; MG/1
TABLET ORAL
COMMUNITY
Start: 2021-05-11 | End: 2021-05-14 | Stop reason: SDUPTHER

## 2021-05-14 RX ORDER — SULFAMETHOXAZOLE AND TRIMETHOPRIM 800; 160 MG/1; MG/1
1 TABLET ORAL 2 TIMES DAILY
Qty: 8 TABLET | Refills: 0 | Status: SHIPPED | OUTPATIENT
Start: 2021-05-14 | End: 2021-05-14 | Stop reason: SDUPTHER

## 2021-05-14 RX ORDER — SULFAMETHOXAZOLE AND TRIMETHOPRIM 800; 160 MG/1; MG/1
1 TABLET ORAL 2 TIMES DAILY
Qty: 14 TABLET | Refills: 1 | Status: SHIPPED | OUTPATIENT
Start: 2021-05-14 | End: 2021-05-21

## 2021-05-14 ASSESSMENT — ENCOUNTER SYMPTOMS
NEUROLOGICAL NEGATIVE: 1
PSYCHIATRIC NEGATIVE: 1
EYES NEGATIVE: 1
CONSTITUTIONAL NEGATIVE: 1
CARDIOVASCULAR NEGATIVE: 1
RESPIRATORY NEGATIVE: 1
GASTROINTESTINAL NEGATIVE: 1

## 2021-05-14 ASSESSMENT — FIBROSIS 4 INDEX: FIB4 SCORE: 0.68

## 2021-05-14 ASSESSMENT — PAIN SCALES - GENERAL: PAINLEVEL: NO PAIN

## 2021-05-14 NOTE — PROGRESS NOTES
[facility]fectious Disease [facility]    Subjective:     Chief Complaint   Patient presents with   • New Patient     Diabetic wound infection     Referring physician: Dr. Abram Cortez from [facility]  Reason for referral: Puncture wound with foreign body to right foot    Interval History: 46-year-old male with a PMH of arthritis, type 2 diabetes with osteomyelitis and status post right fifth ray amputation in 2018, hyperlipidemia, hypertension, and hepatitis C carrier.  Patient states that following his surgery with Dr. Cortez for the right fifth ray amp in 2018 he had residual pain and a bump to the outside of the foot.  He was doing well until about May or June 2020, at which time he felt it was getting worse and was seen by his PCP.  His PCP then referred him to a podiatrist, Dr. Kurtz.  On 6/26, patient underwent excision of hypertrophied bone from the right foot with Dr. Kurtz.  Patient states that he did not do much better following the surgery and developed a small wound to the bottom of the right foot.  Dr. Kurtz then took him back to the operating room on 8/4 and underwent I&D with debridement of nonviable tissue to the right foot.  A mild amount of purulent drainage was noted intraoperatively.  OR culture on 8/4 +MRSA.  Patient reported that he was doing okay for a little bit following surgery, but another hole developed to the bottom of the right foot, at which time Dr. Truman Lazo was attempting to close with wound care and biologics.  In April 2021 Dr. Mariela Lazo recommended getting an MRI that showed marrow edema and enhancement with periosteal reaction of the fourth metatarsal consistent with osteomyelitis and superimposed osteomyelitis extending into the proximal second and third metatarsals cannot be excluded.  It was at this time that Dr. Lazo recommended the patient be evaluated by a surgeon for below the knee amputation.  Patient was evaluated by Dr. Cortez on 5/7/2021 who did not feel that BKA was  warranted and opted for alternate limb preservation surgery.  Underwent right tendo Achilles lengthening, right foot I&D of multiple compartments, partial excision of right fifth metatarsal, fourth metatarsal and cuboid, right fifth ray amputation and allograft biologic placement on 5/11/2021 with Dr. Cortez.  OR bone culture positive MRSE and staph cashunensis.  Pathology from the right fifth metatarsal with NO evidence of osteomyelitis.  Placed on p.o. Bactrim until seen by ID.    Records reviewed    Today, 5/14/2021: Patient being referred back to [facility] infectious disease, last seen in 2016; therefore, he is viewed as being a new patient.  Patient reports feeling well and has been tolerating the p.o. Bactrim without adverse effect.  He does have baseline issues with nausea and vomiting, fevers and chills and headaches, which are worse on the Bactrim.  Denies feeling generally ill,general malaise, diarrhea, abdominal pain, chest pain, shortness of breath, cough, sore throat or rash.  Has a follow-up appoint with Dr. Cortez on 5/24.  Will be getting established with wound care later today.  He still has the original surgical dressing in place, noting that there was a lot of blood in the boot when he went to get dressed this morning.  Complains of 10/10 right foot pain, noting that pain medication, rest and elevation do help.  But sugar was 83 this a.m., averages 100.  Has not been treated for hepatitis C, would like to explore this option in the future once his right foot is healed.  Smokes marijuana a couple times a day and occasionally eats edibles.    ROS as above in HPI.    Past Medical History:   Diagnosis Date   • Arthritis right hip    osteo left hip, right hand   • Backpain     feet/hip-R,back   • Bowel habit changes     consitpation and diarreha sometimes   • Dental disorder 06/2020    upper dentures   • Diabetes     oral meds   • Hepatitis C 2009    No tx   • Hepatitis C carrier (HCC)    • High  "cholesterol    • Hyperlipidemia    • Hypertension    • Infectious disease    • Leukocytosis 2019   • Osteomyelitis (HCC)        Family History   Problem Relation Age of Onset   • Diabetes Mother    • Hyperlipidemia Mother    • Arthritis Mother    • Heart Attack Father    • Heart Disease Father    • Hypertension Father    • Stroke Father    • Hyperlipidemia Father    • Arthritis Father        Social History     Tobacco Use   • Smoking status: Former Smoker     Packs/day: 0.50     Years: 10.00     Pack years: 5.00     Quit date: 2012     Years since quittin.3   • Smokeless tobacco: Never Used   • Tobacco comment: 5 yrs ago   Vaping Use   • Vaping Use: Never used   Substance Use Topics   • Alcohol use: No   • Drug use: Yes     Types: Marijuana, Inhaled     Comment: couple of times a day, occasionally will eat edibles       Allergies: Bee    Pt's medication and problem list reviewed.     Objective:     /80 (BP Location: Right arm, Patient Position: Sitting, BP Cuff Size: Adult)   Pulse 72   Temp 37.4 °C (99.4 °F) (Temporal)   Resp 16   Ht 1.803 m (5' 11\") Comment: patient reported  Wt 91.2 kg (201 lb)   SpO2 95%   BMI 28.03 kg/m²     Physical Exam  Vitals reviewed.   Constitutional:       General: He is not in acute distress.     Appearance: Normal appearance. He is normal weight. He is not diaphoretic.   HENT:      Head: Normocephalic and atraumatic.      Right Ear: External ear normal.      Left Ear: External ear normal.   Eyes:      Extraocular Movements: Extraocular movements intact.      Conjunctiva/sclera: Conjunctivae normal.      Pupils: Pupils are equal, round, and reactive to light.   Cardiovascular:      Rate and Rhythm: Normal rate and regular rhythm.      Heart sounds: Normal heart sounds. No murmur heard.     Pulmonary:      Effort: Pulmonary effort is normal. No respiratory distress.      Breath sounds: Normal breath sounds. No wheezing.   Abdominal:      General: Abdomen is " flat. Bowel sounds are normal. There is no distension.      Palpations: Abdomen is soft.      Tenderness: There is no abdominal tenderness.   Musculoskeletal:      Cervical back: Normal range of motion and neck supple.      Comments: RLE-original surgical dressing in place, unable to evaluate surgical site at this time.  Wearing walking boot.  For second and third digits exposed, slightly cool to touch, able to wiggle.   Skin:     Capillary Refill: Capillary refill takes 2 to 3 seconds.   Neurological:      Mental Status: He is alert and oriented to person, place, and time. Mental status is at baseline.      Cranial Nerves: No cranial nerve deficit.      Sensory: Sensory deficit (Decreased sensation to RLE) present.      Comments: Scooter for ambulatory assistance   Psychiatric:         Mood and Affect: Mood normal.         Behavior: Behavior normal.         Thought Content: Thought content normal.         Judgment: Judgment normal.      Comments: Pleasant         Labs:  WBC   Date/Time Value Ref Range Status   05/10/2021 10:31 AM 9.4 4.8 - 10.8 K/uL Final     RBC   Date/Time Value Ref Range Status   05/10/2021 10:31 AM 4.65 (L) 4.70 - 6.10 M/uL Final     Hemoglobin   Date/Time Value Ref Range Status   05/10/2021 10:31 AM 13.3 (L) 14.0 - 18.0 g/dL Final     Hematocrit   Date/Time Value Ref Range Status   05/10/2021 10:31 AM 43.2 42.0 - 52.0 % Final     MCV   Date/Time Value Ref Range Status   05/10/2021 10:31 AM 92.9 81.4 - 97.8 fL Final     MCH   Date/Time Value Ref Range Status   05/10/2021 10:31 AM 28.6 27.0 - 33.0 pg Final     MCHC   Date/Time Value Ref Range Status   05/10/2021 10:31 AM 30.8 (L) 33.7 - 35.3 g/dL Final     MPV   Date/Time Value Ref Range Status   05/10/2021 10:31 AM 9.7 9.0 - 12.9 fL Final        Sodium   Date/Time Value Ref Range Status   05/10/2021 10:31  135 - 145 mmol/L Final     Potassium   Date/Time Value Ref Range Status   05/10/2021 10:31 AM 5.3 3.6 - 5.5 mmol/L Final     Chloride    Date/Time Value Ref Range Status   05/10/2021 10:31  96 - 112 mmol/L Final     Co2   Date/Time Value Ref Range Status   05/10/2021 10:31 AM 26 20 - 33 mmol/L Final     Glucose   Date/Time Value Ref Range Status   05/10/2021 10:31 AM 65 65 - 99 mg/dL Final     Bun   Date/Time Value Ref Range Status   05/10/2021 10:31 AM 28 (H) 8 - 22 mg/dL Final     Creatinine   Date/Time Value Ref Range Status   05/10/2021 10:31 AM 1.31 0.50 - 1.40 mg/dL Final   11/28/2008 03:20 AM 1.1 0.5 - 1.4 mg/dL Final       Alkaline Phosphatase   Date/Time Value Ref Range Status   05/10/2021 10:31  (H) 30 - 99 U/L Final     AST(SGOT)   Date/Time Value Ref Range Status   05/10/2021 10:31 AM 34 12 - 45 U/L Final     ALT(SGPT)   Date/Time Value Ref Range Status   05/10/2021 10:31 AM 60 (H) 2 - 50 U/L Final     Total Bilirubin   Date/Time Value Ref Range Status   05/10/2021 10:31 AM 0.2 0.1 - 1.5 mg/dL Final        Microbiology:  CULTURE TISSUE W/ GRM STAIN  05/11/21   Source BONE P    Site Right Plantar Foot Bone #1 P      Culture Result Abnormal   Staphylococcus epidermidis   Rare growth   P      Culture Result Abnormal   Staphylococcus lugdunensis   Rare growth   Susceptibilities in progress   P      Resulting Agency M   Susceptibility     Staphylococcus epidermidis     SAHIL (Preliminary)     Ampicillin/sulbactam <=8/4 mcg/mL Resistant     Azithromycin >4 mcg/mL Resistant     Cefazolin <=8 mcg/mL Resistant     Cefepime 8 mcg/mL Resistant     Clindamycin <=0.25 mcg/mL Sensitive     Daptomycin <=0.5 mcg/mL Sensitive     Erythromycin >4 mcg/mL Resistant     Oxacillin >2 mcg/mL Resistant     Tetracycline >8 mcg/mL Resistant     Trimeth/Sulfa <=0.5/9.5 m... Sensitive     Vancomycin 1 mcg/mL Sensitive           Pathology:  5/11/2021  SURGICAL PATHOLOGY CONSULTATION   FINAL DIAGNOSIS:   A. Right fifth metatarsal:          Fragments of benign sclerotic bone without significant           histopathologic abnormality.          No evidence of  osteomyelitis or malignancy identified.       Assessment and Plan:   The following treatment plan was discussed with patient at length:    1. Diabetic foot infection (HCC)  sulfamethoxazole-trimethoprim (BACTRIM DS) 800-160 MG tablet    Comp Metabolic Panel    CRP QUANTITIVE (NON-CARDIAC)    Sed Rate    -Continue p.o. Bactrim DS twice daily for total of 14 days as pathology is negative for osteomyelitis, do not need to treat for bone infection.  --- Patient to be evaluated by Dr. Cortez on 5/24, if at that appointment Dr. Cortez has residual concerns for lingering infection, patient is to then continue on the p.o. Bactrim, refill for Bactrim has been placed so he can call into the pharmacy if need be.  However, if patient is doing well at that time, he is to finish the 2-week course and then monitor himself closely off the antibiotics.  -----Monitor for s/sx of worsening off abx: increased redness, pain, swelling, drainage, breakdown of surgical site, fevers, chills, general malaise, etc.  Notify ID or go to ER should these s/sx occur.  -CMP, ESR and CRP to be done at the end of next week to monitor for hepatonephrotoxicity, hyperkalemia, hyponatremia and trend inflammatory markers.  -Follow-up with Dr. Cortez as directed.   2. Methicillin resistant Staphylococcus epidermidis infection      As above   3. Staphylococcal lugdunensis infection      Susceptibility still pending.  If sensitive to Bactrim, continue with p.o. antibiotic plan as noted above.  If resistant, then would transition to p.o. Zyvox 60   4. Type 2 diabetes mellitus with hyperglycemia, without long-term current use of insulin (HCC)      Keep blood sugars under 150 to promote wound healing and control infection.     Follow up: 3 weeks as needed if of antibiotics and doing well. RTC sooner if needed. FU with PCP for ongoing chronic medical conditions.     BLAKE Go.P.R.N.       Please note that this dictation was created using voice  recognition software. I have  worked with technical experts from [facility] to optimize the interface.  I have made every reasonable attempt to correct obvious errors, but there may be errors of grammar and possibly content that I did not discover before finalizing the note.

## 2021-05-14 NOTE — PROGRESS NOTES
Provider Encounter- Full Thickness wound    HISTORY OF PRESENT ILLNESS  Wound History:    START OF CARE IN CLINIC: 1421    REFERRING PROVIDER: Alvaro Cortez MD     WOUND- Full Thickness Wound   LOCATION: Plantar surface right foot   HISTORY: On on 5/11/2021 Dr. Cortez performed a right Achilles tendon lengthening, right foot I&D of multiple compartments, partial excision of right fifth metatarsal, fourth metatarsal and cuboid right fifth ray amputation and allograft placement.  A bone culture revealed MRSA and strep lugdunrnsis.  There was no evidence of osteomyelitis.    Pertinent Medical History: Patient has a long history of problems with his right foot going back to 2018 when he had osteomyelitis and underwent a right fifth ray amputation.  Patient is diabetic and has hyperlipidemia hypertension and hepatitis C. following the ray amputation he developed a lump on the side of his foot is referred to a podiatrist (Dr. Kurtz) and on 626 he had a excision of hypertrophic bone from the right foot.  Did not improve from that procedure and developed a small wound on the bottom of the foot.  He had a follow-up I&D the wound became reinfected bursa was identified a second I&D was performed by Dr. Kyung Garg.  In April 2021 Dr. Mariela Lazo ordered an MRI showed marrow edema and enhancement with periosteal action of both fourth metatarsal consistent with osteomyelitis and superimposed osteomyelitis extending into the second and third metatarsals not be excluded.  He was referred for a BKA to Dr. Cortez who recommended conservative preservation surgery.  He has a follow-up visit with Dr. Cortez on 5/24/2021    TOBACCO USE: Smoker    Patient's problem list, allergies, and current medications reviewed and updated in Epic    Interval History:    5/14/2021: Initial clinic visit with Dr. Garcia.  Patient awoke this morning with a boot full of blood that he wears at all times.  He was seen by infectious disease today he  is currently on Bactrim without adverse effects.  Scheduled to be on the Bactrim for a total of 14 days forever they have given him a second prescription to be available if he has any symptoms of increased redness pain swelling damage or breakdown of the surgical sites few chills fevers general malaise etc.    REVIEW OF SYSTEMS:   Review of Systems   Constitutional: Negative.    HENT: Negative.    Eyes: Negative.    Respiratory: Negative.    Cardiovascular: Negative.    Gastrointestinal: Negative.    Genitourinary: Negative.    Musculoskeletal:        His right foot has sutures in from the Achilles tendon lengthening and from the exposure I&D on the lateral aspect of his foot.  The plantar aspect shows a very small 1 mm opening to a depth of approximately 0.6 cm out any tunneling or purulent discharge   Skin: Negative.    Neurological: Negative.    Endo/Heme/Allergies: Negative.    Psychiatric/Behavioral: Negative.        PHYSICAL EXAMINATION:   There were no vitals taken for this visit.    Physical Exam  Constitutional:       Appearance: Normal appearance.   HENT:      Head: Normocephalic.      Nose: Nose normal.   Eyes:      Conjunctiva/sclera: Conjunctivae normal.   Cardiovascular:      Rate and Rhythm: Normal rate.      Pulses: Normal pulses.   Pulmonary:      Effort: Pulmonary effort is normal.   Abdominal:      Palpations: Abdomen is soft.   Musculoskeletal:      Comments: Right foot has sutures in the Achilles tendon repair site the lateral aspect of the foot since surgery that has dried blood along its entire course no active bleeding the foot itself appears swollen suggestive of a hematoma but it is too tender to palpate.  The plantar surface has a very small opening which allows a wooden Q-tip point to proceed in to a depth of 6 mm without evidence of tunneling or purulent discharge or bleeding   Skin:     General: Skin is warm and dry.   Neurological:      General: No focal deficit present.      Mental  Status: He is alert.   Psychiatric:         Mood and Affect: Mood normal.         WOUND ASSESSMENT    Wound 06/26/20 Incision Foot Right benzoin, steri strips, 4x4, soft roll, plaster, coban, post op shoe (Active)       Wound 05/14/21 Diabetic Ulcer Right plantar mid-foot (Active)   Wound Image   05/14/21 1530   Site Assessment Pink;Yellow 05/14/21 1530   Periwound Assessment Callused 05/14/21 1530   Margins Unattached edges 05/14/21 1530   Drainage Amount KAVIN 05/14/21 1530   Drainage Description KAVIN 05/14/21 1530   Treatments Cleansed;Site care 05/14/21 1530   Wound Cleansing Puracyn Alexander 05/14/21 1530   Periwound Protectant Skin Protectant Wipes to Periwound 05/14/21 1530   Dressing Cleansing/Solutions Not Applicable 05/14/21 1530   Dressing Options Hydrofiber Silver;Nonadhesive Foam;Dry Roll Gauze;Hypafix Tape;Ace Wrap 05/14/21 1530   Dressing Changed New 05/14/21 1530   Dressing Change/Treatment Frequency Every 72 hrs, and As Needed 05/14/21 1530   Non-staged Wound Description Full thickness 05/14/21 1530   Wound Length (cm) 0.3 cm 05/14/21 1530   Wound Width (cm) 0.3 cm 05/14/21 1530   Wound Depth (cm) 0.6 cm 05/14/21 1530   Wound Surface Area (cm^2) 0.09 cm^2 05/14/21 1530   Wound Volume (cm^3) 0.05 cm^3 05/14/21 1530   Tunneling (cm) 0 cm 05/14/21 1530   Undermining (cm) 0.3 cm 05/14/21 1530   Wound Odor None 05/14/21 1530   Pulses Right;2+;DP;PT 05/14/21 1530   Exposed Structures None 05/14/21 1530       Wound 05/14/21 Incision Right Lateral Foot (Active)   Wound Image   05/14/21 1530   Site Assessment Other (Comment) 05/14/21 1530   Periwound Assessment Ecchymosis;Edema 05/14/21 1530   Closure Approximated;Sutures 05/14/21 1530   Drainage Amount Large 05/14/21 1530   Drainage Description Sanguineous 05/14/21 1530   Treatments Cleansed;Site care 05/14/21 1530   Wound Cleansing Puracyn Alexander 05/14/21 1530   Periwound Protectant Adaptic 05/14/21 1530   Dressing Cleansing/Solutions Not Applicable 05/14/21 1530    Dressing Options Adaptic;Hydrofiber Silver;Nonadhesive Foam;Dry Roll Gauze;Hypafix Tape;Ace Wrap 05/14/21 1530   Dressing Changed New 05/14/21 1530   Dressing Change/Treatment Frequency Every 72 hrs, and As Needed 05/14/21 1530   Tunneling (cm) 0 cm 05/14/21 1530   Undermining (cm) 0 cm 05/14/21 1530   Wound Odor None 05/14/21 1530   Pulses Right;2+;DP;PT 05/14/21 1530       Wound 05/14/21 Incision Right Achilles (Active)   Wound Image   05/14/21 1530   Site Assessment Other (Comment) 05/14/21 1530   Periwound Assessment Intact;Edema 05/14/21 1530   Closure Approximated;Sutures 05/14/21 1530   Drainage Amount None 05/14/21 1530   Treatments Cleansed;Site care 05/14/21 1530   Wound Cleansing Puracyn Monroe 05/14/21 1530   Periwound Protectant Skin Protectant Wipes to Periwound;Adaptic 05/14/21 1530   Dressing Cleansing/Solutions Not Applicable 05/14/21 1530   Dressing Options Adaptic;Silicone Adhesive Foam;Dry Roll Gauze;Hypafix Tape;Ace Wrap 05/14/21 1530   Dressing Changed New 05/14/21 1530   Dressing Change/Treatment Frequency Every 72 hrs, and As Needed 05/14/21 1530   Tunneling (cm) 0 cm 05/14/21 1530   Undermining (cm) 0 cm 05/14/21 1530   Wound Odor None 05/14/21 1530   Pulses Right;2+;DP;PT 05/14/21 1530          PROCEDURE:   -2% viscous lidocaine applied topically to wound bed for approximately 5 minutes prior to debridement.  Right foot was evaluated as noted above with sutures in the Achilles tendons repair site the right except I&D and surgical site with dried blood no active bleeding moderate erythema bruising and possible hematoma.  Plantar surface had a very small opening that only barely admitted the wooden end of a Q-tip down to approximately 5 to 6 mm.  There is no discharge no bleeding and too small to to debride.    Pertinent Labs and Diagnostics:    Labs:     A1c:   Lab Results   Component Value Date/Time    HBA1C 6.9 (H) 05/10/2021 10:31 AM          IMAGING: N/A    VASCULAR STUDIES: N/A    LAST   WOUND CULTURE:  DATE : See epic and above            ASSESSMENT AND PLAN:     !.  Recent Achilles tendon lengthening  5/14/2021: Sutures in place no evidence of infection    2.  I&D lateral foot multiple compartments partial excision of right metatarsal and fourth metatarsal and cuboid right fifth ray amputation of and placement of biologic graft.  5/14/2021: Evidence of active bleeding.  Was relayed to Dr. Cortez who recommended the patient go to the [facility] immediately.    3.  Diabetic foot ulcer plantar surface.  5/14/2021: Wound care completed by wound care RN see flow sheet    PATIENT EDUCATION  - Importance of adequate nutrition for wound healing  -Advised to go to ER for any increased redness, swelling, drainage, or odor, or if patient develops fever, chills, nausea or vomiting.     30 min spent face to face with patient, >50% of time spent counseling, coordinating care, reviewing records, discussing POC, educating patient regarding wound healing and progression.     Please note that this note may have been created using voice recognition software. I have worked with technical experts from [employer] to optimize the interface.  I have made every reasonable attempt to correct obvious errors, but there may be errors of grammar and possibly content that I did not discover before finalizing the note.    N

## 2021-05-15 LAB
BACTERIA TISS AEROBE CULT: ABNORMAL
GRAM STN SPEC: ABNORMAL
SIGNIFICANT IND 70042: ABNORMAL
SITE SITE: ABNORMAL
SOURCE SOURCE: ABNORMAL

## 2021-05-17 ENCOUNTER — TELEPHONE (OUTPATIENT)
Dept: INFECTIOUS DISEASES | Facility: MEDICAL CENTER | Age: 46
End: 2021-05-17

## 2021-05-17 NOTE — TELEPHONE ENCOUNTER
----- Message from Hannah Meek M.D. sent at 5/15/2021  8:52 AM PDT -----  Keyshawn Santiago,    Please call patient and let him know that the bacteria, Staphylococcus lugdunensis is susceptible to Bactrim  ----- Message -----  From: LILLIANA Go  Sent: 5/14/2021  12:04 PM PDT  To: Hananh Meek M.D.    OR cultures are still pending, staph lugdunensis susceptibility in progress.  He is currently on Bactrim, but if the staph lugdunensis is resistant, I told him that we would likely switch him over to p.o. Zyvox.  Could you please follow-up on this since I will be on vacation?      Thank you,  Shell

## 2021-05-28 ENCOUNTER — NON-PROVIDER VISIT (OUTPATIENT)
Dept: WOUND CARE | Facility: MEDICAL CENTER | Age: 46
End: 2021-05-28
Attending: ORTHOPAEDIC SURGERY
Payer: COMMERCIAL

## 2021-05-28 PROCEDURE — 99211 OFF/OP EST MAY X REQ PHY/QHP: CPT

## 2021-05-28 NOTE — PATIENT INSTRUCTIONS
Pt instr dc today, keep area clean, it will be fragile for a few days, bathe and dry area gently, as scar tissue only ever regains a maximum of 80% of the tensile strength of the surrounding skin, remodeling of scar can continue for 6mo - a year. Contact PCP for a referral back here if any problems with area opening and draining again. Pt understands  Follow up with dr. Cortez and with podiatrist

## 2021-05-30 DIAGNOSIS — K21.9 GASTROESOPHAGEAL REFLUX DISEASE WITHOUT ESOPHAGITIS: ICD-10-CM

## 2021-06-01 RX ORDER — OMEPRAZOLE 20 MG/1
CAPSULE, DELAYED RELEASE ORAL
Qty: 180 CAPSULE | Refills: 1 | Status: SHIPPED | OUTPATIENT
Start: 2021-06-01 | End: 2021-12-16

## 2021-06-04 ENCOUNTER — APPOINTMENT (OUTPATIENT)
Dept: INFECTIOUS DISEASES | Facility: MEDICAL CENTER | Age: 46
End: 2021-06-04
Payer: COMMERCIAL

## 2021-06-04 NOTE — PROGRESS NOTES
Infectious Disease [facility]    Subjective:     No chief complaint on file.    Referring physician: Dr. Abram Cortez from [facility]  Reason for referral: Puncture wound with foreign body to right foot    Interval History: 46-year-old male with a PMH of arthritis, type 2 diabetes with osteomyelitis and status post right fifth ray amputation in 2018, hyperlipidemia, hypertension, and hepatitis C carrier.  Patient states that following his surgery with Dr. Cortez for the right fifth ray amp in 2018 he had residual pain and a bump to the outside of the foot.  He was doing well until about May or June 2020, at which time he felt it was getting worse and was seen by his PCP.  His PCP then referred him to a podiatrist, Dr. Kurtz.  On 6/26, patient underwent excision of hypertrophied bone from the right foot with Dr. Kurtz.  Patient states that he did not do much better following the surgery and developed a small wound to the bottom of the right foot.  Dr. Kurtz then took him back to the operating room on 8/4 and underwent I&D with debridement of nonviable tissue to the right foot.  A mild amount of purulent drainage was noted intraoperatively.  OR culture on 8/4 +MRSA.  Patient reported that he was doing okay for a little bit following surgery, but another hole developed to the bottom of the right foot, at which time Dr. Truman Lazo was attempting to close with wound care and biologics.  In April 2021 Dr. Mariela Lazo recommended getting an MRI that showed marrow edema and enhancement with periosteal reaction of the fourth metatarsal consistent with osteomyelitis and superimposed osteomyelitis extending into the proximal second and third metatarsals cannot be excluded.  It was at this time that Dr. Lazo recommended the patient be evaluated by a surgeon for below the knee amputation.  Patient was evaluated by Dr. Cortez on 5/7/2021 who did not feel that BKA was warranted and opted for alternate limb preservation surgery.   Underwent right tendo Achilles lengthening, right foot I&D of multiple compartments, partial excision of right fifth metatarsal, fourth metatarsal and cuboid, right fifth ray amputation and allograft biologic placement on 5/11/2021 with Dr. Cortez.  OR bone culture positive MRSE and staph lugdunensis.  Pathology from the right fifth metatarsal with NO evidence of osteomyelitis.  Placed on p.o. Bactrim until seen by ID.    Records reviewed    Today, 5/14/2021: Patient being referred back to [facility] infectious [facility], last seen in 2016; therefore, he is viewed as being a new patient.  Patient reports feeling well and has been tolerating the p.o. Bactrim without adverse effect.  He does have baseline issues with nausea and vomiting, fevers and chills and headaches, which are worse on the Bactrim.  Denies feeling generally ill,general malaise, diarrhea, abdominal pain, chest pain, shortness of breath, cough, sore throat or rash.  Has a follow-up appoint with Dr. Cortez on 5/24.  Will be getting established with wound care later today.  He still has the original surgical dressing in place, noting that there was a lot of blood in the boot when he went to get dressed this morning.  Complains of 10/10 right foot pain, noting that pain medication, rest and elevation do help.  But sugar was 83 this a.m., averages 100.  Has not been treated for hepatitis C, would like to explore this option in the future once his right foot is healed.  Smokes marijuana a couple times a day and occasionally eats edibles.        Today, 6/4/2021:          ROS as above in HPI.    Past Medical History:   Diagnosis Date   • Arthritis right hip    osteo left hip, right hand   • Backpain     feet/hip-R,back   • Bowel habit changes     consitpation and diarreha sometimes   • Dental disorder 06/2020    upper dentures   • Diabetes     oral meds   • Hepatitis C 2009    No tx   • Hepatitis C carrier (HCC)    • High cholesterol    • Hyperlipidemia    •  Hypertension    • Infectious disease    • Leukocytosis 2019   • Osteomyelitis (HCC)        Family History   Problem Relation Age of Onset   • Diabetes Mother    • Hyperlipidemia Mother    • Arthritis Mother    • Heart Attack Father    • Heart Disease Father    • Hypertension Father    • Stroke Father    • Hyperlipidemia Father    • Arthritis Father        Social History     Tobacco Use   • Smoking status: Former Smoker     Packs/day: 0.50     Years: 10.00     Pack years: 5.00     Quit date: 2012     Years since quittin.4   • Smokeless tobacco: Never Used   • Tobacco comment: 5 yrs ago   Vaping Use   • Vaping Use: Never used   Substance Use Topics   • Alcohol use: No   • Drug use: Yes     Types: Marijuana, Inhaled     Comment: couple of times a day, occasionally will eat edibles       Allergies: Bee    Pt's medication and problem list reviewed.     Objective:     There were no vitals taken for this visit.    Physical Exam  Vitals reviewed.   Constitutional:       General: He is not in acute distress.     Appearance: Normal appearance. He is normal weight. He is not diaphoretic.   HENT:      Head: Normocephalic and atraumatic.      Right Ear: External ear normal.      Left Ear: External ear normal.   Eyes:      Extraocular Movements: Extraocular movements intact.      Conjunctiva/sclera: Conjunctivae normal.      Pupils: Pupils are equal, round, and reactive to light.   Cardiovascular:      Rate and Rhythm: Normal rate and regular rhythm.      Heart sounds: Normal heart sounds. No murmur heard.     Pulmonary:      Effort: Pulmonary effort is normal. No respiratory distress.      Breath sounds: Normal breath sounds. No wheezing.   Abdominal:      General: Abdomen is flat. Bowel sounds are normal. There is no distension.      Palpations: Abdomen is soft.      Tenderness: There is no abdominal tenderness.   Musculoskeletal:      Cervical back: Normal range of motion and neck supple.      Comments:  RLE-original surgical dressing in place, unable to evaluate surgical site at this time.  Wearing walking boot.  For second and third digits exposed, slightly cool to touch, able to wiggle.   Skin:     Capillary Refill: Capillary refill takes 2 to 3 seconds.   Neurological:      Mental Status: He is alert and oriented to person, place, and time. Mental status is at baseline.      Cranial Nerves: No cranial nerve deficit.      Sensory: Sensory deficit (Decreased sensation to RLE) present.      Comments: Scooter for ambulatory assistance   Psychiatric:         Mood and Affect: Mood normal.         Behavior: Behavior normal.         Thought Content: Thought content normal.         Judgment: Judgment normal.      Comments: Pleasant         Labs:  WBC   Date/Time Value Ref Range Status   05/10/2021 10:31 AM 9.4 4.8 - 10.8 K/uL Final     RBC   Date/Time Value Ref Range Status   05/10/2021 10:31 AM 4.65 (L) 4.70 - 6.10 M/uL Final     Hemoglobin   Date/Time Value Ref Range Status   05/10/2021 10:31 AM 13.3 (L) 14.0 - 18.0 g/dL Final     Hematocrit   Date/Time Value Ref Range Status   05/10/2021 10:31 AM 43.2 42.0 - 52.0 % Final     MCV   Date/Time Value Ref Range Status   05/10/2021 10:31 AM 92.9 81.4 - 97.8 fL Final     MCH   Date/Time Value Ref Range Status   05/10/2021 10:31 AM 28.6 27.0 - 33.0 pg Final     MCHC   Date/Time Value Ref Range Status   05/10/2021 10:31 AM 30.8 (L) 33.7 - 35.3 g/dL Final     MPV   Date/Time Value Ref Range Status   05/10/2021 10:31 AM 9.7 9.0 - 12.9 fL Final        Sodium   Date/Time Value Ref Range Status   05/10/2021 10:31  135 - 145 mmol/L Final     Potassium   Date/Time Value Ref Range Status   05/10/2021 10:31 AM 5.3 3.6 - 5.5 mmol/L Final     Chloride   Date/Time Value Ref Range Status   05/10/2021 10:31  96 - 112 mmol/L Final     Co2   Date/Time Value Ref Range Status   05/10/2021 10:31 AM 26 20 - 33 mmol/L Final     Glucose   Date/Time Value Ref Range Status   05/10/2021  10:31 AM 65 65 - 99 mg/dL Final     Bun   Date/Time Value Ref Range Status   05/10/2021 10:31 AM 28 (H) 8 - 22 mg/dL Final     Creatinine   Date/Time Value Ref Range Status   05/10/2021 10:31 AM 1.31 0.50 - 1.40 mg/dL Final   11/28/2008 03:20 AM 1.1 0.5 - 1.4 mg/dL Final       Alkaline Phosphatase   Date/Time Value Ref Range Status   05/10/2021 10:31  (H) 30 - 99 U/L Final     AST(SGOT)   Date/Time Value Ref Range Status   05/10/2021 10:31 AM 34 12 - 45 U/L Final     ALT(SGPT)   Date/Time Value Ref Range Status   05/10/2021 10:31 AM 60 (H) 2 - 50 U/L Final     Total Bilirubin   Date/Time Value Ref Range Status   05/10/2021 10:31 AM 0.2 0.1 - 1.5 mg/dL Final        Microbiology:  CULTURE TISSUE W/ GRM STAIN  05/11/21   Source BONE P    Site Right Plantar Foot Bone #1 P      Culture Result Abnormal   Staphylococcus epidermidis   Rare growth   P      Culture Result Abnormal   Staphylococcus lugdunensis   Rare growth   Susceptibilities in progress   P      Resulting Agency M   Susceptibility     Staphylococcus epidermidis     SAHIL (Preliminary)     Ampicillin/sulbactam <=8/4 mcg/mL Resistant     Azithromycin >4 mcg/mL Resistant     Cefazolin <=8 mcg/mL Resistant     Cefepime 8 mcg/mL Resistant     Clindamycin <=0.25 mcg/mL Sensitive     Daptomycin <=0.5 mcg/mL Sensitive     Erythromycin >4 mcg/mL Resistant     Oxacillin >2 mcg/mL Resistant     Tetracycline >8 mcg/mL Resistant     Trimeth/Sulfa <=0.5/9.5 m... Sensitive     Vancomycin 1 mcg/mL Sensitive           Pathology:  5/11/2021  SURGICAL PATHOLOGY CONSULTATION   FINAL DIAGNOSIS:   A. Right fifth metatarsal:          Fragments of benign sclerotic bone without significant           histopathologic abnormality.          No evidence of osteomyelitis or malignancy identified.       Assessment and Plan:   The following treatment plan was discussed with patient at length:    1. Diabetic foot infection (HCC)  sulfamethoxazole-trimethoprim (BACTRIM DS) 800-160 MG  tablet    Comp Metabolic Panel    CRP QUANTITIVE (NON-CARDIAC)    Sed Rate    -Continue p.o. Bactrim DS twice daily for total of 14 days as pathology is negative for osteomyelitis, do not need to treat for bone infection.  --- Patient to be evaluated by Dr. Cortez on 5/24, if at that appointment Dr. Cortez has residual concerns for lingering infection, patient is to then continue on the p.o. Bactrim, refill for Bactrim has been placed so he can call into the pharmacy if need be.  However, if patient is doing well at that time, he is to finish the 2-week course and then monitor himself closely off the antibiotics.  -----Monitor for s/sx of worsening off abx: increased redness, pain, swelling, drainage, breakdown of surgical site, fevers, chills, general malaise, etc.  Notify ID or go to ER should these s/sx occur.  -CMP, ESR and CRP to be done at the end of next week to monitor for hepatonephrotoxicity, hyperkalemia, hyponatremia and trend inflammatory markers.  -Follow-up with Dr. Cortez as directed.   2. Methicillin resistant Staphylococcus epidermidis infection      As above   3. Staphylococcal lugdunensis infection      Susceptibility still pending.  If sensitive to Bactrim, continue with p.o. antibiotic plan as noted above.  If resistant, then would transition to p.o. Zyvox 60   4. Type 2 diabetes mellitus with hyperglycemia, without long-term current use of insulin (HCC)      Keep blood sugars under 150 to promote wound healing and control infection.     Follow up: 3 weeks as needed if of antibiotics and doing well. RTC sooner if needed. FU with PCP for ongoing chronic medical conditions.     BLAKE Go.P.R.N.       Please note that this dictation was created using voice recognition software. I have  worked with technical experts from [employer] to optimize the interface.  I have made every reasonable attempt to correct obvious errors, but there may be errors of grammar and possibly content  that I did not discover before finalizing the note.

## 2021-06-07 ENCOUNTER — HOSPITAL ENCOUNTER (OUTPATIENT)
Dept: LAB | Facility: MEDICAL CENTER | Age: 46
End: 2021-06-07
Attending: NURSE PRACTITIONER
Payer: COMMERCIAL

## 2021-06-07 ENCOUNTER — HOSPITAL ENCOUNTER (OUTPATIENT)
Dept: LAB | Facility: MEDICAL CENTER | Age: 46
End: 2021-06-07
Attending: INTERNAL MEDICINE
Payer: COMMERCIAL

## 2021-06-07 DIAGNOSIS — L08.9 DIABETIC FOOT INFECTION (HCC): ICD-10-CM

## 2021-06-07 DIAGNOSIS — E11.65 UNCONTROLLED TYPE 2 DIABETES MELLITUS WITH HYPERGLYCEMIA (HCC): Chronic | ICD-10-CM

## 2021-06-07 DIAGNOSIS — E11.628 DIABETIC FOOT INFECTION (HCC): ICD-10-CM

## 2021-06-07 LAB
ALBUMIN SERPL BCP-MCNC: 4 G/DL (ref 3.2–4.9)
ALBUMIN SERPL BCP-MCNC: 4.2 G/DL (ref 3.2–4.9)
ALBUMIN/GLOB SERPL: 1.1 G/DL
ALBUMIN/GLOB SERPL: 1.3 G/DL
ALP SERPL-CCNC: 107 U/L (ref 30–99)
ALP SERPL-CCNC: 108 U/L (ref 30–99)
ALT SERPL-CCNC: 70 U/L (ref 2–50)
ALT SERPL-CCNC: 71 U/L (ref 2–50)
ANION GAP SERPL CALC-SCNC: 11 MMOL/L (ref 7–16)
ANION GAP SERPL CALC-SCNC: 13 MMOL/L (ref 7–16)
AST SERPL-CCNC: 39 U/L (ref 12–45)
AST SERPL-CCNC: 44 U/L (ref 12–45)
BILIRUB SERPL-MCNC: 0.4 MG/DL (ref 0.1–1.5)
BILIRUB SERPL-MCNC: 0.4 MG/DL (ref 0.1–1.5)
BUN SERPL-MCNC: 27 MG/DL (ref 8–22)
BUN SERPL-MCNC: 28 MG/DL (ref 8–22)
CALCIUM SERPL-MCNC: 10 MG/DL (ref 8.5–10.5)
CALCIUM SERPL-MCNC: 9.7 MG/DL (ref 8.5–10.5)
CHLORIDE SERPL-SCNC: 106 MMOL/L (ref 96–112)
CHLORIDE SERPL-SCNC: 110 MMOL/L (ref 96–112)
CHOLEST SERPL-MCNC: 136 MG/DL (ref 100–199)
CO2 SERPL-SCNC: 19 MMOL/L (ref 20–33)
CO2 SERPL-SCNC: 20 MMOL/L (ref 20–33)
CREAT SERPL-MCNC: 1.12 MG/DL (ref 0.5–1.4)
CREAT SERPL-MCNC: 1.18 MG/DL (ref 0.5–1.4)
CREAT UR-MCNC: 185.61 MG/DL
CRP SERPL HS-MCNC: <0.3 MG/DL (ref 0–0.75)
ERYTHROCYTE [SEDIMENTATION RATE] IN BLOOD BY WESTERGREN METHOD: 47 MM/HOUR (ref 0–20)
EST. AVERAGE GLUCOSE BLD GHB EST-MCNC: 134 MG/DL
FASTING STATUS PATIENT QL REPORTED: NORMAL
FASTING STATUS PATIENT QL REPORTED: NORMAL
GLOBULIN SER CALC-MCNC: 3.2 G/DL (ref 1.9–3.5)
GLOBULIN SER CALC-MCNC: 3.5 G/DL (ref 1.9–3.5)
GLUCOSE SERPL-MCNC: 137 MG/DL (ref 65–99)
GLUCOSE SERPL-MCNC: 138 MG/DL (ref 65–99)
HBA1C MFR BLD: 6.3 % (ref 4–5.6)
HDLC SERPL-MCNC: 36 MG/DL
LDLC SERPL CALC-MCNC: 80 MG/DL
MAGNESIUM SERPL-MCNC: 2.1 MG/DL (ref 1.5–2.5)
MICROALBUMIN UR-MCNC: 315.7 MG/DL
MICROALBUMIN/CREAT UR: 1701 MG/G (ref 0–30)
POTASSIUM SERPL-SCNC: 4.5 MMOL/L (ref 3.6–5.5)
POTASSIUM SERPL-SCNC: 4.7 MMOL/L (ref 3.6–5.5)
PROT SERPL-MCNC: 7.4 G/DL (ref 6–8.2)
PROT SERPL-MCNC: 7.5 G/DL (ref 6–8.2)
SODIUM SERPL-SCNC: 138 MMOL/L (ref 135–145)
SODIUM SERPL-SCNC: 141 MMOL/L (ref 135–145)
TRIGL SERPL-MCNC: 100 MG/DL (ref 0–149)
TSH SERPL DL<=0.005 MIU/L-ACNC: 0.71 UIU/ML (ref 0.38–5.33)
VIT B12 SERPL-MCNC: 980 PG/ML (ref 211–911)

## 2021-06-07 PROCEDURE — 36415 COLL VENOUS BLD VENIPUNCTURE: CPT

## 2021-06-07 PROCEDURE — 86140 C-REACTIVE PROTEIN: CPT

## 2021-06-07 PROCEDURE — 82306 VITAMIN D 25 HYDROXY: CPT

## 2021-06-07 PROCEDURE — 83735 ASSAY OF MAGNESIUM: CPT

## 2021-06-07 PROCEDURE — 83036 HEMOGLOBIN GLYCOSYLATED A1C: CPT

## 2021-06-07 PROCEDURE — 82607 VITAMIN B-12: CPT

## 2021-06-07 PROCEDURE — 80053 COMPREHEN METABOLIC PANEL: CPT

## 2021-06-07 PROCEDURE — 80053 COMPREHEN METABOLIC PANEL: CPT | Mod: 91

## 2021-06-07 PROCEDURE — 80061 LIPID PANEL: CPT

## 2021-06-07 PROCEDURE — 82570 ASSAY OF URINE CREATININE: CPT

## 2021-06-07 PROCEDURE — 82043 UR ALBUMIN QUANTITATIVE: CPT

## 2021-06-07 PROCEDURE — 85652 RBC SED RATE AUTOMATED: CPT

## 2021-06-07 PROCEDURE — 84443 ASSAY THYROID STIM HORMONE: CPT

## 2021-06-08 ENCOUNTER — OFFICE VISIT (OUTPATIENT)
Dept: MEDICAL GROUP | Facility: PHYSICIAN GROUP | Age: 46
End: 2021-06-08
Payer: COMMERCIAL

## 2021-06-08 VITALS
BODY MASS INDEX: 27.59 KG/M2 | HEIGHT: 71 IN | DIASTOLIC BLOOD PRESSURE: 66 MMHG | SYSTOLIC BLOOD PRESSURE: 128 MMHG | OXYGEN SATURATION: 96 % | HEART RATE: 81 BPM | WEIGHT: 197.1 LBS | TEMPERATURE: 98.9 F

## 2021-06-08 DIAGNOSIS — E11.65 TYPE 2 DIABETES MELLITUS WITH HYPERGLYCEMIA, WITHOUT LONG-TERM CURRENT USE OF INSULIN (HCC): ICD-10-CM

## 2021-06-08 DIAGNOSIS — L97.411 DIABETIC ULCER OF RIGHT MIDFOOT ASSOCIATED WITH DIABETES MELLITUS OF OTHER TYPE, LIMITED TO BREAKDOWN OF SKIN (HCC): ICD-10-CM

## 2021-06-08 DIAGNOSIS — R11.2 INTRACTABLE NAUSEA AND VOMITING: ICD-10-CM

## 2021-06-08 DIAGNOSIS — E13.621 DIABETIC ULCER OF RIGHT MIDFOOT ASSOCIATED WITH DIABETES MELLITUS OF OTHER TYPE, LIMITED TO BREAKDOWN OF SKIN (HCC): ICD-10-CM

## 2021-06-08 DIAGNOSIS — R80.9 MICROALBUMINURIA: ICD-10-CM

## 2021-06-08 PROCEDURE — 99214 OFFICE O/P EST MOD 30 MIN: CPT | Performed by: INTERNAL MEDICINE

## 2021-06-08 ASSESSMENT — FIBROSIS 4 INDEX: FIB4 SCORE: 0.72

## 2021-06-08 NOTE — PROGRESS NOTES
Established Patient    Chief Complaint   Patient presents with   • Follow-Up     foot surgery on 5/10/21       Subjective:     HPI:   Cayetano presents today with the following.    1. Type 2 diabetes mellitus with hyperglycemia, without long-term current use of insulin ([facility])  2. Diabetic ulcer of right midfoot associated with diabetes mellitus of other type, limited to breakdown of skin ([facility])  3. Microalbuminuria  A1c:   Lab Results   Component Value Date/Time    HBA1C 6.3 (H) 06/07/2021 1020    HBA1C 6.9 (H) 05/10/2021 1031    HBA1C 9.5 (A) 03/08/2021 1549    AVGLUC 134 06/07/2021 1020    AVGLUC 151 05/10/2021 1031    AVGLUC 212 11/24/2019 0018   A1c improved, reported that this improvement secondary to eating healthier option, avoiding sugar and low-carb diet, eating more diabetic diet, also is taking Metformin 500 mg twice daily, glimepiride 4 mg daily, denied having hypoglycemia, he is also having microalbuminuria he is compliant with lisinopril 20 daily  -He had a recent surgery for his right foot with partial excision of metatarsal bones with evidence of osteomyelitis, he was seen by infectious disease status post Bactrim 14 days course, also was following up with wound care as well as podiatrist  >> Patient had RAPHAEL done of both legs with normal range    Patient Active Problem List    Diagnosis Date Noted   • Microalbuminuria 06/08/2021   • Cellulitis and abscess of foot 08/03/2020   • Type 2 diabetes mellitus with hyperglycemia, without long-term current use of insulin ([facility]) 08/03/2020   • Foot callus 03/11/2020   • Marijuana smoker 03/11/2020   • Gastroesophageal reflux disease without esophagitis 03/11/2020   • Upper GI bleed 11/23/2019   • Closed nondisplaced fracture of fourth metatarsal bone of right foot 07/20/2018   • History of amputation of lesser toe of right foot ([facility]) 07/20/2018   • Gastroparesis 03/07/2018   • Intractable nausea and vomiting 08/08/2016   • Diabetic ulcer of right midfoot, limited to  "breakdown of skin (HCC) 01/17/2016   • Diabetic neuropathy (HCC) 01/17/2016   • Essential hypertension 06/29/2014   • Dyslipidemia 06/29/2014   • Chronic right hip pain 06/05/2014   • History of Bqsw-Cubnv-Gswklhh disease 06/05/2014   • Scrotal abscess 01/04/2012   • Hepatitis C 01/04/2012       Current Outpatient Medications on File Prior to Visit   Medication Sig Dispense Refill   • omeprazole (PRILOSEC) 20 MG delayed-release capsule TAKE 1 CAPSULE BY MOUTH TWICE A  capsule 1   • hydrOXYzine pamoate (VISTARIL) 50 MG Cap      • lisinopril (PRINIVIL) 20 MG Tab Take 1 tablet by mouth every day. 90 tablet 1   • metformin (GLUCOPHAGE) 1000 MG tablet TAKE 1 TABLET BY MOUTH TWICE A DAY WITH MEALS 180 tablet 1   • rosuvastatin (CRESTOR) 10 MG Tab Take 1 tablet by mouth every day. N THE EVENING 90 tablet 1   • glimepiride (AMARYL) 4 MG Tab Take 1 tablet by mouth every morning. 90 tablet 1     No current facility-administered medications on file prior to visit.       Allergies, past medical history, past surgical history, family history, social history reviewed and updated    ROS:  All other systems reviewed and are negative except as stated in the HPI     Physical Exam:     /66 (BP Location: Right arm, Patient Position: Sitting, BP Cuff Size: Adult)   Pulse 81   Temp 37.2 °C (98.9 °F) (Temporal)   Ht 1.803 m (5' 11\")   Wt 89.4 kg (197 lb 1.6 oz) Comment: With boot  SpO2 96%   BMI 27.49 kg/m²   General: Normal appearing. No distress.  ENT: oropharynx without exudates.    Eyes: conjunctiva clear lids without ptosis  Pulmonary: Clear to ausculation.  Normal effort.   Cardiovascular: Regular rate and rhythm  Abdomen: Soft, nontender,  Lymph: No cervical or supraclavicular palpable lymph nodes  Psych: Normal mood and affect.     I have reviewed pertinent labs and diagnostic tests associated with this visit with specific comments listed under the assessment and plan below      Assessment and Plan:     46 y.o. " male with the following issues.    1. Type 2 diabetes mellitus with hyperglycemia, without long-term current use of insulin (HCC)  2. Diabetic ulcer of right midfoot associated with diabetes mellitus of other type, limited to breakdown of skin (HCC)  3. Microalbuminuria  >> Continue diabetic medication as above  -Advised to follow-up with endocrinology as referral done last time however never follow-up, to obtain free medication as well including Jardiance since he could not afford the  >> Patient does not see anymore ID, wound care, as well as his podiatrist will encourage patient to see podiatrist regularly for his chronic right foot wound issues)  >> Reported will follow up with Dr. Cortez June 24 for his right foot wound healing process    -Discussed diabetic diet in detail, educated regarding management of hypoglycemia, advised regular exercise, educated disease management and weight goals as well as feet care and frequent check of feet.  -Patient to check early morning fasting blood glucose with goal discussed.      Nausea and vomiting  -Seems to be this is habitual, mention go to bed at 6 PM and wake up around 1 AM and then start to have his cyclic vomiting as high which will think, and then get ready around 7 AM for his kids school  >> Educated to shift his sleep pattern from 6 PM to around 9 PM and hopefully that will skip his cycle of nausea and vomiting at 1 AM, also educated regarding marijuana side effect and complication.  >> There is no strong indication for Prilosec for long-term, educated regarding associative side effect and complication as well    Follow Up:      Return in about 3 months (around 9/8/2021) for follow up.  Diabetes, PPI de-prescription hopefully   Please note that this dictation was created using voice recognition software. I have made every reasonable attempt to correct obvious errors, but I expect that there are errors of grammar and possibly content that I did not discover  before finalizing the note.    Signed by: Candy Gupta M.D.

## 2021-06-09 LAB — 25(OH)D3 SERPL-MCNC: 22 NG/ML (ref 30–80)

## 2021-06-10 DIAGNOSIS — E55.9 VITAMIN D DEFICIENCY: ICD-10-CM

## 2021-06-10 RX ORDER — CHOLECALCIFEROL (VITAMIN D3) 125 MCG
5000 CAPSULE ORAL DAILY
Qty: 90 CAPSULE | Refills: 0 | Status: SHIPPED | OUTPATIENT
Start: 2021-06-10 | End: 2021-09-07

## 2021-08-10 RX ORDER — METOCLOPRAMIDE 10 MG/1
TABLET ORAL
Qty: 90 TABLET | Refills: 1 | Status: SHIPPED | OUTPATIENT
Start: 2021-08-10 | End: 2021-12-16

## 2021-09-03 DIAGNOSIS — E55.9 VITAMIN D DEFICIENCY: ICD-10-CM

## 2021-09-07 RX ORDER — ISOPROPYL ALCOHOL 91 %
SPRAY, NON-AEROSOL (ML) TOPICAL
Qty: 90 CAPSULE | Refills: 0 | Status: SHIPPED | OUTPATIENT
Start: 2021-09-07 | End: 2021-12-13

## 2021-09-21 DIAGNOSIS — E11.65 UNCONTROLLED TYPE 2 DIABETES MELLITUS WITH HYPERGLYCEMIA (HCC): Chronic | ICD-10-CM

## 2021-09-22 RX ORDER — GLIMEPIRIDE 4 MG/1
4 TABLET ORAL EVERY MORNING
Qty: 90 TABLET | Refills: 1 | Status: SHIPPED | OUTPATIENT
Start: 2021-09-22 | End: 2022-03-16 | Stop reason: SDUPTHER

## 2021-09-22 NOTE — TELEPHONE ENCOUNTER
CC: NEXPLANON INSERTION      PRE-NEXPLANON INSERTION COUNSELING:  All contraceptive options were reviewed and the patient chooses Nexplanon.  Patients history was reviewed and there were no contraindications to Nexplanon.  The procedure and minimal risks of pain, bleeding, bruising and infection at the insertion site discussed. Possible irregular menstrual bleeding pattern versus amenorrhea was explained.  No protection against STDs discussed.  Written information provided; all questions answered and patient agrees to proceed.  Consent signed and scanned into computer.  NEXPLANON LOT #[id number],  EXPIRATION 05/2021    Vitals:    12/05/18 1458   BP: 104/70       EXAM:  With patient in supine position the nondominant arm was flexed at the elbow and externally rotated.  The insertion site was identified 6-8 cm above the elbow crease at the inner side of the upper arm overlying the groove between biceps and triceps.  The insertion site was marked and a second alan was placed 6-8 cm above the first.    PROCEDURE:  TIME OUT PERFORMED.  The insertion site was prepped with antiseptic and injected with 2 cc of 1% Xylocaine without epinephrine subq along the planned insertion canal.  Using sterile technique the Nexplanon applicator was visually verified and removed from the blister pack.  The Transparent Protection Cap was removed by sliding it horizontally in the direction of the arrow away from the needle.  The white colored implant was visualized by looking into the tip of the needle.   The needle tip was inserted bevel side up at a 30 degree angle to penetrate the skin.  The applicator was lowered parallel to the arm and the skin was tented with the needle.  While lifting skin with the needle, the needle was inserted to its full length.  Keeping the applicator in place, the purple slider was unlocked by pushing it slightly down.  The slider was then moved fully back until it stopped.  The applicator was then removed. The  Received request via: Pharmacy    Was the patient seen in the last year in this department? Yes    Does the patient have an active prescription (recently filled or refills available) for medication(s) requested? No     Needle was noted to be fully retracted and only the purple tip of the obturator was visible.  The implant was palpable beneath the skin after insertion.  A small adhesive bandage and then a pressure bandage was placed over the insertion site.  The patient tolerated the procedure well.    ASSESSMENT:  1. Contraception management / Nexplanon insertion.V25.0.    POST NEXPLANON INSERTION COUNSELING:  Manage post nexplanon placement arm pain with NSAIDs, Tylenol or Rx per MedCard.  Keep arm elevated and apply intermittent ice packs to decrease pain and bruising for 24 Hours.  May remove bandage in 24 hours.  Nexplanon danger signs (worsening pain at insertion site, bleeding through bandage, redness and/or pus drainage at insertion site).  Removal in 3 years.    Counseling lasted approximately 15 minutes and all her questions were answered.    FOLLOW-UP: with me in two weeks.

## 2021-11-02 DIAGNOSIS — I10 ESSENTIAL HYPERTENSION: ICD-10-CM

## 2021-11-03 RX ORDER — LISINOPRIL 20 MG/1
20 TABLET ORAL
Qty: 90 TABLET | Refills: 1 | Status: ON HOLD | OUTPATIENT
Start: 2021-11-03 | End: 2021-12-18

## 2021-12-03 ENCOUNTER — PHARMACY VISIT (OUTPATIENT)
Dept: PHARMACY | Facility: MEDICAL CENTER | Age: 46
End: 2021-12-03
Payer: COMMERCIAL

## 2021-12-03 PROCEDURE — RXMED WILLOW AMBULATORY MEDICATION CHARGE: Performed by: INTERNAL MEDICINE

## 2021-12-03 RX ORDER — RNA INGREDIENT BNT-162B2 0.23 G/1.8ML
0.3 INJECTION, SUSPENSION INTRAMUSCULAR
Qty: 0.3 ML | Refills: 0 | Status: SHIPPED | OUTPATIENT
Start: 2021-12-03 | End: 2021-12-16

## 2021-12-13 DIAGNOSIS — E55.9 VITAMIN D DEFICIENCY: ICD-10-CM

## 2021-12-13 RX ORDER — ISOPROPYL ALCOHOL 91 %
SPRAY, NON-AEROSOL (ML) TOPICAL
Qty: 90 CAPSULE | Refills: 0 | Status: SHIPPED | OUTPATIENT
Start: 2021-12-13 | End: 2021-12-16

## 2021-12-16 ENCOUNTER — APPOINTMENT (OUTPATIENT)
Dept: RADIOLOGY | Facility: MEDICAL CENTER | Age: 46
End: 2021-12-16
Attending: EMERGENCY MEDICINE
Payer: COMMERCIAL

## 2021-12-16 ENCOUNTER — HOSPITAL ENCOUNTER (OUTPATIENT)
Facility: MEDICAL CENTER | Age: 46
End: 2021-12-18
Attending: EMERGENCY MEDICINE | Admitting: STUDENT IN AN ORGANIZED HEALTH CARE EDUCATION/TRAINING PROGRAM
Payer: COMMERCIAL

## 2021-12-16 ENCOUNTER — APPOINTMENT (OUTPATIENT)
Dept: RADIOLOGY | Facility: MEDICAL CENTER | Age: 46
End: 2021-12-16
Payer: COMMERCIAL

## 2021-12-16 DIAGNOSIS — R07.9 CHEST PAIN, UNSPECIFIED TYPE: ICD-10-CM

## 2021-12-16 DIAGNOSIS — I10 HYPERTENSION, UNSPECIFIED TYPE: ICD-10-CM

## 2021-12-16 DIAGNOSIS — I21.4 NSTEMI (NON-ST ELEVATED MYOCARDIAL INFARCTION) (HCC): Primary | ICD-10-CM

## 2021-12-16 LAB
ALBUMIN SERPL BCP-MCNC: 4.2 G/DL (ref 3.2–4.9)
ALBUMIN/GLOB SERPL: 1.3 G/DL
ALP SERPL-CCNC: 103 U/L (ref 30–99)
ALT SERPL-CCNC: 56 U/L (ref 2–50)
AMPHET UR QL SCN: NEGATIVE
ANION GAP SERPL CALC-SCNC: 13 MMOL/L (ref 7–16)
APPEARANCE UR: CLEAR
AST SERPL-CCNC: 39 U/L (ref 12–45)
BACTERIA #/AREA URNS HPF: ABNORMAL /HPF
BARBITURATES UR QL SCN: NEGATIVE
BASOPHILS # BLD AUTO: 0.3 % (ref 0–1.8)
BASOPHILS # BLD: 0.04 K/UL (ref 0–0.12)
BENZODIAZ UR QL SCN: NEGATIVE
BILIRUB SERPL-MCNC: 0.4 MG/DL (ref 0.1–1.5)
BILIRUB UR QL STRIP.AUTO: NEGATIVE
BUN SERPL-MCNC: 33 MG/DL (ref 8–22)
BZE UR QL SCN: NEGATIVE
CALCIUM SERPL-MCNC: 9.8 MG/DL (ref 8.5–10.5)
CANNABINOIDS UR QL SCN: POSITIVE
CHLORIDE SERPL-SCNC: 102 MMOL/L (ref 96–112)
CO2 SERPL-SCNC: 24 MMOL/L (ref 20–33)
COLOR UR: YELLOW
CREAT SERPL-MCNC: 1.55 MG/DL (ref 0.5–1.4)
EKG IMPRESSION: NORMAL
EKG IMPRESSION: NORMAL
EOSINOPHIL # BLD AUTO: 0.01 K/UL (ref 0–0.51)
EOSINOPHIL NFR BLD: 0.1 % (ref 0–6.9)
EPI CELLS #/AREA URNS HPF: NEGATIVE /HPF
ERYTHROCYTE [DISTWIDTH] IN BLOOD BY AUTOMATED COUNT: 44.4 FL (ref 35.9–50)
EST. AVERAGE GLUCOSE BLD GHB EST-MCNC: 140 MG/DL
ETHANOL BLD-MCNC: <10.1 MG/DL (ref 0–10)
GLOBULIN SER CALC-MCNC: 3.3 G/DL (ref 1.9–3.5)
GLUCOSE SERPL-MCNC: 189 MG/DL (ref 65–99)
GLUCOSE UR STRIP.AUTO-MCNC: NEGATIVE MG/DL
HBA1C MFR BLD: 6.5 % (ref 4–5.6)
HCT VFR BLD AUTO: 43.4 % (ref 42–52)
HGB BLD-MCNC: 14.6 G/DL (ref 14–18)
HYALINE CASTS #/AREA URNS LPF: ABNORMAL /LPF
IMM GRANULOCYTES # BLD AUTO: 0.08 K/UL (ref 0–0.11)
IMM GRANULOCYTES NFR BLD AUTO: 0.6 % (ref 0–0.9)
INR PPP: 1.02 (ref 0.87–1.13)
KETONES UR STRIP.AUTO-MCNC: ABNORMAL MG/DL
LEUKOCYTE ESTERASE UR QL STRIP.AUTO: NEGATIVE
LIPASE SERPL-CCNC: 22 U/L (ref 11–82)
LYMPHOCYTES # BLD AUTO: 1.51 K/UL (ref 1–4.8)
LYMPHOCYTES NFR BLD: 10.7 % (ref 22–41)
MAGNESIUM SERPL-MCNC: 1.9 MG/DL (ref 1.5–2.5)
MCH RBC QN AUTO: 29.7 PG (ref 27–33)
MCHC RBC AUTO-ENTMCNC: 33.6 G/DL (ref 33.7–35.3)
MCV RBC AUTO: 88.4 FL (ref 81.4–97.8)
METHADONE UR QL SCN: NEGATIVE
MICRO URNS: ABNORMAL
MONOCYTES # BLD AUTO: 0.51 K/UL (ref 0–0.85)
MONOCYTES NFR BLD AUTO: 3.6 % (ref 0–13.4)
NEUTROPHILS # BLD AUTO: 11.9 K/UL (ref 1.82–7.42)
NEUTROPHILS NFR BLD: 84.7 % (ref 44–72)
NITRITE UR QL STRIP.AUTO: NEGATIVE
NRBC # BLD AUTO: 0 K/UL
NRBC BLD-RTO: 0 /100 WBC
NT-PROBNP SERPL IA-MCNC: 692 PG/ML (ref 0–125)
OPIATES UR QL SCN: POSITIVE
OXYCODONE UR QL SCN: NEGATIVE
PCP UR QL SCN: NEGATIVE
PH UR STRIP.AUTO: 5.5 [PH] (ref 5–8)
PLATELET # BLD AUTO: 311 K/UL (ref 164–446)
PMV BLD AUTO: 9.2 FL (ref 9–12.9)
POTASSIUM SERPL-SCNC: 4.6 MMOL/L (ref 3.6–5.5)
PROPOXYPH UR QL SCN: NEGATIVE
PROT SERPL-MCNC: 7.5 G/DL (ref 6–8.2)
PROT UR QL STRIP: 300 MG/DL
PROTHROMBIN TIME: 13.1 SEC (ref 12–14.6)
RBC # BLD AUTO: 4.91 M/UL (ref 4.7–6.1)
RBC # URNS HPF: ABNORMAL /HPF
RBC UR QL AUTO: ABNORMAL
SODIUM SERPL-SCNC: 139 MMOL/L (ref 135–145)
SP GR UR STRIP.AUTO: 1.04
TROPONIN T SERPL-MCNC: 62 NG/L (ref 6–19)
TROPONIN T SERPL-MCNC: 70 NG/L (ref 6–19)
TROPONIN T SERPL-MCNC: 81 NG/L (ref 6–19)
UROBILINOGEN UR STRIP.AUTO-MCNC: 0.2 MG/DL
WBC # BLD AUTO: 14.1 K/UL (ref 4.8–10.8)
WBC #/AREA URNS HPF: ABNORMAL /HPF

## 2021-12-16 PROCEDURE — 700101 HCHG RX REV CODE 250

## 2021-12-16 PROCEDURE — 83880 ASSAY OF NATRIURETIC PEPTIDE: CPT

## 2021-12-16 PROCEDURE — 700102 HCHG RX REV CODE 250 W/ 637 OVERRIDE(OP): Performed by: EMERGENCY MEDICINE

## 2021-12-16 PROCEDURE — 96374 THER/PROPH/DIAG INJ IV PUSH: CPT

## 2021-12-16 PROCEDURE — A9270 NON-COVERED ITEM OR SERVICE: HCPCS | Performed by: STUDENT IN AN ORGANIZED HEALTH CARE EDUCATION/TRAINING PROGRAM

## 2021-12-16 PROCEDURE — 36415 COLL VENOUS BLD VENIPUNCTURE: CPT

## 2021-12-16 PROCEDURE — A9270 NON-COVERED ITEM OR SERVICE: HCPCS | Performed by: EMERGENCY MEDICINE

## 2021-12-16 PROCEDURE — 85025 COMPLETE CBC W/AUTO DIFF WBC: CPT

## 2021-12-16 PROCEDURE — G0378 HOSPITAL OBSERVATION PER HR: HCPCS

## 2021-12-16 PROCEDURE — 84484 ASSAY OF TROPONIN QUANT: CPT

## 2021-12-16 PROCEDURE — 93005 ELECTROCARDIOGRAM TRACING: CPT | Performed by: EMERGENCY MEDICINE

## 2021-12-16 PROCEDURE — 700117 HCHG RX CONTRAST REV CODE 255: Performed by: EMERGENCY MEDICINE

## 2021-12-16 PROCEDURE — 74175 CTA ABDOMEN W/CONTRAST: CPT

## 2021-12-16 PROCEDURE — 83690 ASSAY OF LIPASE: CPT

## 2021-12-16 PROCEDURE — 71045 X-RAY EXAM CHEST 1 VIEW: CPT

## 2021-12-16 PROCEDURE — 99285 EMERGENCY DEPT VISIT HI MDM: CPT

## 2021-12-16 PROCEDURE — 83735 ASSAY OF MAGNESIUM: CPT

## 2021-12-16 PROCEDURE — 99220 PR INITIAL OBSERVATION CARE,LEVL III: CPT | Performed by: STUDENT IN AN ORGANIZED HEALTH CARE EDUCATION/TRAINING PROGRAM

## 2021-12-16 PROCEDURE — 93005 ELECTROCARDIOGRAM TRACING: CPT

## 2021-12-16 PROCEDURE — 96375 TX/PRO/DX INJ NEW DRUG ADDON: CPT

## 2021-12-16 PROCEDURE — 81001 URINALYSIS AUTO W/SCOPE: CPT

## 2021-12-16 PROCEDURE — 700111 HCHG RX REV CODE 636 W/ 250 OVERRIDE (IP): Performed by: EMERGENCY MEDICINE

## 2021-12-16 PROCEDURE — 82077 ASSAY SPEC XCP UR&BREATH IA: CPT

## 2021-12-16 PROCEDURE — 85610 PROTHROMBIN TIME: CPT

## 2021-12-16 PROCEDURE — 80307 DRUG TEST PRSMV CHEM ANLYZR: CPT

## 2021-12-16 PROCEDURE — 700102 HCHG RX REV CODE 250 W/ 637 OVERRIDE(OP): Performed by: STUDENT IN AN ORGANIZED HEALTH CARE EDUCATION/TRAINING PROGRAM

## 2021-12-16 PROCEDURE — 80053 COMPREHEN METABOLIC PANEL: CPT

## 2021-12-16 PROCEDURE — 83036 HEMOGLOBIN GLYCOSYLATED A1C: CPT

## 2021-12-16 PROCEDURE — 700111 HCHG RX REV CODE 636 W/ 250 OVERRIDE (IP): Performed by: STUDENT IN AN ORGANIZED HEALTH CARE EDUCATION/TRAINING PROGRAM

## 2021-12-16 RX ORDER — ONDANSETRON 4 MG/1
4 TABLET, ORALLY DISINTEGRATING ORAL EVERY 4 HOURS PRN
Status: DISCONTINUED | OUTPATIENT
Start: 2021-12-16 | End: 2021-12-16

## 2021-12-16 RX ORDER — LABETALOL HYDROCHLORIDE 5 MG/ML
10 INJECTION, SOLUTION INTRAVENOUS EVERY 4 HOURS PRN
Status: DISCONTINUED | OUTPATIENT
Start: 2021-12-16 | End: 2021-12-17

## 2021-12-16 RX ORDER — MORPHINE SULFATE 4 MG/ML
2-4 INJECTION INTRAVENOUS
Status: DISCONTINUED | OUTPATIENT
Start: 2021-12-16 | End: 2021-12-18 | Stop reason: HOSPADM

## 2021-12-16 RX ORDER — ROSUVASTATIN CALCIUM 10 MG/1
10 TABLET, COATED ORAL EVERY MORNING
Status: ON HOLD | COMMUNITY
End: 2021-12-18

## 2021-12-16 RX ORDER — LABETALOL HYDROCHLORIDE 5 MG/ML
INJECTION, SOLUTION INTRAVENOUS
Status: COMPLETED
Start: 2021-12-16 | End: 2021-12-16

## 2021-12-16 RX ORDER — LISINOPRIL 20 MG/1
20 TABLET ORAL ONCE
Status: COMPLETED | OUTPATIENT
Start: 2021-12-16 | End: 2021-12-16

## 2021-12-16 RX ORDER — ASPIRIN 325 MG
325 TABLET ORAL ONCE
Status: COMPLETED | OUTPATIENT
Start: 2021-12-16 | End: 2021-12-16

## 2021-12-16 RX ORDER — ONDANSETRON 4 MG/1
4 TABLET, ORALLY DISINTEGRATING ORAL ONCE
Status: COMPLETED | OUTPATIENT
Start: 2021-12-16 | End: 2021-12-16

## 2021-12-16 RX ORDER — MORPHINE SULFATE 4 MG/ML
4 INJECTION INTRAVENOUS ONCE
Status: COMPLETED | OUTPATIENT
Start: 2021-12-16 | End: 2021-12-16

## 2021-12-16 RX ORDER — OXYCODONE HYDROCHLORIDE 5 MG/1
2.5 TABLET ORAL
Status: DISCONTINUED | OUTPATIENT
Start: 2021-12-16 | End: 2021-12-18 | Stop reason: HOSPADM

## 2021-12-16 RX ORDER — OXYCODONE HYDROCHLORIDE 5 MG/1
5 TABLET ORAL
Status: DISCONTINUED | OUTPATIENT
Start: 2021-12-16 | End: 2021-12-18 | Stop reason: HOSPADM

## 2021-12-16 RX ORDER — LABETALOL HYDROCHLORIDE 5 MG/ML
10 INJECTION, SOLUTION INTRAVENOUS ONCE
Status: COMPLETED | OUTPATIENT
Start: 2021-12-16 | End: 2021-12-16

## 2021-12-16 RX ORDER — ONDANSETRON 2 MG/ML
4 INJECTION INTRAMUSCULAR; INTRAVENOUS EVERY 4 HOURS PRN
Status: DISCONTINUED | OUTPATIENT
Start: 2021-12-16 | End: 2021-12-16

## 2021-12-16 RX ORDER — POLYETHYLENE GLYCOL 3350 17 G/17G
1 POWDER, FOR SOLUTION ORAL
Status: DISCONTINUED | OUTPATIENT
Start: 2021-12-16 | End: 2021-12-18 | Stop reason: HOSPADM

## 2021-12-16 RX ORDER — BISACODYL 10 MG
10 SUPPOSITORY, RECTAL RECTAL
Status: DISCONTINUED | OUTPATIENT
Start: 2021-12-16 | End: 2021-12-18 | Stop reason: HOSPADM

## 2021-12-16 RX ORDER — AMOXICILLIN 250 MG
2 CAPSULE ORAL 2 TIMES DAILY
Status: DISCONTINUED | OUTPATIENT
Start: 2021-12-16 | End: 2021-12-18 | Stop reason: HOSPADM

## 2021-12-16 RX ORDER — PROMETHAZINE HYDROCHLORIDE 25 MG/1
12.5-25 TABLET ORAL EVERY 4 HOURS PRN
Status: DISCONTINUED | OUTPATIENT
Start: 2021-12-16 | End: 2021-12-16

## 2021-12-16 RX ORDER — PROCHLORPERAZINE EDISYLATE 5 MG/ML
5-10 INJECTION INTRAMUSCULAR; INTRAVENOUS EVERY 4 HOURS PRN
Status: DISCONTINUED | OUTPATIENT
Start: 2021-12-16 | End: 2021-12-18 | Stop reason: HOSPADM

## 2021-12-16 RX ORDER — ROSUVASTATIN CALCIUM 10 MG/1
10 TABLET, COATED ORAL EVERY MORNING
Status: DISCONTINUED | OUTPATIENT
Start: 2021-12-17 | End: 2021-12-17

## 2021-12-16 RX ORDER — PROMETHAZINE HYDROCHLORIDE 25 MG/1
12.5-25 SUPPOSITORY RECTAL EVERY 4 HOURS PRN
Status: DISCONTINUED | OUTPATIENT
Start: 2021-12-16 | End: 2021-12-16

## 2021-12-16 RX ORDER — ROSUVASTATIN CALCIUM 20 MG/1
20 TABLET, COATED ORAL EVERY EVENING
Status: DISCONTINUED | OUTPATIENT
Start: 2021-12-16 | End: 2021-12-16

## 2021-12-16 RX ORDER — ONDANSETRON 4 MG/1
4 TABLET, ORALLY DISINTEGRATING ORAL EVERY 6 HOURS PRN
COMMUNITY
End: 2022-03-16 | Stop reason: SDUPTHER

## 2021-12-16 RX ORDER — DEXTROSE MONOHYDRATE 25 G/50ML
50 INJECTION, SOLUTION INTRAVENOUS
Status: DISCONTINUED | OUTPATIENT
Start: 2021-12-16 | End: 2021-12-18 | Stop reason: HOSPADM

## 2021-12-16 RX ORDER — HYDROMORPHONE HYDROCHLORIDE 1 MG/ML
0.25 INJECTION, SOLUTION INTRAMUSCULAR; INTRAVENOUS; SUBCUTANEOUS
Status: DISCONTINUED | OUTPATIENT
Start: 2021-12-16 | End: 2021-12-18 | Stop reason: HOSPADM

## 2021-12-16 RX ORDER — HEPARIN SODIUM 5000 [USP'U]/ML
5000 INJECTION, SOLUTION INTRAVENOUS; SUBCUTANEOUS EVERY 8 HOURS
Status: DISCONTINUED | OUTPATIENT
Start: 2021-12-17 | End: 2021-12-18 | Stop reason: HOSPADM

## 2021-12-16 RX ORDER — ENALAPRIL MALEATE 2.5 MG/1
2.5 TABLET ORAL TWICE DAILY
Status: DISCONTINUED | OUTPATIENT
Start: 2021-12-16 | End: 2021-12-16

## 2021-12-16 RX ORDER — LISINOPRIL 20 MG/1
20 TABLET ORAL DAILY
Status: DISCONTINUED | OUTPATIENT
Start: 2021-12-17 | End: 2021-12-17

## 2021-12-16 RX ADMIN — LABETALOL HYDROCHLORIDE 10 MG: 5 INJECTION, SOLUTION INTRAVENOUS at 20:05

## 2021-12-16 RX ADMIN — LIDOCAINE HYDROCHLORIDE 15 ML: 20 SOLUTION OROPHARYNGEAL at 22:26

## 2021-12-16 RX ADMIN — HYDROMORPHONE HYDROCHLORIDE 0.25 MG: 1 INJECTION, SOLUTION INTRAMUSCULAR; INTRAVENOUS; SUBCUTANEOUS at 22:26

## 2021-12-16 RX ADMIN — LISINOPRIL 20 MG: 20 TABLET ORAL at 20:16

## 2021-12-16 RX ADMIN — ONDANSETRON 4 MG: 4 TABLET, ORALLY DISINTEGRATING ORAL at 19:58

## 2021-12-16 RX ADMIN — MORPHINE SULFATE 4 MG: 4 INJECTION INTRAVENOUS at 19:58

## 2021-12-16 RX ADMIN — IOHEXOL 100 ML: 350 INJECTION, SOLUTION INTRAVENOUS at 19:44

## 2021-12-16 RX ADMIN — ONDANSETRON 4 MG: 4 TABLET, ORALLY DISINTEGRATING ORAL at 21:56

## 2021-12-16 RX ADMIN — ASPIRIN 325 MG ORAL TABLET 325 MG: 325 PILL ORAL at 20:16

## 2021-12-16 ASSESSMENT — ENCOUNTER SYMPTOMS
FEVER: 1
VOMITING: 1
SORE THROAT: 1
COUGH: 1
HEADACHES: 1
DIARRHEA: 1

## 2021-12-16 ASSESSMENT — HEART SCORE
ECG: NON-SPECIFIC REPOLARIZATION DISTURBANCE
RISK FACTORS: >2 RISK FACTORS OR HX OF ATHEROSCLEROTIC DISEASE
TROPONIN: 1-3 TIMES NORMAL LIMIT
AGE: 45-64
HEART SCORE: 6
HISTORY: MODERATELY SUSPICIOUS

## 2021-12-16 ASSESSMENT — FIBROSIS 4 INDEX: FIB4 SCORE: 0.72

## 2021-12-17 ENCOUNTER — APPOINTMENT (OUTPATIENT)
Dept: RADIOLOGY | Facility: MEDICAL CENTER | Age: 46
End: 2021-12-17
Attending: STUDENT IN AN ORGANIZED HEALTH CARE EDUCATION/TRAINING PROGRAM
Payer: COMMERCIAL

## 2021-12-17 PROBLEM — I24.9 ACS (ACUTE CORONARY SYNDROME) (HCC): Status: ACTIVE | Noted: 2021-12-16

## 2021-12-17 PROBLEM — R11.10 VOMITING: Status: ACTIVE | Noted: 2019-11-23

## 2021-12-17 PROBLEM — R07.9 CHEST PAIN: Status: ACTIVE | Noted: 2021-12-16

## 2021-12-17 PROBLEM — N17.9 AKI (ACUTE KIDNEY INJURY) (HCC): Status: ACTIVE | Noted: 2021-12-17

## 2021-12-17 LAB
ALBUMIN SERPL BCP-MCNC: 3.6 G/DL (ref 3.2–4.9)
ALBUMIN/GLOB SERPL: 1.2 G/DL
ALP SERPL-CCNC: 96 U/L (ref 30–99)
ALT SERPL-CCNC: 57 U/L (ref 2–50)
ANION GAP SERPL CALC-SCNC: 13 MMOL/L (ref 7–16)
AST SERPL-CCNC: 46 U/L (ref 12–45)
BILIRUB SERPL-MCNC: 0.4 MG/DL (ref 0.1–1.5)
BUN SERPL-MCNC: 31 MG/DL (ref 8–22)
CALCIUM SERPL-MCNC: 9.1 MG/DL (ref 8.5–10.5)
CHLORIDE SERPL-SCNC: 102 MMOL/L (ref 96–112)
CHOLEST SERPL-MCNC: 157 MG/DL (ref 100–199)
CK SERPL-CCNC: 373 U/L (ref 0–154)
CO2 SERPL-SCNC: 22 MMOL/L (ref 20–33)
CREAT SERPL-MCNC: 1.39 MG/DL (ref 0.5–1.4)
CREAT UR-MCNC: 132.48 MG/DL
D DIMER PPP IA.FEU-MCNC: <0.27 UG/ML (FEU) (ref 0–0.5)
EKG IMPRESSION: NORMAL
ERYTHROCYTE [DISTWIDTH] IN BLOOD BY AUTOMATED COUNT: 43.3 FL (ref 35.9–50)
GLOBULIN SER CALC-MCNC: 3.1 G/DL (ref 1.9–3.5)
GLUCOSE BLD-MCNC: 136 MG/DL (ref 65–99)
GLUCOSE BLD-MCNC: 166 MG/DL (ref 65–99)
GLUCOSE BLD-MCNC: 185 MG/DL (ref 65–99)
GLUCOSE SERPL-MCNC: 197 MG/DL (ref 65–99)
HCT VFR BLD AUTO: 40.2 % (ref 42–52)
HDLC SERPL-MCNC: 33 MG/DL
HGB BLD-MCNC: 13.3 G/DL (ref 14–18)
LDLC SERPL CALC-MCNC: 95 MG/DL
MCH RBC QN AUTO: 28.9 PG (ref 27–33)
MCHC RBC AUTO-ENTMCNC: 33.1 G/DL (ref 33.7–35.3)
MCV RBC AUTO: 87.4 FL (ref 81.4–97.8)
PLATELET # BLD AUTO: 264 K/UL (ref 164–446)
PMV BLD AUTO: 9.5 FL (ref 9–12.9)
POTASSIUM SERPL-SCNC: 3.7 MMOL/L (ref 3.6–5.5)
PROT SERPL-MCNC: 6.7 G/DL (ref 6–8.2)
RBC # BLD AUTO: 4.6 M/UL (ref 4.7–6.1)
SODIUM SERPL-SCNC: 137 MMOL/L (ref 135–145)
SODIUM UR-SCNC: 49 MMOL/L
TRIGL SERPL-MCNC: 143 MG/DL (ref 0–149)
TROPONIN T SERPL-MCNC: 57 NG/L (ref 6–19)
WBC # BLD AUTO: 13 K/UL (ref 4.8–10.8)

## 2021-12-17 PROCEDURE — 700102 HCHG RX REV CODE 250 W/ 637 OVERRIDE(OP): Performed by: STUDENT IN AN ORGANIZED HEALTH CARE EDUCATION/TRAINING PROGRAM

## 2021-12-17 PROCEDURE — A9270 NON-COVERED ITEM OR SERVICE: HCPCS | Performed by: STUDENT IN AN ORGANIZED HEALTH CARE EDUCATION/TRAINING PROGRAM

## 2021-12-17 PROCEDURE — 96376 TX/PRO/DX INJ SAME DRUG ADON: CPT

## 2021-12-17 PROCEDURE — 700101 HCHG RX REV CODE 250: Performed by: STUDENT IN AN ORGANIZED HEALTH CARE EDUCATION/TRAINING PROGRAM

## 2021-12-17 PROCEDURE — 93010 ELECTROCARDIOGRAM REPORT: CPT | Performed by: INTERNAL MEDICINE

## 2021-12-17 PROCEDURE — 36415 COLL VENOUS BLD VENIPUNCTURE: CPT

## 2021-12-17 PROCEDURE — A9270 NON-COVERED ITEM OR SERVICE: HCPCS

## 2021-12-17 PROCEDURE — 85379 FIBRIN DEGRADATION QUANT: CPT

## 2021-12-17 PROCEDURE — 93005 ELECTROCARDIOGRAM TRACING: CPT | Performed by: STUDENT IN AN ORGANIZED HEALTH CARE EDUCATION/TRAINING PROGRAM

## 2021-12-17 PROCEDURE — 82570 ASSAY OF URINE CREATININE: CPT

## 2021-12-17 PROCEDURE — 82550 ASSAY OF CK (CPK): CPT

## 2021-12-17 PROCEDURE — 85027 COMPLETE CBC AUTOMATED: CPT

## 2021-12-17 PROCEDURE — 80061 LIPID PANEL: CPT

## 2021-12-17 PROCEDURE — 82962 GLUCOSE BLOOD TEST: CPT | Mod: 91

## 2021-12-17 PROCEDURE — 700111 HCHG RX REV CODE 636 W/ 250 OVERRIDE (IP): Performed by: STUDENT IN AN ORGANIZED HEALTH CARE EDUCATION/TRAINING PROGRAM

## 2021-12-17 PROCEDURE — 84300 ASSAY OF URINE SODIUM: CPT

## 2021-12-17 PROCEDURE — A9502 TC99M TETROFOSMIN: HCPCS

## 2021-12-17 PROCEDURE — G0378 HOSPITAL OBSERVATION PER HR: HCPCS

## 2021-12-17 PROCEDURE — 700102 HCHG RX REV CODE 250 W/ 637 OVERRIDE(OP)

## 2021-12-17 PROCEDURE — 96372 THER/PROPH/DIAG INJ SC/IM: CPT

## 2021-12-17 PROCEDURE — 700111 HCHG RX REV CODE 636 W/ 250 OVERRIDE (IP)

## 2021-12-17 PROCEDURE — 80053 COMPREHEN METABOLIC PANEL: CPT

## 2021-12-17 PROCEDURE — 84484 ASSAY OF TROPONIN QUANT: CPT

## 2021-12-17 PROCEDURE — 99226 PR SUBSEQUENT OBSERVATION CARE,LEVEL III: CPT | Mod: GC | Performed by: INTERNAL MEDICINE

## 2021-12-17 PROCEDURE — 96375 TX/PRO/DX INJ NEW DRUG ADDON: CPT

## 2021-12-17 RX ORDER — HYDROXYZINE HYDROCHLORIDE 10 MG/1
10 TABLET, FILM COATED ORAL 3 TIMES DAILY PRN
Status: DISCONTINUED | OUTPATIENT
Start: 2021-12-17 | End: 2021-12-18 | Stop reason: HOSPADM

## 2021-12-17 RX ORDER — AMINOPHYLLINE 25 MG/ML
100 INJECTION, SOLUTION INTRAVENOUS
Status: COMPLETED | OUTPATIENT
Start: 2021-12-17 | End: 2021-12-17

## 2021-12-17 RX ORDER — REGADENOSON 0.08 MG/ML
0.4 INJECTION, SOLUTION INTRAVENOUS ONCE
Status: COMPLETED | OUTPATIENT
Start: 2021-12-17 | End: 2021-12-17

## 2021-12-17 RX ORDER — CHLORTHALIDONE 25 MG/1
25 TABLET ORAL
Status: DISCONTINUED | OUTPATIENT
Start: 2021-12-18 | End: 2021-12-18 | Stop reason: HOSPADM

## 2021-12-17 RX ORDER — LISINOPRIL 20 MG/1
40 TABLET ORAL DAILY
Status: DISCONTINUED | OUTPATIENT
Start: 2021-12-18 | End: 2021-12-18 | Stop reason: HOSPADM

## 2021-12-17 RX ORDER — ALUMINA, MAGNESIA, AND SIMETHICONE 2400; 2400; 240 MG/30ML; MG/30ML; MG/30ML
10 SUSPENSION ORAL 4 TIMES DAILY PRN
Status: DISCONTINUED | OUTPATIENT
Start: 2021-12-17 | End: 2021-12-18 | Stop reason: HOSPADM

## 2021-12-17 RX ORDER — AMINOPHYLLINE 25 MG/ML
INJECTION, SOLUTION INTRAVENOUS
Status: COMPLETED
Start: 2021-12-17 | End: 2021-12-17

## 2021-12-17 RX ORDER — LORAZEPAM 2 MG/ML
0.5 INJECTION INTRAMUSCULAR ONCE
Status: COMPLETED | OUTPATIENT
Start: 2021-12-17 | End: 2021-12-17

## 2021-12-17 RX ORDER — HYDRALAZINE HYDROCHLORIDE 20 MG/ML
20 INJECTION INTRAMUSCULAR; INTRAVENOUS EVERY 6 HOURS PRN
Status: DISCONTINUED | OUTPATIENT
Start: 2021-12-17 | End: 2021-12-18 | Stop reason: HOSPADM

## 2021-12-17 RX ORDER — OMEPRAZOLE 20 MG/1
20 CAPSULE, DELAYED RELEASE ORAL DAILY
Status: DISCONTINUED | OUTPATIENT
Start: 2021-12-17 | End: 2021-12-18 | Stop reason: HOSPADM

## 2021-12-17 RX ORDER — REGADENOSON 0.08 MG/ML
INJECTION, SOLUTION INTRAVENOUS
Status: COMPLETED
Start: 2021-12-17 | End: 2021-12-17

## 2021-12-17 RX ORDER — ROSUVASTATIN CALCIUM 20 MG/1
20 TABLET, COATED ORAL EVERY MORNING
Status: DISCONTINUED | OUTPATIENT
Start: 2021-12-18 | End: 2021-12-18 | Stop reason: HOSPADM

## 2021-12-17 RX ADMIN — HYDROXYZINE HYDROCHLORIDE 10 MG: 10 TABLET ORAL at 16:48

## 2021-12-17 RX ADMIN — OXYCODONE 5 MG: 5 TABLET ORAL at 20:35

## 2021-12-17 RX ADMIN — HEPARIN SODIUM 5000 UNITS: 5000 INJECTION, SOLUTION INTRAVENOUS; SUBCUTANEOUS at 05:50

## 2021-12-17 RX ADMIN — OXYCODONE 5 MG: 5 TABLET ORAL at 03:46

## 2021-12-17 RX ADMIN — MORPHINE SULFATE 2 MG: 4 INJECTION INTRAVENOUS at 01:20

## 2021-12-17 RX ADMIN — ASPIRIN 81 MG: 81 TABLET, COATED ORAL at 05:49

## 2021-12-17 RX ADMIN — OMEPRAZOLE 20 MG: 20 CAPSULE, DELAYED RELEASE ORAL at 10:33

## 2021-12-17 RX ADMIN — LORAZEPAM 0.5 MG: 2 INJECTION INTRAMUSCULAR; INTRAVENOUS at 06:24

## 2021-12-17 RX ADMIN — LISINOPRIL 20 MG: 20 TABLET ORAL at 05:50

## 2021-12-17 RX ADMIN — OXYCODONE 5 MG: 5 TABLET ORAL at 16:48

## 2021-12-17 RX ADMIN — HYDRALAZINE HYDROCHLORIDE 20 MG: 20 INJECTION INTRAMUSCULAR; INTRAVENOUS at 03:45

## 2021-12-17 RX ADMIN — ROSUVASTATIN 10 MG: 10 TABLET, FILM COATED ORAL at 05:49

## 2021-12-17 RX ADMIN — REGADENOSON 0.4 MG: 0.08 INJECTION, SOLUTION INTRAVENOUS at 09:44

## 2021-12-17 RX ADMIN — INSULIN HUMAN 2 UNITS: 100 INJECTION, SOLUTION PARENTERAL at 20:21

## 2021-12-17 RX ADMIN — AMINOPHYLLINE 100 MG: 25 INJECTION, SOLUTION INTRAVENOUS at 10:00

## 2021-12-17 RX ADMIN — METOPROLOL TARTRATE 25 MG: 25 TABLET, FILM COATED ORAL at 17:41

## 2021-12-17 RX ADMIN — METOPROLOL TARTRATE 25 MG: 25 TABLET, FILM COATED ORAL at 11:57

## 2021-12-17 RX ADMIN — DOCUSATE SODIUM 50 MG AND SENNOSIDES 8.6 MG 2 TABLET: 8.6; 5 TABLET, FILM COATED ORAL at 17:41

## 2021-12-17 RX ADMIN — INSULIN HUMAN 2 UNITS: 100 INJECTION, SOLUTION PARENTERAL at 13:46

## 2021-12-17 RX ADMIN — LABETALOL HYDROCHLORIDE 10 MG: 5 INJECTION, SOLUTION INTRAVENOUS at 00:20

## 2021-12-17 ASSESSMENT — PATIENT HEALTH QUESTIONNAIRE - PHQ9
SUM OF ALL RESPONSES TO PHQ9 QUESTIONS 1 AND 2: 0
2. FEELING DOWN, DEPRESSED, IRRITABLE, OR HOPELESS: NOT AT ALL
1. LITTLE INTEREST OR PLEASURE IN DOING THINGS: NOT AT ALL
1. LITTLE INTEREST OR PLEASURE IN DOING THINGS: NOT AT ALL
2. FEELING DOWN, DEPRESSED, IRRITABLE, OR HOPELESS: NOT AT ALL
SUM OF ALL RESPONSES TO PHQ9 QUESTIONS 1 AND 2: 0

## 2021-12-17 ASSESSMENT — ENCOUNTER SYMPTOMS
SHORTNESS OF BREATH: 0
CONSTIPATION: 0
FEVER: 0
HEARTBURN: 1
MYALGIAS: 0
HEMOPTYSIS: 0
RESPIRATORY NEGATIVE: 1
BLOOD IN STOOL: 0
TINGLING: 0
ABDOMINAL PAIN: 1
ABDOMINAL PAIN: 0
CHILLS: 0
VOMITING: 1
NEUROLOGICAL NEGATIVE: 1
WEAKNESS: 0
DOUBLE VISION: 0
BACK PAIN: 1
NERVOUS/ANXIOUS: 1
BACK PAIN: 0
INSOMNIA: 0
BLURRED VISION: 0
COUGH: 1
CARDIOVASCULAR NEGATIVE: 1
NAUSEA: 1
DIARRHEA: 1
HEADACHES: 0
DIARRHEA: 0
PALPITATIONS: 0

## 2021-12-17 ASSESSMENT — LIFESTYLE VARIABLES
HOW MANY TIMES IN THE PAST YEAR HAVE YOU HAD 5 OR MORE DRINKS IN A DAY: 0
HAVE PEOPLE ANNOYED YOU BY CRITICIZING YOUR DRINKING: NO
HAVE YOU EVER FELT YOU SHOULD CUT DOWN ON YOUR DRINKING: NO
TOTAL SCORE: 0
EVER HAD A DRINK FIRST THING IN THE MORNING TO STEADY YOUR NERVES TO GET RID OF A HANGOVER: NO
ALCOHOL_USE: NO
EVER FELT BAD OR GUILTY ABOUT YOUR DRINKING: NO
AVERAGE NUMBER OF DAYS PER WEEK YOU HAVE A DRINK CONTAINING ALCOHOL: 0
CONSUMPTION TOTAL: NEGATIVE
TOTAL SCORE: 0
TOTAL SCORE: 0
ON A TYPICAL DAY WHEN YOU DRINK ALCOHOL HOW MANY DRINKS DO YOU HAVE: 0
DOES PATIENT WANT TO STOP DRINKING: CANNOT ASSESS

## 2021-12-17 ASSESSMENT — COGNITIVE AND FUNCTIONAL STATUS - GENERAL
MOBILITY SCORE: 18
TURNING FROM BACK TO SIDE WHILE IN FLAT BAD: A LITTLE
MOVING TO AND FROM BED TO CHAIR: A LITTLE
DAILY ACTIVITIY SCORE: 24
STANDING UP FROM CHAIR USING ARMS: A LITTLE
SUGGESTED CMS G CODE MODIFIER DAILY ACTIVITY: CH
MOVING FROM LYING ON BACK TO SITTING ON SIDE OF FLAT BED: A LITTLE
SUGGESTED CMS G CODE MODIFIER MOBILITY: CK
WALKING IN HOSPITAL ROOM: A LITTLE
CLIMB 3 TO 5 STEPS WITH RAILING: A LITTLE

## 2021-12-17 ASSESSMENT — FIBROSIS 4 INDEX: FIB4 SCORE: 0.79

## 2021-12-17 ASSESSMENT — PAIN DESCRIPTION - PAIN TYPE
TYPE: ACUTE PAIN

## 2021-12-17 NOTE — ASSESSMENT & PLAN NOTE
Possibly anxiety driven and possibly exacerbated 2/2 to aortic calcifications  -Recent BP: 158/91  Plan:  -Continue home lisinopril and metoprolol  -Added chlorthalidone and amlodipine  -PRN hydralazine   -PRN hydroxyzine

## 2021-12-17 NOTE — ED NOTES
Report given to Destiney SALES from unit. Pt transported with RN transport on tele monitor to floor.

## 2021-12-17 NOTE — CARE PLAN
Problem: Pain - Standard  Goal: Alleviation of pain or a reduction in pain to the patient’s comfort goal  12/17/2021 1108 by Brian Peralta, R.N.  Outcome: Progressing  12/17/2021 1108 by Brian Peralta, R.N.  Outcome: Progressing     Problem: Knowledge Deficit - Standard  Goal: Patient and family/care givers will demonstrate understanding of plan of care, disease process/condition, diagnostic tests and medications  12/17/2021 1108 by Brian Peralta, R.N.  Outcome: Progressing  12/17/2021 1108 by Brian Peralta R.N.  Outcome: Progressing     Problem: Fall Risk  Goal: Patient will remain free from falls  Outcome: Progressing   The patient is Stable - Low risk of patient condition declining or worsening    Shift Goals  Clinical Goals: monitor BP, stress test  Patient Goals: lower anxiety    Progress made toward(s) clinical / shift goals:  stress test    Patient is not progressing towards the following goals:

## 2021-12-17 NOTE — PROGRESS NOTES
Pt arrived on unit from ED, pt care assumed, tele box on and rate verified. VSS and pt is on 1 L O2 via NC. Pt aaox4, no signs of distress noted at this time. POC discussed with pt and verbalizes no questions. Pt c/o of constant chest pain, PRN pain med morphine administered. Pt denies any additional needs at this time. Bed in lowest position, bed alarm on, pt educated on fall risk and verbalized understanding, call light within reach, will continue with plan of care.

## 2021-12-17 NOTE — PROGRESS NOTES
Daily Progress Note:     Date of Service: 12/17/2021  Primary Team: [facility] IM Gray Team   Attending: SABINA Chavarria*   Senior Resident: Dr. Hamm  Intern: Dr. Live  Contact:  [phone]    Interim:   Mr. Hawkins is a 46-year-old male with a past medical history of myocardial infarction, type 2 diabetes, hepatitis C, hypertension who presents for midsternal chest pain.  Patient also reports associated nausea and vomiting that started along the same time as the chest pain.  Patient also reports a strong family history of myocardial infarction's and heart disease.    Subjective:  -NAEO  -Patient was quite tearful during the interview as patient states that he may have underlying anxiety and relating to his family history and personal history of heart disease.  Provided positive reinforcement and encouraged the patient to remain optimistic  -Discussed patient's chest pain more in detail and stated that it is substernal and occasionally burning. Patient also reported that his patient reports associated nausea and vomiting that improves with warm showers. Patient denied consuming marijuana or any cannabinoid product however does report that he does grow marijuana and cuts it and does heated up in order to form a synthetic product in which the product may be inhaling it    Consultants/Specialty:  GI    Review of Systems:    Review of Systems   Constitutional: Negative for chills, fever and malaise/fatigue.   HENT: Negative for hearing loss.    Eyes: Negative for blurred vision and double vision.   Respiratory: Positive for cough. Negative for hemoptysis and shortness of breath.    Cardiovascular: Positive for chest pain. Negative for palpitations and leg swelling.   Gastrointestinal: Positive for diarrhea, heartburn, nausea and vomiting. Negative for abdominal pain, blood in stool, constipation and melena.        Nursing staff examined emesis and was reported it was non-bloody in nature. Patient reports loose  stools but non-bloody   Genitourinary: Negative for dysuria and hematuria.   Musculoskeletal: Negative for back pain.   Skin: Negative for rash.   Neurological: Negative for tingling, weakness and headaches.   Psychiatric/Behavioral: The patient is nervous/anxious. The patient does not have insomnia.        Objective Data:   Physical Exam:   Vitals:   Temp:  [37.2 °C (98.9 °F)-37.7 °C (99.8 °F)] 37.6 °C (99.6 °F)  Pulse:  [51-92] 51  Resp:  [15-28] 16  BP: (153-232)/() 170/96  SpO2:  [92 %-98 %] 92 %    Physical Exam  Vitals and nursing note reviewed.   Constitutional:       Appearance: Normal appearance. He is not toxic-appearing.   HENT:      Head: Normocephalic and atraumatic.      Right Ear: External ear normal.      Left Ear: External ear normal.      Nose: No rhinorrhea.      Mouth/Throat:      Mouth: Mucous membranes are dry.   Eyes:      Extraocular Movements: Extraocular movements intact.      Conjunctiva/sclera: Conjunctivae normal.   Cardiovascular:      Rate and Rhythm: Normal rate and regular rhythm.      Heart sounds: No murmur heard.  No gallop.    Pulmonary:      Breath sounds: Normal breath sounds. No wheezing or rales.   Abdominal:      General: Bowel sounds are normal. There is no distension.      Tenderness: There is no abdominal tenderness.      Comments: Hypoactive bowel sounds   Musculoskeletal:      Cervical back: Neck supple.      Right lower leg: No edema.      Left lower leg: No edema.   Skin:     Capillary Refill: Capillary refill takes 2 to 3 seconds.      Findings: No rash.   Neurological:      Mental Status: He is oriented to person, place, and time.   Psychiatric:         Mood and Affect: Mood normal.       Labs:   Recent Labs     12/16/21 1823 12/17/21  0201   SODIUM 139 137   POTASSIUM 4.6 3.7   CHLORIDE 102 102   CO2 24 22   GLUCOSE 189* 197*   BUN 33* 31*     Recent Labs     12/16/21  1823 12/17/21  0201   ALBUMIN 4.2 3.6   TBILIRUBIN 0.4 0.4   ALKPHOSPHAT 103* 96    TOTPROTEIN 7.5 6.7   ALTSGPT 56* 57*   ASTSGOT 39 46*   CREATININE 1.55* 1.39     Recent Labs     12/16/21  1822 12/17/21  0201   WBC 14.1* 13.0*   RBC 4.91 4.60*   HEMOGLOBIN 14.6 13.3*   HEMATOCRIT 43.4 40.2*   MCV 88.4 87.4   MCH 29.7 28.9   RDW 44.4 43.3   PLATELETCT 311 264   MPV 9.2 9.5   NEUTSPOLYS 84.70*  --    LYMPHOCYTES 10.70*  --    MONOCYTES 3.60  --    EOSINOPHILS 0.10  --    BASOPHILS 0.30  --      Imaging:   NM-CARDIAC STRESS TEST   Final Result      CT-CTA COMPLETE THORACOABDOMINAL AORTA   Final Result      1.  No aortic aneurysm or dissection identified.      2.  Atherosclerotic disease, advanced for patient's age.      3.  Calcification of the aortic valve.      4.  Mosaic attenuation in the lungs is likely related to small airways disease.      5.  Mild splenomegaly                        DX-CHEST-PORTABLE (1 VIEW)   Final Result      No evidence of acute cardiopulmonary process.        Problem Representation: Mr. Miles is a 46-year-old male who was admitted for acute midsternal chest pain with associated nausea and vomiting. Patient to continue inpatient stay for further evaluation and management.    * Chest pain- (present on admission)  Assessment & Plan  Patient has cardiac history along with family history of heart disease however with normal NM stress test, more likely gastritis  -Troponin: 81--> 70 --> 62 --> 57  -EKG: RBBB  -NM Stress (12/17): No evidence of prior myocardial infarction. Normal left ventricular size, ejection fraction, and wall motion  Plan:  -Continue PPI scheduled and Mylanta PRN    Vomiting- (present on admission)  Assessment & Plan  Patient reports dark colored emesis and per wife, possible coffee ground emesis. Per nursing staff, emesis during inpatient stay appears to be normal in color. Concern for possible Kori-Arce tear  -UDS: +MJ and opioids  -Patient reports that he does not consume cannabinoids but does grow and cut MJ. Furthermore, per patient history,  patient may have exposure to cannabinoid inhalation from the prep of the MJ into a synthetic form  -Hb: 14.6 --> 13.3  Plan:  -PPI  -Will discuss case with GI  -Continue ASA in setting of MI history    ANN (acute kidney injury) (HCC)- (present on admission)  Assessment & Plan  Baseline Cr of 0.7-0.9, possibly 2/2 to dehydration  -Cr: 1.5 --> 1.39  -Calculated FeNa+: 0.4%  Plan:  -CTM for need of IVF, encouraged PO intake  -Avoid nephrotoxic medications unless indicated    HTN (hypertension)- (present on admission)  Assessment & Plan  Possibly anxiety driven and possibly 2/2 to aortic calcifications  -Recent BP: 176/105  Plan:  -Continue home lisinopril and metoprolol  -PRN hydralazine   -PRN hydroxyzine    Dyslipidemia- (present on admission)  Assessment & Plan  -AST/ALT: 46/57  Plan:  -Continue home rosuvastatin     HCV (hepatitis C virus)  Assessment & Plan  Chronic, patient may benefit with outpatient management  Plan:  -Will f/u on GI recs    T2DM (type 2 diabetes mellitus) (HCC)- (present on admission)  Assessment & Plan  -On home glimepiride and metformin  Plan:  -Sliding scale insulin with hypoglycemia protocol      Code: Full  Diet: As tolerated  DVT Px: Heparin  Dispo: Continue inpatient stay

## 2021-12-17 NOTE — ASSESSMENT & PLAN NOTE
Chronic, patient may benefit with outpatient management  -Per GI, follow-up with GI as outpatient when feasible, patient has concerns in regards to finances associated with treatment of hep C  Plan:  -Outpatient follow-up per patient

## 2021-12-17 NOTE — ED PROVIDER NOTES
"ED Provider Note    Scribed for SABINA De Leon II* by Kirsten Campuzano. 2021  6:43 PM    Means of Arrival: Walk-In  History obtained by: Patient  Limitations: None    CHIEF COMPLAINT  Chief Complaint   Patient presents with   • Chest Pain   • Nausea/Vomiting/Diarrhea   • Sore Throat   • Cough       hospitals  Cayetano Hawkins is a 46 y.o. male who presents to the Emergency Department with worsening mid sternal chest pain this morning. He describes the pain as \"deep, and shooting through to his back between the shoulder blades\". The pain also radiates throughout all of the extremities. Yesterday, he first noticed a burning in the stomach that radiated \"up to his nose\". He also endorsed associated fever sensation, headache, c vomiting, diarrhea, cough that have gradually appeared throughout today.  Has been unable to tolerate oral intake.  He will have chest pain and then has vomiting.  He has a past medical history including: Hepatitis C, Metformin controlled Diabetes, and hypertension. He has had an MI in the past. His family also has an extensive history of heart disease including: father  of an MI, brother  of an MI, and sister had 3 CABG. He no longer smokes or drinks, stating the last time he smoked was over ten years ago.    REVIEW OF SYSTEMS  Review of Systems   Constitutional: Positive for fever.   HENT: Positive for sore throat.    Respiratory: Positive for cough.    Cardiovascular: Positive for chest pain.   Gastrointestinal: Positive for diarrhea and vomiting.   Neurological: Positive for headaches.   All other systems reviewed and are negative.    See HPI for further details.    PAST MEDICAL HISTORY   has a past medical history of Arthritis (right hip), Backpain, Bowel habit changes, Dental disorder (2020), Diabetes, Hepatitis C (), Hepatitis C carrier (HCC), High cholesterol, Hyperlipidemia, Hypertension, Infectious disease, Leukocytosis (2019), and Osteomyelitis " "([facility]).    SOCIAL HISTORY  Social History     Tobacco Use   • Smoking status: Former Smoker     Packs/day: 0.50     Years: 10.00     Pack years: 5.00     Quit date: 2012     Years since quittin.9   • Smokeless tobacco: Never Used   • Tobacco comment: 5 yrs ago   Vaping Use   • Vaping Use: Never used   Substance and Sexual Activity   • Alcohol use: No   • Drug use: Yes     Types: Marijuana, Inhaled     Comment: couple of times a day, occasionally will eat edibles   • Sexual activity: Yes     Partners: Female       SURGICAL HISTORY   has a past surgical history that includes nano by laparoscopy; irrigation & debridement ortho (Right, 2016); irrigation & debridement ortho (Right, 4/10/2016); irrigation & debridement ortho (Right, 2016); ostectomy (Right, 2020); incision and drainage general (Right, 8/3/2020); cholecystectomy (); appendectomy (); other (hepatitis c ); toe amputation (Right, 2018); irrigation & debridement ortho (Right, 2021); tendon lenghtening (Right, 2021); and toe amputation (Right, 2021).    CURRENT MEDICATIONS  Home Medications     Reviewed by Blessing Galvan (Pharmacy Tech) on 21 at 2039  Med List Status: Complete   Medication Last Dose Status   Cholecalciferol (VITAMIN D3) 125 MCG (5000 UT) Cap 2021 Active   glimepiride (AMARYL) 4 MG Tab 2021 Active   lisinopril (PRINIVIL) 20 MG Tab 2021 Active   metformin (GLUCOPHAGE) 1000 MG tablet 2021 Active   ondansetron (ZOFRAN ODT) 4 MG TABLET DISPERSIBLE 2021 Active   rosuvastatin (CRESTOR) 10 MG Tab 2021 Active                ALLERGIES  Allergies   Allergen Reactions   • Bee Swelling     Bee stings       PHYSICAL EXAM  VITAL SIGNS: BP (!) 180/100   Pulse 75   Temp 37.2 °C (98.9 °F) (Temporal)   Resp (!) 25   Ht 1.803 m (5' 11\")   Wt 90.3 kg (199 lb 1.2 oz)   SpO2 98%   BMI 27.77 kg/m²     Pulse ox interpretation: I interpret this pulse ox as " normal.  Constitutional: Alert in no apparent distress.  Holding a emesis bag.  Appears mildly fatigued.  HENT: No signs of trauma, Bilateral external ears normal, Nose normal.  Tacky mucous membranes.  Eyes: Pupils are equal, Conjunctiva normal, Non-icteric.   Neck: Normal range of motion, No tenderness, Supple, No stridor.   Cardiovascular: Regular rate and rhythm, no murmurs. Symmetric distal pulses. No cyanosis of extremities. No peripheral edema of extremities.  Thorax & Lungs: Normal breath sounds, No respiratory distress, No wheezing, No chest tenderness.   Abdomen: Soft, No tenderness, No masses, No pulsatile masses. No peritoneal signs.  Skin: Warm, Dry, No erythema, No rash.   Back: No midline bony tenderness, No CVA tenderness.   Musculoskeletal: Good range of motion in all major joints. No tenderness to palpation or major deformities noted.   Neurologic: Alert , Normal motor function, Normal sensory function, No focal deficits noted.   Psychiatric: Affect normal, Judgment normal, Mood normal.     DIAGNOSTIC STUDIES / PROCEDURES    EKG Interpretation:  Results for orders placed or performed during the hospital encounter of 21   EKG (Now)   Result Value Ref Range    Report       [facility] Emergency Dept.    Test Date:  2021  Pt Name:    SAM HENDRIX                Department: ER  MRN:        [id number]                      Room:  Gender:     Male                         Technician: [id number]  :        1975                   Requested By:ER TRIAGE PROTOCOL  Order #:    [id number]                    Reading MD: Isidro Silvestre II, MD    Measurements  Intervals                                Axis  Rate:       80                           P:          50  NH:         136                          QRS:        81  QRSD:       132                          T:          31  QT:         412  QTc:        476    Interpretive Statements  SINUS RHYTHM  Rate 80  RBBB AND LPFB  No ST  elevation or depression. T waves within normal limits.  Impression: Normal sinus rhythm EKG, similar to previous EKG. No obvious  ischemia.  Compared to ECG 05/10/2021 11:03:43  Left posterior fascicular block now present  Electronically Signed On 2021 19:58:35 P ST by Isidro Montgomery II, MD     EKG   Result Value Ref Range    Report       [facility] Emergency Dept.    Test Date:  2021  Pt Name:    SAM HENDRIX                Department: ER  MRN:        [id number]                      Room:       [facility]  Gender:     Male                         Technician: [id number]  :        1975                   Requested By:ISIDRO MONTGOMERY II  Order #:    [id number]                    Reading MD:    Measurements  Intervals                                Axis  Rate:       89                           P:          68  NC:         140                          QRS:        -82  QRSD:       137                          T:          15  QT:         416  QTc:        507    Interpretive Statements  Sinus rhythm  RBBB and LAFB  ST elev, probable normal early repol pattern  Baseline wander in lead(s) III,aVL,aVF,V1  Compared to ECG 2021 17:52:36  Left anterior fascicular block now present  Left posterior fascicular block no longer present  Possible ischemia no longer present  ST (T wave) deviation still present        LABS  Results for orders placed or performed during the hospital encounter of 21   CBC with Differential   Result Value Ref Range    WBC 14.1 (H) 4.8 - 10.8 K/uL    RBC 4.91 4.70 - 6.10 M/uL    Hemoglobin 14.6 14.0 - 18.0 g/dL    Hematocrit 43.4 42.0 - 52.0 %    MCV 88.4 81.4 - 97.8 fL    MCH 29.7 27.0 - 33.0 pg    MCHC 33.6 (L) 33.7 - 35.3 g/dL    RDW 44.4 35.9 - 50.0 fL    Platelet Count 311 164 - 446 K/uL    MPV 9.2 9.0 - 12.9 fL    Neutrophils-Polys 84.70 (H) 44.00 - 72.00 %    Lymphocytes 10.70 (L) 22.00 - 41.00 %    Monocytes 3.60 0.00 - 13.40 %    Eosinophils 0.10 0.00 -  6.90 %    Basophils 0.30 0.00 - 1.80 %    Immature Granulocytes 0.60 0.00 - 0.90 %    Nucleated RBC 0.00 /100 WBC    Neutrophils (Absolute) 11.90 (H) 1.82 - 7.42 K/uL    Lymphs (Absolute) 1.51 1.00 - 4.80 K/uL    Monos (Absolute) 0.51 0.00 - 0.85 K/uL    Eos (Absolute) 0.01 0.00 - 0.51 K/uL    Baso (Absolute) 0.04 0.00 - 0.12 K/uL    Immature Granulocytes (abs) 0.08 0.00 - 0.11 K/uL    NRBC (Absolute) 0.00 K/uL   Complete Metabolic Panel (CMP)   Result Value Ref Range    Sodium 139 135 - 145 mmol/L    Potassium 4.6 3.6 - 5.5 mmol/L    Chloride 102 96 - 112 mmol/L    Co2 24 20 - 33 mmol/L    Anion Gap 13.0 7.0 - 16.0    Glucose 189 (H) 65 - 99 mg/dL    Bun 33 (H) 8 - 22 mg/dL    Creatinine 1.55 (H) 0.50 - 1.40 mg/dL    Calcium 9.8 8.5 - 10.5 mg/dL    AST(SGOT) 39 12 - 45 U/L    ALT(SGPT) 56 (H) 2 - 50 U/L    Alkaline Phosphatase 103 (H) 30 - 99 U/L    Total Bilirubin 0.4 0.1 - 1.5 mg/dL    Albumin 4.2 3.2 - 4.9 g/dL    Total Protein 7.5 6.0 - 8.2 g/dL    Globulin 3.3 1.9 - 3.5 g/dL    A-G Ratio 1.3 g/dL   Troponin   Result Value Ref Range    Troponin T 81 (H) 6 - 19 ng/L   LIPASE   Result Value Ref Range    Lipase 22 11 - 82 U/L   URINALYSIS    Specimen: Urine   Result Value Ref Range    Color Yellow     Character Clear     Specific Gravity 1.045 <1.035    Ph 5.5 5.0 - 8.0    Glucose Negative Negative mg/dL    Ketones Trace (A) Negative mg/dL    Protein 300 (A) Negative mg/dL    Bilirubin Negative Negative    Urobilinogen, Urine 0.2 Negative    Nitrite Negative Negative    Leukocyte Esterase Negative Negative    Occult Blood Moderate (A) Negative    Micro Urine Req Microscopic    ESTIMATED GFR   Result Value Ref Range    GFR If  58 (A) >60 mL/min/1.73 m 2    GFR If Non  48 (A) >60 mL/min/1.73 m 2   TROPONIN   Result Value Ref Range    Troponin T 70 (H) 6 - 19 ng/L   URINE MICROSCOPIC (W/UA)   Result Value Ref Range    WBC 2-5 (A) /hpf    RBC 2-5 (A) /hpf    Bacteria Rare (A) None /hpf     Epithelial Cells Negative /hpf    Hyaline Cast 3-5 (A) /lpf   DIAGNOSTIC ALCOHOL   Result Value Ref Range    Diagnostic Alcohol <10.1 0.0 - 10.0 mg/dL   URINE DRUG SCREEN   Result Value Ref Range    Amphetamines Urine Negative Negative    Barbiturates Negative Negative    Benzodiazepines Negative Negative    Cocaine Metabolite Negative Negative    Methadone Negative Negative    Opiates Positive (A) Negative    Oxycodone Negative Negative    Phencyclidine -Pcp Negative Negative    Propoxyphene Negative Negative    Cannabinoid Metab Positive (A) Negative   Troponin in two (2) hours   Result Value Ref Range    Troponin T 62 (H) 6 - 19 ng/L   Prothrombin time (INR)   Result Value Ref Range    PT 13.1 12.0 - 14.6 sec    INR 1.02 0.87 - 1.13   HEMOGLOBIN A1C   Result Value Ref Range    Glycohemoglobin 6.5 (H) 4.0 - 5.6 %    Est Avg Glucose 140 mg/dL   MAGNESIUM   Result Value Ref Range    Magnesium 1.9 1.5 - 2.5 mg/dL   proBrain Natriuretic Peptide, NT   Result Value Ref Range    NT-proBNP 692 (H) 0 - 125 pg/mL   EKG (Now)   Result Value Ref Range    Report       [facility] Emergency Dept.    Test Date:  2021  Pt Name:    SAM HENDRIX                Department: ER  MRN:        [id number]                      Room:  Gender:     Male                         Technician: [id number]  :        1975                   Requested By:ER TRIAGE PROTOCOL  Order #:    [id number]                    Reading MD: Isidro Silvestre II, MD    Measurements  Intervals                                Axis  Rate:       80                           P:          50  UT:         136                          QRS:        81  QRSD:       132                          T:          31  QT:         412  QTc:        476    Interpretive Statements  SINUS RHYTHM  Rate 80  RBBB AND LPFB  No ST elevation or depression. T waves within normal limits.  Impression: Normal sinus rhythm EKG, similar to previous EKG. No  obvious  ischemia.  Compared to ECG 05/10/2021 11:03:43  Left posterior fascicular block now present  Electronically Signed On 2021 19:58:35 P ST by Isidro Montgomery II, MD     EKG   Result Value Ref Range    Report       [facility] Emergency Dept.    Test Date:  2021  Pt Name:    SAM HENDRIX                Department: ER  MRN:        [id number]                      Room:        11  Gender:     Male                         Technician: [id number]  :        1975                   Requested By:ISIDRO MONTGOMERY II  Order #:    [id number]                    Reading MD:    Measurements  Intervals                                Axis  Rate:       89                           P:          68  RI:         140                          QRS:        -82  QRSD:       137                          T:          15  QT:         416  QTc:        507    Interpretive Statements  Sinus rhythm  RBBB and LAFB  ST elev, probable normal early repol pattern  Baseline wander in lead(s) III,aVL,aVF,V1  Compared to ECG 2021 17:52:36  Left anterior fascicular block now present  Left posterior fascicular block no longer present  Possible ischemia no longer present  ST (T wave) deviation still present      Pertinent Labs & Imaging studies reviewed. (See chart for details)    RADIOLOGY  CT-CTA COMPLETE THORACOABDOMINAL AORTA   Final Result      1.  No aortic aneurysm or dissection identified.      2.  Atherosclerotic disease, advanced for patient's age.      3.  Calcification of the aortic valve.      4.  Mosaic attenuation in the lungs is likely related to small airways disease.      5.  Mild splenomegaly                        DX-CHEST-PORTABLE (1 VIEW)   Final Result      No evidence of acute cardiopulmonary process.      NM-CARDIAC STRESS TEST    (Results Pending)     Pertinent Labs & Imaging studies reviewed. (See chart for details)    COURSE & MEDICAL DECISION MAKING  Pertinent Labs & Imaging studies  reviewed. (See chart for details)    6:43 PM This is a 46 y.o. male who presents with Acute mid sternal chest pains and the differential diagnosis includes but is not limited to Gastritis, Gastroenteritis, pancreatitis, MI, Viral syndrome, cannabis hyperemesis. Ordered for CBC, CMP, Troponin, UA, Estimated GFR, Lipase and EKG to evaluate.     7:15 PM- Lab results have returned. Patient found to have Troponin of 81 on initial. Heart score calculated at 6.      7:50 PM- Patient is experiencing increased nausea.  Because of his symptoms or continued episodes of chest pain, description of pain rating to back between shoulder blades, findings of hypertension, I have ordered a CT of the aorta to look for signs of dissection.  Blood pressures are equal in bilateral upper extremities and the chest x-ray does not show widened mediastinum, however the mild increase in troponin, hypertension with reading symptoms the back are alarming.    8:00 PM- Repeat EKG unchanged from prior. Pending results on CT and repeat Troponin. His blood pressure continues to be above 180 systolic, so I will administer Labetalol 10 mg IV.  He will be given home dose lisinopril.  He was also given a full-strength aspirin.    8:14 PM- Paged hospitalist for NSTEMI.     9:20 PM- Updated the family on hospitalization as a means for further evaluation.  They verbalize agreement to the plan of care.     9:35 PM I discussed the patient's case and the above findings with Dr. Tapia (hospitalist) who agrees to hospitalize the patient and take over the plan of care moving forward.     DISPOSITION:  Patient will be hospitalized by Dr. Flowers in guarded condition.    FINAL IMPRESSION  1. NSTEMI (non-ST elevated myocardial infarction) (HCC) Active   2. Chest pain, unspecified type    3. Hypertension, unspecified type          I, Kirsten Campuzano (Scribsebas), della scribing for, and in the presence of, YVETTE De Leon II.    Electronically signed by: Kirsten  Justen (Mason), 12/16/2021    IIsidro II, M* personally performed the services described in this documentation, as scribed by Kirsten Campuzano in my presence, and it is both accurate and complete.    The note accurately reflects work and decisions made by me.  Isidro Silvestre II, M.D.  12/17/2021  1:09 AM

## 2021-12-17 NOTE — PROGRESS NOTES
Bedside report received from nurse. Assumed care of pt. Pt resting comfortably in bed. A/Ox4, VSS,  All needs met. POC reviewed and white board updated. Tele box on. Call light in reach. Bed locked in lowest position with 2 upper bed rails up. No pain or complaints. Mildly anxious per pt.

## 2021-12-17 NOTE — ASSESSMENT & PLAN NOTE
Baseline Cr of 0.7-0.9, possibly 2/2 to dehydration  -Cr: 1.5 --> 1.39 --> 1.26  -Calculated FeNa+: 0.4%  Plan:  -CTM for need of IVF, encouraged PO intake  -Avoid nephrotoxic medications unless indicated

## 2021-12-17 NOTE — ED TRIAGE NOTES
"Chief Complaint   Patient presents with   • Chest Pain   • Nausea/Vomiting/Diarrhea   • Sore Throat   • Cough     Pt to ED from home c/o chest pain 5/10, cough, NVD x2 days. PMHx Raquel Pt hypertensive in triage.    BP (!) 202/113   Pulse 92   Temp 37.2 °C (98.9 °F) (Temporal)   Resp (!) 25   Ht 1.803 m (5' 11\")   Wt 90.3 kg (199 lb 1.2 oz)   SpO2 98%   BMI 27.77 kg/m²     "

## 2021-12-17 NOTE — ED NOTES
Pt ambulated to room w/o difficulty and changed into gown. Placed on all monitors call light in hand

## 2021-12-17 NOTE — DISCHARGE PLANNING
Anticipated Discharge Disposition: Home with close outpatient follow-up     Action: pt pending medical clearance, pt currently on 0 liters O2, 6 clicks are 24/18. Orientation: A&Ox4.      Barriers to Discharge: medical clearance     Plan: f/u with pt and medical team to discuss dc needs and barriers.

## 2021-12-17 NOTE — ASSESSMENT & PLAN NOTE
Patient has cardiac history along with family history of heart disease however with normal NM stress test, more likely gastritis  -Troponin: 81--> 70 --> 62 --> 57  -EKG: RBBB  -NM Stress (12/17): No evidence of prior myocardial infarction. Normal left ventricular size, ejection fraction, and wall motion  Plan:  -Continue PPI scheduled and Mylanta PRN

## 2021-12-17 NOTE — ED NOTES
First interaction with pt after receiving report from Rupal SALES. Pt resting with no complaints at this time, wife at the bedside. Urinal at the bedside, pt is aware of need for urine. Call light within reach.

## 2021-12-17 NOTE — CARE PLAN
Problem: Pain - Standard  Goal: Alleviation of pain or a reduction in pain to the patient’s comfort goal  Outcome: Progressing  Note: Pt complains of extreme pain on arrival to the unit. Pt calmed down and given pain meds per MAR     Problem: Knowledge Deficit - Standard  Goal: Patient and family/care givers will demonstrate understanding of plan of care, disease process/condition, diagnostic tests and medications  Outcome: Progressing  Note: Pt and Pt's wife questions addressed and answered      Problem: Fall Risk  Goal: Patient will remain free from falls  Outcome: Progressing  Note: Bed alarm on. Pt educated to call before getting up    The patient is Watcher - Medium risk of patient condition declining or worsening    Shift Goals  Clinical Goals: monitor labs and BP  Patient Goals: pain management    Progress made toward(s) clinical / shift goals:  BP decreased  with labetalol, troponin trending down

## 2021-12-17 NOTE — PROGRESS NOTES
4 Eyes Skin Assessment Completed by HENRRY Brand and HENRRY Matute.    Head WDL  Ears WDL  Nose WDL  Mouth WDL  Neck WDL  Breast/Chest WDL  Shoulder Blades WDL  Spine WDL  (R) Arm/Elbow/Hand WDL  (L) Arm/Elbow/Hand WDL  Abdomen WDL  Groin WDL  Scrotum/Coccyx/Buttocks WDL  (R) Leg WDL  (L) Leg WDL  (R) Heel/Foot/Toe Scar , missing last toe  (L) Heel/Foot/Toe WDL, callous on lateral side           Devices In Places Tele Box and Pulse Ox      Interventions In Place Gray Ear Foams and Pillows    Possible Skin Injury No    Pictures Uploaded Into Epic N/A  Wound Consult Placed N/A  RN Wound Prevention Protocol Ordered No

## 2021-12-17 NOTE — DISCHARGE PLANNING
[facility] chart review completed. Patient seen at bedside. The most current review of medical record, knowledge of pt's PLOF and social support, LACE+ score of 73, 6 clicks scores of 18 mobility were considered.  No current provider consults recommend and note that pt is essentially up self per pt report. Introduced [facility] services to pt should needs arise post dc, though currently do not anticipate need for referral and pt aware of likely outpatient follow ups with [facility] PCP/cardiology dependent on medical POC/additional acute workup. Pt reports no functional concerns with dc to home once medically cleared. He is still awaiting results of stress test, ECHO, and possible EGD. TCN will continue to follow and collaborate with discharge planning team as additional post acute needs arise. Thank you.

## 2021-12-17 NOTE — DIETARY
Nutrition services: consult received for cardiac rehab diet instruction.  Pt busy with MD when dietitian came for diet instruction.  Heart Healthy nutrition handout left at bedside.  Dietitian available to return to discuss further if pt would like. Please re-consult if follow up is desired.

## 2021-12-17 NOTE — CONSULTS
Gastroenterology Consult Note:    Enrico Irving D.O.  Date & Time note created:    12/17/2021   3:33 PM     Patient ID:  Name:             Cayetano Hawkins  YOB: 1975  Age:                 46 y.o.  male  MRN:               [id number]    Referring MD:  Dr. Hamm                                                             Chief Complaint(s):    Chest Pain    Consult: Hematemesis    History of Present Illness:    This is a 46 y.o. male with a past medical history of NIDDM2, gastroparesis secondary to cannabis use, and reported history of cirrhosis/steatosis secondary to alcohol and chronic HCV who presents with chest pain. He had an EGD 4/2020 with mild esophagitis and a gastric polyp negative for malignancy.    Patient was previously on medications for gastroparesis and PPI but has not taken for some time. He does not use cannabis anymore but does work with marijuana plants and makes concentrated marijuana which he used to use daily for many years. He states that 2 days ago he began having chest pain with nausea and vomiting which occurs a few times per month but not usually this severe without clear relieving/exacerbating factors except some decreased nausea with sublingual zofran he has for when these events occur. He noticed a small amount of dark emesis at the bottom of the bag he threw up in a few times before he came into the hospital. His UDS was positive for cannabinoids, and he was noted to have ANN with high specific gravity on UA. Hemoglobin was 14.6 and went to 13.3 after patient was given IVF. He states he has not thrown up since he was in the hospital on antiemetics here but has persistent pain without bowel movements. Per reports he was complaining of loose stools and had some clear emesis this am but no melena or blood in stool. He denies NSAID and alcohol use.    Review of Systems:              Review of Systems   Constitutional: Positive for malaise/fatigue. Negative  for chills and fever.   Respiratory: Negative.    Cardiovascular: Negative.    Gastrointestinal: Positive for abdominal pain, nausea and vomiting. Negative for blood in stool, constipation, diarrhea and melena.   Genitourinary: Negative.    Musculoskeletal: Positive for back pain. Negative for myalgias.   Skin: Negative for itching and rash.   Neurological: Negative.        Past Medical History:   Past Medical History:   Diagnosis Date   • Arthritis right hip    osteo left hip, right hand   • Backpain     feet/hip-R,back   • Bowel habit changes     consitpation and diarreha sometimes   • Dental disorder 06/2020    upper dentures   • Diabetes     oral meds   • Hepatitis C 2009    No tx   • Hepatitis C carrier (HCC)    • High cholesterol    • Hyperlipidemia    • Hypertension    • Infectious disease    • Leukocytosis 11/25/2019   • Osteomyelitis (HCC)      Active Hospital Problems    Diagnosis    • ANN (acute kidney injury) (HCC) [N17.9]    • ACS (acute coronary syndrome) (HCC) [I24.9]    • Upper GI bleed [K92.2]    • HTN (hypertension) [I10]    • Diabetes mellitus type 2, uncontrolled (CMS-HCC) [E11.65]    • Hepatitis C [B19.20]        Past Surgical History:  Past Surgical History:   Procedure Laterality Date   • IRRIGATION & DEBRIDEMENT ORTHO Right 5/11/2021    Procedure: IRRIGATION AND DEBRIDEMENT, WOUND - FOOT;  Surgeon: Abram Cortez M.D.;  Location: SURGERY SAME DAY [facility] ER;  Service: Orthopedics   • TENDON LENGHTENING Right 5/11/2021    Procedure: LENGTHENING, TENDON - ACHILLES;  Surgeon: Abram Cortez M.D.;  Location: SURGERY SAME DAY [facility] ER;  Service: Orthopedics   • TOE AMPUTATION Right 5/11/2021    Procedure: AMPUTATION, TOE - 5TH RAY AND PARTIAL CUBOID EXCISION WITH PARTIAL 4TH METATARSAL EXCISION.;  Surgeon: Abram Cortez M.D.;  Location: SURGERY SAME DAY [facility] ER;  Service: Orthopedics   • INCISION AND DRAINAGE GENERAL Right 8/3/2020    Procedure: INCISION AND DRAINAGE;  Surgeon: Willie CARMICHAEL  KWADWO Kurtz;  Location: SURGERY [facility];  Service: Podiatry   • OSTECTOMY Right 6/26/2020    Procedure: EXCISION, BONE - OSTEPHYTIC BONE, FOOT;  Surgeon: Willie Kurtz D.P.M.;  Location: [facility];  Service: Podiatry   • TOE AMPUTATION Right 8/16/2018    Procedure: TOE AMPUTATION - 5TH RAY REVISION;  Surgeon: Abram Cortez M.D.;  Location: SURGERY [facility];  Service: Orthopedics   • IRRIGATION & DEBRIDEMENT ORTHO Right 4/13/2016    Procedure: IRRIGATION & DEBRIDEMENT AND CLOSURE OF ORTHO FOOT WOUND;  Surgeon: Hitesh Sol M.D.;  Location: SURGERY [facility];  Service:    • IRRIGATION & DEBRIDEMENT ORTHO Right 4/10/2016    Procedure: IRRIGATION & DEBRIDEMENT ORTHO-poss ray resection, poss below knee amputation ;  Surgeon: Hitesh Sol M.D.;  Location: SURGERY [facility];  Service:    • IRRIGATION & DEBRIDEMENT ORTHO Right 4/4/2016    Procedure: IRRIGATION & DEBRIDEMENT ORTHO FOOT - AMPUTATION RIGHT FIFTH TOE ;  Surgeon: Hitesh Sol M.D.;  Location: SURGERY [facility];  Service:    • CHOLECYSTECTOMY  2011   • APPENDECTOMY  1988   • OSWALDO BY LAPAROSCOPY     • OTHER  hepatitis c        Hospital Medications:  Current Facility-Administered Medications   Medication Dose Frequency Provider Last Rate Last Admin   • hydrALAZINE (APRESOLINE) injection 20 mg  20 mg Q6HRS PRN Ananda Live M.D.   20 mg at 12/17/21 0345   • omeprazole (PRILOSEC) capsule 20 mg  20 mg DAILY Samuel Hamm M.D.   20 mg at 12/17/21 1033   • mag hydrox-al hydrox-simeth (MAALOX PLUS ES or MYLANTA DS) suspension 10 mL  10 mL 4X/DAY PRN Ananda Live M.D.       • hydrOXYzine HCl (ATARAX) tablet 10 mg  10 mg TID PRN Ananda Live M.D.       • lisinopril (PRINIVIL) tablet 20 mg  20 mg DAILY Bryan Tapia M.D.   20 mg at 12/17/21 0550   • rosuvastatin (CRESTOR) tablet 10 mg  10 mg QAM Bryan Tapia M.D.   10 mg at 12/17/21 0549   • senna-docusate (PERICOLACE or  SENOKOT S) 8.6-50 MG per tablet 2 Tablet  2 Tablet BID Bryna Tapia M.D.        And   • polyethylene glycol/lytes (MIRALAX) PACKET 1 Packet  1 Packet QDAY PRN Bryan Tapia M.D.        And   • magnesium hydroxide (MILK OF MAGNESIA) suspension 30 mL  30 mL QDAY PRN Bryan Tapia M.D.        And   • bisacodyl (DULCOLAX) suppository 10 mg  10 mg QDAY PRN Bryan Tapia M.D.       • heparin injection 5,000 Units  5,000 Units Q8HRS Bryan Tapia M.D.   5,000 Units at 12/17/21 0550   • Pharmacy Consult Request ...Pain Management Review 1 Each  1 Each PHARMACY TO DOSE Bryan Tapia M.D.       • oxyCODONE immediate-release (ROXICODONE) tablet 2.5 mg  2.5 mg Q3HRS PRN Bryan Tapia M.D.        Or   • oxyCODONE immediate-release (ROXICODONE) tablet 5 mg  5 mg Q3HRS PRN Bryan Tapia M.D.   5 mg at 12/17/21 0346    Or   • HYDROmorphone (Dilaudid) injection 0.25 mg  0.25 mg Q3HRS PRN Bryan Tapia M.D.   0.25 mg at 12/16/21 2226   • aspirin EC (ECOTRIN) tablet 81 mg  81 mg DAILY Bryan Tapia M.D.   81 mg at 12/17/21 0549   • metoprolol tartrate (LOPRESSOR) tablet 25 mg  25 mg TWICE DAILY Bryan Tapia M.D.   25 mg at 12/17/21 1157   • morphine 4 MG/ML injection 2-4 mg  2-4 mg Q5 MIN PRN Bryan Tapia M.D.   2 mg at 12/17/21 0120   • prochlorperazine (COMPAZINE) injection 5-10 mg  5-10 mg Q4HRS PRN Bryan Tapia M.D.       • insulin regular (HumuLIN R,NovoLIN R) injection  2-9 Units 4X/DAY ACHS Bryan Tapia M.D.   2 Units at 12/17/21 1346    And   • dextrose 50% (D50W) injection 50 mL  50 mL Q15 MIN PRN Bryan Tapia M.D.       Last reviewed on 12/16/2021  8:39 PM by Blessing Galvan      Current Outpatient Medications:  Medications Prior to Admission   Medication Sig Dispense Refill Last Dose   • Cholecalciferol (VITAMIN D3) 125 MCG (5000 UT) Cap Take 5,000 Units by mouth every morning.   12/16/2021 at 0600   • metformin (GLUCOPHAGE) 1000 MG tablet Take 1,000 mg by  mouth every morning.   2021 at 0600   • rosuvastatin (CRESTOR) 10 MG Tab Take 10 mg by mouth every morning.   2021 at 0600   • ondansetron (ZOFRAN ODT) 4 MG TABLET DISPERSIBLE Take 4 mg by mouth every 6 hours as needed for Nausea.   2021 at 1600   • lisinopril (PRINIVIL) 20 MG Tab TAKE 1 TABLET BY MOUTH EVERY DAY 90 Tablet 1 2021 at 0600   • glimepiride (AMARYL) 4 MG Tab TAKE 1 TABLET BY MOUTH EVERY MORNING. 90 Tablet 1 2021 at 0600       Medication Allergy:  Allergies   Allergen Reactions   • Bee Swelling     Bee stings       Family History:  Family History   Problem Relation Age of Onset   • Diabetes Mother    • Hyperlipidemia Mother    • Arthritis Mother    • Heart Attack Father    • Heart Disease Father    • Hypertension Father    • Stroke Father    • Hyperlipidemia Father    • Arthritis Father        Social History:  Social History     Socioeconomic History   • Marital status:      Spouse name: Not on file   • Number of children: Not on file   • Years of education: Not on file   • Highest education level: Not on file   Occupational History   • Not on file   Tobacco Use   • Smoking status: Former Smoker     Packs/day: 0.50     Years: 10.00     Pack years: 5.00     Quit date: 2012     Years since quittin.9   • Smokeless tobacco: Never Used   • Tobacco comment: 5 yrs ago   Vaping Use   • Vaping Use: Never used   Substance and Sexual Activity   • Alcohol use: No   • Drug use: Yes     Types: Marijuana, Inhaled     Comment: couple of times a day, occasionally will eat edibles   • Sexual activity: Yes     Partners: Female   Other Topics Concern   • Not on file   Social History Narrative   • Not on file     Social Determinants of Health     Financial Resource Strain:    • Difficulty of Paying Living Expenses: Not on file   Food Insecurity:    • Worried About Running Out of Food in the Last Year: Not on file   • Ran Out of Food in the Last Year: Not on file   Transportation  "Needs:    • Lack of Transportation (Medical): Not on file   • Lack of Transportation (Non-Medical): Not on file   Physical Activity:    • Days of Exercise per Week: Not on file   • Minutes of Exercise per Session: Not on file   Stress:    • Feeling of Stress : Not on file   Social Connections:    • Frequency of Communication with Friends and Family: Not on file   • Frequency of Social Gatherings with Friends and Family: Not on file   • Attends Judaism Services: Not on file   • Active Member of Clubs or Organizations: Not on file   • Attends Club or Organization Meetings: Not on file   • Marital Status: Not on file   Intimate Partner Violence:    • Fear of Current or Ex-Partner: Not on file   • Emotionally Abused: Not on file   • Physically Abused: Not on file   • Sexually Abused: Not on file   Housing Stability:    • Unable to Pay for Housing in the Last Year: Not on file   • Number of Places Lived in the Last Year: Not on file   • Unstable Housing in the Last Year: Not on file       Physical Exam:  Weight/BMI: Body mass index is 27.43 kg/m².  BP (!) 176/105   Pulse 69   Temp 37.7 °C (99.8 °F) (Temporal)   Resp 16   Ht 1.803 m (5' 11\")   Wt 89.2 kg (196 lb 10.4 oz)   SpO2 95%   Vitals:    12/17/21 0550 12/17/21 0600 12/17/21 0712 12/17/21 1136   BP: (!) 177/104 (!) 200/114 153/90 (!) 176/105   Pulse:   74 69   Resp:   16 16   Temp:   37.2 °C (99 °F) 37.7 °C (99.8 °F)   TempSrc:   Temporal Temporal   SpO2:   95% 95%   Weight:       Height:         Oxygen Therapy:  Pulse Oximetry: 95 %, O2 (LPM): 0, O2 Delivery Device: None - Room Air  No intake or output data in the 24 hours ending 12/17/21 1533  Physical Exam  Constitutional:       Appearance: Normal appearance. He is not ill-appearing or toxic-appearing.   HENT:      Mouth/Throat:      Mouth: Mucous membranes are dry.      Pharynx: No oropharyngeal exudate or posterior oropharyngeal erythema.   Cardiovascular:      Rate and Rhythm: Normal rate and regular " rhythm.      Pulses: Normal pulses.      Heart sounds: No murmur heard.      Pulmonary:      Effort: Pulmonary effort is normal. No respiratory distress.      Breath sounds: Normal breath sounds. No wheezing.   Abdominal:      General: Abdomen is flat. Bowel sounds are normal. There is no distension.      Palpations: Abdomen is soft.      Tenderness: There is abdominal tenderness (epigastric). There is no guarding or rebound.   Musculoskeletal:         General: No swelling. Normal range of motion.   Skin:     General: Skin is warm and dry.      Findings: No erythema or rash.   Neurological:      General: No focal deficit present.      Mental Status: He is alert and oriented to person, place, and time.      Comments: Cognition is relatively poor          MDM (Data Review):     Records reviewed and summarized in current documentation    Lab Data Review:  Recent Results (from the past 24 hour(s))   EKG (Now)    Collection Time: 21  5:52 PM   Result Value Ref Range    Report       [facility] Emergency Dept.    Test Date:  2021  Pt Name:    SAM HENDRIX                Department: ER  MRN:        [id number]                      Room:  Gender:     Male                         Technician: [id number]  :        1975                   Requested By:ER TRIAGE PROTOCOL  Order #:    [id number]                    Reading MD: Isidro Silvestre II, MD    Measurements  Intervals                                Axis  Rate:       80                           P:          50  GA:         136                          QRS:        81  QRSD:       132                          T:          31  QT:         412  QTc:        476    Interpretive Statements  SINUS RHYTHM  Rate 80  RBBB AND LPFB  No ST elevation or depression. T waves within normal limits.  Impression: Normal sinus rhythm EKG, similar to previous EKG. No obvious  ischemia.  Compared to ECG 05/10/2021 11:03:43  Left posterior fascicular block now  present  Electronically Signed On 12- 19:58:35 P ST by Isidro Silvestre II, MD     CBC with Differential    Collection Time: 12/16/21  6:22 PM   Result Value Ref Range    WBC 14.1 (H) 4.8 - 10.8 K/uL    RBC 4.91 4.70 - 6.10 M/uL    Hemoglobin 14.6 14.0 - 18.0 g/dL    Hematocrit 43.4 42.0 - 52.0 %    MCV 88.4 81.4 - 97.8 fL    MCH 29.7 27.0 - 33.0 pg    MCHC 33.6 (L) 33.7 - 35.3 g/dL    RDW 44.4 35.9 - 50.0 fL    Platelet Count 311 164 - 446 K/uL    MPV 9.2 9.0 - 12.9 fL    Neutrophils-Polys 84.70 (H) 44.00 - 72.00 %    Lymphocytes 10.70 (L) 22.00 - 41.00 %    Monocytes 3.60 0.00 - 13.40 %    Eosinophils 0.10 0.00 - 6.90 %    Basophils 0.30 0.00 - 1.80 %    Immature Granulocytes 0.60 0.00 - 0.90 %    Nucleated RBC 0.00 /100 WBC    Neutrophils (Absolute) 11.90 (H) 1.82 - 7.42 K/uL    Lymphs (Absolute) 1.51 1.00 - 4.80 K/uL    Monos (Absolute) 0.51 0.00 - 0.85 K/uL    Eos (Absolute) 0.01 0.00 - 0.51 K/uL    Baso (Absolute) 0.04 0.00 - 0.12 K/uL    Immature Granulocytes (abs) 0.08 0.00 - 0.11 K/uL    NRBC (Absolute) 0.00 K/uL   DIAGNOSTIC ALCOHOL    Collection Time: 12/16/21  6:22 PM   Result Value Ref Range    Diagnostic Alcohol <10.1 0.0 - 10.0 mg/dL   Prothrombin time (INR)    Collection Time: 12/16/21  6:22 PM   Result Value Ref Range    PT 13.1 12.0 - 14.6 sec    INR 1.02 0.87 - 1.13   HEMOGLOBIN A1C    Collection Time: 12/16/21  6:22 PM   Result Value Ref Range    Glycohemoglobin 6.5 (H) 4.0 - 5.6 %    Est Avg Glucose 140 mg/dL   Complete Metabolic Panel (CMP)    Collection Time: 12/16/21  6:23 PM   Result Value Ref Range    Sodium 139 135 - 145 mmol/L    Potassium 4.6 3.6 - 5.5 mmol/L    Chloride 102 96 - 112 mmol/L    Co2 24 20 - 33 mmol/L    Anion Gap 13.0 7.0 - 16.0    Glucose 189 (H) 65 - 99 mg/dL    Bun 33 (H) 8 - 22 mg/dL    Creatinine 1.55 (H) 0.50 - 1.40 mg/dL    Calcium 9.8 8.5 - 10.5 mg/dL    AST(SGOT) 39 12 - 45 U/L    ALT(SGPT) 56 (H) 2 - 50 U/L    Alkaline Phosphatase 103 (H) 30 - 99 U/L     Total Bilirubin 0.4 0.1 - 1.5 mg/dL    Albumin 4.2 3.2 - 4.9 g/dL    Total Protein 7.5 6.0 - 8.2 g/dL    Globulin 3.3 1.9 - 3.5 g/dL    A-G Ratio 1.3 g/dL   Troponin    Collection Time: 21  6:23 PM   Result Value Ref Range    Troponin T 81 (H) 6 - 19 ng/L   LIPASE    Collection Time: 21  6:23 PM   Result Value Ref Range    Lipase 22 11 - 82 U/L   ESTIMATED GFR    Collection Time: 21  6:23 PM   Result Value Ref Range    GFR If  58 (A) >60 mL/min/1.73 m 2    GFR If Non  48 (A) >60 mL/min/1.73 m 2   EKG    Collection Time: 21  7:54 PM   Result Value Ref Range    Report       [facility] Emergency Dept.    Test Date:  2021  Pt Name:    SAM HENDRIX                Department: ER  MRN:        [id number]                      Room:       [facility]  Gender:     Male                         Technician: [id number]  :        1975                   Requested By:KALLIE MONTGOMERY II  Order #:    [id number]                    Reading MD:    Measurements  Intervals                                Axis  Rate:       89                           P:          68  WI:         140                          QRS:        -82  QRSD:       137                          T:          15  QT:         416  QTc:        507    Interpretive Statements  Sinus rhythm  RBBB and LAFB  ST elev, probable normal early repol pattern  Baseline wander in lead(s) III,aVL,aVF,V1  Compared to ECG 2021 17:52:36  Left anterior fascicular block now present  Left posterior fascicular block no longer present  Possible ischemia no longer present  ST (T wave) deviation still present      TROPONIN    Collection Time: 21  8:00 PM   Result Value Ref Range    Troponin T 70 (H) 6 - 19 ng/L   MAGNESIUM    Collection Time: 21  8:00 PM   Result Value Ref Range    Magnesium 1.9 1.5 - 2.5 mg/dL   proBrain Natriuretic Peptide, NT    Collection Time: 21  8:00 PM   Result Value Ref  Range    NT-proBNP 692 (H) 0 - 125 pg/mL   URINALYSIS    Collection Time: 12/16/21  9:30 PM    Specimen: Urine   Result Value Ref Range    Color Yellow     Character Clear     Specific Gravity 1.045 <1.035    Ph 5.5 5.0 - 8.0    Glucose Negative Negative mg/dL    Ketones Trace (A) Negative mg/dL    Protein 300 (A) Negative mg/dL    Bilirubin Negative Negative    Urobilinogen, Urine 0.2 Negative    Nitrite Negative Negative    Leukocyte Esterase Negative Negative    Occult Blood Moderate (A) Negative    Micro Urine Req Microscopic    URINE MICROSCOPIC (W/UA)    Collection Time: 12/16/21  9:30 PM   Result Value Ref Range    WBC 2-5 (A) /hpf    RBC 2-5 (A) /hpf    Bacteria Rare (A) None /hpf    Epithelial Cells Negative /hpf    Hyaline Cast 3-5 (A) /lpf   URINE DRUG SCREEN    Collection Time: 12/16/21 10:00 PM   Result Value Ref Range    Amphetamines Urine Negative Negative    Barbiturates Negative Negative    Benzodiazepines Negative Negative    Cocaine Metabolite Negative Negative    Methadone Negative Negative    Opiates Positive (A) Negative    Oxycodone Negative Negative    Phencyclidine -Pcp Negative Negative    Propoxyphene Negative Negative    Cannabinoid Metab Positive (A) Negative   Troponin in two (2) hours    Collection Time: 12/16/21 10:25 PM   Result Value Ref Range    Troponin T 62 (H) 6 - 19 ng/L   Troponin in four (4) hours    Collection Time: 12/17/21  2:01 AM   Result Value Ref Range    Troponin T 57 (H) 6 - 19 ng/L   CBC without Differential    Collection Time: 12/17/21  2:01 AM   Result Value Ref Range    WBC 13.0 (H) 4.8 - 10.8 K/uL    RBC 4.60 (L) 4.70 - 6.10 M/uL    Hemoglobin 13.3 (L) 14.0 - 18.0 g/dL    Hematocrit 40.2 (L) 42.0 - 52.0 %    MCV 87.4 81.4 - 97.8 fL    MCH 28.9 27.0 - 33.0 pg    MCHC 33.1 (L) 33.7 - 35.3 g/dL    RDW 43.3 35.9 - 50.0 fL    Platelet Count 264 164 - 446 K/uL    MPV 9.5 9.0 - 12.9 fL   D-DIMER    Collection Time: 12/17/21  2:01 AM   Result Value Ref Range    D-Dimer  Screen <0.27 0.00 - 0.50 ug/mL (FEU)   Comp Metabolic Panel    Collection Time: 21  2:01 AM   Result Value Ref Range    Sodium 137 135 - 145 mmol/L    Potassium 3.7 3.6 - 5.5 mmol/L    Chloride 102 96 - 112 mmol/L    Co2 22 20 - 33 mmol/L    Anion Gap 13.0 7.0 - 16.0    Glucose 197 (H) 65 - 99 mg/dL    Bun 31 (H) 8 - 22 mg/dL    Creatinine 1.39 0.50 - 1.40 mg/dL    Calcium 9.1 8.5 - 10.5 mg/dL    AST(SGOT) 46 (H) 12 - 45 U/L    ALT(SGPT) 57 (H) 2 - 50 U/L    Alkaline Phosphatase 96 30 - 99 U/L    Total Bilirubin 0.4 0.1 - 1.5 mg/dL    Albumin 3.6 3.2 - 4.9 g/dL    Total Protein 6.7 6.0 - 8.2 g/dL    Globulin 3.1 1.9 - 3.5 g/dL    A-G Ratio 1.2 g/dL   Lipid Profile    Collection Time: 21  2:01 AM   Result Value Ref Range    Cholesterol,Tot 157 100 - 199 mg/dL    Triglycerides 143 0 - 149 mg/dL    HDL 33 (A) >=40 mg/dL    LDL 95 <100 mg/dL   ESTIMATED GFR    Collection Time: 21  2:01 AM   Result Value Ref Range    GFR If African American >60 >60 mL/min/1.73 m 2    GFR If Non African American 55 (A) >60 mL/min/1.73 m 2   EKG in four (4) hours    Collection Time: 21  2:06 AM   Result Value Ref Range    Report       [facility]    Test Date:  2021  Pt Name:    SAM HENDRIX                Department: 171  MRN:        [id number]                      Room:       T730  Gender:     Male                         Technician: ELPIDIO  :        1975                   Requested By:LIAM ALANIZ  Order #:    [id number]                    Reading MD: Zach Robles MD    Measurements  Intervals                                Axis  Rate:       70                           P:          58  VT:         144                          QRS:        28  QRSD:       138                          T:          24  QT:         448  QTc:        484    Interpretive Statements  SINUS RHYTHM  RIGHT BUNDLE BRANCH BLOCK  Compared to ECG 2021 19:54:43  NO SIGNIFICANT CHANGES  Electronically Signed On  12- 5:41:43 PST by Zach Robles MD     Urine Sodium Random    Collection Time: 12/17/21  4:05 AM   Result Value Ref Range    Sodium, Urine -per volume 49 mmol/L   Urine Creatinine Random    Collection Time: 12/17/21  4:05 AM   Result Value Ref Range    Creatinine, Random Urine 132.48 mg/dL   POCT glucose device results    Collection Time: 12/17/21  1:44 PM   Result Value Ref Range    Glucose - Accu-Ck 185 (H) 65 - 99 mg/dL       MDM (Assessment and Plan):     Active Hospital Problems    Diagnosis    • ANN (acute kidney injury) (HCC) [N17.9]    • ACS (acute coronary syndrome) (HCC) [I24.9]    • Upper GI bleed [K92.2]    • HTN (hypertension) [I10]    • Diabetes mellitus type 2, uncontrolled (CMS-HCC) [E11.65]    • Hepatitis C [B19.20]      Assessment    #Hematemesis, likely allie huggins, h/o mild esophagitis  #Epigastric abdominal pain  #Chest pain  #Gastroparesis, ?cyclic vomiting syndrome, possible diabetes component  #Steatosis vs Fibrosis 2/2 alcohol and HCV untreated  #Cannabinoid exposure  -Patient with pain in setting of nausea/vomiting and limited PO intake, small amount of possible hematemesis per patient with stable Hgb near baseline and no s/s of active bleeding.  -Sx likely consistent with gastroparesis/cyclic vomiting syndrome possibly causing esophageal irritation/allie huggins. Patient was a user of shatter cannabinoids and still grows/makes cannabinoid products explaining CBD+    Plan  -Okay for diet as tolerated, give foods that patient can get down such as soup. No role for EGD at this time. Antiemetics PRN.  -Okay for oral omeprazole 20 mg if mild esophagitis component  -Patient should avoid cannabinoid exposures, continue alcohol cessation  -Follow up outpatient with GI when feasible for HCV assessment and treatment  -GI to sign off    Enrico Irving D.O.    Core Quality Measures   Reviewed items::  Labs, Medications and Radiology reports reviewed

## 2021-12-17 NOTE — H&P
Hospital Medicine History & Physical Note    Date of Service    Primary Care Physician  Candy Gupta M.D.    Consultants  None    Code Status  Full Code    Chief Complaint  Chief Complaint   Patient presents with   • Chest Pain   • Nausea/Vomiting/Diarrhea   • Sore Throat   • Cough       History of Presenting Illness  46M with hepatitis C, DM, htn, and MI in the past presented with with mid sternal chest pain since the morning that radiates to his back and extremities he rates a 6/10 intensity sharp in nature worsened by palpation and laying flat. Wife bedside states he had coffee ground emesis several times this week. He has associated vomiting with the chest pain episodes which are intermittent. Patient has family history including a father and brother who  of an MI and a sister with 3 CABG. Patient quit smoking 10 years ago and denies drinking. In the ED, he was found to have elevated troponin of 81 which trended down to 62, wbc count of 14.1 with anc of 11.9, glucose 189, and creatinine of 1.55 elevated from  of this year which was 1.18. D-dimer is pending and bnp was 692 with cannabinoid and opiate positive drug screen.    I discussed the plan of care with patient.    Review of Systems  ROS  All systems reviewed and negative except as noted in HPI.    Past Medical History   has a past medical history of Arthritis (right hip), Backpain, Bowel habit changes, Dental disorder (2020), Diabetes, Hepatitis C (), Hepatitis C carrier (HCC), High cholesterol, Hyperlipidemia, Hypertension, Infectious disease, Leukocytosis (2019), and Osteomyelitis (HCC).    Surgical History   has a past surgical history that includes nano by laparoscopy; irrigation & debridement ortho (Right, 2016); irrigation & debridement ortho (Right, 4/10/2016); irrigation & debridement ortho (Right, 2016); ostectomy (Right, 2020); incision and drainage general (Right, 8/3/2020); cholecystectomy ();  appendectomy (1988); other (hepatitis c ); toe amputation (Right, 8/16/2018); irrigation & debridement ortho (Right, 5/11/2021); tendon lenghtening (Right, 5/11/2021); and toe amputation (Right, 5/11/2021).     Family History  family history includes Arthritis in his father and mother; Diabetes in his mother; Heart Attack in his father; Heart Disease in his father; Hyperlipidemia in his father and mother; Hypertension in his father; Stroke in his father.   Family history reviewed with patient. There is family history that is pertinent to the chief complaint.     Social History   reports that he quit smoking about 9 years ago. He has a 5.00 pack-year smoking history. He has never used smokeless tobacco. He reports current drug use. Drugs: Marijuana and Inhaled. He reports that he does not drink alcohol.    Allergies  Allergies   Allergen Reactions   • Bee Swelling     Bee stings       Medications  Prior to Admission Medications   Prescriptions Last Dose Informant Patient Reported? Taking?   Cholecalciferol (VITAMIN D3) 125 MCG (5000 UT) Cap 12/16/2021 at 0600 Patient Yes Yes   Sig: Take 5,000 Units by mouth every morning.   glimepiride (AMARYL) 4 MG Tab 12/16/2021 at 0600 Patient No No   Sig: TAKE 1 TABLET BY MOUTH EVERY MORNING.   lisinopril (PRINIVIL) 20 MG Tab 12/16/2021 at 0600 Patient No No   Sig: TAKE 1 TABLET BY MOUTH EVERY DAY   metformin (GLUCOPHAGE) 1000 MG tablet 12/16/2021 at 0600 Patient Yes Yes   Sig: Take 1,000 mg by mouth every morning.   ondansetron (ZOFRAN ODT) 4 MG TABLET DISPERSIBLE 12/16/2021 at 1600 Patient Yes Yes   Sig: Take 4 mg by mouth every 6 hours as needed for Nausea.   rosuvastatin (CRESTOR) 10 MG Tab 12/16/2021 at 0600 Patient Yes Yes   Sig: Take 10 mg by mouth every morning.      Facility-Administered Medications: None       Physical Exam  Temp:  [37.2 °C (98.9 °F)-37.2 °C (99 °F)] 37.2 °C (99 °F)  Pulse:  [71-92] 84  Resp:  [15-28] 20  BP: (158-232)/() 203/116  SpO2:  [92 %-98  %] 96 %  Blood Pressure: (!) 162/87   Temperature: 37.2 °C (98.9 °F)   Pulse: 71   Respiration: 17   Pulse Oximetry: 92 %       Physical Exam    Constitutional: Resting comfortably in NAD   HENT: Normocephalic, no obvious evidence of acute trauma.  Eyes: No scleral icterus. Normal conjunctiva   Neck: Comfortable movement without any obvious restriction in the range of motion.  Cardiovascular: Upon ascultation I appreciate a regular heart rhythm and a normal rate with no murmurs, rubs or gallops  Thorax & Lungs: No respiratory distress. No wheezing, rales or rhonchi heard on ausculation. There is palpable chest wall tenderness. I appreciate normal air movement throughout.   Abdomen: The abdomen is not visibly distended. Upon palpation, I find it to epigastric tenderness.  No mass appreciated. +cva tenderness L>R  Skin: The exposed portions of skin reveal no obvious rash or other abnormalities.  Extremities/Musculoskeletal: no lower extremity edema with no asymmetry.  Neurologic: Alert & oriented. No focal deficits observed.   Psychiatric: Normal affect appropriate for the clinical situation.      Laboratory:  Recent Labs     12/16/21 1822   WBC 14.1*   RBC 4.91   HEMOGLOBIN 14.6   HEMATOCRIT 43.4   MCV 88.4   MCH 29.7   MCHC 33.6*   RDW 44.4   PLATELETCT 311   MPV 9.2     Recent Labs     12/16/21 1823   SODIUM 139   POTASSIUM 4.6   CHLORIDE 102   CO2 24   GLUCOSE 189*   BUN 33*   CREATININE 1.55*   CALCIUM 9.8     Recent Labs     12/16/21 1823   ALTSGPT 56*   ASTSGOT 39   ALKPHOSPHAT 103*   TBILIRUBIN 0.4   LIPASE 22   GLUCOSE 189*     Recent Labs     12/16/21 1822   INR 1.02     Recent Labs     12/16/21 2000   NTPROBNP 692*         Recent Labs     12/16/21  1823 12/16/21 2000 12/16/21  2225   TROPONINT 81* 70* 62*       Imaging:  CT-CTA COMPLETE THORACOABDOMINAL AORTA   Final Result      1.  No aortic aneurysm or dissection identified.      2.  Atherosclerotic disease, advanced for patient's age.      3.   Calcification of the aortic valve.      4.  Mosaic attenuation in the lungs is likely related to small airways disease.      5.  Mild splenomegaly                        DX-CHEST-PORTABLE (1 VIEW)   Final Result      No evidence of acute cardiopulmonary process.      NM-CARDIAC STRESS TEST    (Results Pending)   US-RENAL    (Results Pending)       X-Ray:  I have personally reviewed the images and compared with prior images. and My impression is: concurrence with radiologists' impression  EKG:  I have personally reviewed the images and compared with prior images.    Assessment/Plan:  I anticipate this patient is appropriate for observation status at this time.    * ACS (acute coronary syndrome) (HCC)- (present on admission)  Assessment & Plan  Troponin x 3  Echo in AM  LFT  ASA  Consider Cardiac Stress Test  EKG prn chest pain  Morphine prn pain  Trial Omeprazole/GI Cocktail  Consider thyroid studies    ANN (acute kidney injury) (HCC)- (present on admission)  Assessment & Plan  - c/w NS  - F/u Urine Na and Urine Cr  - Monitor I/Os  - Renal US pending response to IVF  - Monitor repeat labs      Upper GI bleed- (present on admission)  Assessment & Plan  Per wife bedside, patient had episode of coffee ground emesis this week  GI consult  Trend hgb, tsufe if <7  Consider egd in house.    HTN (hypertension)- (present on admission)  Assessment & Plan  Continue Home Medications  Labetalol ivp prn with parameters      Hepatitis C- (present on admission)  Assessment & Plan  chronic    Diabetes mellitus type 2, uncontrolled (CMS-HCC)- (present on admission)  Assessment & Plan  ISS  A1c  Hypoglycemia protocol        VTE prophylaxis: SCDs/TEDs and heparin ppx

## 2021-12-17 NOTE — ED NOTES
Pt tearful, c/o CP and nausea. MD notified, will medicate pt per order. Call light within reach, no additional needs at this time.

## 2021-12-17 NOTE — ED NOTES
Med rec completed per Pt and spouse at bedside.  Allergies reviewed with Pt. No known DRUG allergies.  Pt was previously prescribed metformin 1000 mg 1 tablet 2 times per day; spouse reports that Pt takes only 1 tablet once per day, in the morning; dosing updated on med rec.  No oral antibiotics in last 30 days.  Pt's preferred pharmacy: [facility] on [address].

## 2021-12-18 VITALS
SYSTOLIC BLOOD PRESSURE: 144 MMHG | TEMPERATURE: 97.9 F | BODY MASS INDEX: 27.53 KG/M2 | WEIGHT: 196.65 LBS | OXYGEN SATURATION: 97 % | HEIGHT: 71 IN | RESPIRATION RATE: 18 BRPM | HEART RATE: 56 BPM | DIASTOLIC BLOOD PRESSURE: 62 MMHG

## 2021-12-18 LAB
ALBUMIN SERPL BCP-MCNC: 3.6 G/DL (ref 3.2–4.9)
ALBUMIN/GLOB SERPL: 1.3 G/DL
ALP SERPL-CCNC: 95 U/L (ref 30–99)
ALT SERPL-CCNC: 68 U/L (ref 2–50)
ANION GAP SERPL CALC-SCNC: 10 MMOL/L (ref 7–16)
AST SERPL-CCNC: 41 U/L (ref 12–45)
BASOPHILS # BLD AUTO: 0.4 % (ref 0–1.8)
BASOPHILS # BLD: 0.05 K/UL (ref 0–0.12)
BILIRUB SERPL-MCNC: 0.4 MG/DL (ref 0.1–1.5)
BUN SERPL-MCNC: 26 MG/DL (ref 8–22)
CALCIUM SERPL-MCNC: 9 MG/DL (ref 8.5–10.5)
CHLORIDE SERPL-SCNC: 103 MMOL/L (ref 96–112)
CO2 SERPL-SCNC: 25 MMOL/L (ref 20–33)
CREAT SERPL-MCNC: 1.26 MG/DL (ref 0.5–1.4)
EOSINOPHIL # BLD AUTO: 0.08 K/UL (ref 0–0.51)
EOSINOPHIL NFR BLD: 0.7 % (ref 0–6.9)
ERYTHROCYTE [DISTWIDTH] IN BLOOD BY AUTOMATED COUNT: 43.2 FL (ref 35.9–50)
GLOBULIN SER CALC-MCNC: 2.8 G/DL (ref 1.9–3.5)
GLUCOSE SERPL-MCNC: 149 MG/DL (ref 65–99)
HCT VFR BLD AUTO: 42.4 % (ref 42–52)
HGB BLD-MCNC: 14.2 G/DL (ref 14–18)
IMM GRANULOCYTES # BLD AUTO: 0.06 K/UL (ref 0–0.11)
IMM GRANULOCYTES NFR BLD AUTO: 0.5 % (ref 0–0.9)
LYMPHOCYTES # BLD AUTO: 2.35 K/UL (ref 1–4.8)
LYMPHOCYTES NFR BLD: 20.1 % (ref 22–41)
MCH RBC QN AUTO: 29.3 PG (ref 27–33)
MCHC RBC AUTO-ENTMCNC: 33.5 G/DL (ref 33.7–35.3)
MCV RBC AUTO: 87.6 FL (ref 81.4–97.8)
MONOCYTES # BLD AUTO: 0.71 K/UL (ref 0–0.85)
MONOCYTES NFR BLD AUTO: 6.1 % (ref 0–13.4)
NEUTROPHILS # BLD AUTO: 8.47 K/UL (ref 1.82–7.42)
NEUTROPHILS NFR BLD: 72.2 % (ref 44–72)
NRBC # BLD AUTO: 0 K/UL
NRBC BLD-RTO: 0 /100 WBC
PLATELET # BLD AUTO: 285 K/UL (ref 164–446)
PMV BLD AUTO: 9.3 FL (ref 9–12.9)
POTASSIUM SERPL-SCNC: 4 MMOL/L (ref 3.6–5.5)
PROT SERPL-MCNC: 6.4 G/DL (ref 6–8.2)
RBC # BLD AUTO: 4.84 M/UL (ref 4.7–6.1)
SODIUM SERPL-SCNC: 138 MMOL/L (ref 135–145)
WBC # BLD AUTO: 11.7 K/UL (ref 4.8–10.8)

## 2021-12-18 PROCEDURE — 80053 COMPREHEN METABOLIC PANEL: CPT

## 2021-12-18 PROCEDURE — 36415 COLL VENOUS BLD VENIPUNCTURE: CPT

## 2021-12-18 PROCEDURE — 99217 PR OBSERVATION CARE DISCHARGE: CPT | Mod: GC | Performed by: INTERNAL MEDICINE

## 2021-12-18 PROCEDURE — 700111 HCHG RX REV CODE 636 W/ 250 OVERRIDE (IP): Performed by: INTERNAL MEDICINE

## 2021-12-18 PROCEDURE — 90471 IMMUNIZATION ADMIN: CPT

## 2021-12-18 PROCEDURE — G0378 HOSPITAL OBSERVATION PER HR: HCPCS

## 2021-12-18 PROCEDURE — 96372 THER/PROPH/DIAG INJ SC/IM: CPT

## 2021-12-18 PROCEDURE — 94760 N-INVAS EAR/PLS OXIMETRY 1: CPT

## 2021-12-18 PROCEDURE — A9270 NON-COVERED ITEM OR SERVICE: HCPCS | Performed by: STUDENT IN AN ORGANIZED HEALTH CARE EDUCATION/TRAINING PROGRAM

## 2021-12-18 PROCEDURE — 90686 IIV4 VACC NO PRSV 0.5 ML IM: CPT | Performed by: INTERNAL MEDICINE

## 2021-12-18 PROCEDURE — 97161 PT EVAL LOW COMPLEX 20 MIN: CPT

## 2021-12-18 PROCEDURE — 82962 GLUCOSE BLOOD TEST: CPT

## 2021-12-18 PROCEDURE — 700102 HCHG RX REV CODE 250 W/ 637 OVERRIDE(OP): Performed by: STUDENT IN AN ORGANIZED HEALTH CARE EDUCATION/TRAINING PROGRAM

## 2021-12-18 PROCEDURE — A9270 NON-COVERED ITEM OR SERVICE: HCPCS | Performed by: INTERNAL MEDICINE

## 2021-12-18 PROCEDURE — 85025 COMPLETE CBC W/AUTO DIFF WBC: CPT

## 2021-12-18 PROCEDURE — 700102 HCHG RX REV CODE 250 W/ 637 OVERRIDE(OP): Performed by: INTERNAL MEDICINE

## 2021-12-18 RX ORDER — OMEPRAZOLE 20 MG/1
20 CAPSULE, DELAYED RELEASE ORAL DAILY
Qty: 90 CAPSULE | Refills: 1 | Status: SHIPPED | OUTPATIENT
Start: 2021-12-19 | End: 2022-03-16 | Stop reason: SDUPTHER

## 2021-12-18 RX ORDER — ASPIRIN 81 MG/1
81 TABLET ORAL DAILY
Qty: 90 TABLET | Refills: 0 | Status: SHIPPED | OUTPATIENT
Start: 2021-12-19 | End: 2022-03-16 | Stop reason: SDUPTHER

## 2021-12-18 RX ORDER — AMLODIPINE BESYLATE 5 MG/1
5 TABLET ORAL
Status: DISCONTINUED | OUTPATIENT
Start: 2021-12-18 | End: 2021-12-18

## 2021-12-18 RX ORDER — CHLORTHALIDONE 25 MG/1
25 TABLET ORAL DAILY
Qty: 90 TABLET | Refills: 0 | Status: SHIPPED | OUTPATIENT
Start: 2021-12-19 | End: 2022-03-16 | Stop reason: SDUPTHER

## 2021-12-18 RX ORDER — LISINOPRIL 40 MG/1
40 TABLET ORAL DAILY
Qty: 60 TABLET | Refills: 1 | Status: SHIPPED | OUTPATIENT
Start: 2021-12-19 | End: 2022-03-16 | Stop reason: SDUPTHER

## 2021-12-18 RX ORDER — ROSUVASTATIN CALCIUM 20 MG/1
20 TABLET, COATED ORAL EVERY MORNING
Qty: 60 TABLET | Refills: 1 | Status: SHIPPED | OUTPATIENT
Start: 2021-12-19 | End: 2022-03-16 | Stop reason: SDUPTHER

## 2021-12-18 RX ORDER — AMLODIPINE BESYLATE 5 MG/1
5 TABLET ORAL
Status: DISCONTINUED | OUTPATIENT
Start: 2021-12-18 | End: 2021-12-18 | Stop reason: HOSPADM

## 2021-12-18 RX ADMIN — ROSUVASTATIN CALCIUM 20 MG: 20 TABLET, FILM COATED ORAL at 05:21

## 2021-12-18 RX ADMIN — CHLORTHALIDONE 25 MG: 25 TABLET ORAL at 05:21

## 2021-12-18 RX ADMIN — INFLUENZA A VIRUS A/VICTORIA/2570/2019 IVR-215 (H1N1) ANTIGEN (FORMALDEHYDE INACTIVATED), INFLUENZA A VIRUS A/TASMANIA/503/2020 IVR-221 (H3N2) ANTIGEN (FORMALDEHYDE INACTIVATED), INFLUENZA B VIRUS B/PHUKET/3073/2013 ANTIGEN (FORMALDEHYDE INACTIVATED), AND INFLUENZA B VIRUS B/WASHINGTON/02/2019 ANTIGEN (FORMALDEHYDE INACTIVATED) 0.5 ML: 15; 15; 15; 15 INJECTION, SUSPENSION INTRAMUSCULAR at 14:38

## 2021-12-18 RX ADMIN — METOPROLOL TARTRATE 25 MG: 25 TABLET, FILM COATED ORAL at 05:21

## 2021-12-18 RX ADMIN — LISINOPRIL 40 MG: 20 TABLET ORAL at 05:21

## 2021-12-18 RX ADMIN — ASPIRIN 81 MG: 81 TABLET, COATED ORAL at 05:21

## 2021-12-18 RX ADMIN — OMEPRAZOLE 20 MG: 20 CAPSULE, DELAYED RELEASE ORAL at 05:21

## 2021-12-18 RX ADMIN — AMLODIPINE BESYLATE 5 MG: 5 TABLET ORAL at 10:12

## 2021-12-18 RX ADMIN — INSULIN HUMAN 2 UNITS: 100 INJECTION, SOLUTION PARENTERAL at 09:52

## 2021-12-18 RX ADMIN — DOCUSATE SODIUM 50 MG AND SENNOSIDES 8.6 MG 2 TABLET: 8.6; 5 TABLET, FILM COATED ORAL at 05:21

## 2021-12-18 ASSESSMENT — COGNITIVE AND FUNCTIONAL STATUS - GENERAL
SUGGESTED CMS G CODE MODIFIER MOBILITY: CH
MOBILITY SCORE: 24

## 2021-12-18 ASSESSMENT — GAIT ASSESSMENTS
GAIT LEVEL OF ASSIST: INDEPENDENT
DISTANCE (FEET): 450
DEVIATION: ANTALGIC

## 2021-12-18 NOTE — THERAPY
Physical Therapy   Initial Evaluation     Patient Name: Cayetano Hawkins  Age:  46 y.o., Sex:  male  Medical Record #: [id number]  Today's Date: 12/18/2021     Precautions  Comments: (P) univseral    Assessment  Patient is 46 y.o. male with a diagnosis of acute coronary syndrome and ANN. Other PMH includes hepatitis C, DM2, HTN, HLD, Hx MI. Pt reports he lives with his wife, children (Ages 12, 16, 20), and 2 grandchildren in a single level home. He reports independence with mobility at baseline with intermittent use of SPC depending on pain in RLE. Today, pt demonstrated good strength, mobility, balance, and safety awareness with mobility. Pt was educated on cardiac handout and return to activity. Pt with no further need for skilled PT in the acute setting at this time. Anticipate pt to be able to D/c home without further PT needs.    Plan    Recommend Physical Therapy for Evaluation only    DC Equipment Recommendations: (P) None  Discharge Recommendations: (P) Anticipate that the patient will have no further physical therapy needs after discharge from the hospital       Subjective    Pt stating he is feeling much better today.     Objective       12/18/21 0909   Prior Living Situation   Prior Services Home-Independent   Housing / Facility 1 Story House   Steps Into Home 2   Steps In Home 0   Rail None   Equipment Owned Single Point Cane   Lives with - Patient's Self Care Capacity Spouse;Adult Children;Child Less than 18 Years of Age   Comments reports independence with self care and mobility, occassional use of SPC when RLE/foot is bothering him; lives with spouse, kids 12, 16, and 20, and 2 grandkids   Prior Level of Functional Mobility   Bed Mobility Independent   Transfer Status Independent   Ambulation Independent   Distance Ambulation (Feet)   (community)   Assistive Devices Used Single Point Cane  (intermittent use)   Stairs Independent   History of Falls   History of Falls No   Cognition    Cognition /  Consciousness WDL   Strength Lower Body   Lower Body Strength  WDL   Balance Assessment   Sitting Balance (Static) Normal   Sitting Balance (Dynamic) Normal   Standing Balance (Static) Good   Standing Balance (Dynamic) Good   Weight Shift Sitting Good   Weight Shift Standing Good   Comments no AD   Gait Analysis   Gait Level Of Assist Independent   Assistive Device None   Distance (Feet) 450   # of Times Distance was Traveled 1   Deviation Antalgic   # of Stairs Climbed 0   Weight Bearing Status no restriction   Comments baseline antalgic gait due to right toe amputations   Bed Mobility    Comments seated EOB   Functional Mobility   Sit to Stand Independent   Transfer Method Stand Step   Mobility seated EOB->stand->amb->seated EOB   Comments no LOB   How much difficulty does the patient currently have...   Turning over in bed (including adjusting bedclothes, sheets and blankets)? 4   Sitting down on and standing up from a chair with arms (e.g., wheelchair, bedside commode, etc.) 4   Moving from lying on back to sitting on the side of the bed? 4   How much help from another person does the patient currently need...   Moving to and from a bed to a chair (including a wheelchair)? 4   Need to walk in a hospital room? 4   Climbing 3-5 steps with a railing? 4   6 clicks Mobility Score 24   Activity Tolerance   Sitting in Chair not tested   Sitting Edge of Bed left sitting EOB   Standing 5 minutes    Comments tolerated well   Education Group   Education Provided Role of Physical Therapist;Cardiac Precautions   Cardiac Precautions Patient Response Patient;Acceptance;Explanation;Handout;Verbal Demonstration   Role of Physical Therapist Patient Response Patient;Acceptance;Explanation;Verbal Demonstration   Problem List    Problems Decreased Activity Tolerance   Anticipated Discharge Equipment and Recommendations   DC Equipment Recommendations None   Discharge Recommendations Anticipate that the patient will have no further  physical therapy needs after discharge from the hospital

## 2021-12-18 NOTE — DISCHARGE SUMMARY
Discharge Summary    Date of Admission: 12/16/2021  Date of Discharge: 12/18/2021  2:51 PM  Discharging Attending: Dr. Anna Finch MD  Discharging Senior Resident: Dr. Samuel Hamm MD  Discharging Intern: Dr. Ananda Live MD    CHIEF COMPLAINT ON ADMISSION  Chief Complaint   Patient presents with   • Chest Pain   • Nausea/Vomiting/Diarrhea   • Sore Throat   • Cough       Reason for Admission  Acute Coronary syndrome rule out  Gastroesophageal reflux disease  Cannabinoid hyperemesis, suspected  Hypertensive urgency      Admission Date  12/16/2021    CODE STATUS  Full Code    HPI & HOSPITAL COURSE    Cayetano Hawkins is a a 45yo M with hx of MI, DM2, Hepatitis C, hypertension, familial history of CAD/MI <age 45; presented for acute onset, constant chest pain lasting hours, nausea/vomiting; underwent work up for acute coronary syndrome without acute findings, symptomatic improvement of chest pain with PPI.    Admitted on 12/16 for acute chest pressure/pain described as burning/crushing with radiation to back, neck and bilateral arms. He had associated nausea and vomiting, and due to his previous MI and familial history of heart disease, was concerned for heart attack. Had initial mild elevation of troponin, but downtrended. No EKG changes and a normal chemical stress test. Chest pain improved symptomatically after initiation of PPI.    Blood pressure remained elevated throughout hospitalisation with somewhat improvement after initiation of guideline directed medication changes.    #Chest pain/GERD  Chest pain symptomatically improved with PPI, negative cardiac workup. Most likely chest pain was GERD/acute esophagitis, possibly aggravated by retching/emesis.  -Omeprazole 20mg PO qD  -PCP follow up    #Hypertensive urgency  Severe blood pressure elevations without identifiable secondary cause (CT showed normal caliber renal arteries, adrenal glands, no endocrine abnormalities suggested by laboratory  studies).  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 Counseled on lifestyle change.  -PCP follow up counseling    #Dyslipidemia  History of MI, strong familial history of cardiac disease younger than 45; calcifications seen within the aorta.  -INCREASED crestor 20mg PO qD  -PCP follow up lipid panel/calcium scoring, dose changes    Therefore, he is discharged in fair and stable condition to home with close outpatient follow-up.    The patient met 2-midnight criteria for an inpatient stay at the time of discharge.    PHYSICAL EXAM ON DISCHARGE  Temp:  [36.6 °C (97.9 °F)-37.6 °C (99.6 °F)] 36.6 °C (97.9 °F)  Pulse:  [51-69] 56  Resp:  [16-18] 18  BP: (144-203)/() 144/62  SpO2:  [90 %-99 %] 97 %    Physical Exam    GEN: well appearing, no acute distress  CV: RRR, no m/g/r  Pulm: CTAB, no rales, wheeze, crackles  Abd: soft, nontender, nondistended  Extremities: shoulder/elbow flexion/extension 5/5 bilaterally, knee flexion/extension 5/5 bilaterally, no peripheral edema  Neuro: CN II-XII intact, no focal deficits, Aox4  Psych: Denies any SI/HI morning of discharge    Discharge Date  12/18/2021    FOLLOW UP ITEMS POST DISCHARGE  PCP for medication titrations, hospitalisation    DISCHARGE DIAGNOSES  Principal Problem:    Chest pain POA: Yes  Active Problems:    T2DM (type 2 diabetes mellitus) (HCC) POA: Yes    HCV (hepatitis C virus) POA: Unknown    HTN (hypertension) POA: Yes    Dyslipidemia (Chronic) POA: Yes    Vomiting POA: Yes    ANN (acute kidney injury) (HCC) POA: Yes  Resolved Problems:    * No resolved hospital problems. *      FOLLOW UP  No future appointments.  Candy Gupta M.D.  [facility] [facility] [address]  [facility] [id number] [phone]    Schedule an appointment as soon  as possible for a visit  Call to make an appointment for post hospitalization follow up. Ask for GI referral-follow up on HCV.       MEDICATIONS ON DISCHARGE     Medication List      START taking these medications      Instructions   aspirin 81 MG EC tablet  Start taking on: December 19, 2021   Take 1 Tablet by mouth every day.  Dose: 81 mg     chlorthalidone 25 MG Tabs  Start taking on: December 19, 2021  Commonly known as: HYGROTON   Take 1 Tablet by mouth every day.  Dose: 25 mg     metoprolol tartrate 25 MG Tabs  Commonly known as: LOPRESSOR   Take 1 Tablet by mouth 2 times a day.  Dose: 25 mg     omeprazole 20 MG delayed-release capsule  Start taking on: December 19, 2021  Commonly known as: PRILOSEC   Take 1 Capsule by mouth every day.  Dose: 20 mg        CHANGE how you take these medications      Instructions   lisinopril 40 MG tablet  Start taking on: December 19, 2021  What changed:   · medication strength  · how much to take  · when to take this  Commonly known as: PRINIVIL   Take 1 Tablet by mouth every day.  Dose: 40 mg     rosuvastatin 20 MG Tabs  Start taking on: December 19, 2021  What changed:   · medication strength  · how much to take  Commonly known as: CRESTOR   Take 1 Tablet by mouth every morning.  Dose: 20 mg        CONTINUE taking these medications      Instructions   glimepiride 4 MG Tabs  Commonly known as: AMARYL   Doctor's comments: DX Code Needed  PATIENT REQUESTING REFILLS.  TAKE 1 TABLET BY MOUTH EVERY MORNING.  Dose: 4 mg     metformin 1000 MG tablet  Commonly known as: GLUCOPHAGE   Take 1,000 mg by mouth every morning.  Dose: 1,000 mg     ondansetron 4 MG Tbdp  Commonly known as: ZOFRAN ODT   Take 4 mg by mouth every 6 hours as needed for Nausea.  Dose: 4 mg     vitamin D3 125 MCG (5000 UT) Caps   Take 5,000 Units by mouth every morning.  Dose: 5,000 Units            Allergies  Allergies   Allergen Reactions   • Bee Swelling     Bee stings       DIET  Orders Placed This Encounter    Procedures   • Diet Order Diet: Level 5 - Minced and Moist; Liquid level: Level 0 - Thin; Second Modifier: (optional): Consistent CHO (Diabetic)     Standing Status:   Standing     Number of Occurrences:   1     Order Specific Question:   Diet:     Answer:   Level 5 - Minced and Moist [24]     Order Specific Question:   Liquid level     Answer:   Level 0 - Thin     Order Specific Question:   Second Modifier: (optional)     Answer:   Consistent CHO (Diabetic) [4]       ACTIVITY  As tolerated.  Weight bearing as tolerated    CONSULTATIONS  Dr. Rankin with Gastroenterology consulted.  Treatment options were discussed and plan of care agreed upon.    PROCEDURES  Lexiscan stress test 12/17:  No evidence of significant jeopardized viable myocardium or prior myocardial    infarction.   Normal left ventricular size, ejection fraction, and wall motion.   ECG INTERPRETATION   Negative stress ECG for ischemia.

## 2021-12-18 NOTE — CARE PLAN
Problem: Pain - Standard  Goal: Alleviation of pain or a reduction in pain to the patient’s comfort goal  Outcome: Met     Problem: Knowledge Deficit - Standard  Goal: Patient and family/care givers will demonstrate understanding of plan of care, disease process/condition, diagnostic tests and medications  Outcome: Met     Problem: Fall Risk  Goal: Patient will remain free from falls  Outcome: Met     The patient is Stable - Low risk of patient condition declining or worsening    Shift Goals  Clinical Goals: monitor blood pressures, comfort, discharge plan  Patient Goals: discharge, blood pressure     Progress made toward(s) clinical / shift goals: Patient to discharge this shift     Patient is not progressing towards the following goals:

## 2021-12-18 NOTE — PROGRESS NOTES
Report received from [facility] shift RN, assumed patient care. Patient is calmly resting in bed, no signs of distress, even and unlabored breathing noted. Pt on room air. Tele box on and in place. Patient has call light within reach, fall precautions in place. Will continue to monitor.

## 2021-12-18 NOTE — CARE PLAN
Problem: Pain - Standard  Goal: Alleviation of pain or a reduction in pain to the patient’s comfort goal  Outcome: Progressing     Problem: Knowledge Deficit - Standard  Goal: Patient and family/care givers will demonstrate understanding of plan of care, disease process/condition, diagnostic tests and medications  Outcome: Progressing     Problem: Fall Risk  Goal: Patient will remain free from falls  Outcome: Progressing   The patient is Stable - Low risk of patient condition declining or worsening    Shift Goals  Clinical Goals: Monitor BP  Patient Goals: Monitor bp    Progress made toward(s) clinical / shift goals:  progressing

## 2021-12-18 NOTE — DISCHARGE INSTRUCTIONS
Discharge Instructions    Discharged to home by car with relative. Discharged via wheelchair, hospital escort: Yes.  Special equipment needed: Not Applicable    Be sure to schedule a follow-up appointment with your primary care doctor or any specialists as instructed.     Discharge Plan:   Diet Plan: Discussed  Activity Level: Discussed  Confirmed Follow up Appointment: Patient to Call and Schedule Appointment  Confirmed Symptoms Management: Discussed  Medication Reconciliation Updated: Yes  Influenza Vaccine Indication: Indicated: 9 to 64 years of age    I understand that a diet low in cholesterol, fat, and sodium is recommended for good health. Unless I have been given specific instructions below for another diet, I accept this instruction as my diet prescription.   Other diet: cardiac, low sodium    Special Instructions: None    · Is patient discharged on Warfarin / Coumadin?   No     Depression / Suicide Risk    As you are discharged from this [facility] facility, it is important to learn how to keep safe from harming yourself.    Recognize the warning signs:  · Abrupt changes in personality, positive or negative- including increase in energy   · Giving away possessions  · Change in eating patterns- significant weight changes-  positive or negative  · Change in sleeping patterns- unable to sleep or sleeping all the time   · Unwillingness or inability to communicate  · Depression  · Unusual sadness, discouragement and loneliness  · Talk of wanting to die  · Neglect of personal appearance   · Rebelliousness- reckless behavior  · Withdrawal from people/activities they love  · Confusion- inability to concentrate     If you or a loved one observes any of these behaviors or has concerns about self-harm, here's what you can do:  · Talk about it- your feelings and reasons for harming yourself  · Remove any means that you might use to hurt yourself (examples: pills, rope, extension cords, firearm)  · Get professional  help from the community (Mental Health, Substance Abuse, psychological counseling)  · Do not be alone:Call your Safe Contact- someone whom you trust who will be there for you.  · Call your local CRISIS HOTLINE 844-9869 or 093-733-9020  · Call your local Children's Mobile Crisis Response Team [place] (283) 968-5350 or www.[employer]  · Call the toll free National Suicide Prevention Hotlines   · National Suicide Prevention Lifeline 199-824-DLWR (2588)  · [employer] Line Network 800-SUICIDE (888-9025)  ·   Chest Pain Observation  It is often hard to give a specific diagnosis for the cause of chest pain. Among other possibilities your symptoms might be caused by inadequate oxygen delivery to your heart (angina). Angina that is not treated or evaluated can lead to a heart attack (myocardial infarction) or death.  Blood tests, electrocardiograms, and X-rays may have been done to help determine a possible cause of your chest pain. After evaluation and observation, your health care provider has determined that it is unlikely your pain was caused by an unstable condition that requires hospitalization. However, a full evaluation of your pain may need to be completed, with additional diagnostic testing as directed. It is very important to keep your follow-up appointments. Not keeping your follow-up appointments could result in permanent heart damage, disability, or death. If there is any problem keeping your follow-up appointments, you must call your health care provider.  HOME CARE INSTRUCTIONS   Due to the slight chance that your pain could be angina, it is important to follow your health care provider's treatment plan and also maintain a healthy lifestyle:  · Maintain or work toward achieving a healthy weight.  · Stay physically active and exercise regularly.  · Decrease your salt intake.  · Eat a balanced, healthy diet. Talk to a dietitian to learn about heart-healthy foods.  · Increase your fiber intake by  including whole grains, vegetables, fruits, and nuts in your diet.  · Avoid situations that cause stress, anger, or depression.  · Take medicines as advised by your health care provider. Report any side effects to your health care provider. Do not stop medicines or adjust the dosages on your own.  · Quit smoking. Do not use nicotine patches or gum until you check with your health care provider.  · Keep your blood pressure, blood sugar, and cholesterol levels within normal limits.  · Limit alcohol intake to no more than 1 drink per day for women who are not pregnant and 2 drinks per day for men.  · Do not abuse drugs.  SEEK IMMEDIATE MEDICAL CARE IF:  You have severe chest pain or pressure which may include symptoms such as:  · You feel pain or pressure in your arms, neck, jaw, or back.  · You have severe back or abdominal pain, feel sick to your stomach (nauseous), or throw up (vomit).  · You are sweating profusely.  · You are having a fast or irregular heartbeat.  · You feel short of breath while at rest.  · You notice increasing shortness of breath during rest, sleep, or with activity.  · You have chest pain that does not get better after rest or after taking your usual medicine.  · You wake from sleep with chest pain.  · You are unable to sleep because you cannot breathe.  · You develop a frequent cough or you are coughing up blood.  · You feel dizzy, faint, or experience extreme fatigue.  · You develop severe weakness, dizziness, fainting, or chills.  Any of these symptoms may represent a serious problem that is an emergency. Do not wait to see if the symptoms will go away. Call your local emergency services (911 in the [place] Do not drive yourself to the hospital.  MAKE SURE YOU:  · Understand these instructions.  · Will watch your condition.  · Will get help right away if you are not doing well or get worse.     This information is not intended to replace advice given to you by your health care provider. Make  sure you discuss any questions you have with your health care provider.     Document Released: 01/20/2012 Document Revised: 12/23/2014 Document Reviewed: 06/19/2014  [employer] Patient Education ©2016 [employer] Inc.      Hypertension, Adult  Hypertension is another name for high blood pressure. High blood pressure forces your heart to work harder to pump blood. This can cause problems over time.  There are two numbers in a blood pressure reading. There is a top number (systolic) over a bottom number (diastolic). It is best to have a blood pressure that is below 120/80. Healthy choices can help lower your blood pressure, or you may need medicine to help lower it.  What are the causes?  The cause of this condition is not known. Some conditions may be related to high blood pressure.  What increases the risk?  · Smoking.  · Having type 2 diabetes mellitus, high cholesterol, or both.  · Not getting enough exercise or physical activity.  · Being overweight.  · Having too much fat, sugar, calories, or salt (sodium) in your diet.  · Drinking too much alcohol.  · Having long-term (chronic) kidney disease.  · Having a family history of high blood pressure.  · Age. Risk increases with age.  · Race. You may be at higher risk if you are .  · Gender. Men are at higher risk than women before age 45. After age 65, women are at higher risk than men.  · Having obstructive sleep apnea.  · Stress.  What are the signs or symptoms?  · High blood pressure may not cause symptoms. Very high blood pressure (hypertensive crisis) may cause:  ? Headache.  ? Feelings of worry or nervousness (anxiety).  ? Shortness of breath.  ? Nosebleed.  ? A feeling of being sick to your stomach (nausea).  ? Throwing up (vomiting).  ? Changes in how you see.  ? Very bad chest pain.  ? Seizures.  How is this treated?  · This condition is treated by making healthy lifestyle changes, such as:  ? Eating healthy foods.  ? Exercising  more.  ? Drinking less alcohol.  · Your health care provider may prescribe medicine if lifestyle changes are not enough to get your blood pressure under control, and if:  ? Your top number is above 130.  ? Your bottom number is above 80.  · Your personal target blood pressure may vary.  Follow these instructions at home:  Eating and drinking    · If told, follow the DASH eating plan. To follow this plan:  ? Fill one half of your plate at each meal with fruits and vegetables.  ? Fill one fourth of your plate at each meal with whole grains. Whole grains include whole-wheat pasta, brown rice, and whole-grain bread.  ? Eat or drink low-fat dairy products, such as skim milk or low-fat yogurt.  ? Fill one fourth of your plate at each meal with low-fat (lean) proteins. Low-fat proteins include fish, chicken without skin, eggs, beans, and tofu.  ? Avoid fatty meat, cured and processed meat, or chicken with skin.  ? Avoid pre-made or processed food.  · Eat less than 1,500 mg of salt each day.  · Do not drink alcohol if:  ? Your doctor tells you not to drink.  ? You are pregnant, may be pregnant, or are planning to become pregnant.  · If you drink alcohol:  ? Limit how much you use to:  § 0-1 drink a day for women.  § 0-2 drinks a day for men.  ? Be aware of how much alcohol is in your drink. In the [place] one drink equals one 12 oz bottle of beer (355 mL), one 5 oz glass of wine (148 mL), or one 1½ oz glass of hard liquor (44 mL).  Lifestyle    · Work with your doctor to stay at a healthy weight or to lose weight. Ask your doctor what the best weight is for you.  · Get at least 30 minutes of exercise most days of the week. This may include walking, swimming, or biking.  · Get at least 30 minutes of exercise that strengthens your muscles (resistance exercise) at least 3 days a week. This may include lifting weights or doing Pilates.  · Do not use any products that contain nicotine or tobacco, such as cigarettes, e-cigarettes,  and chewing tobacco. If you need help quitting, ask your doctor.  · Check your blood pressure at home as told by your doctor.  · Keep all follow-up visits as told by your doctor. This is important.  Medicines  · Take over-the-counter and prescription medicines only as told by your doctor. Follow directions carefully.  · Do not skip doses of blood pressure medicine. The medicine does not work as well if you skip doses. Skipping doses also puts you at risk for problems.  · Ask your doctor about side effects or reactions to medicines that you should watch for.  Contact a doctor if you:  · Think you are having a reaction to the medicine you are taking.  · Have headaches that keep coming back (recurring).  · Feel dizzy.  · Have swelling in your ankles.  · Have trouble with your vision.  Get help right away if you:  · Get a very bad headache.  · Start to feel mixed up (confused).  · Feel weak or numb.  · Feel faint.  · Have very bad pain in your:  ? Chest.  ? Belly (abdomen).  · Throw up more than once.  · Have trouble breathing.  Summary  · Hypertension is another name for high blood pressure.  · High blood pressure forces your heart to work harder to pump blood.  · For most people, a normal blood pressure is less than 120/80.  · Making healthy choices can help lower blood pressure. If your blood pressure does not get lower with healthy choices, you may need to take medicine.  This information is not intended to replace advice given to you by your health care provider. Make sure you discuss any questions you have with your health care provider.  Document Released: 06/05/2009 Document Revised: 08/28/2019 Document Reviewed: 08/28/2019  [employer] Patient Education © 2020 Elsevier Inc.      How to Take Your Blood Pressure  You can take your blood pressure at home with a machine. You may need to check your blood pressure at home:  · To check if you have high blood pressure (hypertension).  · To check your blood pressure over  "time.  · To make sure your blood pressure medicine is working.  Supplies needed:  You will need a blood pressure machine, or monitor. You can buy one at a [employer] or online. When choosing one:  · Choose one with an arm cuff.  · Choose one that wraps around your upper arm. Only one finger should fit between your arm and the cuff.  · Do not choose one that measures your blood pressure from your wrist or finger.  Your doctor can suggest a monitor.  How to prepare  Avoid these things for 30 minutes before checking your blood pressure:  · Drinking caffeine.  · Drinking alcohol.  · Eating.  · Smoking.  · Exercising.  Five minutes before checking your blood pressure:  · Pee.  · Sit in a dining chair. Avoid sitting in a soft couch or armchair.  · Be quiet. Do not talk.  How to take your blood pressure  Follow the instructions that came with your machine. If you have a digital blood pressure monitor, these may be the instructions:  1. Sit up straight.  2. Place your feet on the floor. Do not cross your ankles or legs.  3. Rest your left arm at the level of your heart. You may rest it on a table, desk, or chair.  4. Pull up your shirt sleeve.  5. Wrap the blood pressure cuff around the upper part of your left arm. The cuff should be 1 inch (2.5 cm) above your elbow. It is best to wrap the cuff around bare skin.  6. Fit the cuff snugly around your arm. You should be able to place only one finger between the cuff and your arm.  7. Put the cord inside the groove of your elbow.  8. Press the power button.  9. Sit quietly while the cuff fills with air and loses air.  10. Write down the numbers on the screen.  11. Wait 2-3 minutes and then repeat steps 1-10.  What do the numbers mean?  Two numbers make up your blood pressure. The first number is called systolic pressure. The second is called diastolic pressure. An example of a blood pressure reading is \"120 over 80\" (or 120/80).  If you are an adult and do not have a medical " condition, use this guide to find out if your blood pressure is normal:  Normal  · First number: below 120.  · Second number: below 80.  Elevated  · First number: 120-129.  · Second number: below 80.  Hypertension stage 1  · First number: 130-139.  · Second number: 80-89.  Hypertension stage 2  · First number: 140 or above.  · Second number: 90 or above.  Your blood pressure is above normal even if only the top or bottom number is above normal.  Follow these instructions at home:  · Check your blood pressure as often as your doctor tells you to.  · Take your monitor to your next doctor's appointment. Your doctor will:  ? Make sure you are using it correctly.  ? Make sure it is working right.  · Make sure you understand what your blood pressure numbers should be.  · Tell your doctor if your medicines are causing side effects.  Contact a doctor if:  · Your blood pressure keeps being high.  Get help right away if:  · Your first blood pressure number is higher than 180.  · Your second blood pressure number is higher than 120.  This information is not intended to replace advice given to you by your health care provider. Make sure you discuss any questions you have with your health care provider.  Document Released: 11/30/2009 Document Revised: 11/30/2018 Document Reviewed: 05/26/2017  [employer] Patient Education © 2020 [employer]        Aspirin and Your Heart    Aspirin is a medicine that prevents the cells in the blood that are used for clotting, called platelets, from sticking together. Aspirin can be used to help reduce the risk of blood clots, heart attacks, and other heart-related problems.  Can I take aspirin?  Your health care provider will help you determine whether it is safe and beneficial for you to take aspirin daily. Taking aspirin daily may be helpful if you:  · Have had a heart attack or chest pain.  · Are at risk for a heart attack.  · Have undergone open-heart surgery, such as coronary artery bypass  surgery (CABG).  · Have had coronary angioplasty or a stent.  · Have had certain types of stroke or transient ischemic attack (TIA).  · Have peripheral artery disease (PAD).  · Have chronic heart rhythm problems such as atrial fibrillation and cannot take an anticoagulant.  · Have valve disease or have had surgery on a valve.  What are the risks?  Daily use of aspirin can cause side effects. Some of these include:  · Bleeding. Bleeding problems can be minor or serious. An example of a minor problem is a cut that does not stop bleeding. An example of a more serious problem is stomach bleeding or, rarely, bleeding into the brain. Your risk of bleeding is increased if you are also taking non-steroidal anti-inflammatory drugs (NSAIDs).  · Increased bruising.  · Upset stomach.  · An allergic reaction. People who have nasal polyps have an increased risk of developing an aspirin allergy.  General guidelines  · Take aspirin only as told by your health care provider. Make sure that you understand how much you should take and what form you should take. The two forms of aspirin are:  ? Non-enteric-coated.This type of aspirin does not have a coating and is absorbed quickly. This type of aspirin also comes in a chewable form.  ? Enteric-coated. This type of aspirin has a coating that releases the medicine very slowly. Enteric-coated aspirin might cause less stomach upset than non-enteric-coated aspirin. This type of aspirin should not be chewed or crushed.  · Limit alcohol intake to no more than 1 drink a day for nonpregnant women and 2 drinks a day for men. Drinking alcohol increases your risk of bleeding. One drink equals 12 oz of beer, 5 oz of wine, or 1½ oz of hard liquor.  Contact a health care provider if you:  · Have unusual bleeding or bruising.  · Have stomach pain or nausea.  · Have ringing in your ears.  · Have an allergic reaction that causes:  ? Hives.  ? Itchy skin.  ? Swelling of the lips, tongue, or face.  Get  help right away if you:  · Notice that your bowel movements are bloody, dark red, or black in color.  · Vomit or cough up blood.  · Have blood in your urine.  · Cough, have noisy breathing (wheeze), or feel short of breath.  · Have chest pain, especially if the pain spreads to the arms, back, neck, or jaw.  · Have a severe headache, or a headache with confusion, or dizziness.  These symptoms may represent a serious problem that is an emergency. Do not wait to see if the symptoms will go away. Get medical help right away. Call your local emergency services (911 in the [place] Do not drive yourself to the hospital.  Summary  · Aspirin can be used to help reduce the risk of blood clots, heart attacks, and other heart-related problems.  · Daily use of aspirin can increase your risk of side effects. Your health care provider will help you determine whether it is safe and beneficial for you to take aspirin daily.  · Take aspirin only as told by your health care provider. Make sure that you understand how much you can take and what form you can take.  This information is not intended to replace advice given to you by your health care provider. Make sure you discuss any questions you have with your health care provider.  Document Released: 11/30/2009 Document Revised: 10/18/2018 Document Reviewed: 10/18/2018  [employer] Patient Education © 2020 [employer]            Chlorthalidone tablets  What is this medicine?  CHLORTHALIDONE (klheidi GIL i done) is a diuretic. It increases the amount of urine passed, which causes the body to lose salt and water. This medicine is used to treat high blood pressure and edema or water retention.  This medicine may be used for other purposes; ask your health care provider or pharmacist if you have questions.  COMMON BRAND NAME(S): Thalitone  What should I tell my health care provider before I take this medicine?  They need to know if you have any of these  conditions:  · asthma  · diabetes  · gout  · kidney disease  · liver disease  · parathyroid disease  · systemic lupus erythematosus (SLE)  · taking cortisone, digoxin, lithium carbonate, or drugs for diabetes  · an unusual or allergic reaction to chlorthalidone, sulfa drugs, other medicines, foods, dyes, or preservatives  · pregnant or trying to get pregnant  · breast-feeding  How should I use this medicine?  Take this medicine by mouth with a glass of water. Follow the directions on the prescription label. It is best to take your dose in the morning with food. Take your medicine at regular intervals. Do not take your medicine more often than directed. Do not stop taking except on your doctor's advice.  Talk to your pediatrician regarding the use of this medicine in children. Special care may be needed.  Overdosage: If you think you have taken too much of this medicine contact a poison control center or emergency room at once.  NOTE: This medicine is only for you. Do not share this medicine with others.  What if I miss a dose?  If you miss a dose, take it as soon as you can. If it is almost time for your next dose, take only that dose. Do not take double or extra doses.  What may interact with this medicine?  · barbiturate medicines for sleep or seizure control  · digoxin  · lithium  · medicines for diabetes  · norepinephrine  · other medicines for high blood pressure  · some pain medicines  · steroid hormones like prednisone, cortisone, hydrocortisone, corticotropin  · tubocurarine  This list may not describe all possible interactions. Give your health care provider a list of all the medicines, herbs, non-prescription drugs, or dietary supplements you use. Also tell them if you smoke, drink alcohol, or use illegal drugs. Some items may interact with your medicine.  What should I watch for while using this medicine?  Visit your doctor or health care professional for regular check ups. Check your blood pressure as  directed. Ask your doctor or health care professional what your blood pressure should be and when you should contact him or her.  You may need to be on a special diet while taking this medicine. Ask your doctor.  You may get drowsy or dizzy. Do not drive, use machinery, or do anything that needs mental alertness until you know how this medicine affects you. Do not stand or sit up quickly, especially if you are an older patient. This reduces the risk of dizzy or fainting spells. Alcohol may interfere with the effect of this medicine. Avoid alcoholic drinks.  This medicine may affect your blood sugar level. If you have diabetes, check with your doctor or health care professional before changing the dose of your diabetic medicine.  This medicine can make you more sensitive to the sun. Keep out of the sun. If you cannot avoid being in the sun, wear protective clothing and use sunscreen. Do not use sun lamps or tanning beds/booths.  What side effects may I notice from receiving this medicine?  Side effects that you should report to your doctor or health care professional as soon as possible:  · allergic reactions like skin rash, itching or hives, swelling of the face, lips, or tongue  · dark urine  · dry mouth  · excess thirst  · fast, irregular heart rate  · fever, chills  · muscle pain, cramps, or spasm  · nausea, vomiting  · redness, blistering, peeling or loosening of the skin, including inside the mouth  · tingling, pain or numbness in the hands or feet  · unusually weak or tired  · yellowing of the eyes or skin  Side effects that usually do not require medical attention (report to your doctor or health care professional if they continue or are bothersome):  · diarrhea or constipation  · headache  · impotence  · loss of appetite  · stomach upset  This list may not describe all possible side effects. Call your doctor for medical advice about side effects. You may report side effects to FDA at 1-779-ZYQ-3966.  Where  should I keep my medicine?  Keep out of the reach of children.  Store at room temperature between 15 and 30 degrees C (59 and 86 degrees F). Keep container tightly closed. Throw away any unused medicine after the expiration date.  NOTE: This sheet is a summary. It may not cover all possible information. If you have questions about this medicine, talk to your doctor, pharmacist, or health care provider.  © 2020 [employer] (2009-03-24 15:28:48)    Metoprolol tablets  What is this medicine?  METOPROLOL (me TOE proe lole) is a beta-blocker. Beta-blockers reduce the workload on the heart and help it to beat more regularly. This medicine is used to treat high blood pressure and to prevent chest pain. It is also used to after a heart attack and to prevent an additional heart attack from occurring.  This medicine may be used for other purposes; ask your health care provider or pharmacist if you have questions.  COMMON BRAND NAME(S): Lopressor  What should I tell my health care provider before I take this medicine?  They need to know if you have any of these conditions:  · diabetes  · heart or vessel disease like slow heart rate, worsening heart failure, heart block, sick sinus syndrome or Raynaud's disease  · kidney disease  · liver disease  · lung or breathing disease, like asthma or emphysema  · pheochromocytoma  · thyroid disease  · an unusual or allergic reaction to metoprolol, other beta-blockers, medicines, foods, dyes, or preservatives  · pregnant or trying to get pregnant  · breast-feeding  How should I use this medicine?  Take this medicine by mouth with a drink of water. Follow the directions on the prescription label. Take this medicine immediately after meals. Take your doses at regular intervals. Do not take more medicine than directed. Do not stop taking this medicine suddenly. This could lead to serious heart-related effects.  Talk to your pediatrician regarding the use of this medicine in  children. Special care may be needed.  Overdosage: If you think you have taken too much of this medicine contact a poison control center or emergency room at once.  NOTE: This medicine is only for you. Do not share this medicine with others.  What if I miss a dose?  If you miss a dose, take it as soon as you can. If it is almost time for your next dose, take only that dose. Do not take double or extra doses.  What may interact with this medicine?  This medicine may interact with the following medications:  · certain medicines for blood pressure, heart disease, irregular heart beat  · certain medicines for depression like monoamine oxidase (MAO) inhibitors, fluoxetine, or paroxetine  · clonidine  · dobutamine  · epinephrine  · isoproterenol  · reserpine  This list may not describe all possible interactions. Give your health care provider a list of all the medicines, herbs, non-prescription drugs, or dietary supplements you use. Also tell them if you smoke, drink alcohol, or use illegal drugs. Some items may interact with your medicine.  What should I watch for while using this medicine?  Visit your doctor or health care professional for regular check ups. Contact your doctor right away if your symptoms worsen. Check your blood pressure and pulse rate regularly. Ask your health care professional what your blood pressure and pulse rate should be, and when you should contact them.  You may get drowsy or dizzy. Do not drive, use machinery, or do anything that needs mental alertness until you know how this medicine affects you. Do not sit or stand up quickly, especially if you are an older patient. This reduces the risk of dizzy or fainting spells. Contact your doctor if these symptoms continue. Alcohol may interfere with the effect of this medicine. Avoid alcoholic drinks.  This medicine may increase blood sugar. Ask your healthcare provider if changes in diet or medicines are needed if you have diabetes.  What side  effects may I notice from receiving this medicine?  Side effects that you should report to your doctor or health care professional as soon as possible:  · allergic reactions like skin rash, itching or hives  · cold or numb hands or feet  · depression  · difficulty breathing  · faint  · fever with sore throat  · irregular heartbeat, chest pain  · rapid weight gain  ·   signs and symptoms of high blood sugar such as being more thirsty or hungry or having to urinate more than normal. You may also feel very tired or have blurry vision.  · swollen legs or ankles  Side effects that usually do not require medical attention (report to your doctor or health care professional if they continue or are bothersome):  · anxiety or nervousness  · change in sex drive or performance  · dry skin  · headache  · nightmares or trouble sleeping  · short term memory loss  · stomach upset or diarrhea  This list may not describe all possible side effects. Call your doctor for medical advice about side effects. You may report side effects to FDA at 0-486-HLB-8802.  Where should I keep my medicine?  Keep out of the reach of children.  Store at room temperature between 15 and 30 degrees C (59 and 86 degrees F). Throw away any unused medicine after the expiration date.  NOTE: This sheet is a summary. It may not cover all possible information. If you have questions about this medicine, talk to your doctor, pharmacist, or health care provider.  © 2020 [employer]vier/Gold Standard (2019-10-08 11:15:23)      Omeprazole capsules (sprinkle caps) - Rx  What is this medicine?  OMEPRAZOLE (oh ME pray zol) prevents the production of acid in the stomach. It is used to treat gastroesophageal reflux disease (GERD), ulcers, certain bacteria in the stomach, inflammation of the esophagus, and Zollinger-Matias Syndrome. It is also used to treat other conditions that cause too much stomach acid.  This medicine may be used for other purposes; ask your health care  provider or pharmacist if you have questions.  COMMON BRAND NAME(S): Prilosec  What should I tell my health care provider before I take this medicine?  They need to know if you have any of these conditions:  · liver disease  · low levels of magnesium in the blood  · lupus  · an unusual or allergic reaction to omeprazole, other medicines, foods, dyes, or preservatives  · pregnant or trying to get pregnant  · breast-feeding  How should I use this medicine?  Take this medicine by mouth with a glass of water. Follow the directions on the prescription label. Do not cut, crush or chew this medicine. Swallow the capsules whole. You may open the capsule and put the contents in 1 tablespoon of applesauce. Swallow the medicine and applesauce right away. Do not chew the medicine or applesauce. Take this medicine before a meal. Take your medicine at regular intervals. Do not take your medicine more often than directed. Do not stop taking except on your doctor's advice.  A special MedGuide will be given to you by the pharmacist with each prescription and refill. Be sure to read this information carefully each time.  Talk to your pediatrician regarding the use of this medicine in children. While this drug may be prescribed for children as young as 2 years for selected conditions, precautions do apply.  Overdosage: If you think you have taken too much of this medicine contact a poison control center or emergency room at once.  NOTE: This medicine is only for you. Do not share this medicine with others.  What if I miss a dose?  If you miss a dose, take it as soon as you can. If it is almost time for your next dose, take only that dose. Do not take double or extra doses.  What may interact with this medicine?  Do not take this medicine with any of the following medications:  · atazanavir  · clopidogrel  · nelfinavir  · rilpivirine  This medicine may also interact with the following medications:  · antifungals like itraconazole,  ketoconazole, and voriconazole  · certain antivirals for HIV or hepatitis  · certain medicines that treat or prevent blood clots like warfarin  · cilostazol  · citalopram  · cyclosporine  · dasatinib  · digoxin  · disulfiram  · diuretics  · erlotinib  · iron supplements  · medicines for anxiety, panic, and sleep like diazepam  · medicines for seizures like carbamazepine, phenobarbital, phenytoin  · methotrexate  · mycophenolate mofetil  · nilotinib  · rifampin  · [place] wort  · tacrolimus  · vitamin B12  This list may not describe all possible interactions. Give your health care provider a list of all the medicines, herbs, non-prescription drugs, or dietary supplements you use. Also tell them if you smoke, drink alcohol, or use illegal drugs. Some items may interact with your medicine.  What should I watch for while using this medicine?  Visit your healthcare professional for regular checks on your progress. Tell your healthcare professional if your symptoms do not start to get better or if they get worse. You may need blood work done while taking this medicine.  This medicine may cause a decrease in vitamin B12. You should make sure that you get enough vitamin B12 while you are taking this medicine. Discuss the foods you eat and the vitamins you take with your health care professional.  What side effects may I notice from receiving this medicine?  Side effects that you should report to your doctor or health care professional as soon as possible:  · allergic reactions like skin rash, itching or hives, swelling of the face, lips, or tongue  · bone pain  · breathing problems  · fever or sore throat  · joint pain  · rash on cheeks or arms that gets worse in the sun  · redness, blistering, peeling, or loosening of the skin, including inside the mouth  · severe diarrhea  · signs and symptoms of kidney injury like trouble passing urine or change in the amount of urine  · signs and symptoms of low magnesium like muscle  cramps; muscle pain; muscle weakness; tremors; seizures; or fast, irregular heartbeat  · stomach polyps  · unusual bleeding or bruising  Side effects that usually do not require medical attention (report to your doctor or health care professional if they continue or are bothersome):  · diarrhea  · dry mouth  · gas  · headache  · nausea  · stomach pain  This list may not describe all possible side effects. Call your doctor for medical advice about side effects. You may report side effects to FDA at 4-041-TLP-0780.  Where should I keep my medicine?  Keep out of the reach of children.  Store at room temperature between 15 and 30 degrees C (59 and 86 degrees F). Protect from light and moisture. Throw away any unused medicine after the expiration date.  NOTE: This sheet is a summary. It may not cover all possible information. If you have questions about this medicine, talk to your doctor, pharmacist, or health care provider.  © 2020 [employer]vier/Gold Standard (2019-10-09 13:30:14)

## 2021-12-18 NOTE — PROGRESS NOTES
Patient transferred to unit, pt care assumed, VSS, pt assessment complete. Pt AAOx4, with 9/10 of pain at this time. No signs of acute distress noted at this time. Plan of care discussed with pt and verbalizes no questions. Pt denies any additional needs at this time. Bed locked/in lowest position, pt educated on fall risk and verbalized understanding, call light within reach, hourly rounding initiated.

## 2021-12-18 NOTE — PROGRESS NOTES
Patient A&Ox4, on room air, discharged home to self care with spouse. Patient had PIV removed, tele box removed, monitors notified. Patient updated on plan of care with AVS-vocalized understanding on medications, follow up appointments, and plan of care. Patient sent with all belongings. Patient discharged.

## 2021-12-20 LAB — GLUCOSE BLD-MCNC: 167 MG/DL (ref 65–99)

## 2022-03-16 DIAGNOSIS — E11.65 UNCONTROLLED TYPE 2 DIABETES MELLITUS WITH HYPERGLYCEMIA (HCC): Chronic | ICD-10-CM

## 2022-03-16 RX ORDER — GLIMEPIRIDE 4 MG/1
4 TABLET ORAL EVERY MORNING
Qty: 90 TABLET | Refills: 1 | Status: SHIPPED | OUTPATIENT
Start: 2022-03-16 | End: 2022-05-16 | Stop reason: SDUPTHER

## 2022-03-16 RX ORDER — ASPIRIN 81 MG/1
81 TABLET ORAL DAILY
Qty: 90 TABLET | Refills: 0 | Status: SHIPPED | OUTPATIENT
Start: 2022-03-16 | End: 2022-05-16 | Stop reason: SDUPTHER

## 2022-03-16 RX ORDER — ONDANSETRON 4 MG/1
4 TABLET, ORALLY DISINTEGRATING ORAL EVERY 6 HOURS PRN
Qty: 10 TABLET | Refills: 0 | Status: SHIPPED | OUTPATIENT
Start: 2022-03-16 | End: 2023-04-05

## 2022-03-16 RX ORDER — CHLORTHALIDONE 25 MG/1
25 TABLET ORAL DAILY
Qty: 90 TABLET | Refills: 0 | Status: SHIPPED | OUTPATIENT
Start: 2022-03-16 | End: 2022-05-16 | Stop reason: SDUPTHER

## 2022-03-16 RX ORDER — OMEPRAZOLE 20 MG/1
20 CAPSULE, DELAYED RELEASE ORAL DAILY
Qty: 90 CAPSULE | Refills: 1 | Status: SHIPPED | OUTPATIENT
Start: 2022-03-16 | End: 2022-07-25

## 2022-03-16 RX ORDER — ROSUVASTATIN CALCIUM 20 MG/1
20 TABLET, COATED ORAL EVERY MORNING
Qty: 60 TABLET | Refills: 1 | Status: SHIPPED | OUTPATIENT
Start: 2022-03-16 | End: 2022-05-16 | Stop reason: SDUPTHER

## 2022-03-16 RX ORDER — LISINOPRIL 40 MG/1
40 TABLET ORAL DAILY
Qty: 60 TABLET | Refills: 1 | Status: SHIPPED | OUTPATIENT
Start: 2022-03-16 | End: 2022-05-16 | Stop reason: SDUPTHER

## 2022-03-16 NOTE — TELEPHONE ENCOUNTER
2Received request via: Patient    Was the patient seen in the last year in this department? Yes    Does the patient have an active prescription (recently filled or refills available) for medication(s) requested? No

## 2022-04-11 RX ORDER — ISOPROPYL ALCOHOL 91 %
SPRAY, NON-AEROSOL (ML) TOPICAL
Qty: 30 CAPSULE | Refills: 0 | Status: SHIPPED | OUTPATIENT
Start: 2022-04-11 | End: 2022-06-09

## 2022-05-16 DIAGNOSIS — E11.65 UNCONTROLLED TYPE 2 DIABETES MELLITUS WITH HYPERGLYCEMIA (HCC): Chronic | ICD-10-CM

## 2022-05-17 RX ORDER — CHLORTHALIDONE 25 MG/1
25 TABLET ORAL DAILY
Qty: 90 TABLET | Refills: 0 | Status: SHIPPED | OUTPATIENT
Start: 2022-05-17 | End: 2023-04-20 | Stop reason: SDUPTHER

## 2022-05-17 RX ORDER — ASPIRIN 81 MG/1
81 TABLET ORAL DAILY
Qty: 90 TABLET | Refills: 0 | Status: SHIPPED | OUTPATIENT
Start: 2022-05-17 | End: 2023-09-13

## 2022-05-17 RX ORDER — ROSUVASTATIN CALCIUM 20 MG/1
20 TABLET, COATED ORAL EVERY MORNING
Qty: 60 TABLET | Refills: 0 | Status: SHIPPED | OUTPATIENT
Start: 2022-05-17 | End: 2022-07-25

## 2022-05-17 RX ORDER — LISINOPRIL 40 MG/1
40 TABLET ORAL DAILY
Qty: 60 TABLET | Refills: 0 | Status: SHIPPED | OUTPATIENT
Start: 2022-05-17 | End: 2022-10-25

## 2022-05-17 RX ORDER — GLIMEPIRIDE 4 MG/1
4 TABLET ORAL EVERY MORNING
Qty: 90 TABLET | Refills: 0 | Status: SHIPPED | OUTPATIENT
Start: 2022-05-17 | End: 2023-04-20

## 2022-07-25 ENCOUNTER — APPOINTMENT (OUTPATIENT)
Dept: RADIOLOGY | Facility: MEDICAL CENTER | Age: 47
End: 2022-07-25
Attending: EMERGENCY MEDICINE
Payer: COMMERCIAL

## 2022-07-25 ENCOUNTER — HOSPITAL ENCOUNTER (EMERGENCY)
Facility: MEDICAL CENTER | Age: 47
End: 2022-07-25
Attending: EMERGENCY MEDICINE
Payer: COMMERCIAL

## 2022-07-25 VITALS
SYSTOLIC BLOOD PRESSURE: 173 MMHG | HEART RATE: 70 BPM | RESPIRATION RATE: 16 BRPM | HEIGHT: 71 IN | BODY MASS INDEX: 27.59 KG/M2 | DIASTOLIC BLOOD PRESSURE: 77 MMHG | WEIGHT: 197.09 LBS | TEMPERATURE: 98.4 F | OXYGEN SATURATION: 98 %

## 2022-07-25 DIAGNOSIS — K52.9 GASTROENTERITIS: ICD-10-CM

## 2022-07-25 LAB
ALBUMIN SERPL BCP-MCNC: 4.6 G/DL (ref 3.2–4.9)
ALBUMIN/GLOB SERPL: 1.3 G/DL
ALP SERPL-CCNC: 106 U/L (ref 30–99)
ALT SERPL-CCNC: 40 U/L (ref 2–50)
ANION GAP SERPL CALC-SCNC: 16 MMOL/L (ref 7–16)
APPEARANCE UR: CLEAR
AST SERPL-CCNC: 25 U/L (ref 12–45)
BACTERIA #/AREA URNS HPF: ABNORMAL /HPF
BASOPHILS # BLD AUTO: 0.3 % (ref 0–1.8)
BASOPHILS # BLD: 0.04 K/UL (ref 0–0.12)
BILIRUB SERPL-MCNC: 0.4 MG/DL (ref 0.1–1.5)
BILIRUB UR QL STRIP.AUTO: NEGATIVE
BUN SERPL-MCNC: 43 MG/DL (ref 8–22)
CALCIUM SERPL-MCNC: 10.4 MG/DL (ref 8.4–10.2)
CHLORIDE SERPL-SCNC: 106 MMOL/L (ref 96–112)
CO2 SERPL-SCNC: 19 MMOL/L (ref 20–33)
COLOR UR: YELLOW
CREAT SERPL-MCNC: 2.06 MG/DL (ref 0.5–1.4)
EOSINOPHIL # BLD AUTO: 0.01 K/UL (ref 0–0.51)
EOSINOPHIL NFR BLD: 0.1 % (ref 0–6.9)
EPI CELLS #/AREA URNS HPF: ABNORMAL /HPF
ERYTHROCYTE [DISTWIDTH] IN BLOOD BY AUTOMATED COUNT: 43.4 FL (ref 35.9–50)
GFR SERPLBLD CREATININE-BSD FMLA CKD-EPI: 39 ML/MIN/1.73 M 2
GLOBULIN SER CALC-MCNC: 3.6 G/DL (ref 1.9–3.5)
GLUCOSE SERPL-MCNC: 194 MG/DL (ref 65–99)
GLUCOSE UR STRIP.AUTO-MCNC: NEGATIVE MG/DL
HCT VFR BLD AUTO: 42.1 % (ref 42–52)
HGB BLD-MCNC: 14.2 G/DL (ref 14–18)
HYALINE CASTS #/AREA URNS LPF: ABNORMAL /LPF
IMM GRANULOCYTES # BLD AUTO: 0.08 K/UL (ref 0–0.11)
IMM GRANULOCYTES NFR BLD AUTO: 0.6 % (ref 0–0.9)
KETONES UR STRIP.AUTO-MCNC: 15 MG/DL
LACTATE BLD-SCNC: 2.3 MMOL/L (ref 0.5–2)
LEUKOCYTE ESTERASE UR QL STRIP.AUTO: NEGATIVE
LIPASE SERPL-CCNC: 34 U/L (ref 7–58)
LYMPHOCYTES # BLD AUTO: 1.54 K/UL (ref 1–4.8)
LYMPHOCYTES NFR BLD: 11.7 % (ref 22–41)
MCH RBC QN AUTO: 29.5 PG (ref 27–33)
MCHC RBC AUTO-ENTMCNC: 33.7 G/DL (ref 33.7–35.3)
MCV RBC AUTO: 87.5 FL (ref 81.4–97.8)
MICRO URNS: ABNORMAL
MONOCYTES # BLD AUTO: 0.27 K/UL (ref 0–0.85)
MONOCYTES NFR BLD AUTO: 2 % (ref 0–13.4)
MUCOUS THREADS #/AREA URNS HPF: ABNORMAL /HPF
NEUTROPHILS # BLD AUTO: 11.24 K/UL (ref 1.82–7.42)
NEUTROPHILS NFR BLD: 85.3 % (ref 44–72)
NITRITE UR QL STRIP.AUTO: NEGATIVE
NRBC # BLD AUTO: 0 K/UL
NRBC BLD-RTO: 0 /100 WBC
PH UR STRIP.AUTO: 5 [PH] (ref 5–8)
PLATELET # BLD AUTO: 332 K/UL (ref 164–446)
PMV BLD AUTO: 9.4 FL (ref 9–12.9)
POTASSIUM SERPL-SCNC: 4.4 MMOL/L (ref 3.6–5.5)
PROT SERPL-MCNC: 8.2 G/DL (ref 6–8.2)
PROT UR QL STRIP: 100 MG/DL
RBC # BLD AUTO: 4.81 M/UL (ref 4.7–6.1)
RBC # URNS HPF: ABNORMAL /HPF
RBC UR QL AUTO: ABNORMAL
SODIUM SERPL-SCNC: 141 MMOL/L (ref 135–145)
SP GR UR STRIP.AUTO: 1.02
SPERM #/AREA URNS HPF: ABNORMAL /HPF
TRANS CELLS URNS QL MICRO: ABNORMAL /HPF
WBC # BLD AUTO: 13.2 K/UL (ref 4.8–10.8)
WBC #/AREA URNS HPF: ABNORMAL /HPF

## 2022-07-25 PROCEDURE — 700117 HCHG RX CONTRAST REV CODE 255: Performed by: EMERGENCY MEDICINE

## 2022-07-25 PROCEDURE — 83605 ASSAY OF LACTIC ACID: CPT

## 2022-07-25 PROCEDURE — A9270 NON-COVERED ITEM OR SERVICE: HCPCS | Performed by: EMERGENCY MEDICINE

## 2022-07-25 PROCEDURE — 80053 COMPREHEN METABOLIC PANEL: CPT

## 2022-07-25 PROCEDURE — 36415 COLL VENOUS BLD VENIPUNCTURE: CPT

## 2022-07-25 PROCEDURE — 700102 HCHG RX REV CODE 250 W/ 637 OVERRIDE(OP): Performed by: EMERGENCY MEDICINE

## 2022-07-25 PROCEDURE — 85025 COMPLETE CBC W/AUTO DIFF WBC: CPT

## 2022-07-25 PROCEDURE — 81001 URINALYSIS AUTO W/SCOPE: CPT

## 2022-07-25 PROCEDURE — 96375 TX/PRO/DX INJ NEW DRUG ADDON: CPT

## 2022-07-25 PROCEDURE — 99284 EMERGENCY DEPT VISIT MOD MDM: CPT

## 2022-07-25 PROCEDURE — 96374 THER/PROPH/DIAG INJ IV PUSH: CPT

## 2022-07-25 PROCEDURE — 74177 CT ABD & PELVIS W/CONTRAST: CPT

## 2022-07-25 PROCEDURE — 700111 HCHG RX REV CODE 636 W/ 250 OVERRIDE (IP): Performed by: EMERGENCY MEDICINE

## 2022-07-25 PROCEDURE — 83690 ASSAY OF LIPASE: CPT

## 2022-07-25 PROCEDURE — 700105 HCHG RX REV CODE 258: Performed by: EMERGENCY MEDICINE

## 2022-07-25 RX ORDER — OMEPRAZOLE 20 MG/1
20 CAPSULE, DELAYED RELEASE ORAL 2 TIMES DAILY
Status: SHIPPED | COMMUNITY
End: 2023-09-13

## 2022-07-25 RX ORDER — FAMOTIDINE 20 MG/1
20 TABLET, FILM COATED ORAL NIGHTLY
Qty: 10 TABLET | Refills: 0 | Status: SHIPPED | OUTPATIENT
Start: 2022-07-25 | End: 2022-08-04

## 2022-07-25 RX ORDER — MORPHINE SULFATE 4 MG/ML
4 INJECTION INTRAVENOUS ONCE
Status: COMPLETED | OUTPATIENT
Start: 2022-07-25 | End: 2022-07-25

## 2022-07-25 RX ORDER — ONDANSETRON 2 MG/ML
4 INJECTION INTRAMUSCULAR; INTRAVENOUS ONCE
Status: COMPLETED | OUTPATIENT
Start: 2022-07-25 | End: 2022-07-25

## 2022-07-25 RX ORDER — ONDANSETRON 4 MG/1
4 TABLET, ORALLY DISINTEGRATING ORAL EVERY 6 HOURS PRN
Qty: 10 TABLET | Refills: 0 | Status: SHIPPED | OUTPATIENT
Start: 2022-07-25 | End: 2023-04-05

## 2022-07-25 RX ORDER — LOPERAMIDE HYDROCHLORIDE 2 MG/1
2 CAPSULE ORAL 4 TIMES DAILY PRN
Qty: 30 CAPSULE | Refills: 0 | Status: SHIPPED | OUTPATIENT
Start: 2022-07-25 | End: 2023-04-05

## 2022-07-25 RX ORDER — KETOROLAC TROMETHAMINE 30 MG/ML
30 INJECTION, SOLUTION INTRAMUSCULAR; INTRAVENOUS ONCE
Status: COMPLETED | OUTPATIENT
Start: 2022-07-25 | End: 2022-07-25

## 2022-07-25 RX ORDER — SODIUM CHLORIDE, SODIUM LACTATE, POTASSIUM CHLORIDE, CALCIUM CHLORIDE 600; 310; 30; 20 MG/100ML; MG/100ML; MG/100ML; MG/100ML
1000 INJECTION, SOLUTION INTRAVENOUS ONCE
Status: COMPLETED | OUTPATIENT
Start: 2022-07-25 | End: 2022-07-25

## 2022-07-25 RX ORDER — ROSUVASTATIN CALCIUM 40 MG/1
40 TABLET, COATED ORAL EVERY EVENING
Status: SHIPPED | COMMUNITY
End: 2023-04-05

## 2022-07-25 RX ADMIN — IOHEXOL 100 ML: 350 INJECTION, SOLUTION INTRAVENOUS at 15:59

## 2022-07-25 RX ADMIN — ONDANSETRON 4 MG: 2 INJECTION INTRAMUSCULAR; INTRAVENOUS at 12:20

## 2022-07-25 RX ADMIN — SODIUM CHLORIDE, POTASSIUM CHLORIDE, SODIUM LACTATE AND CALCIUM CHLORIDE 1000 ML: 600; 310; 30; 20 INJECTION, SOLUTION INTRAVENOUS at 12:18

## 2022-07-25 RX ADMIN — FAMOTIDINE 20 MG: 10 INJECTION, SOLUTION INTRAVENOUS at 14:50

## 2022-07-25 RX ADMIN — KETOROLAC TROMETHAMINE 30 MG: 30 INJECTION, SOLUTION INTRAMUSCULAR; INTRAVENOUS at 12:20

## 2022-07-25 RX ADMIN — MORPHINE SULFATE 4 MG: 4 INJECTION INTRAVENOUS at 13:52

## 2022-07-25 RX ADMIN — LIDOCAINE HYDROCHLORIDE 30 ML: 20 SOLUTION OROPHARYNGEAL at 14:49

## 2022-07-25 ASSESSMENT — FIBROSIS 4 INDEX: FIB4 SCORE: 0.84

## 2022-07-25 NOTE — ED PROVIDER NOTES
"ED Provider Note    CHIEF COMPLAINT  Chief Complaint   Patient presents with   • Nausea/Vomiting/Diarrhea     Since noon yesterday  Emesis TNTC  BM x 6 / 24 hrs Loose stool No blood   • Back Pain     Mid to low back Onset at same time   Radiates to hip  \" Aggravated back dry heaving \"       HPI  Cayetano Hawkins is a 47 y.o. male who presents with nausea vomiting and diarrhea since noon yesterday.  He has had at least 6 stools in the last 24 hours.  He denies any blood or mucus.  He states he may have had some bad food but is not sure what.  He states no one is sick at home.  He is also some mid to low back pain.  He denies any recent antibiotics.  He denies any recent travel outside the country.  He has not had any fevers that he is noticed.  He is diabetic and takes metformin.  He denies any chest pain coughing sore throat or shortness of breath.  Denies smoking.    REVIEW OF SYSTEMS  HEENT:  No ear pain, congestion or sore throat   EYES: no discharge redness or vision changes  CARDIAC: no chest pain, palpitations    PULMONARY: no dyspnea, cough or congestion   GI: See history of present illness  : no dysuria, positive back pain or hematuria   Neuro: no weakness, numbness aphasia or headache  Musculoskeletal: no swelling deformity or pain no joint swelling  Endocrine: no fevers, sweating, weight loss   SKIN: no rash, erythema or contusions     See history of present illness all other systems are negative    PAST MEDICAL HISTORY  Past Medical History:   Diagnosis Date   • Arthritis right hip    osteo left hip, right hand   • Backpain     feet/hip-R,back   • Bowel habit changes     consitpation and diarreha sometimes   • Dental disorder 06/2020    upper dentures   • Diabetes     oral meds   • Hepatitis C 2009    No tx   • Hepatitis C carrier (HCC)    • High cholesterol    • Hyperlipidemia    • Hypertension    • Infectious disease    • Leukocytosis 11/25/2019   • Osteomyelitis (HCC)        FAMILY " HISTORY  Family History   Problem Relation Age of Onset   • Diabetes Mother    • Hyperlipidemia Mother    • Arthritis Mother    • Heart Attack Father    • Heart Disease Father    • Hypertension Father    • Stroke Father    • Hyperlipidemia Father    • Arthritis Father        SOCIAL HISTORY  Social History     Socioeconomic History   • Marital status:    Tobacco Use   • Smoking status: Former Smoker     Packs/day: 0.50     Years: 10.00     Pack years: 5.00     Quit date: 1/1/2012     Years since quitting: 10.5   • Smokeless tobacco: Never Used   • Tobacco comment: 5 yrs ago   Vaping Use   • Vaping Use: Never used   Substance and Sexual Activity   • Alcohol use: No   • Drug use: Yes     Types: Marijuana, Inhaled     Comment: couple of times a day, occasionally will eat edibles   • Sexual activity: Yes     Partners: Female       SURGICAL HISTORY  Past Surgical History:   Procedure Laterality Date   • IRRIGATION & DEBRIDEMENT ORTHO Right 5/11/2021    Procedure: IRRIGATION AND DEBRIDEMENT, WOUND - FOOT;  Surgeon: Abram Cortez M.D.;  Location: SURGERY SAME [facility];  Service: Orthopedics   • TENDON LENGHTENING Right 5/11/2021    Procedure: LENGTHENING, TENDON - ACHILLES;  Surgeon: Abram Cortez M.D.;  Location: SURGERY SAME [facility];  Service: Orthopedics   • TOE AMPUTATION Right 5/11/2021    Procedure: AMPUTATION, TOE - 5TH RAY AND PARTIAL CUBOID EXCISION WITH PARTIAL 4TH METATARSAL EXCISION.;  Surgeon: Abram Cortez M.D.;  Location: SURGERY SAME [facility];  Service: Orthopedics   • INCISION AND DRAINAGE GENERAL Right 8/3/2020    Procedure: INCISION AND DRAINAGE;  Surgeon: Willie Kurtz D.P.M.;  Location: SURGERY [facility];  Service: Podiatry   • OSTECTOMY Right 6/26/2020    Procedure: EXCISION, BONE - OSTEPHYTIC BONE, FOOT;  Surgeon: Willie Kurtz D.P.M.;  Location: SURGERY [facility];  Service: Podiatry   • TOE AMPUTATION Right 8/16/2018    Procedure: TOE  "AMPUTATION - 5TH RAY REVISION;  Surgeon: Abram Cortez M.D.;  Location: SURGERY [facility];  Service: Orthopedics   • IRRIGATION & DEBRIDEMENT ORTHO Right 4/13/2016    Procedure: IRRIGATION & DEBRIDEMENT AND CLOSURE OF ORTHO FOOT WOUND;  Surgeon: Hitesh Sol M.D.;  Location: SURGERY [facility];  Service:    • IRRIGATION & DEBRIDEMENT ORTHO Right 4/10/2016    Procedure: IRRIGATION & DEBRIDEMENT ORTHO-poss ray resection, poss below knee amputation ;  Surgeon: Hitesh Sol M.D.;  Location: SURGERY [facility];  Service:    • IRRIGATION & DEBRIDEMENT ORTHO Right 4/4/2016    Procedure: IRRIGATION & DEBRIDEMENT ORTHO FOOT - AMPUTATION RIGHT FIFTH TOE ;  Surgeon: Hitesh Sol M.D.;  Location: SURGERY [facility];  Service:    • CHOLECYSTECTOMY  2011   • APPENDECTOMY  1988   • OSWALDO BY LAPAROSCOPY     • OTHER  hepatitis c        CURRENT MEDICATIONS  Home Medications     Reviewed by Blessing Roche (Pharmacy Tech) on 07/25/22 at 1252  Med List Status: Complete   Medication Last Dose Status   aspirin 81 MG EC tablet 7/20/2022 Active   chlorthalidone (HYGROTON) 25 MG Tab 7/25/2022 Active   Cholecalciferol (VITAMIN D3) 125 MCG (5000 UT) Cap 3 wks ago Active   glimepiride (AMARYL) 4 MG Tab 7/25/2022 Active   lisinopril (PRINIVIL) 40 MG tablet 7/25/2022 Active   metformin (GLUCOPHAGE) 1000 MG tablet 4d ago Active   metoprolol tartrate (LOPRESSOR) 25 MG Tab 7/25/2022 Active   omeprazole (PRILOSEC) 20 MG delayed-release capsule 7/25/2022 Active   ondansetron (ZOFRAN ODT) 4 MG TABLET DISPERSIBLE 7/25/2022 Active   rosuvastatin (CRESTOR) 40 MG tablet 7/24/2022 Active                ALLERGIES  Allergies   Allergen Reactions   • Bee Swelling     Bee stings       PHYSICAL EXAM  VITAL SIGNS: BP (!) 173/77   Pulse 70   Temp 36.9 °C (98.4 °F) (Temporal)   Resp 16   Ht 1.803 m (5' 11\")   Wt 89.4 kg (197 lb 1.5 oz)   SpO2 98%   BMI 27.49 kg/m²   Constitutional: Well developed, Well " nourished, No acute distress, Non-toxic appearance.   HEENT: Normocephalic, Atraumatic,  external ears normal, pharynx pink,  Mucous  Membranes dry, No rhinorrhea or mucosal edema  Eyes: PERRL, EOMI, Conjunctiva normal, No discharge.   Neck: Normal range of motion, No tenderness, Supple, No stridor.   Lymphatic: No lymphadenopathy    Cardiovascular: Regular Rate and Rhythm, No murmurs,  rubs, or gallops.   Thorax & Lungs: Lungs clear to auscultation bilaterally, No respiratory distress, No wheezes, rhales or rhonchi, No chest wall tenderness.   Abdomen: Bowel sounds normal, Soft, non tender, non distended,  No pulsatile masses., no rebound guarding or peritoneal signs.   Skin: Warm, Dry, No erythema, No rash,   Back:  No CVA tenderness,  No spinal tenderness, bony crepitance step offs or instability.   Extremities: Equal, intact distal pulses, No cyanosis, clubbing or edema,  No tenderness.   Musculoskeletal: Good range of motion in all major joints. No tenderness to palpation or major deformities noted.   Neurologic: Alert & oriented x 3, No focal deficits noted.   Psychiatric: Affect normal, Judgment normal, Mood normal.     LABS  Results for orders placed or performed during the hospital encounter of 07/25/22   CBC WITH DIFFERENTIAL   Result Value Ref Range    WBC 13.2 (H) 4.8 - 10.8 K/uL    RBC 4.81 4.70 - 6.10 M/uL    Hemoglobin 14.2 14.0 - 18.0 g/dL    Hematocrit 42.1 42.0 - 52.0 %    MCV 87.5 81.4 - 97.8 fL    MCH 29.5 27.0 - 33.0 pg    MCHC 33.7 33.7 - 35.3 g/dL    RDW 43.4 35.9 - 50.0 fL    Platelet Count 332 164 - 446 K/uL    MPV 9.4 9.0 - 12.9 fL    Neutrophils-Polys 85.30 (H) 44.00 - 72.00 %    Lymphocytes 11.70 (L) 22.00 - 41.00 %    Monocytes 2.00 0.00 - 13.40 %    Eosinophils 0.10 0.00 - 6.90 %    Basophils 0.30 0.00 - 1.80 %    Immature Granulocytes 0.60 0.00 - 0.90 %    Nucleated RBC 0.00 /100 WBC    Neutrophils (Absolute) 11.24 (H) 1.82 - 7.42 K/uL    Lymphs (Absolute) 1.54 1.00 - 4.80 K/uL     Monos (Absolute) 0.27 0.00 - 0.85 K/uL    Eos (Absolute) 0.01 0.00 - 0.51 K/uL    Baso (Absolute) 0.04 0.00 - 0.12 K/uL    Immature Granulocytes (abs) 0.08 0.00 - 0.11 K/uL    NRBC (Absolute) 0.00 K/uL   COMP METABOLIC PANEL   Result Value Ref Range    Sodium 141 135 - 145 mmol/L    Potassium 4.4 3.6 - 5.5 mmol/L    Chloride 106 96 - 112 mmol/L    Co2 19 (L) 20 - 33 mmol/L    Anion Gap 16.0 7.0 - 16.0    Glucose 194 (H) 65 - 99 mg/dL    Bun 43 (H) 8 - 22 mg/dL    Creatinine 2.06 (H) 0.50 - 1.40 mg/dL    Calcium 10.4 (H) 8.4 - 10.2 mg/dL    AST(SGOT) 25 12 - 45 U/L    ALT(SGPT) 40 2 - 50 U/L    Alkaline Phosphatase 106 (H) 30 - 99 U/L    Total Bilirubin 0.4 0.1 - 1.5 mg/dL    Albumin 4.6 3.2 - 4.9 g/dL    Total Protein 8.2 6.0 - 8.2 g/dL    Globulin 3.6 (H) 1.9 - 3.5 g/dL    A-G Ratio 1.3 g/dL   LIPASE   Result Value Ref Range    Lipase 34 7 - 58 U/L   URINALYSIS    Specimen: Urine, Clean Catch; Blood   Result Value Ref Range    Color Yellow     Character Clear     Specific Gravity 1.025 <1.035    Ph 5.0 5.0 - 8.0    Glucose Negative Negative mg/dL    Ketones 15 (A) Negative mg/dL    Protein 100 (A) Negative mg/dL    Bilirubin Negative Negative    Nitrite Negative Negative    Leukocyte Esterase Negative Negative    Occult Blood Small (A) Negative    Micro Urine Req Microscopic    LACTIC ACID   Result Value Ref Range    Lactic Acid 2.3 (H) 0.5 - 2.0 mmol/L   URINE MICROSCOPIC (W/UA)   Result Value Ref Range    WBC 2-5 (A) /hpf    RBC 0-2 (A) /hpf    Bacteria Rare (A) None /hpf    Epithelial Cells Rare Few /hpf    Trans Epithelial Cells Few /hpf    Mucous Threads Few /hpf    Hyaline Cast 0-2 /lpf    Sperm Few /hpf   ESTIMATED GFR   Result Value Ref Range    GFR (CKD-EPI) 39 (A) >60 mL/min/1.73 m 2       RADIOLOGY/PROCEDURES  CT-ABDOMEN-PELVIS WITH   Final Result      1.  There is no bowel obstruction or acute inflammation.   2.  There is a small hiatal hernia.   3.  Surgically absent gallbladder without significant  "biliary dilatation.   4.  Stable mild splenomegaly since 2016 is no clinical significance.          COURSE & MEDICAL DECISION MAKING  Pertinent Labs & Imaging studies reviewed. (See chart for details)  Differential diagnosis: Gastroenteritis, colitis, diverticulitis, dehydration    HYDRATION: Based on the patient's presentation of Dehydration the patient was given IV fluids. IV Hydration was used because oral hydration was not adequate alone. Upon recheck following hydration, the patient was improved.    12:16 PM I gave the patient IV Toradol and Zofran as well as a liter of lactated Ringer's for rehydration.  Labs have been drawn.      Patient has had high blood pressure while in the emergency department, felt likely secondary to medical condition. Counseled patient to monitor blood pressure at home and follow up with primary care physician.    3:00 PM - Patient felt \"burning\" in his stomach, so I ordered a GI cocktail and Pepcid. I also ordered CT of his abdomen.     4:30 PM - I reevaluated the patient at bedside. The patient informs me they feel improved. He states that he vomited a little, but felt better afterwards. I discussed the patient's diagnostic study results which show no acute problems. I informed him that he probably has a stomach flu. I will send him home with Pepcid, imodium, and Zofran. I discussed plan for discharge and follow up as outlined below. The patient is stable for discharge at this time and will return for any new or worsening symptoms. Patient verbalizes understanding and support with my plan for discharge.       FINAL IMPRESSION  1. Gastroenteritis           PLAN/DISPOSITION  Discharged in improved condition          Electronically signed by: Nola Gutiérrez M.D., 7/25/2022 12:13 PM  "

## 2022-10-24 DIAGNOSIS — I10 HYPERTENSION, UNSPECIFIED TYPE: ICD-10-CM

## 2022-10-25 RX ORDER — LISINOPRIL 40 MG/1
40 TABLET ORAL DAILY
Qty: 90 TABLET | Refills: 0 | Status: SHIPPED | OUTPATIENT
Start: 2022-10-25 | End: 2023-04-20

## 2022-11-03 RX ORDER — ROSUVASTATIN CALCIUM 20 MG/1
TABLET, COATED ORAL
Qty: 90 TABLET | Refills: 0 | Status: SHIPPED | OUTPATIENT
Start: 2022-11-03 | End: 2023-04-20

## 2023-04-05 ENCOUNTER — HOSPITAL ENCOUNTER (OUTPATIENT)
Facility: MEDICAL CENTER | Age: 48
End: 2023-04-05
Attending: NURSE PRACTITIONER
Payer: COMMERCIAL

## 2023-04-05 ENCOUNTER — HOSPITAL ENCOUNTER (OUTPATIENT)
Facility: MEDICAL CENTER | Age: 48
End: 2023-04-05
Attending: PEDIATRICS
Payer: COMMERCIAL

## 2023-04-05 ENCOUNTER — OFFICE VISIT (OUTPATIENT)
Dept: MEDICAL GROUP | Facility: MEDICAL CENTER | Age: 48
End: 2023-04-05
Payer: COMMERCIAL

## 2023-04-05 ENCOUNTER — HOSPITAL ENCOUNTER (OUTPATIENT)
Dept: LAB | Facility: MEDICAL CENTER | Age: 48
End: 2023-04-05
Attending: NURSE PRACTITIONER
Payer: COMMERCIAL

## 2023-04-05 VITALS
TEMPERATURE: 97.8 F | HEART RATE: 66 BPM | OXYGEN SATURATION: 99 % | SYSTOLIC BLOOD PRESSURE: 128 MMHG | WEIGHT: 200 LBS | HEIGHT: 71 IN | DIASTOLIC BLOOD PRESSURE: 82 MMHG | BODY MASS INDEX: 28 KG/M2

## 2023-04-05 DIAGNOSIS — E11.65 TYPE 2 DIABETES MELLITUS WITH HYPERGLYCEMIA, WITHOUT LONG-TERM CURRENT USE OF INSULIN (HCC): ICD-10-CM

## 2023-04-05 DIAGNOSIS — Z76.89 ENCOUNTER TO ESTABLISH CARE: ICD-10-CM

## 2023-04-05 DIAGNOSIS — B18.2 CHRONIC HEPATITIS C WITHOUT HEPATIC COMA (HCC): ICD-10-CM

## 2023-04-05 DIAGNOSIS — K21.9 GASTROESOPHAGEAL REFLUX DISEASE WITHOUT ESOPHAGITIS: Chronic | ICD-10-CM

## 2023-04-05 DIAGNOSIS — R60.9 PERIPHERAL EDEMA: ICD-10-CM

## 2023-04-05 DIAGNOSIS — Z12.11 SCREENING FOR COLORECTAL CANCER: ICD-10-CM

## 2023-04-05 DIAGNOSIS — Z12.12 SCREENING FOR COLORECTAL CANCER: ICD-10-CM

## 2023-04-05 DIAGNOSIS — I10 HYPERTENSION, UNSPECIFIED TYPE: ICD-10-CM

## 2023-04-05 DIAGNOSIS — E78.5 DYSLIPIDEMIA: Chronic | ICD-10-CM

## 2023-04-05 DIAGNOSIS — E11.319 DIABETIC RETINOPATHY ASSOCIATED WITH TYPE 2 DIABETES MELLITUS, MACULAR EDEMA PRESENCE UNSPECIFIED, UNSPECIFIED LATERALITY, UNSPECIFIED RETINOPATHY SEVERITY (HCC): ICD-10-CM

## 2023-04-05 DIAGNOSIS — E55.9 VITAMIN D DEFICIENCY: ICD-10-CM

## 2023-04-05 PROBLEM — R11.10 VOMITING: Status: RESOLVED | Noted: 2019-11-23 | Resolved: 2023-04-05

## 2023-04-05 PROBLEM — N17.9 AKI (ACUTE KIDNEY INJURY) (HCC): Status: RESOLVED | Noted: 2021-12-17 | Resolved: 2023-04-05

## 2023-04-05 LAB
25(OH)D3 SERPL-MCNC: 6 NG/ML (ref 30–100)
ALBUMIN SERPL BCP-MCNC: 2.9 G/DL (ref 3.2–4.9)
ALBUMIN/GLOB SERPL: 0.9 G/DL
ALP SERPL-CCNC: 131 U/L (ref 30–99)
ALT SERPL-CCNC: 37 U/L (ref 2–50)
ANION GAP SERPL CALC-SCNC: 9 MMOL/L (ref 7–16)
AST SERPL-CCNC: 28 U/L (ref 12–45)
BILIRUB SERPL-MCNC: 0.3 MG/DL (ref 0.1–1.5)
BUN SERPL-MCNC: 19 MG/DL (ref 8–22)
CALCIUM ALBUM COR SERPL-MCNC: 9.9 MG/DL (ref 8.5–10.5)
CALCIUM SERPL-MCNC: 9 MG/DL (ref 8.5–10.5)
CHLORIDE SERPL-SCNC: 108 MMOL/L (ref 96–112)
CHOLEST SERPL-MCNC: 264 MG/DL (ref 100–199)
CO2 SERPL-SCNC: 25 MMOL/L (ref 20–33)
CREAT SERPL-MCNC: 1.68 MG/DL (ref 0.5–1.4)
ERYTHROCYTE [DISTWIDTH] IN BLOOD BY AUTOMATED COUNT: 44.3 FL (ref 35.9–50)
FASTING STATUS PATIENT QL REPORTED: NORMAL
GFR SERPLBLD CREATININE-BSD FMLA CKD-EPI: 49 ML/MIN/1.73 M 2
GLOBULIN SER CALC-MCNC: 3.4 G/DL (ref 1.9–3.5)
GLUCOSE SERPL-MCNC: 135 MG/DL (ref 65–99)
HBA1C MFR BLD: 9.1 % (ref ?–5.8)
HCT VFR BLD AUTO: 42.9 % (ref 42–52)
HDLC SERPL-MCNC: 33 MG/DL
HGB BLD-MCNC: 14 G/DL (ref 14–18)
LDLC SERPL CALC-MCNC: 182 MG/DL
MCH RBC QN AUTO: 28.9 PG (ref 27–33)
MCHC RBC AUTO-ENTMCNC: 32.6 G/DL (ref 33.7–35.3)
MCV RBC AUTO: 88.6 FL (ref 81.4–97.8)
PLATELET # BLD AUTO: 294 K/UL (ref 164–446)
PMV BLD AUTO: 9.6 FL (ref 9–12.9)
POCT INT CON NEG: NEGATIVE
POCT INT CON POS: POSITIVE
POTASSIUM SERPL-SCNC: 4.8 MMOL/L (ref 3.6–5.5)
PROT SERPL-MCNC: 6.3 G/DL (ref 6–8.2)
RBC # BLD AUTO: 4.84 M/UL (ref 4.7–6.1)
SODIUM SERPL-SCNC: 142 MMOL/L (ref 135–145)
TRIGL SERPL-MCNC: 245 MG/DL (ref 0–149)
TSH SERPL DL<=0.005 MIU/L-ACNC: 1.24 UIU/ML (ref 0.38–5.33)
WBC # BLD AUTO: 9.3 K/UL (ref 4.8–10.8)

## 2023-04-05 PROCEDURE — 80053 COMPREHEN METABOLIC PANEL: CPT

## 2023-04-05 PROCEDURE — 82043 UR ALBUMIN QUANTITATIVE: CPT | Mod: 91

## 2023-04-05 PROCEDURE — 82306 VITAMIN D 25 HYDROXY: CPT

## 2023-04-05 PROCEDURE — 99214 OFFICE O/P EST MOD 30 MIN: CPT | Performed by: NURSE PRACTITIONER

## 2023-04-05 PROCEDURE — 80061 LIPID PANEL: CPT

## 2023-04-05 PROCEDURE — RXMED WILLOW AMBULATORY MEDICATION CHARGE: Performed by: NURSE PRACTITIONER

## 2023-04-05 PROCEDURE — 82043 UR ALBUMIN QUANTITATIVE: CPT

## 2023-04-05 PROCEDURE — 82570 ASSAY OF URINE CREATININE: CPT | Mod: 91

## 2023-04-05 PROCEDURE — 36415 COLL VENOUS BLD VENIPUNCTURE: CPT

## 2023-04-05 PROCEDURE — 83036 HEMOGLOBIN GLYCOSYLATED A1C: CPT | Performed by: NURSE PRACTITIONER

## 2023-04-05 PROCEDURE — 82570 ASSAY OF URINE CREATININE: CPT

## 2023-04-05 PROCEDURE — 84443 ASSAY THYROID STIM HORMONE: CPT

## 2023-04-05 PROCEDURE — 99000 SPECIMEN HANDLING OFFICE-LAB: CPT | Performed by: NURSE PRACTITIONER

## 2023-04-05 PROCEDURE — 85027 COMPLETE CBC AUTOMATED: CPT

## 2023-04-05 ASSESSMENT — PATIENT HEALTH QUESTIONNAIRE - PHQ9
5. POOR APPETITE OR OVEREATING: 0 - NOT AT ALL
SUM OF ALL RESPONSES TO PHQ QUESTIONS 1-9: 12
CLINICAL INTERPRETATION OF PHQ2 SCORE: 5

## 2023-04-05 ASSESSMENT — FIBROSIS 4 INDEX: FIB4 SCORE: 0.58

## 2023-04-05 NOTE — LETTER
Request for Medical Records    Patient Name: Cayetano Hawkins    : 1975      Dear Doctor: Daren    The above named patient receives primary care at the [facility] by LILLIANA Chaidez.  The patient informs us that you are his eye care Provider.    Please fax a copy of the most recent eye exam to [phone] or answer the  questions below and fax this sheet back to us at the above number.  Attached is a signed Release of Information.      Date of last eye exam: _____________    Retinal eye exam summary:        Please select the choice(s) that apply.    ____ No diabetic retinopathy    ____    Diabetic retinopathy present      Printed Name and Credentials: __________________________________    Signature of Eye Care Provider: _________________________________    We appreciate your assistance and collaboration in providing efficient patient care!    Kindest Regards,    [facility] [address] [place] [id number] [phone]

## 2023-04-05 NOTE — PROGRESS NOTES
Chief Complaint   Patient presents with    Diabetes Follow-up     Cayetano Hawkins is a 48 y.o. male here to establish care and to discuss the evaluation and management of:    Patient presents accompanied by his wife today who assists with HPI.    He tells me that he is been lost to follow-up.  Has not sought primary care evaluation since 2021.  Has not been on any medication since last year for his diabetes or blood pressure or cholesterol.    Today he is requesting to have a referral to ophthalmology as he was told that he has bilateral cataracts as well as bleeding in the back of his retina.        Type 2 Diabetes Mellitus With Hyperglycemia, Without Long-Term Current Use of Insulin ([facility])    Dx: 2009. Was Metformin and Glimepiride  Not current checking BS.  Has not been on medication.  He does have neuropathy.  Recently told he has a bleed in his retina.  History of toe amputation.     Gastroesophageal Reflux Disease Without Esophagitis    Chronic.  Not well controlled.  Omeprazole 20mg BID for this.   Gets sick in the morning most days-bile moslty      Htn (Hypertension)    Chronic. Was on Lisinopril 40g, Chlorthalidone 25mg, Metoprolol 25mg-has not been on medication in more than 6 months.   Blood pressure appears stable in clinic.   Dyslipidemia    Chronic. Was on Rosuvastatin 20mg.        Chart reviewed    ROS: SEE ABOVE        Current Outpatient Medications:     metFORMIN (GLUCOPHAGE) 500 MG Tab, Take 2 Tablets by mouth every day for 90 days., Disp: 180 Tablet, Rfl: 0    rosuvastatin (CRESTOR) 20 MG Tab, TAKE 1 TABLET BY MOUTH EVERY DAY IN THE MORNING, Disp: 90 Tablet, Rfl: 0    lisinopril (PRINIVIL) 40 MG tablet, TAKE 1 TABLET BY MOUTH EVERY DAY, Disp: 90 Tablet, Rfl: 0    omeprazole (PRILOSEC) 20 MG delayed-release capsule, Take 20 mg by mouth 2 times a day., Disp: , Rfl:     metformin (GLUCOPHAGE) 1000 MG tablet, TAKE 1 TABLET BY MOUTH EVERY DAY IN THE MORNING (Patient taking differently: Take 1,000  mg by mouth every morning.), Disp: 90 Tablet, Rfl: 1    metoprolol tartrate (LOPRESSOR) 25 MG Tab, TAKE 1 TABLET BY MOUTH TWICE A DAY (Patient taking differently: Take 25 mg by mouth 2 times a day.), Disp: 180 Tablet, Rfl: 1    Cholecalciferol (VITAMIN D3) 125 MCG (5000 UT) Cap, TAKE 1 CAPSULE BY MOUTH EVERY DAY IN THE MORNING (Patient taking differently: Take 1 Capsule by mouth every day.), Disp: 30 Capsule, Rfl: 0    glimepiride (AMARYL) 4 MG Tab, Take 1 Tablet by mouth every morning., Disp: 90 Tablet, Rfl: 0    chlorthalidone (HYGROTON) 25 MG Tab, Take 1 Tablet by mouth every day., Disp: 90 Tablet, Rfl: 0    aspirin 81 MG EC tablet, Take 1 Tablet by mouth every day., Disp: 90 Tablet, Rfl: 0    Allergies   Allergen Reactions    Bee Swelling     Bee stings       Past Medical History:   Diagnosis Date    Arthritis right hip    osteo left hip, right hand    Backpain     feet/hip-R,back    Bowel habit changes     consitpation and diarreha sometimes    Dental disorder 06/2020    upper dentures    Diabetes     oral meds    Hepatitis C 2009    No tx    Hepatitis C carrier (HCC)     High cholesterol     Hyperlipidemia     Hypertension     Infectious disease     Leukocytosis 11/25/2019    Osteomyelitis (HCC)      Past Surgical History:   Procedure Laterality Date    IRRIGATION & DEBRIDEMENT ORTHO Right 5/11/2021    Procedure: IRRIGATION AND DEBRIDEMENT, WOUND - FOOT;  Surgeon: Abram Cortez M.D.;  Location: SURGERY SAME DAY [facility];  Service: Orthopedics    TENDON LENGHTENING Right 5/11/2021    Procedure: LENGTHENING, TENDON - ACHILLES;  Surgeon: Abram Cortez M.D.;  Location: SURGERY SAME DAY [facility];  Service: Orthopedics    TOE AMPUTATION Right 5/11/2021    Procedure: AMPUTATION, TOE - 5TH RAY AND PARTIAL CUBOID EXCISION WITH PARTIAL 4TH METATARSAL EXCISION.;  Surgeon: Abram Cortez M.D.;  Location: SURGERY SAME DAY [facility];  Service: Orthopedics    INCISION AND DRAINAGE GENERAL Right 8/3/2020     Procedure: INCISION AND DRAINAGE;  Surgeon: Willie Kurtz D.P.M.;  Location: SURGERY [facility];  Service: Podiatry    OSTECTOMY Right 6/26/2020    Procedure: EXCISION, BONE - OSTEPHYTIC BONE, FOOT;  Surgeon: Willie Kurtz D.P.M.;  Location: [facility];  Service: Podiatry    TOE AMPUTATION Right 8/16/2018    Procedure: TOE AMPUTATION - 5TH RAY REVISION;  Surgeon: Abram Cortez M.D.;  Location: SURGERY [facility];  Service: Orthopedics    IRRIGATION & DEBRIDEMENT ORTHO Right 4/13/2016    Procedure: IRRIGATION & DEBRIDEMENT AND CLOSURE OF ORTHO FOOT WOUND;  Surgeon: Hitesh Sol M.D.;  Location: SURGERY [facility];  Service:     IRRIGATION & DEBRIDEMENT ORTHO Right 4/10/2016    Procedure: IRRIGATION & DEBRIDEMENT ORTHO-poss ray resection, poss below knee amputation ;  Surgeon: Hitesh Sol M.D.;  Location: SURGERY [facility];  Service:     IRRIGATION & DEBRIDEMENT ORTHO Right 4/4/2016    Procedure: IRRIGATION & DEBRIDEMENT ORTHO FOOT - AMPUTATION RIGHT FIFTH TOE ;  Surgeon: Hitesh Sol M.D.;  Location: SURGERY [facility];  Service:     CHOLECYSTECTOMY  2011    APPENDECTOMY  1988    OSWALDO BY LAPAROSCOPY      OTHER  hepatitis c      Family History   Problem Relation Age of Onset    Diabetes Mother     Hyperlipidemia Mother     Arthritis Mother     Heart Attack Father         CABG4    Heart Disease Father     Hypertension Father     Stroke Father     Hyperlipidemia Father     Arthritis Father     Other Sister         ?possible seizure    Heart Disease Sister         CABG3    Heart Attack Brother 32     Social History     Socioeconomic History    Marital status:      Spouse name: Not on file    Number of children: Not on file    Years of education: Not on file    Highest education level: Not on file   Occupational History    Not on file   Tobacco Use    Smoking status: Former     Packs/day: 0.50     Years: 10.00     Pack years: 5.00      "Types: Cigarettes     Quit date: 2012     Years since quittin.2    Smokeless tobacco: Never    Tobacco comments:     5 yrs ago   Vaping Use    Vaping Use: Never used   Substance and Sexual Activity    Alcohol use: No    Drug use: Yes     Types: Marijuana, Inhaled     Comment: couple of times a day, occasionally will eat edibles    Sexual activity: Yes     Partners: Female   Other Topics Concern    Not on file   Social History Narrative    Not on file     Social Determinants of Health     Financial Resource Strain: Not on file   Food Insecurity: Not on file   Transportation Needs: Not on file   Physical Activity: Not on file   Stress: Not on file   Social Connections: Not on file   Intimate Partner Violence: Not on file   Housing Stability: Not on file       Objective:     Vitals: /82 (BP Location: Left arm, Patient Position: Sitting, BP Cuff Size: Adult)   Pulse 66   Temp 36.6 °C (97.8 °F) (Temporal)   Ht 1.803 m (5' 11\")   Wt 90.7 kg (200 lb)   SpO2 99%   BMI 27.89 kg/m²      General: Alert, pleasant, NAD  HEENT:  Normocephalic.  Neck supple.  No thyromegaly or masses palpated. No cervical or supraclavicular lymphadenopathy.  Heart:  Regular rate and rhythm.  S1 and S2 normal.  No murmurs appreciated.    Respiratory:  Normal respiratory effort.  Clear to auscultation bilaterally.    Skin:  Warm, dry, no rashes  Musculoskeletal:  Gait is normal.  Moves all extremities well.  Extremities:   Bilateral lower extremity pitting 2+ edema.  Right foot -partial amputation.  Onychomycotic toes.    Neurological: Absent sensation, neuropathy  Psych:  Affect/mood is normal, judgement is good, memory is intact, grooming is appropriate.    Assessment and Plan.     48 y.o. male to establish care and discuss the followin. Diabetic retinopathy associated with type 2 diabetes mellitus, macular edema presence unspecified, unspecified laterality, unspecified retinopathy severity (HCC)  New problem to me.  " Has been off diabetic medications for more than 6 months.  A1c in the clinic today 9.1%.  He needs updated referral to ophthalmology.  Have placed his referrals today as he does an appointment tomorrow and mid April.  - Referral to Ophthalmology  - Referral to Ophthalmology    2. Type 2 diabetes mellitus with hyperglycemia, without long-term current use of insulin (HCC)  Chronic problem, not well controlled.  A1c today 9.1%.  Restart on metformin 1000 once daily as he historically has been forgetting the second dose it.  Was also on glimepiride.  He will need a new glucometer.  Before starting any additional medications would like him to complete his blood work so I can establish baseline kidney function.  Monofilament completed patient absence of sensation.  Neuropathy to mid calf.  - VITAMIN D,25 HYDROXY (DEFICIENCY); Future  - POCT Hemoglobin A1C  - Microalbumin Creat Ratio Urine (Clinic Collect); Future  - Diabetic Monofilament LE Exam  - metFORMIN (GLUCOPHAGE) 500 MG Tab; Take 2 Tablets by mouth every day for 90 days.  Dispense: 180 Tablet; Refill: 0    3. Dyslipidemia  Chronic problem.  Currently not on statin however he was on rosuvastatin without myalgias.  Update lipid panel.  Follow-up to review.  - Lipid Profile; Future  - TSH; Future    4. Gastroesophageal reflux disease without esophagitis  Chronic problem, per patient not well controlled.  Continue omeprazole for now and have placed referral to gastroenterology.  ?  Gastroparesis    5. Hypertension, unspecified type  Chronic problem.  Blood pressures well controlled in the clinic despite him being off medications for reportedly quite some time.  Hold on starting any medications until he is able to obtain blood work to evaluate kidney function.  - TSH; Future  - Comp Metabolic Panel; Future  - CBC WITHOUT DIFFERENTIAL; Future    6. Chronic hepatitis C without hepatic coma (HCC)  Chronic problem has not been treated.  Referral to gastroenterology.  -  Referral to Gastroenterology    7. Vitamin D deficiency  - VITAMIN D,25 HYDROXY (DEFICIENCY); Future    8. Screening for colorectal cancer  - Referral to GI for Colonoscopy    9. Encounter to establish care    10.  Peripheral edema  Found on exam bilaterally 2+.  Was previously on chlorthalidone.  Need updated electrolyte panel prior to starting.  Stable in the clinic.  Follow-up 2 weeks.  ?  Last echo 2015-May need update.    Have asked patient to complete blood work and follow back up in the next 2 weeks to review and continue to evaluate his chart and update his problem list as well as his medical conditions.    Return in about 2 weeks (around 4/19/2023) for Lab results, Diabetes, Blood Pressure.    {I have placed the above orders and discussed them with an approved delegating provider.  The MA is performing the below orders under the direction of Dr. Mike TIJERINA

## 2023-04-06 LAB
CREAT UR-MCNC: 157.56 MG/DL
MICROALBUMIN UR-MCNC: >440 MG/DL
MICROALBUMIN/CREAT UR: NORMAL MG/G (ref 0–30)

## 2023-04-07 LAB
CREAT UR-MCNC: 350.88 MG/DL
MICROALBUMIN UR-MCNC: >440 MG/DL
MICROALBUMIN/CREAT UR: NORMAL MG/G (ref 0–30)

## 2023-04-10 ENCOUNTER — PHARMACY VISIT (OUTPATIENT)
Dept: PHARMACY | Facility: MEDICAL CENTER | Age: 48
End: 2023-04-10
Payer: COMMERCIAL

## 2023-04-20 ENCOUNTER — PHARMACY VISIT (OUTPATIENT)
Dept: PHARMACY | Facility: MEDICAL CENTER | Age: 48
End: 2023-04-20
Payer: COMMERCIAL

## 2023-04-20 ENCOUNTER — OFFICE VISIT (OUTPATIENT)
Dept: MEDICAL GROUP | Facility: MEDICAL CENTER | Age: 48
End: 2023-04-20
Payer: COMMERCIAL

## 2023-04-20 VITALS
OXYGEN SATURATION: 99 % | TEMPERATURE: 97.7 F | WEIGHT: 206 LBS | BODY MASS INDEX: 28.84 KG/M2 | HEIGHT: 71 IN | SYSTOLIC BLOOD PRESSURE: 146 MMHG | HEART RATE: 60 BPM | DIASTOLIC BLOOD PRESSURE: 80 MMHG

## 2023-04-20 DIAGNOSIS — E11.22 TYPE 2 DIABETES MELLITUS WITH STAGE 3A CHRONIC KIDNEY DISEASE, WITHOUT LONG-TERM CURRENT USE OF INSULIN (HCC): ICD-10-CM

## 2023-04-20 DIAGNOSIS — I10 HYPERTENSION, UNSPECIFIED TYPE: ICD-10-CM

## 2023-04-20 DIAGNOSIS — N18.31 TYPE 2 DIABETES MELLITUS WITH STAGE 3A CHRONIC KIDNEY DISEASE, WITHOUT LONG-TERM CURRENT USE OF INSULIN (HCC): ICD-10-CM

## 2023-04-20 DIAGNOSIS — R60.9 PERIPHERAL EDEMA: ICD-10-CM

## 2023-04-20 DIAGNOSIS — E78.5 DYSLIPIDEMIA: ICD-10-CM

## 2023-04-20 DIAGNOSIS — E11.21 MACROALBUMINURIC DIABETIC NEPHROPATHY (HCC): ICD-10-CM

## 2023-04-20 DIAGNOSIS — E55.9 VITAMIN D DEFICIENCY: ICD-10-CM

## 2023-04-20 DIAGNOSIS — N18.31 STAGE 3A CHRONIC KIDNEY DISEASE: ICD-10-CM

## 2023-04-20 PROBLEM — Z89.421 HISTORY OF AMPUTATION OF LESSER TOE OF RIGHT FOOT (HCC): Chronic | Status: ACTIVE | Noted: 2018-07-20

## 2023-04-20 PROBLEM — E11.36 TYPE 2 DIABETES MELLITUS WITH DIABETIC CATARACT, WITHOUT LONG-TERM CURRENT USE OF INSULIN (HCC): Status: ACTIVE | Noted: 2023-04-20

## 2023-04-20 PROBLEM — L03.119 CELLULITIS AND ABSCESS OF FOOT: Status: RESOLVED | Noted: 2020-08-03 | Resolved: 2023-04-20

## 2023-04-20 PROBLEM — L02.619 CELLULITIS AND ABSCESS OF FOOT: Status: RESOLVED | Noted: 2020-08-03 | Resolved: 2023-04-20

## 2023-04-20 PROCEDURE — 99214 OFFICE O/P EST MOD 30 MIN: CPT | Performed by: NURSE PRACTITIONER

## 2023-04-20 PROCEDURE — RXMED WILLOW AMBULATORY MEDICATION CHARGE: Performed by: NURSE PRACTITIONER

## 2023-04-20 RX ORDER — LISINOPRIL 20 MG/1
20 TABLET ORAL DAILY
Qty: 90 TABLET | Refills: 1 | Status: SHIPPED | OUTPATIENT
Start: 2023-04-20 | End: 2023-10-10 | Stop reason: SDUPTHER

## 2023-04-20 RX ORDER — CHLORTHALIDONE 25 MG/1
25 TABLET ORAL DAILY
Qty: 90 TABLET | Refills: 3 | Status: SHIPPED | OUTPATIENT
Start: 2023-04-20

## 2023-04-20 RX ORDER — ROSUVASTATIN CALCIUM 20 MG/1
20 TABLET, COATED ORAL EVERY EVENING
Qty: 90 TABLET | Refills: 3 | Status: SHIPPED | OUTPATIENT
Start: 2023-04-20 | End: 2023-09-19 | Stop reason: CLARIF

## 2023-04-20 RX ORDER — ERGOCALCIFEROL 1.25 MG/1
50000 CAPSULE ORAL
Qty: 12 CAPSULE | Refills: 1 | Status: SHIPPED | OUTPATIENT
Start: 2023-04-20 | End: 2023-12-23 | Stop reason: SDUPTHER

## 2023-04-20 RX ORDER — GLIPIZIDE 5 MG/1
5 TABLET, FILM COATED, EXTENDED RELEASE ORAL DAILY
Qty: 90 TABLET | Refills: 1 | Status: SHIPPED | OUTPATIENT
Start: 2023-04-20 | End: 2023-12-14 | Stop reason: SDUPTHER

## 2023-04-20 ASSESSMENT — FIBROSIS 4 INDEX: FIB4 SCORE: 0.77

## 2023-04-20 NOTE — PROGRESS NOTES
Chief Complaint   Patient presents with    Diabetes Follow-up     2 Wk    Lab Results     DOS: 4/5/2023    Hypertension Follow-up     2 Wk     Subjective:     HPI:     Cayetano Hawkins is a 48 y.o. male here to for follow-up labs    Follow up labs/diabetes/HTN/follow up     Wife accompanies patient today and assist with HPI    Tolerating Metformin since last OV  Would corinne Freestyle Reynaldo  Eating two meals a day.     ROS: : see above        Current Outpatient Medications:     ergocalciferol (DRISDOL) 35305 UNIT capsule, Take 1 Capsule by mouth every 7 days., Disp: 12 Capsule, Rfl: 1    metFORMIN (GLUCOPHAGE) 500 MG Tab, Take 2 Tablets by mouth every day for 90 days., Disp: 180 Tablet, Rfl: 3    rosuvastatin (CRESTOR) 20 MG Tab, Take 1 Tablet by mouth every evening., Disp: 90 Tablet, Rfl: 3    chlorthalidone (HYGROTON) 25 MG Tab, Take 1 Tablet by mouth every day., Disp: 90 Tablet, Rfl: 3    lisinopril (PRINIVIL) 20 MG Tab, Take 1 Tablet by mouth every day., Disp: 90 Tablet, Rfl: 1    Continuous Blood Gluc Sensor (FREESTYLE REYNALDO 2 SENSOR) [facility] [place], Apply one sensor under skin every 14 days to check blood sugars before meals and at bedtime daily., Disp: 6 Each, Rfl: 3    Continuous Blood Gluc  (FREESTYLE REYNALDO 2 READER) Device, 1 Device 4 Times a Day,Before Meals and at Bedtime., Disp: 1 Each, Rfl: 0    glipiZIDE SR (GLUCOTROL) 5 MG TABLET SR 24 HR, Take 1 Tablet by mouth every day., Disp: 90 Tablet, Rfl: 1    omeprazole (PRILOSEC) 20 MG delayed-release capsule, Take 20 mg by mouth 2 times a day., Disp: , Rfl:     metoprolol tartrate (LOPRESSOR) 25 MG Tab, TAKE 1 TABLET BY MOUTH TWICE A DAY (Patient taking differently: Take 25 mg by mouth 2 times a day.), Disp: 180 Tablet, Rfl: 1    Cholecalciferol (VITAMIN D3) 125 MCG (5000 UT) Cap, TAKE 1 CAPSULE BY MOUTH EVERY DAY IN THE MORNING (Patient taking differently: Take 1 Capsule by mouth every day.), Disp: 30 Capsule, Rfl: 0    aspirin 81 MG EC tablet, Take 1  "Tablet by mouth every day., Disp: 90 Tablet, Rfl: 0    Allergies   Allergen Reactions    Bee Swelling     Bee stings       Objective:     Vitals: BP (!) 146/80 (BP Location: Left arm, Patient Position: Sitting, BP Cuff Size: Adult) Comment: BP Re-Check  Pulse 60   Temp 36.5 °C (97.7 °F) (Temporal)   Ht 1.803 m (5' 11\")   Wt 93.4 kg (206 lb)   SpO2 99%   BMI 28.73 kg/m²    General: Alert, pleasant, NAD  HEENT: Normocephalic.  Neck supple.   Respiratory: no distress, no audible wheezing, RR -WNL  Skin: Warm, dry, no rashes.  Extremities: No leg edema. No discoloration  Neurological: No tremors  Psych:  Affect/mood is normal, judgement is good, memory is intact, grooming is appropriate.    Assessment/Plan:      1. Type 2 diabetes mellitus with hyperglycemia, without long-term current use of insulin (HCC)  Chronic problem, not well controlled.  Continue Metformin 1000 mg daily.  Start on glipizide 5 mg daily  Monitor renal function.   GFR 49.  Recommend freestyle sagrario for patient to check his blood sugars before meals and at bedtime and for any signs and symptoms of low or high blood sugar.  ?  Jardiance-consider-although previously was quite expensive  Follow-up 1 month-    - metFORMIN (GLUCOPHAGE) 500 MG Tab; Take 2 Tablets by mouth every day for 90 days.  Dispense: 180 Tablet; Refill: 3  - Continuous Blood Gluc Sensor (FREESTYLE SAGRARIO 2 SENSOR) Misc; Apply one sensor under skin every 14 days to check blood sugars before meals and at bedtime daily.  Dispense: 6 Each; Refill: 3  - Continuous Blood Gluc  (FREESTYLE SAGRARIO 2 READER) Device; 1 Device 4 Times a Day,Before Meals and at Bedtime.  Dispense: 1 Each; Refill: 0  - Basic Metabolic Panel; Future  - glipiZIDE SR (GLUCOTROL) 5 MG TABLET SR 24 HR; Take 1 Tablet by mouth every day.  Dispense: 90 Tablet; Refill: 1    2. Macro albuminuric diabetic nephropathy (HCC)  Present on recent labs.   Continue to optimize diabetes/HTN control.     3. " Dyslipidemia  Chronic problem, not well controlled at this time as he has been off medication for quite some time.  Goal LDL below 70.  Start on rosuvastatin 5 mg nightly.  Recheck 6 months.  - rosuvastatin (CRESTOR) 20 MG Tab; Take 1 Tablet by mouth every evening.  Dispense: 90 Tablet; Refill: 3    4. Stage 3a chronic kidney disease (HCC)  Chronic problem, not well controlled at this time.  Goal to optimize blood pressure and blood sugar control.  Recheck BMP 1 week.  Avoid nephrotoxic medications, renal dose meds.  - Basic Metabolic Panel; Future    5. Hypertension, unspecified type  Chronic problem, initial blood pressure was 162/78, recheck 146/80.   Blood pressure not at goal.  Recommend starting on lisinopril 20 mg and Chlorthalidone as he does have lower extremity edema.  He will need to recheck his electrolytes and kidney function in 1 to 2 weeks after starting medication. DASH diet.  He will follow-up in 1 month.  - chlorthalidone (HYGROTON) 25 MG Tab; Take 1 Tablet by mouth every day.  Dispense: 90 Tablet; Refill: 3  - lisinopril (PRINIVIL) 20 MG Tab; Take 1 Tablet by mouth every day.  Dispense: 90 Tablet; Refill: 1    6. Vitamin D deficiency  Chronic, not well controlled.  Significantly suboptimal.  Recommend starting on 50,000 units IU weekly for 3 to 6 months, recheck 6 months.  Attempted transition to oral supplementation.  - ergocalciferol (DRISDOL) 22958 UNIT capsule; Take 1 Capsule by mouth every 7 days.  Dispense: 12 Capsule; Refill: 1    7. Peripheral edema  Chronic. Not well controlled.  Restart Chlorthalidone. DASH diet.   Monitor Creatinine level.   - chlorthalidone (HYGROTON) 25 MG Tab; Take 1 Tablet by mouth every day.  Dispense: 90 Tablet; Refill: 3      Return in about 4 weeks (around 5/18/2023) for Lab results, Diabetes, Blood Pressure.    {I have placed the above orders and discussed them with an approved delegating provider. The MA is performing the below orders under the direction of  Dr. Mike TIJERINA

## 2023-05-15 ENCOUNTER — HOSPITAL ENCOUNTER (OUTPATIENT)
Dept: LAB | Facility: MEDICAL CENTER | Age: 48
End: 2023-05-15
Attending: NURSE PRACTITIONER
Payer: COMMERCIAL

## 2023-05-15 DIAGNOSIS — E11.22 TYPE 2 DIABETES MELLITUS WITH STAGE 3A CHRONIC KIDNEY DISEASE, WITHOUT LONG-TERM CURRENT USE OF INSULIN (HCC): ICD-10-CM

## 2023-05-15 DIAGNOSIS — N18.31 STAGE 3A CHRONIC KIDNEY DISEASE: ICD-10-CM

## 2023-05-15 DIAGNOSIS — N18.31 TYPE 2 DIABETES MELLITUS WITH STAGE 3A CHRONIC KIDNEY DISEASE, WITHOUT LONG-TERM CURRENT USE OF INSULIN (HCC): ICD-10-CM

## 2023-05-15 LAB
ANION GAP SERPL CALC-SCNC: 11 MMOL/L (ref 7–16)
BUN SERPL-MCNC: 33 MG/DL (ref 8–22)
CALCIUM SERPL-MCNC: 9.8 MG/DL (ref 8.5–10.5)
CHLORIDE SERPL-SCNC: 107 MMOL/L (ref 96–112)
CO2 SERPL-SCNC: 23 MMOL/L (ref 20–33)
CREAT SERPL-MCNC: 1.79 MG/DL (ref 0.5–1.4)
GFR SERPLBLD CREATININE-BSD FMLA CKD-EPI: 46 ML/MIN/1.73 M 2
GLUCOSE SERPL-MCNC: 136 MG/DL (ref 65–99)
POTASSIUM SERPL-SCNC: 4.5 MMOL/L (ref 3.6–5.5)
SODIUM SERPL-SCNC: 141 MMOL/L (ref 135–145)

## 2023-05-15 PROCEDURE — 80048 BASIC METABOLIC PNL TOTAL CA: CPT

## 2023-05-15 PROCEDURE — 36415 COLL VENOUS BLD VENIPUNCTURE: CPT

## 2023-05-19 ENCOUNTER — OFFICE VISIT (OUTPATIENT)
Dept: MEDICAL GROUP | Facility: MEDICAL CENTER | Age: 48
End: 2023-05-19
Payer: COMMERCIAL

## 2023-05-19 VITALS
TEMPERATURE: 97.6 F | OXYGEN SATURATION: 97 % | WEIGHT: 200.84 LBS | HEIGHT: 71 IN | DIASTOLIC BLOOD PRESSURE: 80 MMHG | RESPIRATION RATE: 14 BRPM | SYSTOLIC BLOOD PRESSURE: 120 MMHG | HEART RATE: 60 BPM | BODY MASS INDEX: 28.12 KG/M2

## 2023-05-19 DIAGNOSIS — N18.31 STAGE 3A CHRONIC KIDNEY DISEASE: ICD-10-CM

## 2023-05-19 DIAGNOSIS — R60.9 PERIPHERAL EDEMA: ICD-10-CM

## 2023-05-19 DIAGNOSIS — M21.70 LEG LENGTH DISCREPANCY: ICD-10-CM

## 2023-05-19 DIAGNOSIS — Z87.39 HISTORY OF LEGG-CALVE-PERTHES DISEASE: ICD-10-CM

## 2023-05-19 DIAGNOSIS — E11.22 TYPE 2 DIABETES MELLITUS WITH STAGE 3A CHRONIC KIDNEY DISEASE, WITHOUT LONG-TERM CURRENT USE OF INSULIN (HCC): ICD-10-CM

## 2023-05-19 DIAGNOSIS — N18.31 TYPE 2 DIABETES MELLITUS WITH STAGE 3A CHRONIC KIDNEY DISEASE, WITHOUT LONG-TERM CURRENT USE OF INSULIN (HCC): ICD-10-CM

## 2023-05-19 DIAGNOSIS — I10 HYPERTENSION, UNSPECIFIED TYPE: ICD-10-CM

## 2023-05-19 PROBLEM — E11.21 MACROALBUMINURIC DIABETIC NEPHROPATHY (HCC): Chronic | Status: ACTIVE | Noted: 2023-04-20

## 2023-05-19 PROBLEM — R07.9 CHEST PAIN: Status: RESOLVED | Noted: 2021-12-16 | Resolved: 2023-05-19

## 2023-05-19 PROBLEM — L84 FOOT CALLUS: Status: RESOLVED | Noted: 2020-03-11 | Resolved: 2023-05-19

## 2023-05-19 PROCEDURE — 3079F DIAST BP 80-89 MM HG: CPT | Performed by: NURSE PRACTITIONER

## 2023-05-19 PROCEDURE — 99214 OFFICE O/P EST MOD 30 MIN: CPT | Performed by: NURSE PRACTITIONER

## 2023-05-19 PROCEDURE — 3074F SYST BP LT 130 MM HG: CPT | Performed by: NURSE PRACTITIONER

## 2023-05-19 RX ORDER — EMPAGLIFLOZIN 25 MG/1
25 TABLET, FILM COATED ORAL DAILY
Qty: 90 TABLET | Refills: 1 | Status: SHIPPED | OUTPATIENT
Start: 2023-05-19 | End: 2023-11-20 | Stop reason: SDUPTHER

## 2023-05-19 RX ORDER — BLOOD-GLUCOSE SENSOR
EACH MISCELLANEOUS
Qty: 6 EACH | Refills: 6 | Status: SHIPPED | OUTPATIENT
Start: 2023-05-19

## 2023-05-19 ASSESSMENT — FIBROSIS 4 INDEX: FIB4 SCORE: 0.77

## 2023-05-19 NOTE — PROGRESS NOTES
Chief Complaint   Patient presents with    Follow-Up     Lab review       Subjective:     HPI:     Cayetano Hawkins is a 48 y.o. male here to discuss the following:    Follow up labs/diabetes/HTN/follow up      Wife accompanies patient today and assist with HPI    Diabetes  Compliant with glipizide, metformin.  Needs meter.  Lab Results   Component Value Date/Time    HBA1C 9.1 (A) 04/05/2023 0852    HBA1C 6.5 (H) 12/16/2021 1822    HBA1C 6.3 (H) 06/07/2021 1020    HBA1C 6.9 (H) 05/10/2021 1031      Latest Reference Range & Units 04/05/23 10:02 05/15/23 08:31   Sodium 135 - 145 mmol/L 142 141   Potassium 3.6 - 5.5 mmol/L 4.8 4.5   Chloride 96 - 112 mmol/L 108 107   Co2 20 - 33 mmol/L 25 23   Anion Gap 7.0 - 16.0  9.0 11.0   Glucose 65 - 99 mg/dL 135 (H) 136 (H)   Bun 8 - 22 mg/dL 19 33 (H)   Creatinine 0.50 - 1.40 mg/dL 1.68 (H) 1.79 (H)   GFR (CKD-EPI) >60 mL/min/1.73 m 2 49 ! 46 !     Stage 3a chronic kidney disease (HCC)  Chronic problem.  Compliant with medication although had been lost to follow-up until recently.    Of note he has been taking ibuprofen for hip pain.  He has history of Leg Calve Perthes dx. Has obvious leg length discrepancy.  Has not had any intervention for this.    Blood pressure  Has been compliant since restarting lisinopril and chlorthalidone since last office visit.  Swelling in his legs is also essentially resolved.  Electrolytes remain stable although slight increase in his creatinine.  GFR at 49.  Currently not followed by nephrology.    Peripheral edema  Swelling improved since restarting on chlorthalidone.  Electrolytes stable on recent blood work.      ROS: : see above        Current Outpatient Medications:     Continuous Blood Gluc Sensor (FREESTYLE SAGRARIO 3 SENSOR) Misc, Apply one sensor under skin every 14 days to check blood sugars before meals and at bedtime daily, Disp: 6 Each, Rfl: 6    Empagliflozin (JARDIANCE) 25 MG Tab, Take 1 tablet- 25 mg by mouth every day., Disp:  End of Shift Nursing Note    Bedside shift change report given to Lexa Dill (oncoming nurse) by Joanna Santiago RN (offgoing nurse). Report included the following information SBAR and Kardex. Zone Phone:   [phone]    Significant changes during shift:    none   Non-emergent issues for physician to address:   none     Number times ambulated in hallway past shift: 0      Number of times OOB to chair past shift: 4    POD #:      Vital Signs:    Temp: 98.7 °F (37.1 °C)     Pulse (Heart Rate): 97     BP: 116/76     Resp Rate: 16     O2 Sat (%): 97 %    Lines & Drains:     Urinary Catheter? No       NG tube [] in [] removed [] not applicable   Drains [] in [] removed [] not applicable     Skin Integrity:      Wounds: no   Dressings Present: no    Wound Concerns: no      GI:    Current diet:  DIET TUBE FEEDING  DIET NPO With Tube Feedings    Nausea: NO  Vomiting: NO  Bowel Sounds: YES  Flatus: YES  Last Bowel Movement: today   Appearance:     Respiratory:  Supplemental O2: Yes      Device: nasal   via 4 Liters/min     Incentive Spirometer: YES  Volume:   Coughing and Deep Breathing: YES  Oral Care: NO  Understanding (patient/family education): YES   Getting out of bed: YES  Head of bed elevation: YES    Patient Safety:    Falls Score: 3  Mobility Score: 1  Bed Alarm On? Yes  Sitter? No      Opportunity for questions and clarification was given to oncoming nurse. Patient bed is in low position, side rails are up x 2, door & observation blinds open as needed, call bell within reach and patient not in distress.     Omar Thayer RN "90 Tablet, Rfl: 1    ergocalciferol (DRISDOL) 98901 UNIT capsule, Take 1 Capsule by mouth every 7 days., Disp: 12 Capsule, Rfl: 1    metFORMIN (GLUCOPHAGE) 500 MG Tab, Take 2 Tablets by mouth every day for 90 days., Disp: 180 Tablet, Rfl: 3    rosuvastatin (CRESTOR) 20 MG Tab, Take 1 Tablet by mouth every evening., Disp: 90 Tablet, Rfl: 3    chlorthalidone (HYGROTON) 25 MG Tab, Take 1 Tablet by mouth every day., Disp: 90 Tablet, Rfl: 3    lisinopril (PRINIVIL) 20 MG Tab, Take 1 Tablet by mouth every day., Disp: 90 Tablet, Rfl: 1    Continuous Blood Gluc  (FREESTYLE SAGRARIO 2 READER) Device, 1 Device 4 Times a Day,Before Meals and at Bedtime., Disp: 1 Each, Rfl: 0    glipiZIDE SR (GLUCOTROL) 5 MG TABLET SR 24 HR, Take 1 Tablet by mouth every day., Disp: 90 Tablet, Rfl: 1    omeprazole (PRILOSEC) 20 MG delayed-release capsule, Take 20 mg by mouth 2 times a day., Disp: , Rfl:     metoprolol tartrate (LOPRESSOR) 25 MG Tab, TAKE 1 TABLET BY MOUTH TWICE A DAY (Patient taking differently: Take 25 mg by mouth 2 times a day.), Disp: 180 Tablet, Rfl: 1    Cholecalciferol (VITAMIN D3) 125 MCG (5000 UT) Cap, TAKE 1 CAPSULE BY MOUTH EVERY DAY IN THE MORNING (Patient taking differently: Take 1 Capsule by mouth every day.), Disp: 30 Capsule, Rfl: 0    aspirin 81 MG EC tablet, Take 1 Tablet by mouth every day., Disp: 90 Tablet, Rfl: 0    Allergies   Allergen Reactions    Bee Swelling     Bee stings       Objective:     Vitals: /80 (BP Location: Right arm, Patient Position: Sitting, BP Cuff Size: Adult)   Pulse 60   Temp 36.4 °C (97.6 °F) (Temporal)   Resp 14   Ht 1.803 m (5' 11\")   Wt 91.1 kg (200 lb 13.4 oz)   SpO2 97%   BMI 28.01 kg/m²    General: Alert, pleasant, NAD  HEENT: Normocephalic.  Neck supple.   Respiratory: no distress, no audible wheezing, RR -WNL  Skin: Warm, dry, no rashes.  Extremities: No leg edema. No discoloration.  Leg length discrepancy  Neurological: No tremors  Psych:  Affect/mood is " normal, judgement is good, memory is intact, grooming is appropriate.    Assessment/Plan:      1. Type 2 diabetes mellitus with stage 3a chronic kidney disease, without long-term current use of insulin (HCC)  Chronic.  Not well controlled.  It is not time for an A1c check yet today.  Continue metformin and glipizide.  Add on Jardiance.  It was previously not affordable however willing to see what the co-pay will be this time.  Have also recommended freestyle sagrario sensor he does have a smart phone.  Follow-up to recheck A1c in clinic in 8 weeks.  - Continuous Blood Gluc Sensor (FREESTYLE SAGRARIO 3 SENSOR) [facility]; Apply one sensor under skin every 14 days to check blood sugars before meals and at bedtime daily  Dispense: 6 Each; Refill: 6  - Empagliflozin (JARDIANCE) 25 MG Tab; Take 1 tablet- 25 mg by mouth every day.  Dispense: 90 Tablet; Refill: 1  - Basic Metabolic Panel; Future  - Referral to Nephrology    2. Stage 3a chronic kidney disease (HCC)  Chronic.  Recommend optimizing blood sugar, blood pressure and routine monitoring.  Continue lisinopril and chlorthalidone at this time.  Start on SGLT2.  Avoid NSAIDs as much as possible, renal dose medications.  Recheck BMP prior to next office visit.   Refer to nephrology.  - Basic Metabolic Panel; Future  - Referral to Nephrology    3. Hypertension, unspecified type  Chronic.  Blood pressure stable in the clinic.  Continue lisinopril and chlorthalidone at this time.  - Referral to Nephrology    4. Peripheral edema  Stabilized once resuming chlorthalidone, electrolytes stable, recommend DASH diet, compression stockings.    5. History of Qmay-Ryktf-Pfdgrje disease  History of.  Significant leg length discrepancy.  Recommend evaluation with orthopedics.  - Referral to Orthopedics    6. Leg length discrepancy  Chronic, not managed at this time however he has been treating himself with NSAIDs although he does have CKD.  Recommend reducing his NSAID use and he may take  Tylenol however recommend evaluation with orthopedics for further treatment options.  - Referral to Orthopedics      Return in about 8 weeks (around 7/14/2023) for Diabetes, Lab results.    {I have placed the above orders and discussed them with an approved delegating provider. The MA is performing the below orders under the direction of Dr. Mike TIJERINA

## 2023-05-30 ENCOUNTER — PHARMACY VISIT (OUTPATIENT)
Dept: PHARMACY | Facility: MEDICAL CENTER | Age: 48
End: 2023-05-30
Payer: COMMERCIAL

## 2023-05-30 PROCEDURE — RXMED WILLOW AMBULATORY MEDICATION CHARGE: Performed by: NURSE PRACTITIONER

## 2023-07-11 ENCOUNTER — APPOINTMENT (OUTPATIENT)
Dept: MEDICAL GROUP | Facility: MEDICAL CENTER | Age: 48
End: 2023-07-11
Payer: COMMERCIAL

## 2023-07-17 PROCEDURE — RXMED WILLOW AMBULATORY MEDICATION CHARGE: Performed by: NURSE PRACTITIONER

## 2023-07-19 ENCOUNTER — PHARMACY VISIT (OUTPATIENT)
Dept: PHARMACY | Facility: MEDICAL CENTER | Age: 48
End: 2023-07-19
Payer: MEDICARE

## 2023-08-14 PROCEDURE — RXMED WILLOW AMBULATORY MEDICATION CHARGE: Performed by: NURSE PRACTITIONER

## 2023-08-15 ENCOUNTER — PHARMACY VISIT (OUTPATIENT)
Dept: PHARMACY | Facility: MEDICAL CENTER | Age: 48
End: 2023-08-15
Payer: MEDICARE

## 2023-08-24 ENCOUNTER — NON-PROVIDER VISIT (OUTPATIENT)
Dept: OCCUPATIONAL MEDICINE | Facility: CLINIC | Age: 48
End: 2023-08-24
Payer: COMMERCIAL

## 2023-08-24 DIAGNOSIS — Z02.1 PRE-EMPLOYMENT DRUG SCREENING: ICD-10-CM

## 2023-08-24 LAB
AMP AMPHETAMINE: NORMAL
COC COCAINE: NORMAL
INT CON NEG: NORMAL
INT CON POS: NORMAL
MET METHAMPHETAMINES: NORMAL
OPI OPIATES: NORMAL
PCP PHENCYCLIDINE: NORMAL
POC DRUG COMMENT 753798-OCCUPATIONAL HEALTH: NORMAL
THC: NORMAL

## 2023-08-24 PROCEDURE — 80305 DRUG TEST PRSMV DIR OPT OBS: CPT | Performed by: NURSE PRACTITIONER

## 2023-09-10 PROCEDURE — RXMED WILLOW AMBULATORY MEDICATION CHARGE: Performed by: NURSE PRACTITIONER

## 2023-09-13 ENCOUNTER — APPOINTMENT (OUTPATIENT)
Dept: RADIOLOGY | Facility: MEDICAL CENTER | Age: 48
DRG: 683 | End: 2023-09-13
Attending: EMERGENCY MEDICINE
Payer: COMMERCIAL

## 2023-09-13 ENCOUNTER — HOSPITAL ENCOUNTER (INPATIENT)
Facility: MEDICAL CENTER | Age: 48
LOS: 2 days | DRG: 683 | End: 2023-09-15
Attending: EMERGENCY MEDICINE | Admitting: INTERNAL MEDICINE
Payer: COMMERCIAL

## 2023-09-13 DIAGNOSIS — R79.89 ELEVATED TROPONIN: ICD-10-CM

## 2023-09-13 DIAGNOSIS — R55 SYNCOPE, UNSPECIFIED SYNCOPE TYPE: ICD-10-CM

## 2023-09-13 DIAGNOSIS — N17.9 ACUTE RENAL FAILURE, UNSPECIFIED ACUTE RENAL FAILURE TYPE (HCC): ICD-10-CM

## 2023-09-13 DIAGNOSIS — E86.0 DEHYDRATION: ICD-10-CM

## 2023-09-13 DIAGNOSIS — K21.9 GASTROESOPHAGEAL REFLUX DISEASE WITHOUT ESOPHAGITIS: Chronic | ICD-10-CM

## 2023-09-13 DIAGNOSIS — R11.11 VOMITING WITHOUT NAUSEA, UNSPECIFIED VOMITING TYPE: ICD-10-CM

## 2023-09-13 DIAGNOSIS — A04.8 H. PYLORI INFECTION: ICD-10-CM

## 2023-09-13 PROBLEM — K52.9 GASTROENTERITIS: Status: ACTIVE | Noted: 2023-09-13

## 2023-09-13 LAB
ALBUMIN SERPL BCP-MCNC: 4.1 G/DL (ref 3.2–4.9)
ALBUMIN/GLOB SERPL: 1.2 G/DL
ALP SERPL-CCNC: 89 U/L (ref 30–99)
ALT SERPL-CCNC: 70 U/L (ref 2–50)
ANION GAP SERPL CALC-SCNC: 19 MMOL/L (ref 7–16)
AST SERPL-CCNC: 52 U/L (ref 12–45)
BASOPHILS # BLD AUTO: 0.4 % (ref 0–1.8)
BASOPHILS # BLD: 0.05 K/UL (ref 0–0.12)
BILIRUB SERPL-MCNC: 0.3 MG/DL (ref 0.1–1.5)
BUN SERPL-MCNC: 70 MG/DL (ref 8–22)
CALCIUM ALBUM COR SERPL-MCNC: 9.5 MG/DL (ref 8.5–10.5)
CALCIUM SERPL-MCNC: 9.6 MG/DL (ref 8.4–10.2)
CHLORIDE SERPL-SCNC: 99 MMOL/L (ref 96–112)
CO2 SERPL-SCNC: 21 MMOL/L (ref 20–33)
CREAT SERPL-MCNC: 4.5 MG/DL (ref 0.5–1.4)
EKG IMPRESSION: NORMAL
EKG IMPRESSION: NORMAL
EOSINOPHIL # BLD AUTO: 0.03 K/UL (ref 0–0.51)
EOSINOPHIL NFR BLD: 0.2 % (ref 0–6.9)
ERYTHROCYTE [DISTWIDTH] IN BLOOD BY AUTOMATED COUNT: 42.5 FL (ref 35.9–50)
FLUAV RNA SPEC QL NAA+PROBE: NEGATIVE
FLUBV RNA SPEC QL NAA+PROBE: NEGATIVE
GFR SERPLBLD CREATININE-BSD FMLA CKD-EPI: 15 ML/MIN/1.73 M 2
GLOBULIN SER CALC-MCNC: 3.4 G/DL (ref 1.9–3.5)
GLUCOSE SERPL-MCNC: 189 MG/DL (ref 65–99)
HCT VFR BLD AUTO: 42.7 % (ref 42–52)
HGB BLD-MCNC: 14.4 G/DL (ref 14–18)
IMM GRANULOCYTES # BLD AUTO: 0.04 K/UL (ref 0–0.11)
IMM GRANULOCYTES NFR BLD AUTO: 0.3 % (ref 0–0.9)
LYMPHOCYTES # BLD AUTO: 2.03 K/UL (ref 1–4.8)
LYMPHOCYTES NFR BLD: 15.4 % (ref 22–41)
MCH RBC QN AUTO: 30.5 PG (ref 27–33)
MCHC RBC AUTO-ENTMCNC: 33.7 G/DL (ref 32.3–36.5)
MCV RBC AUTO: 90.5 FL (ref 81.4–97.8)
MONOCYTES # BLD AUTO: 0.71 K/UL (ref 0–0.85)
MONOCYTES NFR BLD AUTO: 5.4 % (ref 0–13.4)
NEUTROPHILS # BLD AUTO: 10.34 K/UL (ref 1.82–7.42)
NEUTROPHILS NFR BLD: 78.3 % (ref 44–72)
NRBC # BLD AUTO: 0 K/UL
NRBC BLD-RTO: 0 /100 WBC (ref 0–0.2)
PLATELET # BLD AUTO: 331 K/UL (ref 164–446)
PMV BLD AUTO: 8.9 FL (ref 9–12.9)
POTASSIUM SERPL-SCNC: 4.6 MMOL/L (ref 3.6–5.5)
PROT SERPL-MCNC: 7.5 G/DL (ref 6–8.2)
RBC # BLD AUTO: 4.72 M/UL (ref 4.7–6.1)
RSV RNA SPEC QL NAA+PROBE: NEGATIVE
SARS-COV-2 RNA RESP QL NAA+PROBE: NOTDETECTED
SODIUM SERPL-SCNC: 139 MMOL/L (ref 135–145)
SPECIMEN SOURCE: NORMAL
TROPONIN T SERPL-MCNC: 177 NG/L (ref 6–19)
TROPONIN T SERPL-MCNC: 226 NG/L (ref 6–19)
WBC # BLD AUTO: 13.2 K/UL (ref 4.8–10.8)

## 2023-09-13 PROCEDURE — 700105 HCHG RX REV CODE 258: Performed by: EMERGENCY MEDICINE

## 2023-09-13 PROCEDURE — 99223 1ST HOSP IP/OBS HIGH 75: CPT | Performed by: INTERNAL MEDICINE

## 2023-09-13 PROCEDURE — 0241U HCHG SARS-COV-2 COVID-19 NFCT DS RESP RNA 4 TRGT MIC: CPT

## 2023-09-13 PROCEDURE — C9803 HOPD COVID-19 SPEC COLLECT: HCPCS | Performed by: EMERGENCY MEDICINE

## 2023-09-13 PROCEDURE — 36415 COLL VENOUS BLD VENIPUNCTURE: CPT

## 2023-09-13 PROCEDURE — 93005 ELECTROCARDIOGRAM TRACING: CPT

## 2023-09-13 PROCEDURE — 85025 COMPLETE CBC W/AUTO DIFF WBC: CPT

## 2023-09-13 PROCEDURE — 84484 ASSAY OF TROPONIN QUANT: CPT

## 2023-09-13 PROCEDURE — 770020 HCHG ROOM/CARE - TELE (206)

## 2023-09-13 PROCEDURE — 93005 ELECTROCARDIOGRAM TRACING: CPT | Performed by: EMERGENCY MEDICINE

## 2023-09-13 PROCEDURE — 71045 X-RAY EXAM CHEST 1 VIEW: CPT

## 2023-09-13 PROCEDURE — 80053 COMPREHEN METABOLIC PANEL: CPT

## 2023-09-13 PROCEDURE — 70450 CT HEAD/BRAIN W/O DYE: CPT

## 2023-09-13 PROCEDURE — A9270 NON-COVERED ITEM OR SERVICE: HCPCS | Performed by: EMERGENCY MEDICINE

## 2023-09-13 PROCEDURE — 99285 EMERGENCY DEPT VISIT HI MDM: CPT

## 2023-09-13 PROCEDURE — 96374 THER/PROPH/DIAG INJ IV PUSH: CPT

## 2023-09-13 PROCEDURE — 700102 HCHG RX REV CODE 250 W/ 637 OVERRIDE(OP): Performed by: EMERGENCY MEDICINE

## 2023-09-13 PROCEDURE — 700111 HCHG RX REV CODE 636 W/ 250 OVERRIDE (IP): Mod: JZ | Performed by: EMERGENCY MEDICINE

## 2023-09-13 RX ORDER — ONDANSETRON 2 MG/ML
4 INJECTION INTRAMUSCULAR; INTRAVENOUS EVERY 4 HOURS PRN
Status: DISCONTINUED | OUTPATIENT
Start: 2023-09-13 | End: 2023-09-15 | Stop reason: HOSPADM

## 2023-09-13 RX ORDER — ACETAMINOPHEN 325 MG/1
650 TABLET ORAL EVERY 6 HOURS PRN
Status: DISCONTINUED | OUTPATIENT
Start: 2023-09-13 | End: 2023-09-15 | Stop reason: HOSPADM

## 2023-09-13 RX ORDER — SODIUM CHLORIDE, SODIUM LACTATE, POTASSIUM CHLORIDE, CALCIUM CHLORIDE 600; 310; 30; 20 MG/100ML; MG/100ML; MG/100ML; MG/100ML
1000 INJECTION, SOLUTION INTRAVENOUS ONCE
Status: COMPLETED | OUTPATIENT
Start: 2023-09-13 | End: 2023-09-13

## 2023-09-13 RX ORDER — ONDANSETRON 2 MG/ML
4 INJECTION INTRAMUSCULAR; INTRAVENOUS ONCE
Status: COMPLETED | OUTPATIENT
Start: 2023-09-13 | End: 2023-09-13

## 2023-09-13 RX ORDER — PROCHLORPERAZINE EDISYLATE 5 MG/ML
5-10 INJECTION INTRAMUSCULAR; INTRAVENOUS EVERY 4 HOURS PRN
Status: DISCONTINUED | OUTPATIENT
Start: 2023-09-13 | End: 2023-09-15 | Stop reason: HOSPADM

## 2023-09-13 RX ORDER — HEPARIN SODIUM 5000 [USP'U]/ML
5000 INJECTION, SOLUTION INTRAVENOUS; SUBCUTANEOUS EVERY 8 HOURS
Status: DISCONTINUED | OUTPATIENT
Start: 2023-09-13 | End: 2023-09-15 | Stop reason: HOSPADM

## 2023-09-13 RX ORDER — PROMETHAZINE HYDROCHLORIDE 25 MG/1
12.5-25 TABLET ORAL EVERY 4 HOURS PRN
Status: DISCONTINUED | OUTPATIENT
Start: 2023-09-13 | End: 2023-09-15 | Stop reason: HOSPADM

## 2023-09-13 RX ORDER — DEXTROSE MONOHYDRATE 25 G/50ML
25 INJECTION, SOLUTION INTRAVENOUS
Status: DISCONTINUED | OUTPATIENT
Start: 2023-09-13 | End: 2023-09-15 | Stop reason: HOSPADM

## 2023-09-13 RX ORDER — LABETALOL HYDROCHLORIDE 5 MG/ML
10 INJECTION, SOLUTION INTRAVENOUS EVERY 4 HOURS PRN
Status: DISCONTINUED | OUTPATIENT
Start: 2023-09-13 | End: 2023-09-15 | Stop reason: HOSPADM

## 2023-09-13 RX ORDER — SODIUM CHLORIDE 9 MG/ML
INJECTION, SOLUTION INTRAVENOUS CONTINUOUS
Status: DISCONTINUED | OUTPATIENT
Start: 2023-09-13 | End: 2023-09-15 | Stop reason: HOSPADM

## 2023-09-13 RX ORDER — ROSUVASTATIN CALCIUM 10 MG/1
20 TABLET, COATED ORAL DAILY
Status: DISCONTINUED | OUTPATIENT
Start: 2023-09-14 | End: 2023-09-15 | Stop reason: HOSPADM

## 2023-09-13 RX ORDER — PROMETHAZINE HYDROCHLORIDE 25 MG/1
12.5-25 SUPPOSITORY RECTAL EVERY 4 HOURS PRN
Status: DISCONTINUED | OUTPATIENT
Start: 2023-09-13 | End: 2023-09-15 | Stop reason: HOSPADM

## 2023-09-13 RX ORDER — ONDANSETRON 4 MG/1
4 TABLET, ORALLY DISINTEGRATING ORAL EVERY 4 HOURS PRN
Status: DISCONTINUED | OUTPATIENT
Start: 2023-09-13 | End: 2023-09-15 | Stop reason: HOSPADM

## 2023-09-13 RX ORDER — ASPIRIN 81 MG/1
324 TABLET, CHEWABLE ORAL ONCE
Status: COMPLETED | OUTPATIENT
Start: 2023-09-13 | End: 2023-09-13

## 2023-09-13 RX ADMIN — SODIUM CHLORIDE, POTASSIUM CHLORIDE, SODIUM LACTATE AND CALCIUM CHLORIDE 1000 ML: 600; 310; 30; 20 INJECTION, SOLUTION INTRAVENOUS at 20:31

## 2023-09-13 RX ADMIN — SODIUM CHLORIDE, POTASSIUM CHLORIDE, SODIUM LACTATE AND CALCIUM CHLORIDE 1000 ML: 600; 310; 30; 20 INJECTION, SOLUTION INTRAVENOUS at 20:58

## 2023-09-13 RX ADMIN — ONDANSETRON 4 MG: 2 INJECTION INTRAMUSCULAR; INTRAVENOUS at 20:31

## 2023-09-13 RX ADMIN — ASPIRIN 81 MG 324 MG: 81 TABLET ORAL at 21:19

## 2023-09-13 ASSESSMENT — ENCOUNTER SYMPTOMS
DIARRHEA: 1
SPUTUM PRODUCTION: 0
VOMITING: 1
MYALGIAS: 0
LOSS OF CONSCIOUSNESS: 1
CHILLS: 1
FEVER: 1
WEAKNESS: 0
HEADACHES: 0
NAUSEA: 1
PALPITATIONS: 0
TINGLING: 0
CONSTIPATION: 0
ABDOMINAL PAIN: 0
COUGH: 0
SHORTNESS OF BREATH: 0
STRIDOR: 0
DIZZINESS: 1
FALLS: 0
DEPRESSION: 0

## 2023-09-13 ASSESSMENT — LIFESTYLE VARIABLES
CONSUMPTION TOTAL: NEGATIVE
HAVE YOU EVER FELT YOU SHOULD CUT DOWN ON YOUR DRINKING: NO
EVER HAD A DRINK FIRST THING IN THE MORNING TO STEADY YOUR NERVES TO GET RID OF A HANGOVER: NO
EVER FELT BAD OR GUILTY ABOUT YOUR DRINKING: NO
AVERAGE NUMBER OF DAYS PER WEEK YOU HAVE A DRINK CONTAINING ALCOHOL: 0
TOTAL SCORE: 0
HAVE PEOPLE ANNOYED YOU BY CRITICIZING YOUR DRINKING: NO
TOTAL SCORE: 0
ALCOHOL_USE: NO
TOTAL SCORE: 0
ON A TYPICAL DAY WHEN YOU DRINK ALCOHOL HOW MANY DRINKS DO YOU HAVE: 0
HOW MANY TIMES IN THE PAST YEAR HAVE YOU HAD 5 OR MORE DRINKS IN A DAY: 0

## 2023-09-13 ASSESSMENT — COGNITIVE AND FUNCTIONAL STATUS - GENERAL
DAILY ACTIVITIY SCORE: 24
SUGGESTED CMS G CODE MODIFIER MOBILITY: CH
MOBILITY SCORE: 24
SUGGESTED CMS G CODE MODIFIER DAILY ACTIVITY: CH

## 2023-09-13 ASSESSMENT — PAIN DESCRIPTION - PAIN TYPE
TYPE: ACUTE PAIN
TYPE: ACUTE PAIN

## 2023-09-13 ASSESSMENT — FIBROSIS 4 INDEX
FIB4 SCORE: 0.77
FIB4 SCORE: 0.92

## 2023-09-14 ENCOUNTER — APPOINTMENT (OUTPATIENT)
Dept: CARDIOLOGY | Facility: MEDICAL CENTER | Age: 48
DRG: 683 | End: 2023-09-14
Attending: INTERNAL MEDICINE
Payer: COMMERCIAL

## 2023-09-14 LAB
ANION GAP SERPL CALC-SCNC: 14 MMOL/L (ref 7–16)
BUN SERPL-MCNC: 64 MG/DL (ref 8–22)
CALCIUM SERPL-MCNC: 8.8 MG/DL (ref 8.4–10.2)
CHLORIDE SERPL-SCNC: 103 MMOL/L (ref 96–112)
CO2 SERPL-SCNC: 21 MMOL/L (ref 20–33)
CREAT SERPL-MCNC: 3.3 MG/DL (ref 0.5–1.4)
ERYTHROCYTE [DISTWIDTH] IN BLOOD BY AUTOMATED COUNT: 42.5 FL (ref 35.9–50)
GFR SERPLBLD CREATININE-BSD FMLA CKD-EPI: 22 ML/MIN/1.73 M 2
GLUCOSE BLD STRIP.AUTO-MCNC: 125 MG/DL (ref 65–99)
GLUCOSE BLD STRIP.AUTO-MCNC: 128 MG/DL (ref 65–99)
GLUCOSE BLD STRIP.AUTO-MCNC: 136 MG/DL (ref 65–99)
GLUCOSE BLD STRIP.AUTO-MCNC: 174 MG/DL (ref 65–99)
GLUCOSE SERPL-MCNC: 149 MG/DL (ref 65–99)
HCT VFR BLD AUTO: 36.3 % (ref 42–52)
HGB BLD-MCNC: 12.3 G/DL (ref 14–18)
LV EJECT FRACT MOD 2C 99903: 59.03
LV EJECT FRACT MOD 4C 99902: 66.08
LV EJECT FRACT MOD BP 99901: 61.59
MCH RBC QN AUTO: 30.4 PG (ref 27–33)
MCHC RBC AUTO-ENTMCNC: 33.9 G/DL (ref 32.3–36.5)
MCV RBC AUTO: 89.6 FL (ref 81.4–97.8)
PLATELET # BLD AUTO: 252 K/UL (ref 164–446)
PMV BLD AUTO: 8.8 FL (ref 9–12.9)
POTASSIUM SERPL-SCNC: 4.1 MMOL/L (ref 3.6–5.5)
RBC # BLD AUTO: 4.05 M/UL (ref 4.7–6.1)
SODIUM SERPL-SCNC: 138 MMOL/L (ref 135–145)
TROPONIN T SERPL-MCNC: 133 NG/L (ref 6–19)
TROPONIN T SERPL-MCNC: 95 NG/L (ref 6–19)
WBC # BLD AUTO: 9.7 K/UL (ref 4.8–10.8)

## 2023-09-14 PROCEDURE — 80048 BASIC METABOLIC PNL TOTAL CA: CPT

## 2023-09-14 PROCEDURE — 51798 US URINE CAPACITY MEASURE: CPT

## 2023-09-14 PROCEDURE — 700105 HCHG RX REV CODE 258: Performed by: INTERNAL MEDICINE

## 2023-09-14 PROCEDURE — 700111 HCHG RX REV CODE 636 W/ 250 OVERRIDE (IP): Performed by: INTERNAL MEDICINE

## 2023-09-14 PROCEDURE — C9113 INJ PANTOPRAZOLE SODIUM, VIA: HCPCS | Performed by: INTERNAL MEDICINE

## 2023-09-14 PROCEDURE — 36415 COLL VENOUS BLD VENIPUNCTURE: CPT

## 2023-09-14 PROCEDURE — 700102 HCHG RX REV CODE 250 W/ 637 OVERRIDE(OP): Performed by: INTERNAL MEDICINE

## 2023-09-14 PROCEDURE — 82570 ASSAY OF URINE CREATININE: CPT

## 2023-09-14 PROCEDURE — A9270 NON-COVERED ITEM OR SERVICE: HCPCS | Performed by: INTERNAL MEDICINE

## 2023-09-14 PROCEDURE — 99233 SBSQ HOSP IP/OBS HIGH 50: CPT | Performed by: INTERNAL MEDICINE

## 2023-09-14 PROCEDURE — 93306 TTE W/DOPPLER COMPLETE: CPT | Mod: 26 | Performed by: STUDENT IN AN ORGANIZED HEALTH CARE EDUCATION/TRAINING PROGRAM

## 2023-09-14 PROCEDURE — 84484 ASSAY OF TROPONIN QUANT: CPT

## 2023-09-14 PROCEDURE — 82962 GLUCOSE BLOOD TEST: CPT | Mod: 91

## 2023-09-14 PROCEDURE — 84300 ASSAY OF URINE SODIUM: CPT

## 2023-09-14 PROCEDURE — 93306 TTE W/DOPPLER COMPLETE: CPT

## 2023-09-14 PROCEDURE — 94760 N-INVAS EAR/PLS OXIMETRY 1: CPT

## 2023-09-14 PROCEDURE — 85027 COMPLETE CBC AUTOMATED: CPT

## 2023-09-14 PROCEDURE — 770020 HCHG ROOM/CARE - TELE (206)

## 2023-09-14 RX ORDER — PANTOPRAZOLE SODIUM 40 MG/10ML
40 INJECTION, POWDER, LYOPHILIZED, FOR SOLUTION INTRAVENOUS 2 TIMES DAILY
Status: DISCONTINUED | OUTPATIENT
Start: 2023-09-14 | End: 2023-09-14

## 2023-09-14 RX ORDER — PANTOPRAZOLE SODIUM 40 MG/10ML
40 INJECTION, POWDER, LYOPHILIZED, FOR SOLUTION INTRAVENOUS ONCE
Status: COMPLETED | OUTPATIENT
Start: 2023-09-14 | End: 2023-09-14

## 2023-09-14 RX ORDER — PANTOPRAZOLE SODIUM 40 MG/10ML
40 INJECTION, POWDER, LYOPHILIZED, FOR SOLUTION INTRAVENOUS 2 TIMES DAILY
Status: DISCONTINUED | OUTPATIENT
Start: 2023-09-14 | End: 2023-09-15

## 2023-09-14 RX ADMIN — SODIUM CHLORIDE: 9 INJECTION, SOLUTION INTRAVENOUS at 00:03

## 2023-09-14 RX ADMIN — HEPARIN SODIUM 5000 UNITS: 5000 INJECTION, SOLUTION INTRAVENOUS; SUBCUTANEOUS at 00:04

## 2023-09-14 RX ADMIN — PANTOPRAZOLE SODIUM 40 MG: 40 INJECTION, POWDER, LYOPHILIZED, FOR SOLUTION INTRAVENOUS at 21:09

## 2023-09-14 RX ADMIN — HEPARIN SODIUM 5000 UNITS: 5000 INJECTION, SOLUTION INTRAVENOUS; SUBCUTANEOUS at 21:09

## 2023-09-14 RX ADMIN — PROCHLORPERAZINE EDISYLATE 10 MG: 5 INJECTION INTRAMUSCULAR; INTRAVENOUS at 07:38

## 2023-09-14 RX ADMIN — PANTOPRAZOLE SODIUM 40 MG: 40 INJECTION, POWDER, LYOPHILIZED, FOR SOLUTION INTRAVENOUS at 13:04

## 2023-09-14 RX ADMIN — SODIUM CHLORIDE: 9 INJECTION, SOLUTION INTRAVENOUS at 13:06

## 2023-09-14 RX ADMIN — SODIUM CHLORIDE: 9 INJECTION, SOLUTION INTRAVENOUS at 18:33

## 2023-09-14 RX ADMIN — ONDANSETRON 4 MG: 2 INJECTION INTRAMUSCULAR; INTRAVENOUS at 05:26

## 2023-09-14 RX ADMIN — HEPARIN SODIUM 5000 UNITS: 5000 INJECTION, SOLUTION INTRAVENOUS; SUBCUTANEOUS at 13:05

## 2023-09-14 RX ADMIN — ROSUVASTATIN 20 MG: 10 TABLET, FILM COATED ORAL at 05:31

## 2023-09-14 RX ADMIN — SODIUM CHLORIDE: 9 INJECTION, SOLUTION INTRAVENOUS at 06:31

## 2023-09-14 RX ADMIN — HEPARIN SODIUM 5000 UNITS: 5000 INJECTION, SOLUTION INTRAVENOUS; SUBCUTANEOUS at 05:27

## 2023-09-14 ASSESSMENT — ENCOUNTER SYMPTOMS
CHILLS: 0
FEVER: 0
HEADACHES: 0
COUGH: 0
VOMITING: 0
BACK PAIN: 0
ABDOMINAL PAIN: 0
NAUSEA: 1
CONSTIPATION: 0
DIARRHEA: 0
DIZZINESS: 0
HEARTBURN: 1
PALPITATIONS: 0
SHORTNESS OF BREATH: 0

## 2023-09-14 ASSESSMENT — PAIN DESCRIPTION - PAIN TYPE: TYPE: ACUTE PAIN

## 2023-09-14 ASSESSMENT — FIBROSIS 4 INDEX: FIB4 SCORE: 0.92

## 2023-09-14 NOTE — ASSESSMENT & PLAN NOTE
Causing nausea, vomiting and diarrhea  Viral panel was negative  Supportive measures  Causing significant dehydration, start IV fluids    Patient's chronic vomiting is likely GERD related.  I am giving a referral to GI.  I have started IV Protonix.

## 2023-09-14 NOTE — ED NOTES
tech from Lab called with critical result of trop 226 at 2040. Critical lab result read back to tech.   Dr. perry notified of critical lab result at 2040.  Critical lab result read back by Dr. perry.

## 2023-09-14 NOTE — CARE PLAN
Problem: Psychosocial  Goal: Patient's level of anxiety will decrease  Outcome: Progressing     Problem: Communication  Goal: The ability to communicate needs accurately and effectively will improve  Outcome: Progressing     Problem: Fluid Volume  Goal: Fluid volume balance will be maintained  Outcome: Progressing   The patient is Watcher - Medium risk of patient condition declining or worsening         Progress made toward(s) clinical / shift goals:  patient is getting IV fluids    Patient is not progressing towards the following goals:

## 2023-09-14 NOTE — PROGRESS NOTES
"Hospital Medicine Daily Progress Note    Date of Service  9/14/2023    Chief Complaint  Cayetano Hawkins is a 48 y.o. male admitted 9/13/2023 with  Chief Complaint   Patient presents with    Syncope     Pt states he stood up from chair and \"blacked out\" pt fell and hit head on table, c/o dizziness    Flu Like Symptoms     Started yesterday, c/o body aches, fatigue, chills, nausea     Hospital Course  No notes on file    Interval Problem Update  9/14:  Patient stated he has been vomiting for the last \"2 years\" every single night at the same time at about 3 AM in the morning.  Wife at bedside, confirms the story.  He has not visited any GI doctor but has been with [facility] before.  He recalls his last endoscopy in 2018 only.  He states he does have heartburn issues.  He denied any smoking, illicit drug use or alcohol use.  - I reviewed labs, Cr 3.30, continue IVF.   - troponin now 95, 133, from 177 and 226 on admission.    I have discussed this patient's plan of care and discharge plan at IDT rounds today with Case Management, Nursing, Nursing leadership, and other members of the IDT team.    Consultants/Specialty  none    Code Status  Full Code    Disposition  The patient is not medically cleared for discharge to home or a post-acute facility.  Anticipate discharge to: home with close outpatient follow-up    I have placed the appropriate orders for post-discharge needs.    Review of Systems  Review of Systems   Constitutional:  Negative for chills, fever and malaise/fatigue.   Respiratory:  Negative for cough and shortness of breath.    Cardiovascular:  Negative for chest pain and palpitations.   Gastrointestinal:  Positive for heartburn and nausea. Negative for abdominal pain, constipation, diarrhea and vomiting.   Musculoskeletal:  Negative for back pain and joint pain.   Neurological:  Negative for dizziness and headaches.   All other systems reviewed and are negative.       Physical " Exam  Temp:  [36.4 °C (97.5 °F)-36.8 °C (98.3 °F)] 36.8 °C (98.2 °F)  Pulse:  [57-81] 61  Resp:  [16-21] 18  BP: ()/(50-87) 122/78  SpO2:  [94 %-100 %] 98 %    Physical Exam  Vitals and nursing note reviewed.   Constitutional:       General: He is not in acute distress.     Appearance: He is not diaphoretic.   HENT:      Mouth/Throat:      Mouth: Mucous membranes are dry.      Pharynx: No oropharyngeal exudate.   Cardiovascular:      Rate and Rhythm: Normal rate and regular rhythm.      Pulses: Normal pulses.      Heart sounds: Normal heart sounds. No murmur heard.  Pulmonary:      Effort: Pulmonary effort is normal. No respiratory distress.      Breath sounds: Normal breath sounds. No wheezing or rales.   Abdominal:      General: Bowel sounds are normal. There is no distension.      Tenderness: There is abdominal tenderness.   Musculoskeletal:         General: No swelling or tenderness. Normal range of motion.   Skin:     General: Skin is warm.      Capillary Refill: Capillary refill takes less than 2 seconds.      Coloration: Skin is not jaundiced or pale.   Neurological:      General: No focal deficit present.      Mental Status: He is alert and oriented to person, place, and time. Mental status is at baseline.      Motor: No weakness.   Psychiatric:         Mood and Affect: Mood normal.         Behavior: Behavior normal.         Thought Content: Thought content normal.         Judgment: Judgment normal.         Fluids    Intake/Output Summary (Last 24 hours) at 9/14/2023 1800  Last data filed at 9/14/2023 1737  Gross per 24 hour   Intake 320 ml   Output 4150 ml   Net -3830 ml       Laboratory  Recent Labs     09/13/23 2006 09/14/23  0402   WBC 13.2* 9.7   RBC 4.72 4.05*   HEMOGLOBIN 14.4 12.3*   HEMATOCRIT 42.7 36.3*   MCV 90.5 89.6   MCH 30.5 30.4   MCHC 33.7 33.9   RDW 42.5 42.5   PLATELETCT 331 252   MPV 8.9* 8.8*     Recent Labs     09/13/23 2006 09/14/23  0402   SODIUM 139 138   POTASSIUM 4.6 4.1  "  CHLORIDE 99 103   CO2 21 21   GLUCOSE 189* 149*   BUN 70* 64*   CREATININE 4.50* 3.30*   CALCIUM 9.6 8.8                   Imaging  EC-ECHOCARDIOGRAM COMPLETE W/O CONT   Final Result      CT-HEAD W/O   Final Result         1.  No acute intracranial abnormality.   2.  Atherosclerosis.         DX-CHEST-PORTABLE (1 VIEW)   Final Result         1.  No acute cardiopulmonary disease.           Assessment/Plan  * ARF (acute renal failure) (HCC)- (present on admission)  Assessment & Plan  Significant elevation of his creatinine, secondary to dehydration from gastroenteritis  Continue IV fluids  Avoid nephrotoxic agents  Creatinine 3.3  Have ordered repeat labs in the morning    Elevated troponin- (present on admission)  Assessment & Plan  Significant elevation initially, no chest pain  This was repeated in a couple of hours had significantly improved  I do not think there is an acute cardiac event, I think this is due to his acute renal failure    Monitor patient on telemetry  Obtain echocardiogram   troponin now 95, 133, from 177 and 226 on admission.    Gastroenteritis- (present on admission)  Assessment & Plan  Causing nausea, vomiting and diarrhea  Viral panel was negative  Supportive measures  Causing significant dehydration, start IV fluids    Patient's chronic vomiting is likely GERD related.  I am giving a referral to GI.  I have started IV Protonix.    Stage 3a chronic kidney disease (HCC)- (present on admission)  Assessment & Plan  Due to T2DM  Monitor    Type 2 diabetes mellitus with hyperglycemia, without long-term current use of insulin ([facility])- (present on admission)  Assessment & Plan  Hold home meds  Start insulin sliding scale  Adjust as needed    Vomiting- (present on admission)  Assessment & Plan  Patient stated he has been vomiting for the last \"2 years\" every single night at the same time at about 3 AM in the morning.  Wife at bedside, confirms the story.  He has not visited any GI doctor but has been " with [facility] before.  He recalls his last endoscopy in 2018 only.  He states he does have heartburn issues.  He denied any smoking, illicit drug use or alcohol use.  -Started IV Protonix.  If nausea vomiting is not under control, patient will have recurrence of ANN from dehydration.    Dyslipidemia- (present on admission)  Assessment & Plan  Continue home rosuvastatin    HTN (hypertension)- (present on admission)  Assessment & Plan  Blood pressure is a borderline low currently due to dehydration  Hold home lisinopril, metoprolol and chlorthalidone  Start as needed labetalol  Add back home meds as blood pressure increases, lisinopril once kidney function improves         VTE prophylaxis:   SCDs/TEDs   heparin ppx      I have performed a physical exam and reviewed and updated ROS and Plan today (9/14/2023). In review of yesterday's note (9/13/2023), there are no changes except as documented above.      Total time spent 51 minutes. I spent greater than 50% of the time for patient care, counseling, and coordination on this date, including unit/floor time, and face-to-face time with the patient as per interval events, my own review of patient's imaging and lab analysis and developing my assessment and plan above.

## 2023-09-14 NOTE — PROGRESS NOTES
Telemetry Shift Summary     Rhythm: SR  HR: 62-68  Ectopy: rare PVC    Measurements: .16/.12/.44    Normal Values  Rhythm: SR  HR:   Measurements: 0.12-0.20/0.08-0.10/0.30-0.52

## 2023-09-14 NOTE — ED NOTES
Med rec completed per patient and family with list at bedside.   Allergies reviewed.   Patient denies any outpatient antibiotics in the last 30 days.   [facility] - [facility]     Maida Kelly, PharmD, BCEMP

## 2023-09-14 NOTE — PROGRESS NOTES
Telemetry Shift Summary     Rhythm: SR  Rate: 61-73  Measurements: .14/.14/.38  Ectopy (reported by Monitor Tech): r PVC, r PAC     Normal Values  Rhythm: Sinus  HR:   Measurements: 0.12-0.20/0.06-0.10/0.30-0.52

## 2023-09-14 NOTE — PROGRESS NOTES
4 Eyes Skin Assessment Completed by Sravanthi RN and HENRRY Westfall.    Head WDL  Ears WDL  Nose WDL  Mouth WDL  Neck WDL  Breast/Chest WDL  Shoulder Blades WDL  Spine WDL  (R) Arm/Elbow/Hand WDL  (L) Arm/Elbow/Hand WDL  Abdomen WDL  Groin WDL  Scrotum/Coccyx/Buttocks WDL  (R) Leg WDL  (L) Leg Scab  (R) Heel/Foot/Toe WDL  (L) Heel/Foot/Toe Scar missing 5th toe          Devices In Places Tele Box and SCD's      Interventions In Place N/A    Possible Skin Injury No    Pictures Uploaded Into Epic N/A  Wound Consult Placed N/A  RN Wound Prevention Protocol Ordered No

## 2023-09-14 NOTE — H&P
"Hospital Medicine History & Physical Note    Date of Service  9/13/2023    Primary Care Physician  LILLIANA Chaidez    Consultants  None    Specialist Names: None    Code Status  Full Code    Chief Complaint  Chief Complaint   Patient presents with    Syncope     Pt states he stood up from chair and \"blacked out\" pt fell and hit head on table, c/o dizziness    Flu Like Symptoms     Started yesterday, c/o body aches, fatigue, chills, nausea       History of Presenting Illness  Cayetano Hawkins is a 48 y.o. male who presented 9/13/2023 with nausea, vomiting and diarrhea, sick more so today.  Patient states he was in his usual state of health until yesterday when he started feeling ill.  Began having nausea and vomiting, unable to keep anything down.  Shortly thereafter he developed diarrhea.  Today he felt very dizzy and had a syncopal episode.  Patient had no improvement so he presented to the emergency department.  Patient denies any sign of blood in his vomitus or stool.  He denies any sick contacts.  I did discuss the case including labs and imaging with ER physician.    I discussed the plan of care with patient and family.    Review of Systems  Review of Systems   Constitutional:  Positive for chills, fever and malaise/fatigue.   HENT:  Negative for congestion.    Respiratory:  Negative for cough, sputum production, shortness of breath and stridor.    Cardiovascular:  Negative for chest pain, palpitations and leg swelling.   Gastrointestinal:  Positive for diarrhea, nausea and vomiting. Negative for abdominal pain and constipation.   Genitourinary:  Negative for dysuria and urgency.   Musculoskeletal:  Negative for falls and myalgias.   Neurological:  Positive for dizziness and loss of consciousness. Negative for tingling, weakness and headaches.   Psychiatric/Behavioral:  Negative for depression and suicidal ideas.    All other systems reviewed and are negative.      Past Medical History   " has a past medical history of Arthritis (right hip), Backpain, Bowel habit changes, Dental disorder (06/2020), Diabetes, Hepatitis C (2009), Hepatitis C carrier (HCC), High cholesterol, Hyperlipidemia, Hypertension, Infectious disease, Leukocytosis (11/25/2019), Osteomyelitis (HCC), and Scrotal abscess (1/4/2012).    Surgical History   has a past surgical history that includes nano by laparoscopy; irrigation & debridement ortho (Right, 4/4/2016); irrigation & debridement ortho (Right, 4/10/2016); irrigation & debridement ortho (Right, 4/13/2016); ostectomy (Right, 6/26/2020); incision and drainage general (Right, 8/3/2020); cholecystectomy (2011); appendectomy (1988); other (hepatitis c ); toe amputation (Right, 8/16/2018); irrigation & debridement ortho (Right, 5/11/2021); tendon lenghtening (Right, 5/11/2021); and toe amputation (Right, 5/11/2021).     Family History  family history includes Arthritis in his father and mother; Diabetes in his mother; Heart Attack in his father; Heart Attack (age of onset: 32) in his brother; Heart Disease in his father and sister; Hyperlipidemia in his father and mother; Hypertension in his father; Other in his sister; Stroke in his father.   Family history reviewed with patient. There is no family history that is pertinent to the chief complaint.     Social History   reports that he quit smoking about 11 years ago. His smoking use included cigarettes. He started smoking about 21 years ago. He has a 5.0 pack-year smoking history. He has never used smokeless tobacco. He reports that he does not currently use drugs after having used the following drugs: Marijuana and Inhaled. He reports that he does not drink alcohol.    Allergies  Allergies   Allergen Reactions    Bee Swelling     Bee stings       Medications  Prior to Admission Medications   Prescriptions Last Dose Informant Patient Reported? Taking?   Continuous Blood Gluc  (FREESTYLE SAGRARIO 2 READER) Device   No No    Si Device 4 Times a Day,Before Meals and at Bedtime.   Continuous Blood Gluc Sensor (FREESTYLE SAGRARIO 3 SENSOR) Misc   No No   Sig: Apply one sensor to skin every 14 days to check blood sugars before meals and at bedtime daily   Empagliflozin (JARDIANCE) 25 MG Tab 2023 at AM  No No   Sig: Take 1 tablet- 25 mg by mouth every day.   chlorthalidone (HYGROTON) 25 MG Tab 2023 at AM  No No   Sig: Take 1 Tablet by mouth every day.   ergocalciferol (DRISDOL) 78677 UNIT capsule 2023  No No   Sig: Take 1 Capsule by mouth every 7 days.   glipiZIDE SR (GLUCOTROL) 5 MG TABLET SR 24 HR 2023 at AM  No No   Sig: Take 1 Tablet by mouth every day.   lisinopril (PRINIVIL) 20 MG Tab 2023 at AM  No No   Sig: Take 1 Tablet by mouth every day.   metFORMIN (GLUCOPHAGE) 500 MG Tab 2023 at AM  No No   Sig: Take 2 Tablets by mouth every day for 90 days.   metoprolol tartrate (LOPRESSOR) 25 MG Tab 2023 at AM  No No   Sig: TAKE 1 TABLET BY MOUTH TWICE A DAY   Patient taking differently: Take 25 mg by mouth 2 times a day.   rosuvastatin (CRESTOR) 20 MG Tab 2023 at AM  No No   Sig: Take 1 Tablet by mouth every evening.   Patient taking differently: Take 20 mg by mouth every day.      Facility-Administered Medications: None       Physical Exam  Temp:  [36.8 °C (98.3 °F)] 36.8 °C (98.3 °F)  Pulse:  [81] 81  Resp:  [16-21] 21  BP: ()/(50-60) 92/50  SpO2:  [94 %] 94 %  Blood Pressure: 92/50   Temperature: 36.8 °C (98.3 °F)   Pulse: 81   Respiration: (!) 21   Pulse Oximetry: 94 %       Physical Exam  Vitals and nursing note reviewed.   Constitutional:       General: He is not in acute distress.     Appearance: He is well-developed. He is ill-appearing. He is not toxic-appearing or diaphoretic.   HENT:      Head: Normocephalic and atraumatic.      Right Ear: External ear normal.      Left Ear: External ear normal.      Nose: Nose normal. No congestion or rhinorrhea.      Mouth/Throat:      Mouth: Mucous  "membranes are dry.      Pharynx: No oropharyngeal exudate.   Eyes:      General:         Right eye: No discharge.         Left eye: No discharge.   Neck:      Trachea: No tracheal deviation.   Cardiovascular:      Rate and Rhythm: Normal rate and regular rhythm.      Heart sounds: No murmur heard.     No friction rub. No gallop.   Pulmonary:      Effort: Pulmonary effort is normal. No respiratory distress.      Breath sounds: Normal breath sounds. No stridor. No wheezing or rales.   Chest:      Chest wall: No tenderness.   Abdominal:      General: Bowel sounds are normal. There is no distension.      Palpations: Abdomen is soft.      Tenderness: There is no abdominal tenderness.   Musculoskeletal:         General: No tenderness. Normal range of motion.      Cervical back: Normal range of motion and neck supple. No edema or erythema.      Right lower leg: No edema.      Left lower leg: No edema.   Lymphadenopathy:      Cervical: No cervical adenopathy.   Skin:     General: Skin is warm and dry.      Findings: No erythema or rash.   Neurological:      Mental Status: He is alert and oriented to person, place, and time.      Cranial Nerves: No cranial nerve deficit.   Psychiatric:         Mood and Affect: Mood normal.         Behavior: Behavior normal.         Thought Content: Thought content normal.         Judgment: Judgment normal.         Laboratory:  Recent Labs     09/13/23 2006   WBC 13.2*   RBC 4.72   HEMOGLOBIN 14.4   HEMATOCRIT 42.7   MCV 90.5   MCH 30.5   MCHC 33.7   RDW 42.5   PLATELETCT 331   MPV 8.9*     Recent Labs     09/13/23 2006   SODIUM 139   POTASSIUM 4.6   CHLORIDE 99   CO2 21   GLUCOSE 189*   BUN 70*   CREATININE 4.50*   CALCIUM 9.6     Recent Labs     09/13/23 2006   ALTSGPT 70*   ASTSGOT 52*   ALKPHOSPHAT 89   TBILIRUBIN 0.3   GLUCOSE 189*         No results for input(s): \"NTPROBNP\" in the last 72 hours.      Recent Labs     09/13/23 2006 09/13/23  2153   TROPONINT 226* 177* "       Imaging:  CT-HEAD W/O   Final Result         1.  No acute intracranial abnormality.   2.  Atherosclerosis.         DX-CHEST-PORTABLE (1 VIEW)   Final Result         1.  No acute cardiopulmonary disease.      EC-ECHOCARDIOGRAM COMPLETE W/ CONT    (Results Pending)       EKG:  I have personally reviewed the images and compared with prior images.    Assessment/Plan:  Justification for Admission Status  I anticipate this patient will require at least two midnights for appropriate medical management, necessitating inpatient admission because acute renal failure    Patient will need a Telemetry bed on MEDICAL service .  The need is secondary to acute renal failure.    * ARF (acute renal failure) (HCC)- (present on admission)  Assessment & Plan  Significant elevation of his creatinine, secondary to dehydration from gastroenteritis  Start IV fluids  Repeat BMP in the morning  If no improvement can consider renal ultrasound  Avoid nephrotoxic agents    Elevated troponin- (present on admission)  Assessment & Plan  Significant elevation initially, no chest pain  This was repeated in a couple of hours had significantly improved  I do not think there is an acute cardiac event, I think this is due to his acute renal failure  Continue to trend troponin  Monitor patient on telemetry  Obtain echocardiogram    Gastroenteritis- (present on admission)  Assessment & Plan  Causing nausea, vomiting and diarrhea  Viral panel was negative  Supportive measures  Causing significant dehydration, start IV fluids    Type 2 diabetes mellitus with hyperglycemia, without long-term current use of insulin (HCC)- (present on admission)  Assessment & Plan  Hold home meds  Start insulin sliding scale  Adjust as needed    Dyslipidemia- (present on admission)  Assessment & Plan  Continue home statin    HTN (hypertension)- (present on admission)  Assessment & Plan  Blood pressure is a borderline low currently due to dehydration  Hold home lisinopril,  metoprolol and chlorthalidone  Start as needed labetalol  Add back home meds as blood pressure increases, lisinopril once kidney function improves        VTE prophylaxis: SCDs/TEDs and heparin ppx

## 2023-09-14 NOTE — ASSESSMENT & PLAN NOTE
Significant elevation of his creatinine, secondary to dehydration from gastroenteritis  Continue IV fluids  Avoid nephrotoxic agents  Creatinine 3.3  Have ordered repeat labs in the morning

## 2023-09-14 NOTE — ED TRIAGE NOTES
"Chief Complaint   Patient presents with    Syncope     Pt states he stood up from chair and \"blacked out\" pt fell and hit head on table, c/o dizziness    Flu Like Symptoms     Started yesterday, c/o body aches, fatigue, chills, nausea     BP 99/60   Pulse 81   Temp 36.8 °C (98.3 °F) (Temporal)   Resp 16   Wt 91.1 kg (200 lb 13.4 oz)   SpO2 94%   BMI 28.01 kg/m²     Pt BIB family; EKG done in triage   "

## 2023-09-14 NOTE — ASSESSMENT & PLAN NOTE
Blood pressure is a borderline low currently due to dehydration  Hold home lisinopril, metoprolol and chlorthalidone  Start as needed labetalol  Add back home meds as blood pressure increases, lisinopril once kidney function improves

## 2023-09-14 NOTE — CARE PLAN
The patient is Stable - Low risk of patient condition declining or worsening    Shift Goals  Clinical Goals: IVF, control n/v, echo  Patient Goals: feel better    Progress made toward(s) clinical / shift goals:      Patient is not progressing towards the following goals:      Problem: Knowledge Deficit - Standard  Goal: Patient and family/care givers will demonstrate understanding of plan of care, disease process/condition, diagnostic tests and medications  Outcome: Progressing  Note: N/V controlled with compazine. IVF @ 150 mL/hr     Problem: Communication  Goal: The ability to communicate needs accurately and effectively will improve  Outcome: Progressing

## 2023-09-14 NOTE — ED PROVIDER NOTES
"ED Provider Note    CHIEF COMPLAINT  Chief Complaint   Patient presents with    Syncope     Pt states he stood up from chair and \"blacked out\" pt fell and hit head on table, c/o dizziness    Flu Like Symptoms     Started yesterday, c/o body aches, fatigue, chills, nausea       EXTERNAL RECORDS REVIEWED  I reviewed previous cardiac evaluation including stress test and echocardiogram and was previous EKGs and labs.    HPI/ROS  LIMITATION TO HISTORY   Select: : None  OUTSIDE HISTORIAN(S):  None    Cayetano Hawkins is a 48 y.o. male who presents to the emergency department for evaluation of syncope.  The patient was sick yesterday with nausea, vomiting, and diarrhea.  This was nonbloody nonbilious emesis and none bloody non-mucousy diarrhea.  He vomited multiple times.  Today he felt dizzy and lightheaded and intermittently sweaty while at work so went home.  He went and laid down he was found to  his child and got up and is very lightheaded and dizzy.  He had similar symptoms of orthostatic dizziness throughout the afternoon.  He was sitting on the couch little over an hour ago when he stood up he felt really dizzy and then passed out and fell and hit the table.  States she might of hit his arm in his chest.  Did not hit his head.  Feels better now.  He states his wife took his blood pressure at that time and it was around 80 systolic.  Denies any previous syncopal episodes.  He has mild sore throat and cough as well.  No runny nose.    PAST MEDICAL HISTORY   has a past medical history of Arthritis (right hip), Backpain, Bowel habit changes, Dental disorder (06/2020), Diabetes, Hepatitis C (2009), Hepatitis C carrier (HCC), High cholesterol, Hyperlipidemia, Hypertension, Infectious disease, Leukocytosis (11/25/2019), Osteomyelitis (HCC), and Scrotal abscess (1/4/2012).    SURGICAL HISTORY   has a past surgical history that includes nano by laparoscopy; irrigation & debridement ortho (Right, 4/4/2016); " irrigation & debridement ortho (Right, 4/10/2016); irrigation & debridement ortho (Right, 2016); ostectomy (Right, 2020); incision and drainage general (Right, 8/3/2020); cholecystectomy (); appendectomy (); other (hepatitis c ); toe amputation (Right, 2018); irrigation & debridement ortho (Right, 2021); tendon lenghtening (Right, 2021); and toe amputation (Right, 2021).    FAMILY HISTORY  Family History   Problem Relation Age of Onset    Diabetes Mother     Hyperlipidemia Mother     Arthritis Mother     Heart Attack Father         CABG4    Heart Disease Father     Hypertension Father     Stroke Father     Hyperlipidemia Father     Arthritis Father     Other Sister         ?possible seizure    Heart Disease Sister         CABG3    Heart Attack Brother 32       SOCIAL HISTORY  Social History     Tobacco Use    Smoking status: Former     Current packs/day: 0.00     Average packs/day: 0.5 packs/day for 10.0 years (5.0 ttl pk-yrs)     Types: Cigarettes     Start date: 2002     Quit date: 2012     Years since quittin.7    Smokeless tobacco: Never    Tobacco comments:     5 yrs ago   Vaping Use    Vaping Use: Never used   Substance and Sexual Activity    Alcohol use: No    Drug use: Not Currently     Types: Marijuana, Inhaled     Comment: couple of times a day, occasionally will eat edibles    Sexual activity: Yes     Partners: Female       CURRENT MEDICATIONS  Home Medications       Reviewed by Dunia Soto R.N. (Registered Nurse) on 23 at 1848  Med List Status: Not Addressed     Medication Last Dose Status   aspirin 81 MG EC tablet  Active   chlorthalidone (HYGROTON) 25 MG Tab  Active   Cholecalciferol (VITAMIN D3) 125 MCG (5000 UT) Cap  Active   Continuous Blood Gluc  (FREESTYLE SAGRARIO 2 READER) Device  Active   Continuous Blood Gluc Sensor (FREESTYLE SAGRARIO 3 SENSOR) Misc  Active   Empagliflozin (JARDIANCE) 25 MG Tab  Active   ergocalciferol  (DRISDOL) 72190 UNIT capsule  Active   glipiZIDE SR (GLUCOTROL) 5 MG TABLET SR 24 HR  Active   lisinopril (PRINIVIL) 20 MG Tab  Active   metFORMIN (GLUCOPHAGE) 500 MG Tab  Active   metoprolol tartrate (LOPRESSOR) 25 MG Tab  Active   omeprazole (PRILOSEC) 20 MG delayed-release capsule  Active   rosuvastatin (CRESTOR) 20 MG Tab  Active                    ALLERGIES  Allergies   Allergen Reactions    Bee Swelling     Bee stings       PHYSICAL EXAM  VITAL SIGNS: BP 99/60   Pulse 81   Temp 36.8 °C (98.3 °F) (Temporal)   Resp 16   Wt 91.1 kg (200 lb 13.4 oz)   SpO2 94%   BMI 28.01 kg/m²    Constitutional: Well developed, Well nourished, No acute distress, Non-toxic appearance.   HENT: Normocephalic, Atraumatic, Bilateral external ears normal, Oropharynx moist, No oral exudates, Nose normal.   Eyes: PERRL, EOMI, Conjunctiva normal, No discharge.   Neck: Normal range of motion  Cardiovascular: Normal heart rate, Normal rhythm, No murmurs, No rubs, No gallops.   Thorax & Lungs: Normal breath sounds, No respiratory distress,   Abdomen: Bowel sounds normal, Soft, No tenderness  Skin: Warm, Dry, No erythema, No rash.   Musculoskeletal: Good range of motion in all major joints.  No asymmetric edema good pulses  Neurologic: Alert,No focal deficits noted.   Psychiatric: Affect normal      DIAGNOSTIC STUDIES / PROCEDURES    Results for orders placed or performed during the hospital encounter of 09/13/23   CoV-2, FLU A/B, and RSV by PCR (2-4 Hours CEPHEID) : Collect NP swab in VTM    Specimen: Respirate   Result Value Ref Range    Influenza virus A RNA Negative Negative    Influenza virus B, PCR Negative Negative    RSV, PCR Negative Negative    SARS-CoV-2 by PCR NotDetected     SARS-CoV-2 Source NP Swab    CBC WITH DIFFERENTIAL   Result Value Ref Range    WBC 13.2 (H) 4.8 - 10.8 K/uL    RBC 4.72 4.70 - 6.10 M/uL    Hemoglobin 14.4 14.0 - 18.0 g/dL    Hematocrit 42.7 42.0 - 52.0 %    MCV 90.5 81.4 - 97.8 fL    MCH 30.5 27.0 -  33.0 pg    MCHC 33.7 32.3 - 36.5 g/dL    RDW 42.5 35.9 - 50.0 fL    Platelet Count 331 164 - 446 K/uL    MPV 8.9 (L) 9.0 - 12.9 fL    Neutrophils-Polys 78.30 (H) 44.00 - 72.00 %    Lymphocytes 15.40 (L) 22.00 - 41.00 %    Monocytes 5.40 0.00 - 13.40 %    Eosinophils 0.20 0.00 - 6.90 %    Basophils 0.40 0.00 - 1.80 %    Immature Granulocytes 0.30 0.00 - 0.90 %    Nucleated RBC 0.00 0.00 - 0.20 /100 WBC    Neutrophils (Absolute) 10.34 (H) 1.82 - 7.42 K/uL    Lymphs (Absolute) 2.03 1.00 - 4.80 K/uL    Monos (Absolute) 0.71 0.00 - 0.85 K/uL    Eos (Absolute) 0.03 0.00 - 0.51 K/uL    Baso (Absolute) 0.05 0.00 - 0.12 K/uL    Immature Granulocytes (abs) 0.04 0.00 - 0.11 K/uL    NRBC (Absolute) 0.00 K/uL   COMP METABOLIC PANEL   Result Value Ref Range    Sodium 139 135 - 145 mmol/L    Potassium 4.6 3.6 - 5.5 mmol/L    Chloride 99 96 - 112 mmol/L    Co2 21 20 - 33 mmol/L    Anion Gap 19.0 (H) 7.0 - 16.0    Glucose 189 (H) 65 - 99 mg/dL    Bun 70 (H) 8 - 22 mg/dL    Creatinine 4.50 (HH) 0.50 - 1.40 mg/dL    Calcium 9.6 8.4 - 10.2 mg/dL    Correct Calcium 9.5 8.5 - 10.5 mg/dL    AST(SGOT) 52 (H) 12 - 45 U/L    ALT(SGPT) 70 (H) 2 - 50 U/L    Alkaline Phosphatase 89 30 - 99 U/L    Total Bilirubin 0.3 0.1 - 1.5 mg/dL    Albumin 4.1 3.2 - 4.9 g/dL    Total Protein 7.5 6.0 - 8.2 g/dL    Globulin 3.4 1.9 - 3.5 g/dL    A-G Ratio 1.2 g/dL   TROPONIN   Result Value Ref Range    Troponin T 226 (H) 6 - 19 ng/L   ESTIMATED GFR   Result Value Ref Range    GFR (CKD-EPI) 15 (A) >60 mL/min/1.73 m 2   EKG   Result Value Ref Range    Report       [facility] Emergency Dept.    Test Date:  2023  Pt Name:    SAM HENDRIX                Department: [facility]  MRN:        [id number]                      Room:  Gender:     Male                         Technician: [id number]  :        1975                   Requested By:ER TRIAGE PROTOCOL  Order #:    [id number]                    Apryl MD: MARK ESPOSITO.  AMD    Measurements  Intervals                                Axis  Rate:       82                           P:          46  VA:         133                          QRS:        69  QRSD:       136                          T:          48  QT:         406  QTc:        475    Interpretive Statements  Sinus rhythm  Right bundle branch block  ST elev, probable normal early repol pattern  Compared to ECG 2021 02:06:29  no significant change from prior  Electronically Signed On 2023 19:59:13 PDT by MARK ESPOSITO. [facility]     EKG (NOW)   Result Value Ref Range    Report       [facility] Emergency Dept.    Test Date:  2023  Pt Name:    SAM HENDRIX                Department: [facility]  MRN:        [id number]                      Room:       [facility]  Gender:     Male                         Technician: PRADEEP  :        1975                   Requested By:MARK ESPOSITO  Order #:    [id number]                    Reading MD: MARK ESPOSITO. [facility]    Measurements  Intervals                                Axis  Rate:       63                           P:          21  VA:         143                          QRS:        60  QRSD:       137                          T:          46  QT:         446  QTc:        457    Interpretive Statements  Sinus rhythm  Right bundle branch block  ST elev, probable normal early repol pattern  Compared to ECG 2023 18:54:00  No significant changes  Electronically Signed On 2023 20:58:39 PDT by MARK ESPOSITO. [facility]          RADIOLOGY  I have independently interpreted the diagnostic imaging associated with this visit and am waiting the final reading from the radiologist.   My preliminary interpretation is as follows: \  Radiologist interpretation:   CT-HEAD W/O   Final Result         1.  No acute intracranial abnormality.   2.  Atherosclerosis.         DX-CHEST-PORTABLE (1 VIEW)   Final Result         1.  No acute cardiopulmonary disease.             COURSE & MEDICAL DECISION MAKING    ED Observation Status?  Patient does not meet observation criteria.  He will likely require hospitalization.    INITIAL ASSESSMENT, COURSE AND PLAN  Care Narrative:     Patient presents to the emergency department for evaluation of a syncopal episode.  He had nausea and vomiting decreased oral intake yesterday as well as diarrhea.  Today he had several episodes of orthostatic near syncope followed by an episode of syncope and hypotension.    Differential diagnose includes but not limited to dehydration, ANN, electrolyte abnormality, arrhythmia, hemorrhage, orthostatic syncope.    The patient is worked up with labs and an EKG.  He is placed on a cardiac monitor because of a syncopal episode and is given a liter of fluid.    The patient was reassessed after fluids and is improving.  His troponin came back at 226.  His creatinine is 4.5.  This is new ANN and renal failure.  He is given some additional IV fluids.  Is unclear if his troponin is related to an acute process is not having any chest pain at this time.  He is given a liter of fluid and a 2-hour troponin is obtained is gone down to 170.    After his first set of labs spoke with hospitalist Dr. Davalos he wanted a delta troponin.  Felt if the patient's labs are improving he can be hospitalized here.  Discussed case with Dr. Davalos again at 10:40 PM.  He is seen the patient care transferred at that time.            HYDRATION: Based on the patient's presentation of Acute Vomiting the patient was given IV fluids. IV Hydration was used because oral hydration was not adequate alone. Upon recheck following hydration, the patient was improved.      ADDITIONAL PROBLEM LIST    Diabetes  High blood pressure  Renal failure  Dehydration      DISPOSITION AND DISCUSSIONS  I have discussed management of the patient with the following physicians and CECE's: Dr. Steele [facility] hospitalist.            FINAL DIAGNOSIS  1. Syncope,  unspecified syncope type    2. Dehydration    3. Vomiting without nausea, unspecified vomiting type    4. Acute renal failure, unspecified acute renal failure type (HCC)    5. Elevated troponin           Electronically signed by: Brad Carl M.D., 9/13/2023 7:48 PM

## 2023-09-14 NOTE — ASSESSMENT & PLAN NOTE
Significant elevation initially, no chest pain  This was repeated in a couple of hours had significantly improved  I do not think there is an acute cardiac event, I think this is due to his acute renal failure    Monitor patient on telemetry  Obtain echocardiogram   troponin now 95, 133, from 177 and 226 on admission.

## 2023-09-15 ENCOUNTER — PHARMACY VISIT (OUTPATIENT)
Dept: PHARMACY | Facility: MEDICAL CENTER | Age: 48
End: 2023-09-15
Payer: MEDICARE

## 2023-09-15 VITALS
BODY MASS INDEX: 27.93 KG/M2 | HEIGHT: 71 IN | RESPIRATION RATE: 18 BRPM | DIASTOLIC BLOOD PRESSURE: 95 MMHG | HEART RATE: 69 BPM | WEIGHT: 199.52 LBS | TEMPERATURE: 97.8 F | SYSTOLIC BLOOD PRESSURE: 176 MMHG | OXYGEN SATURATION: 99 %

## 2023-09-15 PROBLEM — A04.8 H. PYLORI INFECTION: Status: ACTIVE | Noted: 2023-09-15

## 2023-09-15 PROBLEM — R11.10 VOMITING: Status: RESOLVED | Noted: 2019-11-23 | Resolved: 2023-09-15

## 2023-09-15 PROBLEM — I24.89 DEMAND ISCHEMIA OF MYOCARDIUM (HCC): Status: RESOLVED | Noted: 2023-09-15 | Resolved: 2023-09-15

## 2023-09-15 PROBLEM — I24.89 DEMAND ISCHEMIA OF MYOCARDIUM (HCC): Status: ACTIVE | Noted: 2023-09-15

## 2023-09-15 LAB
ALBUMIN SERPL BCP-MCNC: 3 G/DL (ref 3.2–4.9)
BUN SERPL-MCNC: 38 MG/DL (ref 8–22)
CALCIUM ALBUM COR SERPL-MCNC: 9.5 MG/DL (ref 8.5–10.5)
CALCIUM SERPL-MCNC: 8.7 MG/DL (ref 8.4–10.2)
CHLORIDE SERPL-SCNC: 106 MMOL/L (ref 96–112)
CO2 SERPL-SCNC: 17 MMOL/L (ref 20–33)
CREAT SERPL-MCNC: 1.82 MG/DL (ref 0.5–1.4)
CREAT UR-MCNC: 116.77 MG/DL
GFR SERPLBLD CREATININE-BSD FMLA CKD-EPI: 45 ML/MIN/1.73 M 2
GLUCOSE BLD STRIP.AUTO-MCNC: 124 MG/DL (ref 65–99)
GLUCOSE BLD STRIP.AUTO-MCNC: 141 MG/DL (ref 65–99)
GLUCOSE SERPL-MCNC: 113 MG/DL (ref 65–99)
H PYLORI AG STL QL IA: DETECTED
MAGNESIUM SERPL-MCNC: 2.2 MG/DL (ref 1.5–2.5)
PHOSPHATE SERPL-MCNC: 2.9 MG/DL (ref 2.5–4.5)
POTASSIUM SERPL-SCNC: 5 MMOL/L (ref 3.6–5.5)
SODIUM SERPL-SCNC: 134 MMOL/L (ref 135–145)
SODIUM UR-SCNC: 106 MMOL/L

## 2023-09-15 PROCEDURE — 99239 HOSP IP/OBS DSCHRG MGMT >30: CPT | Performed by: INTERNAL MEDICINE

## 2023-09-15 PROCEDURE — 87338 HPYLORI STOOL AG IA: CPT

## 2023-09-15 PROCEDURE — 700111 HCHG RX REV CODE 636 W/ 250 OVERRIDE (IP): Performed by: INTERNAL MEDICINE

## 2023-09-15 PROCEDURE — 700105 HCHG RX REV CODE 258: Performed by: INTERNAL MEDICINE

## 2023-09-15 PROCEDURE — RXMED WILLOW AMBULATORY MEDICATION CHARGE: Performed by: INTERNAL MEDICINE

## 2023-09-15 PROCEDURE — 700102 HCHG RX REV CODE 250 W/ 637 OVERRIDE(OP): Performed by: INTERNAL MEDICINE

## 2023-09-15 PROCEDURE — 80069 RENAL FUNCTION PANEL: CPT

## 2023-09-15 PROCEDURE — 36415 COLL VENOUS BLD VENIPUNCTURE: CPT

## 2023-09-15 PROCEDURE — RXMED WILLOW AMBULATORY MEDICATION CHARGE: Performed by: NURSE PRACTITIONER

## 2023-09-15 PROCEDURE — A9270 NON-COVERED ITEM OR SERVICE: HCPCS | Performed by: INTERNAL MEDICINE

## 2023-09-15 PROCEDURE — 82962 GLUCOSE BLOOD TEST: CPT

## 2023-09-15 PROCEDURE — C9113 INJ PANTOPRAZOLE SODIUM, VIA: HCPCS | Performed by: INTERNAL MEDICINE

## 2023-09-15 PROCEDURE — 83735 ASSAY OF MAGNESIUM: CPT

## 2023-09-15 RX ORDER — METRONIDAZOLE 500 MG/1
500 TABLET ORAL EVERY 6 HOURS
Qty: 56 TABLET | Refills: 0 | Status: ACTIVE | OUTPATIENT
Start: 2023-09-15 | End: 2023-09-30

## 2023-09-15 RX ORDER — BISMUTH SUBSALICYLATE 262 MG/1
524 TABLET, CHEWABLE ORAL
Qty: 112 TABLET | Refills: 0 | Status: SHIPPED | OUTPATIENT
Start: 2023-09-15 | End: 2023-09-30

## 2023-09-15 RX ORDER — TETRACYCLINE HYDROCHLORIDE 250 MG/1
250 CAPSULE ORAL 4 TIMES DAILY
Qty: 56 CAPSULE | Refills: 0 | Status: SHIPPED | OUTPATIENT
Start: 2023-09-15 | End: 2023-09-15 | Stop reason: CLARIF

## 2023-09-15 RX ORDER — TETRACYCLINE HYDROCHLORIDE 500 MG/1
500 CAPSULE ORAL 4 TIMES DAILY
Qty: 56 CAPSULE | Refills: 0 | Status: ACTIVE | OUTPATIENT
Start: 2023-09-15 | End: 2023-09-30

## 2023-09-15 RX ORDER — OMEPRAZOLE 20 MG/1
40 CAPSULE, DELAYED RELEASE ORAL 2 TIMES DAILY
Status: DISCONTINUED | OUTPATIENT
Start: 2023-09-15 | End: 2023-09-15 | Stop reason: HOSPADM

## 2023-09-15 RX ORDER — OMEPRAZOLE 40 MG/1
40 CAPSULE, DELAYED RELEASE ORAL 2 TIMES DAILY
Qty: 60 CAPSULE | Refills: 1 | Status: SHIPPED | OUTPATIENT
Start: 2023-09-15 | End: 2023-10-15

## 2023-09-15 RX ADMIN — PANTOPRAZOLE SODIUM 40 MG: 40 INJECTION, POWDER, LYOPHILIZED, FOR SOLUTION INTRAVENOUS at 05:10

## 2023-09-15 RX ADMIN — HEPARIN SODIUM 5000 UNITS: 5000 INJECTION, SOLUTION INTRAVENOUS; SUBCUTANEOUS at 05:10

## 2023-09-15 RX ADMIN — SODIUM CHLORIDE: 9 INJECTION, SOLUTION INTRAVENOUS at 08:20

## 2023-09-15 RX ADMIN — SODIUM CHLORIDE: 9 INJECTION, SOLUTION INTRAVENOUS at 01:00

## 2023-09-15 RX ADMIN — ROSUVASTATIN 20 MG: 10 TABLET, FILM COATED ORAL at 05:10

## 2023-09-15 ASSESSMENT — PAIN DESCRIPTION - PAIN TYPE: TYPE: ACUTE PAIN

## 2023-09-15 NOTE — ASSESSMENT & PLAN NOTE
"Patient stated he has been vomiting for the last \"2 years\" every single night at the same time at about 3 AM in the morning.  Wife at bedside, confirms the story.  He has not visited any GI doctor but has been with [facility] before.  He recalls his last endoscopy in 2018 only.  He states he does have heartburn issues.  He denied any smoking, illicit drug use or alcohol use.  -Started IV Protonix.  If nausea vomiting is not under control, patient will have recurrence of ANN from dehydration.  "

## 2023-09-15 NOTE — PROGRESS NOTES
Discharge instructions given and discussed, signed copy in chart. Pt verbalized understanding and all questions answered. New prescriptions delivered by [email or web address]. Pt discharged home with wife in stable condition room air escorted by self/walking. Personal belongings with patient. IV removed and tolerated well. Tele box removed, monitor tech notified.

## 2023-09-15 NOTE — PROGRESS NOTES
Received report from HENRRY Goodrich. Safety precautions in place. Bed locked and low. Offered assist. Call light and belongings within reach.  Pt states he didn't like dinner much. Snacks provided. Emptied urinal. Good UOP. Feeling better. WCTM.

## 2023-09-15 NOTE — PROGRESS NOTES
Telemetry Shift Summary     Rhythm SB-SR with BBB  HR Range 55-62  Ectopy rPVC/PAC  Measurements 0.14/0.12/0.40        Normal Values  Rhythm SR  HR Range    Measurements 0.12-0.20 / 0.06-0.10  / 0.30-0.52

## 2023-09-15 NOTE — DISCHARGE SUMMARY
"Discharge Summary    CHIEF COMPLAINT ON ADMISSION  Chief Complaint   Patient presents with    Syncope     Pt states he stood up from chair and \"blacked out\" pt fell and hit head on table, c/o dizziness    Flu Like Symptoms     Started yesterday, c/o body aches, fatigue, chills, nausea       Reason for Admission  Syncope, Head Injury      Admission Date  9/13/2023    CODE STATUS  Full Code    HPI & HOSPITAL COURSE  This is \"Cayetano Hawkins is a 48 y.o. male who presented 9/13/2023 with nausea, vomiting and diarrhea, sick more so today.  Patient states he was in his usual state of health until yesterday when he started feeling ill.  Began having nausea and vomiting, unable to keep anything down.  Shortly thereafter he developed diarrhea.  Today he felt very dizzy and had a syncopal episode.  Patient had no improvement so he presented to the emergency department.  Patient denies any sign of blood in his vomitus or stool.  He denies any sick contacts.  I did discuss the case including labs and imaging with ER physician.\" (As per admitting physician Dr. Steele)    Patient did well after IV fluid hydration.  It appears he has been reporting vomiting for the last 2 years exactly at 3 AM every single morning.  He also had diarrhea which was likely a short-term of viral or bacterial gastroenteritis which has now resolved.  Unfortunately this did leave him with acute renal failure which improved after IVF, creatinine down to 1.83 and patient was urinating well.  He also presented with troponinemia due to demand ischemia and ANN.  Troponin levels down trended and patient did not have any chest pain and EKG findings for ACS.  As per patient's    Persistent vomiting at home, he appears to have improved after starting on PPI twice daily.  I suspect he has a's moderate to severe GERD.  I gave him a referral for GI which she states he has seen [facility] in the past and will return to them.  I recommended " that he should get an endoscopy to evaluate the severity of his GERD.  I have prescribed him omeprazole 40 mg p.o. twice daily.  I gave him instructions on eating at night, bedtime routine and positioning for bedtime.    Addendum:  Pt returned positive for H. Pylori, I sent over for quadruple therapy. I called patient's wife and informed her of the findings and treatment plan. Rx sent to pt's pharmacy with [facility].    Therefore, he is discharged in good and stable condition to home with close outpatient follow-up.    The patient met 2-midnight criteria for an inpatient stay at the time of discharge.    Discharge Date  09/15/2023    FOLLOW UP ITEMS POST DISCHARGE  With PCP and [facility]    DISCHARGE DIAGNOSES  Principal Problem:    ARF (acute renal failure) (HCC) (POA: Yes)  Active Problems:    HTN (hypertension) (Chronic) (POA: Yes)      Overview: Chronic. Was on Lisinopril 40g, Chlorthalidone 25mg, Metoprolol 25mg-has       not been on medication in more than 6 months.     Dyslipidemia (Chronic) (POA: Yes)      Overview: Chronic. Was on Rosuvastatin 20mg.     Type 2 diabetes mellitus with hyperglycemia, without long-term current use of insulin (HCC) (Chronic) (POA: Yes)      Overview: Dx: 2009. Was Metformin and Glimepiride      Not current checking BS.    Stage 3a chronic kidney disease (HCC) (POA: Yes)    Gastroenteritis (POA: Yes)    Elevated troponin (POA: Yes)    H. pylori infection (POA: Yes)  Resolved Problems:    Vomiting (POA: Yes)    Demand ischemia of myocardium (HCC) (POA: Yes)      FOLLOW UP  No future appointments.  [facility]  [address] [facility] [facility] [address] [phone]  Schedule an appointment as soon as possible for a visit in 1 week(s)  Please call and discuss GERD, whether or not EGD will be appropriate for evaluation.    Carmen Umana, AVERYRAbN.  [address]  [facility] [address] [place] [id number] [phone]    Schedule an appointment as soon as possible  for a visit in 1 week(s)  Please follow up for BMP, CBC, follow up for GERD.  Please discuss Jardiance if this is causing your N/V symptoms at night.      MEDICATIONS ON DISCHARGE     Medication List        START taking these medications        Instructions   bismuth subsalicylate 262 MG Chew  Commonly known as: Pepto-Bismol   Chew 2 Tablets 4 Times a Day,Before Meals and at Bedtime for 14 days.  Dose: 524 mg     metroNIDAZOLE 500 MG Tabs  Commonly known as: Flagyl   Take 1 Tablet by mouth every 6 hours for 14 days.  Dose: 500 mg     omeprazole 40 MG delayed-release capsule  Commonly known as: PriLOSEC   Take 1 Capsule by mouth 2 times a day for 30 days.  Dose: 40 mg     tetracycline 500 MG Caps  Commonly known as: Sumycin   Doctor's comments: H. Pylori treatment  Take 1 Capsule by mouth 4 times a day for 14 days.  Dose: 500 mg            CHANGE how you take these medications        Instructions   metoprolol tartrate 25 MG Tabs  What changed: when to take this  Commonly known as: Lopressor   TAKE 1 TABLET BY MOUTH TWICE A DAY     rosuvastatin 20 MG Tabs  What changed: when to take this  Commonly known as: Crestor   Take 1 Tablet by mouth every evening.  Dose: 20 mg            CONTINUE taking these medications        Instructions   chlorthalidone 25 MG Tabs  Commonly known as: [place]   Take 1 Tablet by mouth every day.  Dose: 25 mg     FreeStyle Reynaldo 2 [place]   1 Device 4 Times a Day,Before Meals and at Bedtime.  Dose: 1 Device     FreeStyle Reynaldo 3 Sensor Misc   Apply one sensor to skin every 14 days to check blood sugars before meals and at bedtime daily     glipiZIDE SR 5 MG Tb24  Commonly known as: Glucotrol   Take 1 Tablet by mouth every day.  Dose: 5 mg     Jardiance 25 MG Tabs  Generic drug: Empagliflozin   Take 1 tablet- 25 mg by mouth every day.  Dose: 25 mg     lisinopril 20 MG Tabs  Commonly known as: Prinivil   Take 1 Tablet by mouth every day.  Dose: 20 mg     metFORMIN 500 MG Tabs  Commonly  known as: Glucophage   Take 2 Tablets by mouth every day for 90 days.  Dose: 1,000 mg     vitamin D2 (Ergocalciferol) 1.25 MG (92609 UT) Caps capsule  Commonly known as: Drisdol   Take 1 Capsule by mouth every 7 days.  Dose: 50,000 Units              Allergies  Allergies   Allergen Reactions    Bee Swelling     Bee stings       DIET  Orders Placed This Encounter   Procedures    Diet Order Diet: Consistent CHO (Diabetic)     Standing Status:   Standing     Number of Occurrences:   1     Order Specific Question:   Diet:     Answer:   Consistent CHO (Diabetic) [4]       ACTIVITY  As tolerated.  Weight bearing as tolerated    CONSULTATIONS  none    PROCEDURES  none    LABORATORY  Lab Results   Component Value Date    SODIUM 134 (L) 09/15/2023    POTASSIUM 5.0 09/15/2023    CHLORIDE 106 09/15/2023    CO2 17 (L) 09/15/2023    GLUCOSE 113 (H) 09/15/2023    BUN 38 (H) 09/15/2023    CREATININE 1.82 (H) 09/15/2023    CREATININE 1.1 11/28/2008        Lab Results   Component Value Date    WBC 9.7 09/14/2023    HEMOGLOBIN 12.3 (L) 09/14/2023    HEMATOCRIT 36.3 (L) 09/14/2023    PLATELETCT 252 09/14/2023        Total time of the discharge process exceeds 38 minutes.  More than 50% of time was spent face to face with patient.  This included but not limited to review of hospital course with patient, treatment goals upon discharge, recommendations to PCP, continued and new medications and their adverse reactions, and nursing instructions for patient.          EC-ECHOCARDIOGRAM COMPLETE W/O CONT   Final Result      CT-HEAD W/O   Final Result         1.  No acute intracranial abnormality.   2.  Atherosclerosis.         DX-CHEST-PORTABLE (1 VIEW)   Final Result         1.  No acute cardiopulmonary disease.

## 2023-09-15 NOTE — CARE PLAN
The patient is Stable - Low risk of patient condition declining or worsening    Shift Goals  Clinical Goals: IVF, control N/V, trend labs  Patient Goals: Feel better    Progress made toward(s) clinical / shift goals:    Problem: Knowledge Deficit - Standard  Goal: Patient and family/care givers will demonstrate understanding of plan of care, disease process/condition, diagnostic tests and medications  Outcome: Progressing     Problem: Psychosocial  Goal: Patient's level of anxiety will decrease  Outcome: Progressing     Problem: Communication  Goal: The ability to communicate needs accurately and effectively will improve  Outcome: Progressing     Problem: Hemodynamics  Goal: Patient's hemodynamics, fluid balance and neurologic status will be stable or improve  Outcome: Progressing     Problem: Fluid Volume  Goal: Fluid volume balance will be maintained  Outcome: Progressing       Patient is not progressing towards the following goals:

## 2023-09-16 ENCOUNTER — PHARMACY VISIT (OUTPATIENT)
Dept: PHARMACY | Facility: MEDICAL CENTER | Age: 48
End: 2023-09-16

## 2023-09-18 ENCOUNTER — TELEPHONE (OUTPATIENT)
Dept: MEDICAL GROUP | Facility: MEDICAL CENTER | Age: 48
End: 2023-09-18
Payer: COMMERCIAL

## 2023-09-18 ENCOUNTER — TELEPHONE (OUTPATIENT)
Dept: HEALTH INFORMATION MANAGEMENT | Facility: OTHER | Age: 48
End: 2023-09-18

## 2023-09-19 DIAGNOSIS — E78.5 DYSLIPIDEMIA: Chronic | ICD-10-CM

## 2023-09-19 PROCEDURE — RXMED WILLOW AMBULATORY MEDICATION CHARGE: Performed by: NURSE PRACTITIONER

## 2023-09-19 RX ORDER — ATORVASTATIN CALCIUM 10 MG/1
10 TABLET, FILM COATED ORAL NIGHTLY
Qty: 90 TABLET | Refills: 1 | Status: SHIPPED | OUTPATIENT
Start: 2023-09-19 | End: 2024-01-11 | Stop reason: SDUPTHER

## 2023-09-22 ENCOUNTER — PHARMACY VISIT (OUTPATIENT)
Dept: PHARMACY | Facility: MEDICAL CENTER | Age: 48
End: 2023-09-22
Payer: MEDICARE

## 2023-09-22 ENCOUNTER — APPOINTMENT (OUTPATIENT)
Dept: MEDICAL GROUP | Facility: MEDICAL CENTER | Age: 48
End: 2023-09-22
Payer: COMMERCIAL

## 2023-09-22 PROCEDURE — RXMED WILLOW AMBULATORY MEDICATION CHARGE: Performed by: NURSE PRACTITIONER

## 2023-09-26 ENCOUNTER — PHARMACY VISIT (OUTPATIENT)
Dept: PHARMACY | Facility: MEDICAL CENTER | Age: 48
End: 2023-09-26
Payer: COMMERCIAL

## 2023-09-26 PROCEDURE — RXMED WILLOW AMBULATORY MEDICATION CHARGE: Performed by: DENTIST

## 2023-09-26 RX ORDER — CLINDAMYCIN HYDROCHLORIDE 150 MG/1
CAPSULE ORAL
Qty: 30 CAPSULE | Refills: 0 | Status: SHIPPED | OUTPATIENT
Start: 2023-09-26 | End: 2023-12-14

## 2023-09-26 RX ORDER — HYDROCODONE BITARTRATE AND ACETAMINOPHEN 7.5; 325 MG/1; MG/1
TABLET ORAL
Qty: 18 TABLET | Refills: 0 | OUTPATIENT
Start: 2023-09-26

## 2023-10-10 DIAGNOSIS — N18.31 TYPE 2 DIABETES MELLITUS WITH STAGE 3A CHRONIC KIDNEY DISEASE, WITHOUT LONG-TERM CURRENT USE OF INSULIN (HCC): ICD-10-CM

## 2023-10-10 DIAGNOSIS — I10 HYPERTENSION, UNSPECIFIED TYPE: ICD-10-CM

## 2023-10-10 DIAGNOSIS — E11.22 TYPE 2 DIABETES MELLITUS WITH STAGE 3A CHRONIC KIDNEY DISEASE, WITHOUT LONG-TERM CURRENT USE OF INSULIN (HCC): ICD-10-CM

## 2023-10-10 RX ORDER — LISINOPRIL 20 MG/1
20 TABLET ORAL DAILY
Qty: 90 TABLET | Refills: 1 | Status: SHIPPED | OUTPATIENT
Start: 2023-10-10

## 2023-10-12 PROCEDURE — RXMED WILLOW AMBULATORY MEDICATION CHARGE: Performed by: NURSE PRACTITIONER

## 2023-10-17 ENCOUNTER — PHARMACY VISIT (OUTPATIENT)
Dept: PHARMACY | Facility: MEDICAL CENTER | Age: 48
End: 2023-10-17
Payer: COMMERCIAL

## 2023-10-17 PROCEDURE — RXMED WILLOW AMBULATORY MEDICATION CHARGE: Performed by: NURSE PRACTITIONER

## 2023-11-20 DIAGNOSIS — E11.22 TYPE 2 DIABETES MELLITUS WITH STAGE 3A CHRONIC KIDNEY DISEASE, WITHOUT LONG-TERM CURRENT USE OF INSULIN (HCC): ICD-10-CM

## 2023-11-20 DIAGNOSIS — N18.31 TYPE 2 DIABETES MELLITUS WITH STAGE 3A CHRONIC KIDNEY DISEASE, WITHOUT LONG-TERM CURRENT USE OF INSULIN (HCC): ICD-10-CM

## 2023-11-20 PROCEDURE — RXMED WILLOW AMBULATORY MEDICATION CHARGE: Performed by: NURSE PRACTITIONER

## 2023-11-21 ENCOUNTER — PHARMACY VISIT (OUTPATIENT)
Dept: PHARMACY | Facility: MEDICAL CENTER | Age: 48
End: 2023-11-21
Payer: COMMERCIAL

## 2023-11-21 PROCEDURE — RXMED WILLOW AMBULATORY MEDICATION CHARGE: Performed by: NURSE PRACTITIONER

## 2023-11-21 RX ORDER — EMPAGLIFLOZIN 25 MG/1
25 TABLET, FILM COATED ORAL DAILY
Qty: 90 TABLET | Refills: 1 | Status: SHIPPED | OUTPATIENT
Start: 2023-11-21

## 2023-12-14 ENCOUNTER — OFFICE VISIT (OUTPATIENT)
Dept: MEDICAL GROUP | Facility: MEDICAL CENTER | Age: 48
End: 2023-12-14
Payer: COMMERCIAL

## 2023-12-14 VITALS
SYSTOLIC BLOOD PRESSURE: 112 MMHG | TEMPERATURE: 97.3 F | HEART RATE: 69 BPM | HEIGHT: 71 IN | DIASTOLIC BLOOD PRESSURE: 68 MMHG | BODY MASS INDEX: 27.5 KG/M2 | WEIGHT: 196.4 LBS | OXYGEN SATURATION: 97 %

## 2023-12-14 DIAGNOSIS — Z86.19 HISTORY OF HELICOBACTER PYLORI INFECTION: ICD-10-CM

## 2023-12-14 DIAGNOSIS — E11.22 TYPE 2 DIABETES MELLITUS WITH STAGE 3A CHRONIC KIDNEY DISEASE, WITHOUT LONG-TERM CURRENT USE OF INSULIN (HCC): ICD-10-CM

## 2023-12-14 DIAGNOSIS — Z12.11 SCREEN FOR COLON CANCER: ICD-10-CM

## 2023-12-14 DIAGNOSIS — N18.31 STAGE 3A CHRONIC KIDNEY DISEASE: ICD-10-CM

## 2023-12-14 DIAGNOSIS — N18.31 TYPE 2 DIABETES MELLITUS WITH STAGE 3A CHRONIC KIDNEY DISEASE, WITHOUT LONG-TERM CURRENT USE OF INSULIN (HCC): ICD-10-CM

## 2023-12-14 DIAGNOSIS — R81 GLUCOSURIA: ICD-10-CM

## 2023-12-14 DIAGNOSIS — Z23 NEED FOR VACCINATION: ICD-10-CM

## 2023-12-14 DIAGNOSIS — E78.5 DYSLIPIDEMIA: ICD-10-CM

## 2023-12-14 DIAGNOSIS — E55.9 VITAMIN D DEFICIENCY: ICD-10-CM

## 2023-12-14 DIAGNOSIS — K21.9 GASTROESOPHAGEAL REFLUX DISEASE, UNSPECIFIED WHETHER ESOPHAGITIS PRESENT: ICD-10-CM

## 2023-12-14 LAB
HBA1C MFR BLD: 7.4 % (ref ?–5.8)
POCT INT CON NEG: NEGATIVE
POCT INT CON POS: POSITIVE

## 2023-12-14 PROCEDURE — 99214 OFFICE O/P EST MOD 30 MIN: CPT | Mod: 25 | Performed by: NURSE PRACTITIONER

## 2023-12-14 PROCEDURE — 90471 IMMUNIZATION ADMIN: CPT | Performed by: NURSE PRACTITIONER

## 2023-12-14 PROCEDURE — 3078F DIAST BP <80 MM HG: CPT | Performed by: NURSE PRACTITIONER

## 2023-12-14 PROCEDURE — 3074F SYST BP LT 130 MM HG: CPT | Performed by: NURSE PRACTITIONER

## 2023-12-14 PROCEDURE — 83036 HEMOGLOBIN GLYCOSYLATED A1C: CPT | Performed by: NURSE PRACTITIONER

## 2023-12-14 PROCEDURE — 90686 IIV4 VACC NO PRSV 0.5 ML IM: CPT | Performed by: NURSE PRACTITIONER

## 2023-12-14 RX ORDER — GLIPIZIDE 5 MG/1
5 TABLET, FILM COATED, EXTENDED RELEASE ORAL DAILY
Qty: 90 TABLET | Refills: 3 | Status: SHIPPED | OUTPATIENT
Start: 2023-12-14

## 2023-12-14 ASSESSMENT — FIBROSIS 4 INDEX: FIB4 SCORE: 1.21

## 2023-12-14 NOTE — LETTER
December 14, 2023        Cayetano Hawkins  [address] [place] [address]        To Whom it May Concern:      Mr. Cayetano Hawkins is a patient under my medical care. His most recent A1c on December 14th was 7.4% which is an improvement. He is on Jardiance which causes glucose to be excreted in the urine and will cause a positive or elevated glucose level on the urinalysis.   He is cleared to resume his normal work. He was seen in the clinic today December 14th, 2023.       If you have any questions or concerns, please don't hesitate to call.        Sincerely,        LEATHA Chaidez.    Electronically Signed

## 2023-12-14 NOTE — PROGRESS NOTES
Chief Complaint   Patient presents with    Lab Results     Pt would like to review UA results; concerned about protein       Subjective:     HPI:     Cayetano Hawkins is a 48 y.o. 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   He does have DM- A1c today has improved to 7.4%.   He does take Jardiance which causes excretion of glucose in his urine. 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     Lab Results   Component Value Date/Time    HBA1C 9.1 (A) 04/05/2023 0852    HBA1C 6.5 (H) 12/16/2021 1822    HBA1C 6.3 (H) 06/07/2021 1020    HBA1C 6.9 (H) 05/10/2021 1031     Due for colon cancer screening.   Hx of H. Pylori      ROS: : see above        Current Outpatient Medications:     glipiZIDE ER (GLUCOTROL XL) 5 MG TABLET SR 24 HR, Take 1 Tablet by mouth every day., Disp: 90 Tablet, Rfl: 3    Empagliflozin (JARDIANCE) 25 MG Tab, Take 1 tablet- 25 mg by mouth every day., Disp: 90 Tablet, Rfl: 1    metoprolol tartrate (LOPRESSOR) 25 MG Tab, Take 1 Tablet by mouth 2 times a day., Disp: 180 Tablet, Rfl: 0    lisinopril (PRINIVIL) 20 MG Tab, Take 1 Tablet by mouth every day., Disp: 90 Tablet, Rfl: 1    Continuous Blood Gluc  (FREESTYLE SAGRARIO 2 READER) Device, 1 Device 4 Times a Day,Before Meals and at Bedtime., Disp: 1 Each, Rfl: 0    HYDROcodone-acetaminophen (NORCO) 7.5-325 MG tab, TAKE 1 TABLET EVERY 4 TO 6 HOURS AS NEEDED FOR PAIN., Disp: 18 Tablet, Rfl: 0    atorvastatin (LIPITOR) 10 MG Tab, Take 1 Tablet by mouth every evening., Disp: 90 Tablet, Rfl: 1    Continuous Blood Gluc Sensor (FREESTYLE SAGRARIO 3 SENSOR) Misc, Apply one sensor to skin every 14 days to check blood sugars before meals and at bedtime daily, Disp: 6 Each, Rfl: 6    metFORMIN (GLUCOPHAGE) 500 MG Tab, Take 2 Tablets by mouth every day for 90 days., Disp: 180 Tablet, Rfl: 3    chlorthalidone (HYGROTON) 25 MG Tab, Take 1 Tablet by mouth every day., Disp: 90  "Tablet, Rfl: 3    ergocalciferol (DRISDOL) 41039 UNIT capsule, Take 1 Capsule by mouth every 7 days., Disp: 12 Capsule, Rfl: 1    Allergies   Allergen Reactions    Bee Swelling     Bee stings       Objective:     Vitals: /68 (BP Location: Right arm, Patient Position: Sitting, BP Cuff Size: Adult)   Pulse 69   Temp 36.3 °C (97.3 °F) (Temporal)   Ht 1.803 m (5' 11\")   Wt 89.1 kg (196 lb 6.4 oz)   SpO2 97%   BMI 27.39 kg/m²    General: Alert, pleasant, NAD  HEENT: Normocephalic.  Neck supple.   Respiratory: no distress, no audible wheezing, RR -WNL  Skin: Warm, dry, no rashes.  Extremities: No leg edema. No discoloration  Neurological: No tremors  Psych:  Affect/mood is normal, judgement is good, memory is intact, grooming is appropriate.    Assessment/Plan:      1. Type 2 diabetes mellitus with stage 3a chronic kidney disease, without long-term current use of insulin (HCC)  Chronic. Not controlled at this time as his A1c is >7.0% however this has improved. He will continue Glipizide, Metformin and Jardiance. Reminded patient to stay adequately hydrated.   Update labs.  - POCT Hemoglobin A1C  - MICROALBUMIN CREAT RATIO URINE; Future  - glipiZIDE ER (GLUCOTROL XL) 5 MG TABLET SR 24 HR; Take 1 Tablet by mouth every day.  Dispense: 90 Tablet; Refill: 3  - URINALYSIS; Future  - MICROALBUMIN CREAT RATIO URINE; Future    2. Stage 3a chronic kidney disease (HCC)  Chronic. Reminded patient not to take NSAIDs- he has been taking them for his hip pain -hx of Leg Calve Perthes dx-  Continue SGLT2. Optimize BP and DM control.   ?not certain if patient was able to establish with Nephrology-referral placed last OV.   Update labs.   - Comp Metabolic Panel; Future  - CBC WITHOUT DIFFERENTIAL; Future  - TSH; Future  - MICROALBUMIN CREAT RATIO URINE; Future    3. Gastroesophageal reflux disease, unspecified whether esophagitis present  Chronic.   Stop NSAIDs  - Referral to GI for Colonoscopy    4. History of Helicobacter " pylori infection  - Referral to GI for Colonoscopy    5. Screen for colon cancer  - Referral to GI for Colonoscopy    6. Vitamin D deficiency  Chronic. Continue OTC supplementation.   - VITAMIN D,25 HYDROXY (DEFICIENCY); Future    7. Dyslipidemia  Chronic. Goal LDL <70.   Continue Atorvastatin.   - Lipid Profile; Future    8. Need for vaccination  - INFLUENZA VACCINE QUAD INJ (PF)    9. Glucosuria  In the setting of DM with current use of SGLT2.   -note provided for DOT examiner that his known DM and use of Jardiance that it is not unexpected for him to have glucose present in his urine.  Continue to optimize DM control.     Return in about 4 weeks (around 1/11/2024).    {I have placed the above orders and discussed them with an approved delegating provider. The MA is performing the below orders under the direction of Dr. Orlando TIJERINA

## 2023-12-16 PROCEDURE — RXMED WILLOW AMBULATORY MEDICATION CHARGE: Performed by: NURSE PRACTITIONER

## 2023-12-22 ENCOUNTER — PHARMACY VISIT (OUTPATIENT)
Dept: PHARMACY | Facility: MEDICAL CENTER | Age: 48
End: 2023-12-22
Payer: COMMERCIAL

## 2023-12-23 DIAGNOSIS — E55.9 VITAMIN D DEFICIENCY: ICD-10-CM

## 2023-12-23 PROCEDURE — RXMED WILLOW AMBULATORY MEDICATION CHARGE: Performed by: NURSE PRACTITIONER

## 2023-12-26 ENCOUNTER — NON-PROVIDER VISIT (OUTPATIENT)
Dept: URGENT CARE | Facility: PHYSICIAN GROUP | Age: 48
End: 2023-12-26

## 2023-12-26 DIAGNOSIS — Z02.1 PRE-EMPLOYMENT DRUG SCREENING: Primary | ICD-10-CM

## 2023-12-26 LAB
AMP AMPHETAMINE: NEGATIVE
COC COCAINE: NEGATIVE
INT CON NEG: NEGATIVE
INT CON POS: POSITIVE
MET METHAMPHETAMINES: NEGATIVE
OPI OPIATES: NEGATIVE
PCP PHENCYCLIDINE: NEGATIVE
POC DRUG COMMENT 753798-OCCUPATIONAL HEALTH: NEGATIVE
THC: NEGATIVE

## 2023-12-26 PROCEDURE — RXMED WILLOW AMBULATORY MEDICATION CHARGE: Performed by: NURSE PRACTITIONER

## 2023-12-26 PROCEDURE — 80305 DRUG TEST PRSMV DIR OPT OBS: CPT | Performed by: PHYSICIAN ASSISTANT

## 2023-12-26 RX ORDER — ERGOCALCIFEROL 1.25 MG/1
50000 CAPSULE ORAL
Qty: 12 CAPSULE | Refills: 1 | Status: SHIPPED | OUTPATIENT
Start: 2023-12-26

## 2023-12-28 ENCOUNTER — TELEPHONE (OUTPATIENT)
Dept: MEDICAL GROUP | Facility: MEDICAL CENTER | Age: 48
End: 2023-12-28
Payer: COMMERCIAL

## 2023-12-28 ENCOUNTER — HOSPITAL ENCOUNTER (OUTPATIENT)
Dept: LAB | Facility: MEDICAL CENTER | Age: 48
End: 2023-12-28
Attending: NURSE PRACTITIONER
Payer: COMMERCIAL

## 2023-12-28 DIAGNOSIS — E78.5 DYSLIPIDEMIA: ICD-10-CM

## 2023-12-28 DIAGNOSIS — E11.22 TYPE 2 DIABETES MELLITUS WITH STAGE 3A CHRONIC KIDNEY DISEASE, WITHOUT LONG-TERM CURRENT USE OF INSULIN (HCC): ICD-10-CM

## 2023-12-28 DIAGNOSIS — N18.31 TYPE 2 DIABETES MELLITUS WITH STAGE 3A CHRONIC KIDNEY DISEASE, WITHOUT LONG-TERM CURRENT USE OF INSULIN (HCC): ICD-10-CM

## 2023-12-28 DIAGNOSIS — N18.31 STAGE 3A CHRONIC KIDNEY DISEASE: ICD-10-CM

## 2023-12-28 DIAGNOSIS — E55.9 VITAMIN D DEFICIENCY: ICD-10-CM

## 2023-12-28 LAB
25(OH)D3 SERPL-MCNC: 27 NG/ML (ref 30–100)
ALBUMIN SERPL BCP-MCNC: 3.9 G/DL (ref 3.2–4.9)
ALBUMIN/GLOB SERPL: 1.2 G/DL
ALP SERPL-CCNC: 120 U/L (ref 30–99)
ALT SERPL-CCNC: 108 U/L (ref 2–50)
ANION GAP SERPL CALC-SCNC: 10 MMOL/L (ref 7–16)
APPEARANCE UR: CLEAR
AST SERPL-CCNC: 56 U/L (ref 12–45)
BACTERIA #/AREA URNS HPF: NEGATIVE /HPF
BILIRUB SERPL-MCNC: 0.2 MG/DL (ref 0.1–1.5)
BILIRUB UR QL STRIP.AUTO: NEGATIVE
BUN SERPL-MCNC: 43 MG/DL (ref 8–22)
CALCIUM ALBUM COR SERPL-MCNC: 9.6 MG/DL (ref 8.5–10.5)
CALCIUM SERPL-MCNC: 9.5 MG/DL (ref 8.5–10.5)
CHLORIDE SERPL-SCNC: 107 MMOL/L (ref 96–112)
CHOLEST SERPL-MCNC: 200 MG/DL (ref 100–199)
CO2 SERPL-SCNC: 24 MMOL/L (ref 20–33)
COLOR UR: YELLOW
CREAT SERPL-MCNC: 1.76 MG/DL (ref 0.5–1.4)
CREAT UR-MCNC: 72.41 MG/DL
EPI CELLS #/AREA URNS HPF: NEGATIVE /HPF
ERYTHROCYTE [DISTWIDTH] IN BLOOD BY AUTOMATED COUNT: 46 FL (ref 35.9–50)
FASTING STATUS PATIENT QL REPORTED: NORMAL
GFR SERPLBLD CREATININE-BSD FMLA CKD-EPI: 47 ML/MIN/1.73 M 2
GLOBULIN SER CALC-MCNC: 3.2 G/DL (ref 1.9–3.5)
GLUCOSE SERPL-MCNC: 178 MG/DL (ref 65–99)
GLUCOSE UR STRIP.AUTO-MCNC: >=1000 MG/DL
HCT VFR BLD AUTO: 41.6 % (ref 42–52)
HDLC SERPL-MCNC: 48 MG/DL
HGB BLD-MCNC: 13.2 G/DL (ref 14–18)
HYALINE CASTS #/AREA URNS LPF: ABNORMAL /LPF
KETONES UR STRIP.AUTO-MCNC: NEGATIVE MG/DL
LDLC SERPL CALC-MCNC: 126 MG/DL
LEUKOCYTE ESTERASE UR QL STRIP.AUTO: NEGATIVE
MCH RBC QN AUTO: 30.3 PG (ref 27–33)
MCHC RBC AUTO-ENTMCNC: 31.7 G/DL (ref 32.3–36.5)
MCV RBC AUTO: 95.4 FL (ref 81.4–97.8)
MICRO URNS: ABNORMAL
MICROALBUMIN UR-MCNC: 84 MG/DL
MICROALBUMIN/CREAT UR: 1160 MG/G (ref 0–30)
NITRITE UR QL STRIP.AUTO: NEGATIVE
PH UR STRIP.AUTO: 6 [PH] (ref 5–8)
PLATELET # BLD AUTO: 309 K/UL (ref 164–446)
PMV BLD AUTO: 9.9 FL (ref 9–12.9)
POTASSIUM SERPL-SCNC: 5.9 MMOL/L (ref 3.6–5.5)
PROT SERPL-MCNC: 7.1 G/DL (ref 6–8.2)
PROT UR QL STRIP: 100 MG/DL
RBC # BLD AUTO: 4.36 M/UL (ref 4.7–6.1)
RBC # URNS HPF: ABNORMAL /HPF
RBC UR QL AUTO: NEGATIVE
SODIUM SERPL-SCNC: 141 MMOL/L (ref 135–145)
SP GR UR STRIP.AUTO: 1.03
TRIGL SERPL-MCNC: 128 MG/DL (ref 0–149)
TSH SERPL DL<=0.005 MIU/L-ACNC: 1.15 UIU/ML (ref 0.38–5.33)
UROBILINOGEN UR STRIP.AUTO-MCNC: 0.2 MG/DL
WBC # BLD AUTO: 9.5 K/UL (ref 4.8–10.8)
WBC #/AREA URNS HPF: ABNORMAL /HPF

## 2023-12-28 PROCEDURE — 85027 COMPLETE CBC AUTOMATED: CPT

## 2023-12-28 PROCEDURE — RXMED WILLOW AMBULATORY MEDICATION CHARGE: Performed by: NURSE PRACTITIONER

## 2023-12-28 PROCEDURE — 36415 COLL VENOUS BLD VENIPUNCTURE: CPT

## 2023-12-28 PROCEDURE — 82570 ASSAY OF URINE CREATININE: CPT

## 2023-12-28 PROCEDURE — 82043 UR ALBUMIN QUANTITATIVE: CPT

## 2023-12-28 PROCEDURE — 81001 URINALYSIS AUTO W/SCOPE: CPT

## 2023-12-28 PROCEDURE — 80053 COMPREHEN METABOLIC PANEL: CPT

## 2023-12-28 PROCEDURE — 84443 ASSAY THYROID STIM HORMONE: CPT

## 2023-12-28 PROCEDURE — 82306 VITAMIN D 25 HYDROXY: CPT

## 2023-12-28 PROCEDURE — 80061 LIPID PANEL: CPT

## 2023-12-28 NOTE — TELEPHONE ENCOUNTER
FINAL PRIOR AUTHORIZATION STATUS:    1.  Name of Medication & Dose: Continuous Blood Gluc  (AlphaStripeYLE SAGRARIO 2 READER) Device      2. Prior Auth Status: Approved through 12/17/2024     3. Action Taken: Pharmacy Notified: no Patient Notified: yes

## 2023-12-29 ENCOUNTER — TELEPHONE (OUTPATIENT)
Dept: MEDICAL GROUP | Facility: MEDICAL CENTER | Age: 48
End: 2023-12-29
Payer: COMMERCIAL

## 2023-12-29 NOTE — TELEPHONE ENCOUNTER
FINAL PRIOR AUTHORIZATION STATUS:    1.  Name of Medication & Dose:  FreeStyle Reynaldo 3 Sensor    2. Prior Auth Status: Approved through 12/27/24     3. Action Taken: Pharmacy Notified: yes Patient Notified: yes

## 2023-12-31 ENCOUNTER — PHARMACY VISIT (OUTPATIENT)
Dept: PHARMACY | Facility: MEDICAL CENTER | Age: 48
End: 2023-12-31
Payer: COMMERCIAL

## 2024-01-11 ENCOUNTER — OFFICE VISIT (OUTPATIENT)
Dept: MEDICAL GROUP | Facility: MEDICAL CENTER | Age: 49
End: 2024-01-11
Payer: COMMERCIAL

## 2024-01-11 VITALS
RESPIRATION RATE: 16 BRPM | HEIGHT: 71 IN | SYSTOLIC BLOOD PRESSURE: 124 MMHG | WEIGHT: 200 LBS | TEMPERATURE: 97.5 F | BODY MASS INDEX: 28 KG/M2 | HEART RATE: 68 BPM | OXYGEN SATURATION: 96 % | DIASTOLIC BLOOD PRESSURE: 72 MMHG

## 2024-01-11 DIAGNOSIS — E11.21 MACROALBUMINURIC DIABETIC NEPHROPATHY (HCC): ICD-10-CM

## 2024-01-11 DIAGNOSIS — Z23 NEED FOR VACCINATION: ICD-10-CM

## 2024-01-11 DIAGNOSIS — F34.1 DYSTHYMIA: ICD-10-CM

## 2024-01-11 DIAGNOSIS — Z87.39 HISTORY OF LEGG-CALVE-PERTHES DISEASE: Chronic | ICD-10-CM

## 2024-01-11 DIAGNOSIS — G89.29 CHRONIC BACK PAIN, UNSPECIFIED BACK LOCATION, UNSPECIFIED BACK PAIN LATERALITY: ICD-10-CM

## 2024-01-11 DIAGNOSIS — M54.9 CHRONIC BACK PAIN, UNSPECIFIED BACK LOCATION, UNSPECIFIED BACK PAIN LATERALITY: ICD-10-CM

## 2024-01-11 DIAGNOSIS — R74.01 TRANSAMINITIS: ICD-10-CM

## 2024-01-11 DIAGNOSIS — N18.31 STAGE 3A CHRONIC KIDNEY DISEASE: ICD-10-CM

## 2024-01-11 DIAGNOSIS — E87.5 HYPERKALEMIA: ICD-10-CM

## 2024-01-11 DIAGNOSIS — E78.5 DYSLIPIDEMIA: Chronic | ICD-10-CM

## 2024-01-11 PROCEDURE — 90677 PCV20 VACCINE IM: CPT | Performed by: NURSE PRACTITIONER

## 2024-01-11 PROCEDURE — 3078F DIAST BP <80 MM HG: CPT | Performed by: NURSE PRACTITIONER

## 2024-01-11 PROCEDURE — 90471 IMMUNIZATION ADMIN: CPT | Performed by: NURSE PRACTITIONER

## 2024-01-11 PROCEDURE — 3074F SYST BP LT 130 MM HG: CPT | Performed by: NURSE PRACTITIONER

## 2024-01-11 PROCEDURE — 99214 OFFICE O/P EST MOD 30 MIN: CPT | Mod: 25 | Performed by: NURSE PRACTITIONER

## 2024-01-11 RX ORDER — ACETAMINOPHEN 500 MG
500 TABLET ORAL EVERY 6 HOURS PRN
Qty: 100 TABLET | Refills: 1 | Status: SHIPPED | OUTPATIENT
Start: 2024-01-11

## 2024-01-11 RX ORDER — ATORVASTATIN CALCIUM 20 MG/1
20 TABLET, FILM COATED ORAL NIGHTLY
Qty: 90 TABLET | Refills: 1 | Status: SHIPPED | OUTPATIENT
Start: 2024-01-11

## 2024-01-11 ASSESSMENT — FIBROSIS 4 INDEX: FIB4 SCORE: 0.85

## 2024-01-11 ASSESSMENT — PATIENT HEALTH QUESTIONNAIRE - PHQ9: CLINICAL INTERPRETATION OF PHQ2 SCORE: 0

## 2024-01-11 NOTE — PROGRESS NOTES
"Subjective:     HPI:     Cayetano Hawkins is a 48 y.o. male presents to discuss:    Wife accompanies patient on today's visit.     Follow up 1 month/lab review    Stopped taking ibuprofen for hip pain.  Now taking Tylenol PRN pain.   He tells me he is staying hydrated.     His lower back pain ongoing.   Denies sciatica symptoms.   Hx of legg calve perthes dx.    Wife also mentions that she has noticed over the last year she has noticed that he has been crying and sad for about 1 year.  States that lately makes \"feels really sad \"he does not necessarily recall his dreams and does not feel that those are triggers.  He states he feels better after he talks to family.  Initially wife was wondering if it might be related to the metoprolol.  Denies any history of overt depression.  No SI.    ROS: : see above        Current Outpatient Medications:     acetaminophen (TYLENOL) 500 MG Tab, Take 1 Tablet by mouth every 6 hours as needed for Moderate Pain., Disp: 100 Tablet, Rfl: 1    atorvastatin (LIPITOR) 20 MG Tab, Take 1 Tablet by mouth every evening., Disp: 90 Tablet, Rfl: 1    ergocalciferol (DRISDOL) 04553 UNIT capsule, Take 1 Capsule by mouth every 7 days., Disp: 12 Capsule, Rfl: 1    glipiZIDE ER (GLUCOTROL XL) 5 MG TABLET SR 24 HR, Take 1 Tablet by mouth every day., Disp: 90 Tablet, Rfl: 3    Empagliflozin (JARDIANCE) 25 MG Tab, Take 1 tablet- 25 mg by mouth every day., Disp: 90 Tablet, Rfl: 1    metoprolol tartrate (LOPRESSOR) 25 MG Tab, Take 1 Tablet by mouth 2 times a day., Disp: 180 Tablet, Rfl: 0    lisinopril (PRINIVIL) 20 MG Tab, Take 1 Tablet by mouth every day., Disp: 90 Tablet, Rfl: 1    Continuous Blood Gluc  (FREESTYLE SAGRARIO 2 READER) Device, 1 Device 4 Times a Day,Before Meals and at Bedtime., Disp: 1 Each, Rfl: 0    HYDROcodone-acetaminophen (NORCO) 7.5-325 MG tab, TAKE 1 TABLET EVERY 4 TO 6 HOURS AS NEEDED FOR PAIN., Disp: 18 Tablet, Rfl: 0    Continuous Blood Gluc Sensor (FREESTYLE SAGRARIO " "3 SENSOR) Misc, Apply one sensor to skin every 14 days to check blood sugars before meals and at bedtime daily, Disp: 6 Each, Rfl: 6    metFORMIN (GLUCOPHAGE) 500 MG Tab, Take 2 Tablets by mouth every day for 90 days., Disp: 180 Tablet, Rfl: 3    chlorthalidone (HYGROTON) 25 MG Tab, Take 1 Tablet by mouth every day., Disp: 90 Tablet, Rfl: 3    Allergies   Allergen Reactions    Bee Swelling     Bee stings       Objective:     Vitals: /72   Pulse 68   Temp 36.4 °C (97.5 °F)   Resp 16   Ht 1.803 m (5' 11\")   Wt 90.7 kg (200 lb)   SpO2 96%   BMI 27.89 kg/m²    General: Alert, pleasant, NAD  HEENT: Normocephalic.  Neck supple.   Respiratory: no distress, no audible wheezing, RR -WNL  Skin: Warm, dry, no rashes.  Extremities: No leg edema. No discoloration  Neurological: No tremors  Psych:  Affect/mood is normal, judgement is good, memory is intact, grooming is appropriate.    Assessment/Plan:      1. Chronic back pain, unspecified back location, unspecified back pain laterality  Chronic.  Avoid NSAID use due to CKD.  Update imaging.  Consider referral to pain specialist  - DX-SPINE-SCOLIOSIS STUDY; Future  - DX-LUMBAR SPINE-2 OR 3 VIEWS; Future    2. History of Smwp-Hngmu-Oaksfyt disease  ?ref to ortho or pain management.    3. Dyslipidemia  Chronic.  LDL not at goal however has improved since restarting atorvastatin.  However now with elevated LFTs.  Need follow-up recheck LFTs.  - atorvastatin (LIPITOR) 20 MG Tab; Take 1 Tablet by mouth every evening.  Dispense: 90 Tablet; Refill: 1    4. Transaminitis  New finding on most recent blood work.  Uncertain etiology howver he has been taking Tylenol for back pain and recently restarted statin. Denies EtOH use  Hx of cholecystectomy.  ?2/2 to restarting atorvastatin or tylenol consumption.  Recheck CMP  ?update RUQ ultrasound  - Comp Metabolic Panel; Future    5. Hyperkalemia  Most recent potassium level at 5.9.  Not currently taking any potassium " supplements.  - Comp Metabolic Panel; Future    6. Stage 3a chronic kidney disease (HCC)  Chronic.  GFR steady on recent lab at 47.  Creatinine improving slowly 1.76 since ARF episode.   Recently stopped NSAIDs.  On SGLT2  Working on optimizing diabetes and blood pressure control.  ?not certain if patient was able to establish with Nephrology-referral placed 5/2023. Recommend scheduling appt.     7.  Microalbuminuria with diabetic nephropathy (HCC)  Chronic.  Has not yet established with nephrology.  Have provided phone number and encouraged patient to call and schedule.  Continue to avoid NSAIDs, increase hydration, continue to optimize diabetes and blood pressure control.    8.Dysthymia  Denies overt depression, just feelings of sadness upon awakening.   Encouraged him to speak with his wife as she does find this beneficial.  Otherwise may consider referral to counseling.    9. Need for vaccination  - Pneumococcal Conjugate Vaccine 20-Valent (6 wks+)    Other orders  - acetaminophen (TYLENOL) 500 MG Tab; Take 1 Tablet by mouth every 6 hours as needed for Moderate Pain.  Dispense: 100 Tablet; Refill: 1      Return in about 3 months (around 4/11/2024).    {I have placed the above orders and discussed them with an approved delegating provider. The MA is performing the below orders under the direction of Dr. Deandra TIJERINA

## 2024-01-19 ENCOUNTER — APPOINTMENT (OUTPATIENT)
Dept: RADIOLOGY | Facility: MEDICAL CENTER | Age: 49
End: 2024-01-19
Attending: NURSE PRACTITIONER
Payer: COMMERCIAL

## 2024-01-30 ENCOUNTER — APPOINTMENT (OUTPATIENT)
Dept: RADIOLOGY | Facility: MEDICAL CENTER | Age: 49
End: 2024-01-30
Attending: NURSE PRACTITIONER
Payer: COMMERCIAL

## 2024-02-05 PROBLEM — K52.9 GASTROENTERITIS: Status: RESOLVED | Noted: 2023-09-13 | Resolved: 2024-02-05

## 2024-02-05 PROBLEM — N17.9 ARF (ACUTE RENAL FAILURE) (HCC): Status: RESOLVED | Noted: 2023-09-13 | Resolved: 2024-02-05

## 2024-02-05 PROBLEM — N18.31 STAGE 3A CHRONIC KIDNEY DISEASE: Chronic | Status: ACTIVE | Noted: 2023-04-20

## 2024-02-23 ENCOUNTER — PHARMACY VISIT (OUTPATIENT)
Dept: PHARMACY | Facility: MEDICAL CENTER | Age: 49
End: 2024-02-23
Payer: COMMERCIAL

## 2024-02-23 PROCEDURE — RXMED WILLOW AMBULATORY MEDICATION CHARGE: Performed by: NURSE PRACTITIONER

## 2024-03-15 NOTE — ANESTHESIA TIME REPORT
Anesthesia Start and Stop Event Times     Date Time Event    6/26/2020 0716 Ready for Procedure     0718 Anesthesia Start     0832 Anesthesia Stop        Responsible Staff  06/26/20    Name Role Begin End    Juan Armenta M.D. Anesth 0718 0832        Preop Diagnosis (Free Text):  Pre-op Diagnosis     OSTEOPHYTE RIGHT FOOT        Preop Diagnosis (Codes):    Post op Diagnosis  Osteophyte of foot      Premium Reason  Non-Premium    Comments:                                                                        skin

## 2024-03-29 PROCEDURE — RXMED WILLOW AMBULATORY MEDICATION CHARGE: Performed by: NURSE PRACTITIONER

## 2024-04-01 ENCOUNTER — PHARMACY VISIT (OUTPATIENT)
Dept: PHARMACY | Facility: MEDICAL CENTER | Age: 49
End: 2024-04-01
Payer: COMMERCIAL

## 2024-04-22 DIAGNOSIS — E11.22 TYPE 2 DIABETES MELLITUS WITH STAGE 3A CHRONIC KIDNEY DISEASE, WITHOUT LONG-TERM CURRENT USE OF INSULIN (HCC): ICD-10-CM

## 2024-04-22 DIAGNOSIS — N18.31 TYPE 2 DIABETES MELLITUS WITH STAGE 3A CHRONIC KIDNEY DISEASE, WITHOUT LONG-TERM CURRENT USE OF INSULIN (HCC): ICD-10-CM

## 2024-04-22 NOTE — TELEPHONE ENCOUNTER
Received request via: Pharmacy    Was the patient seen in the last year in this department? Yes    Does the patient have an active prescription (recently filled or refills available) for medication(s) requested? No    Pharmacy Name: [facility]     Does the patient have retirement Plus and need 100 day supply (blood pressure, diabetes and cholesterol meds only)? Patient does not have SCP

## 2024-04-25 ENCOUNTER — PHARMACY VISIT (OUTPATIENT)
Dept: PHARMACY | Facility: MEDICAL CENTER | Age: 49
End: 2024-04-25
Payer: COMMERCIAL

## 2024-04-25 PROCEDURE — RXMED WILLOW AMBULATORY MEDICATION CHARGE: Performed by: NURSE PRACTITIONER
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
## 2024-08-01 DIAGNOSIS — E11.22 TYPE 2 DIABETES MELLITUS WITH STAGE 3A CHRONIC KIDNEY DISEASE, WITHOUT LONG-TERM CURRENT USE OF INSULIN (HCC): ICD-10-CM

## 2024-08-01 DIAGNOSIS — I10 HYPERTENSION, UNSPECIFIED TYPE: ICD-10-CM

## 2024-08-01 DIAGNOSIS — R60.0 PERIPHERAL EDEMA: ICD-10-CM

## 2024-08-01 DIAGNOSIS — N18.31 TYPE 2 DIABETES MELLITUS WITH STAGE 3A CHRONIC KIDNEY DISEASE, WITHOUT LONG-TERM CURRENT USE OF INSULIN (HCC): ICD-10-CM

## 2024-08-02 PROCEDURE — RXMED WILLOW AMBULATORY MEDICATION CHARGE: Performed by: NURSE PRACTITIONER

## 2024-08-03 PROCEDURE — RXMED WILLOW AMBULATORY MEDICATION CHARGE: Performed by: NURSE PRACTITIONER

## 2024-08-03 RX ORDER — BLOOD-GLUCOSE SENSOR
EACH MISCELLANEOUS
Qty: 6 EACH | Refills: 6 | Status: SHIPPED | OUTPATIENT
Start: 2024-08-03

## 2024-08-03 RX ORDER — CHLORTHALIDONE 25 MG/1
25 TABLET ORAL DAILY
Qty: 90 TABLET | Refills: 3 | Status: SHIPPED | OUTPATIENT
Start: 2024-08-03

## 2024-08-03 RX ORDER — LISINOPRIL 20 MG/1
20 TABLET ORAL DAILY
Qty: 90 TABLET | Refills: 1 | Status: SHIPPED | OUTPATIENT
Start: 2024-08-03

## 2024-08-05 PROCEDURE — RXMED WILLOW AMBULATORY MEDICATION CHARGE: Performed by: NURSE PRACTITIONER

## 2024-08-05 RX ORDER — METOPROLOL TARTRATE 25 MG/1
25 TABLET, FILM COATED ORAL 2 TIMES DAILY
Qty: 180 TABLET | Refills: 3 | Status: SHIPPED | OUTPATIENT
Start: 2024-08-05

## 2024-08-12 ENCOUNTER — PHARMACY VISIT (OUTPATIENT)
Dept: PHARMACY | Facility: MEDICAL CENTER | Age: 49
End: 2024-08-12
Payer: MEDICARE

## 2024-09-12 PROCEDURE — RXMED WILLOW AMBULATORY MEDICATION CHARGE: Performed by: NURSE PRACTITIONER

## 2024-09-13 ENCOUNTER — PHARMACY VISIT (OUTPATIENT)
Dept: PHARMACY | Facility: MEDICAL CENTER | Age: 49
End: 2024-09-13
Payer: MEDICARE

## 2024-10-26 ENCOUNTER — PHARMACY VISIT (OUTPATIENT)
Dept: PHARMACY | Facility: MEDICAL CENTER | Age: 49
End: 2024-10-26
Payer: COMMERCIAL

## 2024-10-26 PROCEDURE — RXMED WILLOW AMBULATORY MEDICATION CHARGE: Performed by: NURSE PRACTITIONER

## 2024-11-11 ENCOUNTER — APPOINTMENT (OUTPATIENT)
Dept: RADIOLOGY | Facility: MEDICAL CENTER | Age: 49
End: 2024-11-11
Attending: STUDENT IN AN ORGANIZED HEALTH CARE EDUCATION/TRAINING PROGRAM

## 2024-11-11 ENCOUNTER — HOSPITAL ENCOUNTER (EMERGENCY)
Facility: MEDICAL CENTER | Age: 49
End: 2024-11-11
Attending: STUDENT IN AN ORGANIZED HEALTH CARE EDUCATION/TRAINING PROGRAM

## 2024-11-11 VITALS
SYSTOLIC BLOOD PRESSURE: 138 MMHG | TEMPERATURE: 98.8 F | HEART RATE: 53 BPM | OXYGEN SATURATION: 96 % | HEIGHT: 71 IN | BODY MASS INDEX: 27.56 KG/M2 | RESPIRATION RATE: 14 BRPM | DIASTOLIC BLOOD PRESSURE: 83 MMHG | WEIGHT: 196.87 LBS

## 2024-11-11 DIAGNOSIS — E11.628 DIABETIC FOOT INFECTION (HCC): ICD-10-CM

## 2024-11-11 DIAGNOSIS — L08.9 DIABETIC FOOT INFECTION (HCC): ICD-10-CM

## 2024-11-11 LAB
ALBUMIN SERPL BCP-MCNC: 3.5 G/DL (ref 3.2–4.9)
ALBUMIN/GLOB SERPL: 1.1 G/DL
ALP SERPL-CCNC: 116 U/L (ref 30–99)
ALT SERPL-CCNC: 38 U/L (ref 2–50)
ANION GAP SERPL CALC-SCNC: 12 MMOL/L (ref 7–16)
AST SERPL-CCNC: 25 U/L (ref 12–45)
BASOPHILS # BLD AUTO: 0.7 % (ref 0–1.8)
BASOPHILS # BLD: 0.06 K/UL (ref 0–0.12)
BILIRUB SERPL-MCNC: 0.2 MG/DL (ref 0.1–1.5)
BUN SERPL-MCNC: 35 MG/DL (ref 8–22)
CALCIUM ALBUM COR SERPL-MCNC: 9.9 MG/DL (ref 8.5–10.5)
CALCIUM SERPL-MCNC: 9.5 MG/DL (ref 8.4–10.2)
CHLORIDE SERPL-SCNC: 108 MMOL/L (ref 96–112)
CO2 SERPL-SCNC: 23 MMOL/L (ref 20–33)
CREAT SERPL-MCNC: 1.81 MG/DL (ref 0.5–1.4)
CRP SERPL HS-MCNC: <0.3 MG/DL (ref 0–0.75)
EOSINOPHIL # BLD AUTO: 0.29 K/UL (ref 0–0.51)
EOSINOPHIL NFR BLD: 3.2 % (ref 0–6.9)
ERYTHROCYTE [DISTWIDTH] IN BLOOD BY AUTOMATED COUNT: 42.8 FL (ref 35.9–50)
ERYTHROCYTE [SEDIMENTATION RATE] IN BLOOD BY WESTERGREN METHOD: 34 MM/HOUR (ref 0–20)
GFR SERPLBLD CREATININE-BSD FMLA CKD-EPI: 45 ML/MIN/1.73 M 2
GLOBULIN SER CALC-MCNC: 3.2 G/DL (ref 1.9–3.5)
GLUCOSE SERPL-MCNC: 150 MG/DL (ref 65–99)
HCT VFR BLD AUTO: 38.9 % (ref 42–52)
HGB BLD-MCNC: 13.1 G/DL (ref 14–18)
IMM GRANULOCYTES # BLD AUTO: 0.03 K/UL (ref 0–0.11)
IMM GRANULOCYTES NFR BLD AUTO: 0.3 % (ref 0–0.9)
LACTATE SERPL-SCNC: 1.1 MMOL/L (ref 0.5–2)
LYMPHOCYTES # BLD AUTO: 2.62 K/UL (ref 1–4.8)
LYMPHOCYTES NFR BLD: 29.3 % (ref 22–41)
MCH RBC QN AUTO: 30.5 PG (ref 27–33)
MCHC RBC AUTO-ENTMCNC: 33.7 G/DL (ref 32.3–36.5)
MCV RBC AUTO: 90.7 FL (ref 81.4–97.8)
MONOCYTES # BLD AUTO: 0.48 K/UL (ref 0–0.85)
MONOCYTES NFR BLD AUTO: 5.4 % (ref 0–13.4)
NEUTROPHILS # BLD AUTO: 5.45 K/UL (ref 1.82–7.42)
NEUTROPHILS NFR BLD: 61.1 % (ref 44–72)
NRBC # BLD AUTO: 0 K/UL
NRBC BLD-RTO: 0 /100 WBC (ref 0–0.2)
PLATELET # BLD AUTO: 263 K/UL (ref 164–446)
PMV BLD AUTO: 9.1 FL (ref 9–12.9)
POTASSIUM SERPL-SCNC: 4.6 MMOL/L (ref 3.6–5.5)
PROT SERPL-MCNC: 6.7 G/DL (ref 6–8.2)
RBC # BLD AUTO: 4.29 M/UL (ref 4.7–6.1)
SODIUM SERPL-SCNC: 143 MMOL/L (ref 135–145)
WBC # BLD AUTO: 8.9 K/UL (ref 4.8–10.8)

## 2024-11-11 PROCEDURE — 700102 HCHG RX REV CODE 250 W/ 637 OVERRIDE(OP): Performed by: STUDENT IN AN ORGANIZED HEALTH CARE EDUCATION/TRAINING PROGRAM

## 2024-11-11 PROCEDURE — 83605 ASSAY OF LACTIC ACID: CPT

## 2024-11-11 PROCEDURE — 99283 EMERGENCY DEPT VISIT LOW MDM: CPT

## 2024-11-11 PROCEDURE — 85652 RBC SED RATE AUTOMATED: CPT

## 2024-11-11 PROCEDURE — 80053 COMPREHEN METABOLIC PANEL: CPT

## 2024-11-11 PROCEDURE — 86140 C-REACTIVE PROTEIN: CPT

## 2024-11-11 PROCEDURE — 85025 COMPLETE CBC W/AUTO DIFF WBC: CPT

## 2024-11-11 PROCEDURE — A9270 NON-COVERED ITEM OR SERVICE: HCPCS | Performed by: STUDENT IN AN ORGANIZED HEALTH CARE EDUCATION/TRAINING PROGRAM

## 2024-11-11 PROCEDURE — 73660 X-RAY EXAM OF TOE(S): CPT | Mod: RT

## 2024-11-11 RX ORDER — LINEZOLID 600 MG/1
600 TABLET, FILM COATED ORAL ONCE
Status: COMPLETED | OUTPATIENT
Start: 2024-11-11 | End: 2024-11-11

## 2024-11-11 RX ORDER — LINEZOLID 600 MG/1
600 TABLET, FILM COATED ORAL 2 TIMES DAILY
Qty: 14 TABLET | Refills: 0 | Status: ACTIVE | OUTPATIENT
Start: 2024-11-11 | End: 2024-11-19

## 2024-11-11 RX ADMIN — LINEZOLID 600 MG: 600 TABLET, FILM COATED ORAL at 19:17

## 2024-11-11 ASSESSMENT — FIBROSIS 4 INDEX: FIB4 SCORE: 0.87

## 2024-11-12 ENCOUNTER — PHARMACY VISIT (OUTPATIENT)
Dept: PHARMACY | Facility: MEDICAL CENTER | Age: 49
End: 2024-11-12
Payer: COMMERCIAL

## 2024-11-12 ENCOUNTER — PATIENT OUTREACH (OUTPATIENT)
Dept: SCHEDULING | Facility: IMAGING CENTER | Age: 49
End: 2024-11-12

## 2024-11-12 PROCEDURE — RXMED WILLOW AMBULATORY MEDICATION CHARGE: Performed by: STUDENT IN AN ORGANIZED HEALTH CARE EDUCATION/TRAINING PROGRAM

## 2024-11-12 NOTE — ED TRIAGE NOTES
"Chief Complaint   Patient presents with    Wound Infection     PT presents d/t diabetic foot ulcer on right great toe x 2 weeks      BP (!) 153/91   Pulse (!) 56   Temp 37.1 °C (98.8 °F) (Temporal)   Resp 18   Ht 1.803 m (5' 11\")   Wt 89.3 kg (196 lb 13.9 oz)   SpO2 96%   BMI 27.46 kg/m²     "

## 2024-11-12 NOTE — ED PROVIDER NOTES
ED Provider Note    CHIEF COMPLAINT  Chief Complaint   Patient presents with    Wound Infection     PT presents d/t diabetic foot ulcer on right great toe x 2 weeks        EXTERNAL RECORDS REVIEWED  Inpatient Notes discharge summary 9/15/2023 admitted for nausea and vomiting noted history of CKD, hypertension, diabetes    HPI/ROS  LIMITATION TO HISTORY     OUTSIDE HISTORIAN(S):      Cayetano Hawkins is a 49 y.o. male who presents with toe pain.  Patient says over the last 2 weeks has had worsening pain in his right toe.  Patient says over the past 3 days he has noticed some redness and ulceration across his right toe.  Patient reports history of diabetes as well as previous toe infections.  Patient denies fever, chills, chest pain, shortness of breath.  Patient says he did have 1 episode of dry heaving yesterday but has been eating drinking normally today.    PAST MEDICAL HISTORY   has a past medical history of Arthritis (right hip), Backpain, Bowel habit changes, Dental disorder (06/2020), Diabetes, Hepatitis C (2009), Hepatitis C carrier (HCC), High cholesterol, Hyperlipidemia, Hypertension, Infectious disease, Leukocytosis (11/25/2019), Osteomyelitis (HCC), and Scrotal abscess (1/4/2012).    SURGICAL HISTORY   has a past surgical history that includes nano by laparoscopy; irrigation & debridement ortho (Right, 4/4/2016); irrigation & debridement ortho (Right, 4/10/2016); irrigation & debridement ortho (Right, 4/13/2016); ostectomy (Right, 6/26/2020); incision and drainage general (Right, 8/3/2020); cholecystectomy (2011); appendectomy (1988); other (hepatitis c ); toe amputation (Right, 8/16/2018); irrigation & debridement ortho (Right, 5/11/2021); tendon lenghtening (Right, 5/11/2021); and toe amputation (Right, 5/11/2021).    FAMILY HISTORY  Family History   Problem Relation Age of Onset    Diabetes Mother     Hyperlipidemia Mother     Arthritis Mother     Heart Attack Father         CABG4    Heart  "Disease Father     Hypertension Father     Stroke Father     Hyperlipidemia Father     Arthritis Father     Other Sister         ?possible seizure    Heart Disease Sister         CABG3    Heart Attack Brother 32       SOCIAL HISTORY  Social History     Tobacco Use    Smoking status: Former     Current packs/day: 0.00     Average packs/day: 0.5 packs/day for 10.0 years (5.0 ttl pk-yrs)     Types: Cigarettes     Start date: 2002     Quit date: 2012     Years since quittin.8    Smokeless tobacco: Never    Tobacco comments:     5 yrs ago   Vaping Use    Vaping status: Never Used   Substance and Sexual Activity    Alcohol use: No    Drug use: Not Currently     Types: Marijuana, Inhaled     Comment: couple of times a day, occasionally will eat edibles    Sexual activity: Yes     Partners: Female       CURRENT MEDICATIONS  Home Medications       Reviewed by Zach Veliz R.N. (Registered Nurse) on 24 at 1607  Med List Status: Not Addressed     Medication Last Dose Status   acetaminophen (TYLENOL) 500 MG Tab  Active   atorvastatin (LIPITOR) 20 MG Tab  Active   chlorthalidone (HYGROTON) 25 MG Tab  Active   Continuous Glucose Sensor (FREESTYLE SAGRARIO 3 SENSOR) Misc  Active   Empagliflozin (JARDIANCE) 25 MG Tab  Active   ergocalciferol (DRISDOL) 65410 UNIT capsule  Active   glipiZIDE ER (GLUCOTROL XL) 5 MG TABLET SR 24 HR  Active   HYDROcodone-acetaminophen (NORCO) 7.5-325 MG tab  Active   lisinopril (PRINIVIL) 20 MG Tab  Active   metFORMIN (GLUCOPHAGE) 500 MG Tab  Active   metoprolol tartrate (LOPRESSOR) 25 MG Tab  Active                  Audit from Redirected Encounters    **Home medications have not yet been reviewed for this encounter**         ALLERGIES  Allergies   Allergen Reactions    Bee Swelling     Bee stings       PHYSICAL EXAM  VITAL SIGNS: BP (!) 150/82   Pulse (!) 54   Temp 37.1 °C (98.8 °F) (Temporal)   Resp 16   Ht 1.803 m (5' 11\")   Wt 89.3 kg (196 lb 13.9 oz)   SpO2 98%   BMI " 27.46 kg/m²    Constitutional: Alert in no apparent distress.  HENT: No signs of trauma, Bilateral external ears normal, Nose normal.   Eyes: Pupils are equal and reactive, Conjunctiva normal, Non-icteric.   Neck: Normal range of motion, No tenderness, Supple, No stridor.   Cardiovascular: Regular rate and rhythm, no murmurs.   Thorax & Lungs: Normal breath sounds, No respiratory distress, No wheezing, No chest tenderness.   Abdomen: Bowel sounds normal, Soft, No tenderness, No masses, No pulsatile masses.   Skin: Warm, Dry, No erythema, No rash.   Back: No bony tenderness, No CVA tenderness.   Extremities: Right first toe small area of erythema just inferior to the margin of the nail no area of fluctuance, approximately 1 cm area of ulceration across the medial aspect of distal first toe, does not probe to bone, intact distal pulses including bilateral DP/PT, No edema, No tenderness, No cyanosis  Musculoskeletal: Good range of motion in all major joints. No tenderness to palpation or major deformities noted.   Neurologic: Alert , Normal motor function, Normal sensory function, No focal deficits noted.       EKG/LABS  Labs Reviewed   CBC WITH DIFFERENTIAL - Abnormal; Notable for the following components:       Result Value    RBC 4.29 (*)     Hemoglobin 13.1 (*)     Hematocrit 38.9 (*)     All other components within normal limits   COMP METABOLIC PANEL - Abnormal; Notable for the following components:    Glucose 150 (*)     Bun 35 (*)     Creatinine 1.81 (*)     Alkaline Phosphatase 116 (*)     All other components within normal limits   SED RATE - Abnormal; Notable for the following components:    Sed Rate Westergren 34 (*)     All other components within normal limits   ESTIMATED GFR - Abnormal; Notable for the following components:    GFR (CKD-EPI) 45 (*)     All other components within normal limits   LACTIC ACID   CRP QUANTITIVE (NON-CARDIAC)       I have independently interpreted this  EKG    RADIOLOGY/PROCEDURES   I have independently interpreted the diagnostic imaging associated with this visit and am waiting the final reading from the radiologist.   My preliminary interpretation is as follows: No fracture or dislocation    Radiologist interpretation:  DX-TOE(S) 2+ RIGHT   Final Result      1. Stable postsurgical changes involving the fifth ray of the right foot.   2. Right great toe soft tissue swelling.   3. No underlying bony or joint abnormality.   4. Right midfoot osteoarthritis.          COURSE & MEDICAL DECISION MAKING    ASSESSMENT, COURSE AND PLAN  Care Narrative: Patient is well-appearing has small area of ulceration and erythema across right first toe normal motor function and sensation throughout bilateral feet intact distal pulses.  No exam to suggest acute arterial occlusion or DVT.  Based on history and exam low suspicion for necrotizing soft tissue infection.  Patient does have previous history of osteomyelitis diabetic foot ulcers will obtain inflammatory markers, x-ray to assess for signs of bony infection.    X-ray with soft tissue swelling no bony changes over the first toe.  ESR is mildly elevated otherwise patient has no leukocytosis and normal CRP.  During over 3 hours of observation patient's had normal vital signs and has been afebrile.  Reviewed prior culture results patient has had a number of MRSA infections.  After discussion with pharmacy we will treat with Zyvox given patient's underlying kidney dysfunction, no previous cultures demonstrating anaerobes or gram-negative's.  Given patient is nontoxic-appearing with only slight elevation in ESR no bony changes on x-ray and small area of ulceration that does not probe to bone believe course of oral antibiotics with close primary care follow-up and wound clinic follow-up is reasonable.  Discussed with patient strict return precautions.              ADDITIONAL PROBLEMS MANAGED      DISPOSITION AND DISCUSSIONS  Discussion  of management with other QHP or appropriate source(s): Pharmacy antibiotics      Escalation of care considered, and ultimately not performed:diagnostic imaging considered admission for MRI    Decision tools and prescription drugs considered including, but not limited to: Antibiotics diabetic foot ulcer .    FINAL DIAGNOSIS  1. Diabetic foot infection (HCC)         Electronically signed by: Bernardino Lal D.O., 11/11/2024 5:06 PM

## 2024-11-12 NOTE — ED NOTES
Patient given discharge instructions, verbalized understanding. PIV discontinued. Rx given for zyvox. Patient instructed to follow up with wound clinic, PCP. Patient provided education to come to ER if symptoms worsen. Discharged in stable condition with daughter, able to walk out with steady gait.

## 2024-11-12 NOTE — DISCHARGE INSTRUCTIONS
We have given you a prescription for antibiotics which you should take twice daily for the next 7 days.  If you have uncontrolled pain, fever, vomiting please return to the emergency department.  You should follow-up closely with your primary care provider.  We have placed a referral for wound clinic where you should also follow-up.

## 2024-11-13 ENCOUNTER — PATIENT OUTREACH (OUTPATIENT)
Dept: SCHEDULING | Facility: IMAGING CENTER | Age: 49
End: 2024-11-13

## 2024-11-21 ENCOUNTER — OFFICE VISIT (OUTPATIENT)
Dept: WOUND CARE | Facility: MEDICAL CENTER | Age: 49
End: 2024-11-21

## 2024-11-21 DIAGNOSIS — Z91.199 FAILURE TO ATTEND APPOINTMENT WITHOUT REASON GIVEN: ICD-10-CM

## 2024-11-21 PROCEDURE — 99999 PR NO CHARGE: CPT | Performed by: NURSE PRACTITIONER

## 2024-11-21 NOTE — PROGRESS NOTES
Patient initially did not arrive to his new wound evaluation appointment.  He later showed up late.  Unable to accommodate patient for his new evaluation.  Patient to reschedule.  Next appointment 11/26/2024.

## 2024-11-26 ENCOUNTER — OFFICE VISIT (OUTPATIENT)
Dept: WOUND CARE | Facility: MEDICAL CENTER | Age: 49
End: 2024-11-26
Attending: STUDENT IN AN ORGANIZED HEALTH CARE EDUCATION/TRAINING PROGRAM

## 2024-11-26 VITALS
SYSTOLIC BLOOD PRESSURE: 110 MMHG | DIASTOLIC BLOOD PRESSURE: 78 MMHG | RESPIRATION RATE: 18 BRPM | HEART RATE: 55 BPM | TEMPERATURE: 97.5 F | OXYGEN SATURATION: 97 %

## 2024-11-26 DIAGNOSIS — E08.41 DIABETIC MONONEUROPATHY ASSOCIATED WITH DIABETES MELLITUS DUE TO UNDERLYING CONDITION (HCC): Chronic | ICD-10-CM

## 2024-11-26 DIAGNOSIS — E11.621 DIABETIC ULCER OF RIGHT GREAT TOE (HCC): ICD-10-CM

## 2024-11-26 DIAGNOSIS — L97.519 DIABETIC ULCER OF RIGHT GREAT TOE (HCC): ICD-10-CM

## 2024-11-26 DIAGNOSIS — E11.65 TYPE 2 DIABETES MELLITUS WITH HYPERGLYCEMIA, WITHOUT LONG-TERM CURRENT USE OF INSULIN (HCC): Chronic | ICD-10-CM

## 2024-11-26 PROCEDURE — 3078F DIAST BP <80 MM HG: CPT | Performed by: NURSE PRACTITIONER

## 2024-11-26 PROCEDURE — RXMED WILLOW AMBULATORY MEDICATION CHARGE: Performed by: NURSE PRACTITIONER

## 2024-11-26 PROCEDURE — 99214 OFFICE O/P EST MOD 30 MIN: CPT | Performed by: NURSE PRACTITIONER

## 2024-11-26 PROCEDURE — 99214 OFFICE O/P EST MOD 30 MIN: CPT

## 2024-11-26 PROCEDURE — 11042 DBRDMT SUBQ TIS 1ST 20SQCM/<: CPT

## 2024-11-26 PROCEDURE — 3074F SYST BP LT 130 MM HG: CPT | Performed by: NURSE PRACTITIONER

## 2024-11-26 ASSESSMENT — ENCOUNTER SYMPTOMS
FALLS: 0
CHILLS: 0
CONSTIPATION: 0
DEPRESSION: 0
DIARRHEA: 0
FEVER: 0
PALPITATIONS: 0
VOMITING: 0
NAUSEA: 0
CLAUDICATION: 0
COUGH: 0
SENSORY CHANGE: 1
SHORTNESS OF BREATH: 0
NERVOUS/ANXIOUS: 0

## 2024-11-26 NOTE — PROGRESS NOTES
Advanced Wound Care   [facility]   [address]   [address]   [place] [address]   [phone] Fax: [phone]        DME: [facility]  Duration of Supply Order: 60 days  Dispense as written.    Wound 11/26/24 Foot;Toe, Hallux Medial Right (Active)   Wound Image    11/26/24 1000   Site Assessment Red;White;[place] 11/26/24 1000   Periwound Assessment Maceration;Callused 11/26/24 1000   Margins Unattached edges 11/26/24 1000   Drainage Amount Moderate 11/26/24 1000   Drainage Description Serosanguineous 11/26/24 1000   Treatments Cleansed;Topical Lidocaine;Provider debridement 11/26/24 1000   Wound Cleansing Hypochlorus Acid 11/26/24 1000   Periwound Protectant No-sting Skin Prep 11/26/24 1000   Dressing Cleansing/Solutions Not Applicable 11/26/24 1000   Dressing Options Hydrofiber Silver;Nonadhesive Foam;Hypafix Tape 11/26/24 1000   Dressing Change/Treatment Frequency Every 72 hrs, and As Needed 11/26/24 1000   Wound Team Following Weekly 11/26/24 1000   Wound Length (cm) 1.6 cm 11/26/24 1000   Wound Width (cm) 0.6 cm 11/26/24 1000   Wound Depth (cm) 0.2 cm 11/26/24 1000   Wound Surface Area (cm^2) 0.96 cm^2 11/26/24 1000   Wound Volume (cm^3) 0.192 cm^3 11/26/24 1000   Post-Procedure Length (cm) 1.5 cm 11/26/24 1000   Post-Procedure Width (cm) 0.6 cm 11/26/24 1000   Post-Procedure Depth (cm) 0.2 cm 11/26/24 1000   Post-Procedure Surface Area (cm^2) 0.9 cm^2 11/26/24 1000   Post-Procedure Volume (cm^3) 0.18 cm^3 11/26/24 1000   Tunneling (cm) 0 cm 11/26/24 1000   Undermining (cm) 0 cm 11/26/24 1000   Wound Odor None 11/26/24 1000   Pulses Right;Left;PT;2+ 11/26/24 1000   Left Foot Monofilament 10-point exam (Sensate) 5/10 11/26/24 1000   Exposed Structures None 11/26/24 1000   Number of days: 0

## 2024-11-26 NOTE — PROGRESS NOTES
Provider Encounter- Diabetic Foot Ulcer      HISTORY OF PRESENT ILLNESS  Wound History:    START OF CARE IN CLINIC: 11/26/2024:    REFERRING PROVIDER: Bernardino Mayorga DO     WOUND- Diabetic foot ulcer   LOCATION: Right great toe medial     HISTORY: Patient first noted ulcer beginning of November 2024.  Initially developed a blister that ruptured.  Does not recall if he dropped something on his foot while cooking or if it developed from shoe wear.  He initially cleaned the wound with hydroperoxide and placed a bandage.  He changed the dressing every day consisting of a Band-Aid..  Patient began to develop necrotic tissue inside the wound with erythema around the great toe which was extending proximally.  He followed up at the ED at [facility] on 11/11/2024.  X-ray negative for osteomyelitis.  ESR was mildly elevated.  No leukocytosis.  Normal CRP.  Patient was discharged with Zyvox which patient completed.  He was referred to [facility] wound care clinic for further treatment and care.    Pertinent Medical History: Diabetes, stage IIIa chronic kidney disease, diabetic peripheral neuropathy, hypertension, right fifth ray amputation    DIABETES HX: Diagnosed with type 2 diabetes in 2009, and is currently managing diabetes with orals.    Checks blood sugars: Every other day   Blood sugar average: 120s  Has had previous diabetes education.    Does have numbness in feet.    Foot wear:nonprescriptive shoes.  Does check feet routinely.    Previous foot ulcers: Yes  Previous foot surgery:yes -right fifth ray amputation  Current occupation currently not working.    Offloading none.           TOBACCO USE:   Former, quit 2012    Patient's problem list, allergies, and current medications reviewed and updated in Epic    Interval History:  11/26/2024: Initial wound evaluation, initial provider Clinic visit with Nadege NICE, VIC-BC, CWON, CFCN.  Pt denies fevers, chills, nausea, vomiting.   Accompanied by wife  Ritu.  Patient to obtain offloading shoe online.  Referral to podiatry placed.  Patient to follow-up monthly until insurance starts January 2025.      REVIEW OF SYSTEMS:   Review of Systems   Constitutional:  Negative for chills and fever.   Eyes:         Denies visual impairment   Respiratory:  Negative for cough and shortness of breath.    Cardiovascular:  Negative for chest pain, palpitations, claudication and leg swelling.   Gastrointestinal:  Negative for constipation, diarrhea, nausea and vomiting.   Genitourinary:  Negative for dysuria.   Musculoskeletal:  Negative for falls and joint pain.   Skin:         Right big toe wound   Neurological:  Positive for sensory change (numbness in feet).   Psychiatric/Behavioral:  Negative for depression. The patient is not nervous/anxious.        PHYSICAL EXAMINATION:   /78   Pulse (!) 55   Temp 36.4 °C (97.5 °F) (Temporal)   Resp 18   SpO2 97%     Physical Exam  Vitals reviewed.   Cardiovascular:      Rate and Rhythm: Normal rate.      Pulses: Normal pulses.      Comments: +2 DP/PT on left foot, +2 PT on right foot, +1 DP on right foot  Pulmonary:      Effort: Pulmonary effort is normal.   Abdominal:      Palpations: Abdomen is soft.   Musculoskeletal:      Comments: Overgrown, mycotic toenails  Mild callus formation left plantar first and fifth MTH  Right fifth complete ray amputation   Skin:     General: Skin is warm and dry.      Comments: Right medial great toe: Macerated thick callus, minimal slough, full-thickness, no evidence of infection  See wound flowsheet for further detail   Neurological:      Mental Status: He is alert and oriented to person, place, and time.      Comments: Insensate to feet   Psychiatric:         Mood and Affect: Mood and affect normal.         Cognition and Memory: Memory normal.         Judgment: Judgment normal.         Monofilament testing with a 10 gram force: sensation intact: decreased on left  Visual Inspection: Feet  with maceration, ulcers, fissures.  Pedal pulses: intact bilaterally     WOUND ASSESSMENT  Wound 11/26/24 Foot;Toe, Hallux Medial Right (Active)   Wound Image    11/26/24 1000   Site Assessment Red;White;[place] 11/26/24 1000   Periwound Assessment Maceration;Callused 11/26/24 1000   Margins Unattached edges 11/26/24 1000   Drainage Amount Moderate 11/26/24 1000   Drainage Description Serosanguineous 11/26/24 1000   Treatments Cleansed;Topical Lidocaine;Provider debridement 11/26/24 1000   Wound Cleansing Hypochlorus Acid 11/26/24 1000   Periwound Protectant No-sting Skin Prep 11/26/24 1000   Dressing Cleansing/Solutions Not Applicable 11/26/24 1000   Dressing Options Hydrofiber Silver;Nonadhesive Foam;Hypafix Tape 11/26/24 1000   Dressing Change/Treatment Frequency Every 72 hrs, and As Needed 11/26/24 1000   Wound Team Following Weekly 11/26/24 1000   Wound Length (cm) 1.6 cm 11/26/24 1000   Wound Width (cm) 0.6 cm 11/26/24 1000   Wound Depth (cm) 0.2 cm 11/26/24 1000   Wound Surface Area (cm^2) 0.96 cm^2 11/26/24 1000   Wound Volume (cm^3) 0.192 cm^3 11/26/24 1000   Post-Procedure Length (cm) 1.5 cm 11/26/24 1000   Post-Procedure Width (cm) 0.6 cm 11/26/24 1000   Post-Procedure Depth (cm) 0.2 cm 11/26/24 1000   Post-Procedure Surface Area (cm^2) 0.9 cm^2 11/26/24 1000   Post-Procedure Volume (cm^3) 0.18 cm^3 11/26/24 1000   Tunneling (cm) 0 cm 11/26/24 1000   Undermining (cm) 0 cm 11/26/24 1000   Wound Odor None 11/26/24 1000   Pulses Right;Left;PT;2+ 11/26/24 1000   Left Foot Monofilament 10-point exam (Sensate) 5/10 11/26/24 1000   Exposed Structures None 11/26/24 1000   Number of days: 1         PROCEDURE:   -2% viscous lidocaine applied topically to wound bed for approximately 5 minutes prior to debridement  -Curette used to debride wound bed and periwound callus.  Excisional debridement was performed to remove devitalized tissue until healthy, bleeding tissue was visualized.   Entire surface of wound, 0.9 cm2  debrided.  Tissue debrided into the subcutaneous layer.  Periwound callus, ~2 cm2, debrided to skin level, excising hyperkeratinized tissue.   -Bleeding controlled with manual pressure.    -Wound care completed by wound RN, refer to flowsheet  -Patient tolerated the procedure well, without c/o pain or discomfort.       Pertinent Labs and Diagnostics:         Labs:     A1c:   Lab Results   Component Value Date/Time    HBA1C 7.4 (A) 12/14/2023 03:56 PM          IMAGING: Foot/Toes Imaging, Past Year DX-TOE(S) 2+ RIGHT    Result Date: 11/11/2024  Narrative 11/11/2024 5:37 PM HISTORY/REASON FOR EXAM:  Atraumatic Pain/Swelling/Deformity. TECHNIQUE/EXAM DESCRIPTION AND NUMBER OF VIEWS:  3 views of the RIGHT toes. COMPARISON: Right foot radiography, 8/3/2020. FINDINGS: Bones: Normal bone mineralization. No fracture. No focal bone lesion. Stable absence of the right fifth ray. Joints: There are degenerative changes involving the navicular cuneiform joints. Remaining joint spaces are preserved, without subluxation. Soft tissues: Postsurgical changes in the lateral right forefoot soft tissues. Mild right great toe soft tissue swelling.     Impression 1. Stable postsurgical changes involving the fifth ray of the right foot. 2. Right great toe soft tissue swelling. 3. No underlying bony or joint abnormality. 4. Right midfoot osteoarthritis.      VASCULAR STUDIES: No results found.    LAST  WOUND CULTURE:   Lab Results   Component Value Date/Time    CULTRSULT No growth at 72 hours. 05/11/2021 10:07 AM    CULTRSULT No Anaerobes isolated. 05/11/2021 10:07 AM           ASSESSMENT AND PLAN:     1. Diabetic ulcer of right great toe (HCC)  Started beginning of November 2024 11/26/2024: Initial evaluation, notable macerated callus, slough to wound bed  - Excisional debridement performed today.  Medically necessary to promote wound healing.  -Recommend weekly appointments however patient does not have insurance until January 2025.   Therefore patient to come in monthly  - Wound care supplies ordered through DME  - Patient change dressing 1-2 times in between clinic appointments  - Obtain offloading shoe online Examples discussed with patient and wife    Wound care: Aquacel Ag, nonadhesive foam, Hypafix tape    2. Diabetic mononeuropathy associated with diabetes mellitus due to underlying condition ([facility])  3. Type 2 diabetes mellitus with hyperglycemia, without long-term current use of insulin ([facility])  11/26/2024: Checks blood sugars every other day.  Averages 120s.  Last A1c 7.4% on 12/14/2023  - Referral to podiatry placed, patient's toenails are mycotic and becoming overgrown.  Also has mild calluses to left first and fifth MTH  - Plan to debride toenails next visit if toenails are impeding adjacent toes placing them at risk for ulceration.  - Importance of tight glucose control for wound healing   - Implications of loss of protective sensation (LOPS) discussed with patient- including increased risk for amputation.  - Advised to check feet at least daily, moisturize feet, and to always wear protective foot wear.   -  Importance of offloading foot to assist with wound healing  - Advised pt not to trim nails or calluses, seek foot/nail care from podiatrist or certified foot/nail nurse  - Importance of adequate nutrition for wound healing                My total time spent caring for the patient on the day of the encounter was 30 minutes, reviewing history, assessment, counseling and education, and coordination of care.  This does not include time spent on separately billable procedures/tests.        Please note that this note may have been created using voice recognition software. I have worked with technical experts from [employer] to optimize the interface.  I have made every reasonable attempt to correct obvious errors, but there may be errors of grammar and possibly content that I did not discover before finalizing the note.    N

## 2024-11-26 NOTE — PATIENT INSTRUCTIONS
-Keep dressings clean and dry. Change dressings every 3-4 days, and if the dressings become saturated, soiled, or fall off.    -If you need to change your dressings at home: Wash your wound with normal saline, wound cleanser, or unscented soap and water prior to applying your new dressings. Please avoid cleansing with hydrogen peroxide or rubbing alcohol. Hydrogen peroxide and rubbing alcohol are toxic to new tissue and skin cells.    -Avoid prolonged standing or sitting without elevating your legs.    -Never walk around the house barefoot. Always wear a rubber soled slipper when walking around the house.    -Wear your offloading shoe any time you are up walking.    -Should you experience any significant changes in your wound(s), such as signs of infection (increasing redness, swelling, localized heat, increased pain, fever > 101 F, chills) or have any questions regarding your home care instructions, please contact the wound center at [phone]. If after hours, contact your primary care physician or go to the hospital emergency room.     -If you are admitted to any hospital, you will need a new referral to come back to the wound clinic and any scheduled appointments that you already have, may be cancelled.     -If you are 5 or more minutes late for an appointment, we reserve the right to cancel and reschedule that appointment. Additionally, if you are habitually late or not showing (3 late cancellations and/or no shows), we reserve the right to cancel your remaining appointments and it will be your responsibility to obtain a new referral if services are still needed.

## 2024-11-26 NOTE — PROGRESS NOTES
Patient does not currently have insurance. He will have insurance come January. Patient will be seen once monthly until then.

## 2024-11-27 ENCOUNTER — PHARMACY VISIT (OUTPATIENT)
Dept: PHARMACY | Facility: MEDICAL CENTER | Age: 49
End: 2024-11-27
Payer: COMMERCIAL

## 2024-12-04 ENCOUNTER — APPOINTMENT (OUTPATIENT)
Dept: WOUND CARE | Facility: MEDICAL CENTER | Age: 49
End: 2024-12-04
Attending: STUDENT IN AN ORGANIZED HEALTH CARE EDUCATION/TRAINING PROGRAM
Payer: MEDICARE

## 2024-12-11 ENCOUNTER — APPOINTMENT (OUTPATIENT)
Dept: WOUND CARE | Facility: MEDICAL CENTER | Age: 49
End: 2024-12-11
Attending: STUDENT IN AN ORGANIZED HEALTH CARE EDUCATION/TRAINING PROGRAM
Payer: MEDICARE

## 2024-12-18 ENCOUNTER — APPOINTMENT (OUTPATIENT)
Dept: WOUND CARE | Facility: MEDICAL CENTER | Age: 49
End: 2024-12-18
Attending: STUDENT IN AN ORGANIZED HEALTH CARE EDUCATION/TRAINING PROGRAM
Payer: MEDICARE

## 2024-12-25 DIAGNOSIS — E55.9 VITAMIN D DEFICIENCY: ICD-10-CM

## 2024-12-25 PROCEDURE — RXMED WILLOW AMBULATORY MEDICATION CHARGE: Performed by: NURSE PRACTITIONER

## 2024-12-26 NOTE — TELEPHONE ENCOUNTER
Received request via: Pharmacy    Was the patient seen in the last year in this department? Yes ergocalciferol (DRISDOL) 66371 UNIT capsule     Does the patient have an active prescription (recently filled or refills available) for medication(s) requested? No    Pharmacy Name:  [facility]     Does the patient have [facility] Plus and need 100-day supply? (This applies to ALL medications) Patient does not have SCP

## 2024-12-29 ENCOUNTER — PHARMACY VISIT (OUTPATIENT)
Dept: PHARMACY | Facility: MEDICAL CENTER | Age: 49
End: 2024-12-29
Payer: COMMERCIAL

## 2024-12-30 RX ORDER — ERGOCALCIFEROL 1.25 MG/1
50000 CAPSULE ORAL
Qty: 12 CAPSULE | Refills: 1 | Status: SHIPPED | OUTPATIENT
Start: 2024-12-30

## 2025-01-09 ENCOUNTER — NON-PROVIDER VISIT (OUTPATIENT)
Dept: WOUND CARE | Facility: MEDICAL CENTER | Age: 50
End: 2025-01-09
Attending: STUDENT IN AN ORGANIZED HEALTH CARE EDUCATION/TRAINING PROGRAM
Payer: MEDICARE

## 2025-01-09 DIAGNOSIS — E11.65 TYPE 2 DIABETES MELLITUS WITH HYPERGLYCEMIA, WITHOUT LONG-TERM CURRENT USE OF INSULIN (HCC): ICD-10-CM

## 2025-01-09 DIAGNOSIS — E08.41 DIABETIC MONONEUROPATHY ASSOCIATED WITH DIABETES MELLITUS DUE TO UNDERLYING CONDITION (HCC): ICD-10-CM

## 2025-01-09 DIAGNOSIS — Z91.198 FAILURE TO ATTEND APPOINTMENT WITH REASON GIVEN: ICD-10-CM

## 2025-01-09 DIAGNOSIS — E11.621 DIABETIC ULCER OF RIGHT GREAT TOE (HCC): ICD-10-CM

## 2025-01-09 DIAGNOSIS — L97.519 DIABETIC ULCER OF RIGHT GREAT TOE (HCC): ICD-10-CM

## 2025-01-09 NOTE — PROGRESS NOTES
Referral to [facility] or [facility] wound clinic referral placed. OON with [facility] wound clinic.

## 2025-01-16 ENCOUNTER — APPOINTMENT (OUTPATIENT)
Dept: WOUND CARE | Facility: MEDICAL CENTER | Age: 50
End: 2025-01-16
Attending: STUDENT IN AN ORGANIZED HEALTH CARE EDUCATION/TRAINING PROGRAM
Payer: MEDICARE

## 2025-01-19 DIAGNOSIS — N18.31 TYPE 2 DIABETES MELLITUS WITH STAGE 3A CHRONIC KIDNEY DISEASE, WITHOUT LONG-TERM CURRENT USE OF INSULIN (HCC): ICD-10-CM

## 2025-01-19 DIAGNOSIS — E11.22 TYPE 2 DIABETES MELLITUS WITH STAGE 3A CHRONIC KIDNEY DISEASE, WITHOUT LONG-TERM CURRENT USE OF INSULIN (HCC): ICD-10-CM

## 2025-01-19 DIAGNOSIS — E78.5 DYSLIPIDEMIA: Chronic | ICD-10-CM

## 2025-01-19 PROCEDURE — RXMED WILLOW AMBULATORY MEDICATION CHARGE: Performed by: NURSE PRACTITIONER

## 2025-01-20 PROCEDURE — RXMED WILLOW AMBULATORY MEDICATION CHARGE: Performed by: INTERNAL MEDICINE

## 2025-01-21 PROCEDURE — RXMED WILLOW AMBULATORY MEDICATION CHARGE: Performed by: NURSE PRACTITIONER

## 2025-01-21 RX ORDER — EMPAGLIFLOZIN 25 MG/1
25 TABLET, FILM COATED ORAL DAILY
Qty: 90 TABLET | Refills: 1 | Status: SHIPPED | OUTPATIENT
Start: 2025-01-21

## 2025-01-21 RX ORDER — ACETAMINOPHEN 500 MG
500 TABLET ORAL EVERY 6 HOURS PRN
Qty: 100 TABLET | Refills: 1 | Status: SHIPPED | OUTPATIENT
Start: 2025-01-21

## 2025-01-21 RX ORDER — ATORVASTATIN CALCIUM 20 MG/1
20 TABLET, FILM COATED ORAL NIGHTLY
Qty: 90 TABLET | Refills: 1 | Status: SHIPPED | OUTPATIENT
Start: 2025-01-21

## 2025-01-21 RX ORDER — GLIPIZIDE 5 MG/1
5 TABLET, FILM COATED, EXTENDED RELEASE ORAL DAILY
Qty: 90 TABLET | Refills: 3 | Status: SHIPPED | OUTPATIENT
Start: 2025-01-21

## 2025-01-23 ENCOUNTER — APPOINTMENT (OUTPATIENT)
Dept: WOUND CARE | Facility: MEDICAL CENTER | Age: 50
End: 2025-01-23
Attending: STUDENT IN AN ORGANIZED HEALTH CARE EDUCATION/TRAINING PROGRAM
Payer: MEDICARE

## 2025-02-01 ENCOUNTER — PHARMACY VISIT (OUTPATIENT)
Dept: PHARMACY | Facility: MEDICAL CENTER | Age: 50
End: 2025-02-01
Payer: COMMERCIAL

## 2025-03-04 DIAGNOSIS — I10 HYPERTENSION, UNSPECIFIED TYPE: ICD-10-CM

## 2025-03-04 PROCEDURE — RXMED WILLOW AMBULATORY MEDICATION CHARGE: Performed by: NURSE PRACTITIONER

## 2025-03-04 RX ORDER — LISINOPRIL 20 MG/1
20 TABLET ORAL DAILY
Qty: 90 TABLET | Refills: 1 | Status: SHIPPED | OUTPATIENT
Start: 2025-03-04

## 2025-03-04 NOTE — TELEPHONE ENCOUNTER
Received request via: Pharmacy    Was the patient seen in the last year in this department? No    Does the patient have an active prescription (recently filled or refills available) for medication(s) requested? No    Pharmacy Name:  [facility] [place]     Does the patient have intermediate Plus and need 100-day supply? (This applies to ALL medications) Patient does not have SCP

## 2025-03-06 ENCOUNTER — PHARMACY VISIT (OUTPATIENT)
Dept: PHARMACY | Facility: MEDICAL CENTER | Age: 50
End: 2025-03-06
Payer: COMMERCIAL

## 2025-04-06 PROCEDURE — RXMED WILLOW AMBULATORY MEDICATION CHARGE: Performed by: NURSE PRACTITIONER

## 2025-04-07 ENCOUNTER — PHARMACY VISIT (OUTPATIENT)
Dept: PHARMACY | Facility: MEDICAL CENTER | Age: 50
End: 2025-04-07
Payer: COMMERCIAL

## 2025-04-25 PROCEDURE — RXMED WILLOW AMBULATORY MEDICATION CHARGE: Performed by: HOSPITALIST

## 2025-04-26 ENCOUNTER — PHARMACY VISIT (OUTPATIENT)
Dept: PHARMACY | Facility: MEDICAL CENTER | Age: 50
End: 2025-04-26
Payer: COMMERCIAL

## 2025-05-07 DIAGNOSIS — N18.31 TYPE 2 DIABETES MELLITUS WITH STAGE 3A CHRONIC KIDNEY DISEASE, WITHOUT LONG-TERM CURRENT USE OF INSULIN (HCC): ICD-10-CM

## 2025-05-07 DIAGNOSIS — E11.22 TYPE 2 DIABETES MELLITUS WITH STAGE 3A CHRONIC KIDNEY DISEASE, WITHOUT LONG-TERM CURRENT USE OF INSULIN (HCC): ICD-10-CM

## 2025-05-07 DIAGNOSIS — E78.5 DYSLIPIDEMIA: Chronic | ICD-10-CM

## 2025-05-07 PROCEDURE — RXMED WILLOW AMBULATORY MEDICATION CHARGE: Performed by: NURSE PRACTITIONER

## 2025-05-08 RX ORDER — ATORVASTATIN CALCIUM 20 MG/1
20 TABLET, FILM COATED ORAL NIGHTLY
Qty: 90 TABLET | Refills: 1 | Status: SHIPPED | OUTPATIENT
Start: 2025-05-08

## 2025-05-12 ENCOUNTER — PHARMACY VISIT (OUTPATIENT)
Dept: PHARMACY | Facility: MEDICAL CENTER | Age: 50
End: 2025-05-12
Payer: COMMERCIAL

## 2025-05-12 PROCEDURE — RXOTC WILLOW AMBULATORY OTC CHARGE

## 2025-05-13 ENCOUNTER — PHARMACY VISIT (OUTPATIENT)
Dept: PHARMACY | Facility: MEDICAL CENTER | Age: 50
End: 2025-05-13
Payer: COMMERCIAL

## 2025-05-13 PROCEDURE — RXMED WILLOW AMBULATORY MEDICATION CHARGE: Performed by: NURSE PRACTITIONER

## 2025-06-04 DIAGNOSIS — E55.9 VITAMIN D DEFICIENCY: ICD-10-CM

## 2025-06-04 PROCEDURE — RXMED WILLOW AMBULATORY MEDICATION CHARGE: Performed by: NURSE PRACTITIONER

## 2025-06-05 RX ORDER — ERGOCALCIFEROL 1.25 MG/1
50000 CAPSULE ORAL
Qty: 12 CAPSULE | Refills: 1 | Status: SHIPPED | OUTPATIENT
Start: 2025-06-05

## 2025-06-07 ENCOUNTER — PHARMACY VISIT (OUTPATIENT)
Dept: PHARMACY | Facility: MEDICAL CENTER | Age: 50
End: 2025-06-07
Payer: COMMERCIAL

## 2025-06-07 PROCEDURE — RXMED WILLOW AMBULATORY MEDICATION CHARGE: Performed by: STUDENT IN AN ORGANIZED HEALTH CARE EDUCATION/TRAINING PROGRAM

## 2025-06-07 RX ORDER — PANTOPRAZOLE SODIUM 40 MG/1
TABLET, DELAYED RELEASE ORAL
Qty: 84 TABLET | Refills: 0 | OUTPATIENT
Start: 2025-06-07

## 2025-06-07 RX ORDER — AMLODIPINE BESYLATE 5 MG/1
5 TABLET ORAL DAILY
Qty: 30 TABLET | Refills: 0 | Status: SHIPPED | OUTPATIENT
Start: 2025-06-07 | End: 2025-06-18 | Stop reason: SDUPTHER

## 2025-06-07 RX ORDER — SUCRALFATE 1 G/1
TABLET ORAL
Qty: 56 TABLET | Refills: 0 | OUTPATIENT
Start: 2025-06-07

## 2025-06-18 ENCOUNTER — PHARMACY VISIT (OUTPATIENT)
Dept: PHARMACY | Facility: MEDICAL CENTER | Age: 50
End: 2025-06-18
Payer: COMMERCIAL

## 2025-06-18 PROCEDURE — RXMED WILLOW AMBULATORY MEDICATION CHARGE

## 2025-06-18 RX ORDER — ESCITALOPRAM OXALATE 10 MG/1
TABLET ORAL
Qty: 90 TABLET | Refills: 1 | OUTPATIENT
Start: 2025-06-18

## 2025-06-18 RX ORDER — HYDROXYZINE HYDROCHLORIDE 25 MG/1
TABLET, FILM COATED ORAL
Qty: 40 TABLET | Refills: 0 | OUTPATIENT
Start: 2025-06-18

## 2025-06-18 RX ORDER — AMLODIPINE BESYLATE 5 MG/1
TABLET ORAL
Qty: 90 TABLET | Refills: 0 | OUTPATIENT
Start: 2025-06-18

## 2025-07-01 PROCEDURE — RXMED WILLOW AMBULATORY MEDICATION CHARGE

## 2025-07-07 ENCOUNTER — PHARMACY VISIT (OUTPATIENT)
Dept: PHARMACY | Facility: MEDICAL CENTER | Age: 50
End: 2025-07-07
Payer: COMMERCIAL

## 2025-08-24 PROCEDURE — RXMED WILLOW AMBULATORY MEDICATION CHARGE

## 2025-08-24 PROCEDURE — RXMED WILLOW AMBULATORY MEDICATION CHARGE: Performed by: STUDENT IN AN ORGANIZED HEALTH CARE EDUCATION/TRAINING PROGRAM

## 2025-08-25 ENCOUNTER — PHARMACY VISIT (OUTPATIENT)
Dept: PHARMACY | Facility: MEDICAL CENTER | Age: 50
End: 2025-08-25
Payer: COMMERCIAL

## (undated) DEVICE — GLOVE BIOGEL PI INDICATOR SZ 7.0 SURGICAL PF LF - (50/BX 4BX/CA)

## (undated) DEVICE — NEPTUNE 4 PORT MANIFOLD - (20/PK)

## (undated) DEVICE — BANDAGE ELASTIC 4 HONEYCOMB - 4"X5YD LF (20/CA)"

## (undated) DEVICE — BANDAGE STERILE 3 IN X 75 IN (12EA/BX 8BX/CA)

## (undated) DEVICE — Device

## (undated) DEVICE — CANISTER SUCTION RIGID RED 1500CC (40EA/CA)

## (undated) DEVICE — CANISTER SUCTION 3000ML MECHANICAL FILTER AUTO SHUTOFF MEDI-VAC NONSTERILE LF DISP  (40EA/CA)

## (undated) DEVICE — SLEEVE VASO CALF MED - (10PR/CA)

## (undated) DEVICE — GOWN WARMING STANDARD FLEX - (30/CA)

## (undated) DEVICE — SET LEADWIRE 5 LEAD BEDSIDE DISPOSABLE ECG (1SET OF 5/EA)

## (undated) DEVICE — SYRINGE 10 ML CONTROL LL (25EA/BX 4BX/CA)

## (undated) DEVICE — TOURNIQUET CUFF 24 X 4 ONE PORT - STERILE (10/BX)

## (undated) DEVICE — SODIUM CHL IRRIGATION 0.9% 1000ML (12EA/CA)

## (undated) DEVICE — WATER IRRIG. STER. 1500 ML - (9/CA)

## (undated) DEVICE — GLOVE BIOGEL PI ORTHO SZ 7.5 PF LF (40PR/BX)

## (undated) DEVICE — PADDING CAST 3 IN STERILE - 3 X 4 YDS (24EA/CA)

## (undated) DEVICE — STERI STRIP COMPOUND BENZOIN - TINCTURE 0.6ML WITH APPLICATOR (40EA/BX)

## (undated) DEVICE — SET EXTENSION WITH 2 PORTS (48EA/CA) ***PART #2C8610 IS A SUBSTITUTE*****

## (undated) DEVICE — GAUZE PACKING STRIP STERILE IODOFORM 1/2 IN X 5 YDS

## (undated) DEVICE — PROTECTOR ULNA NERVE - (36PR/CA)

## (undated) DEVICE — ELECTRODE 850 FOAM ADHESIVE - HYDROGEL RADIOTRNSPRNT (50/PK)

## (undated) DEVICE — SUTURE GENERAL

## (undated) DEVICE — MASK, LARYNGEAL AIRWAY #5

## (undated) DEVICE — PACK LOWER EXTREMITY - (2/CA)

## (undated) DEVICE — SENSOR SPO2 NEO LNCS ADHESIVE (20/BX) SEE USER NOTES

## (undated) DEVICE — GLOVE BIOGEL SZ 7 SURGICAL PF LTX - (50PR/BX 4BX/CA)

## (undated) DEVICE — ELECTRODE DUAL RETURN W/ CORD - (50/PK)

## (undated) DEVICE — SUTURE 3-0 MONOCRYL PLUS PS-1 - 27 INCH (36/BX)

## (undated) DEVICE — CHLORAPREP 26 ML APPLICATOR - ORANGE TINT(25/CA)

## (undated) DEVICE — SUCTION INSTRUMENT YANKAUER BULBOUS TIP W/O VENT (50EA/CA)

## (undated) DEVICE — LACTATED RINGERS INJ 1000 ML - (14EA/CA 60CA/PF)

## (undated) DEVICE — TUBING CLEARLINK DUO-VENT - C-FLO (48EA/CA)

## (undated) DEVICE — HANDPIECE 10FT INTPLS SCT PLS IRRIGATION HAND CONTROL SET (6/PK)

## (undated) DEVICE — PAD LAP STERILE 18 X 18 - (5/PK 40PK/CA)

## (undated) DEVICE — HEAD HOLDER JUNIOR/ADULT

## (undated) DEVICE — SUTURE 3-0 ETHILON FS-1 - (36/BX) 30 INCH

## (undated) DEVICE — GLOVE BIOGEL SZ 8.5 SURGICAL PF LTX - (50PR/BX 4BX/CA)

## (undated) DEVICE — GLOVE SZ 7.5 LF PROTEXIS (50PR/BX)

## (undated) DEVICE — PADDING CAST 6 IN STERILE - 6 X 4 YDS (24/CA)

## (undated) DEVICE — PADDING CAST 4 IN STERILE - 4 X 4 YDS (24/CA)

## (undated) DEVICE — DRESSING 3X8 ADAPTIC GAUZE - NON-ADHERING (36/PK 6PK/BX)

## (undated) DEVICE — DRAPE LARGE 3 QUARTER - (20/CA)

## (undated) DEVICE — TUBE CONNECTING SUCTION - CLEAR PLASTIC STERILE 72 IN (50EA/CA)

## (undated) DEVICE — SUTURE 2-0 ETHILON FS - (36/BX) 18 INCH

## (undated) DEVICE — BONE WAX (12PK/BX)

## (undated) DEVICE — KIT ANESTHESIA W/CIRCUIT & 3/LT BAG W/FILTER (20EA/CA)

## (undated) DEVICE — GLOVE BIOGEL SZ 8 SURGICAL PF LTX - (50PR/BX 4BX/CA)

## (undated) DEVICE — SUTURE 3-0 VICRYL PLUSPS-2 - 18 INCH (36/BX)

## (undated) DEVICE — MASK ANESTHESIA ADULT  - (100/CA)

## (undated) DEVICE — BLADE SURGICAL #15 - (50/BX 3BX/CA)

## (undated) DEVICE — MASK, LARYNGEAL AIRWAY #4

## (undated) DEVICE — SET IRRIGATION CYSTOSCOPY TUBE L80 IN (20EA/CA)

## (undated) DEVICE — RASP SMALL TEAR CROSS-CUT 5X11MM

## (undated) DEVICE — KIT ROOM DECONTAMINATION

## (undated) DEVICE — SUTURE 2-0 PDS II CT-2 - (36/BX)

## (undated) DEVICE — DETERGENT RENUZYME PLUS 10 OZ PACKET (50/BX)

## (undated) DEVICE — SODIUM CHL. IRRIGATION 0.9% 3000ML (4EA/CA 65CA/PF)

## (undated) DEVICE — CLOSURE SKIN STRIP 1/2 X 4 IN - (STERI STRIP) (50/BX 4BX/CA)

## (undated) DEVICE — TRAY SKIN SCRUB PVP WET (20EA/CA) PART #DYND70356 DISCONTINUED

## (undated) DEVICE — PAD PREP 24 X 48 CUFFED - (100/CA)

## (undated) DEVICE — WRAP SELF ADHERING 3 IN X 5YDS NON STERILE TAN (1/EA)

## (undated) DEVICE — TOWEL STOP TIMEOUT SAFETY FLAG (40EA/CA)

## (undated) DEVICE — GLOVE BIOGEL INDICATOR SZ 7SURGICAL PF LTX - (50/BX 4BX/CA)

## (undated) DEVICE — GLOVE BIOGEL INDICATOR SZ 8.5 SURGICAL PF LTX - (50/BX 4BX/CA)

## (undated) DEVICE — BANDAGE ELASTIC 6 HONEYCOMB - 6X5YD LF (20/CA)"

## (undated) DEVICE — KIT  I.V. START (100EA/CA)

## (undated) DEVICE — BLADE OSCILLATING 9MMX24.6MMX0.4MM LIKE STRYKER 2296-3-111

## (undated) DEVICE — CATHETER IV 20 GA X 1-1/4 ---SURG.& SDS ONLY--- (50EA/BX)

## (undated) DEVICE — GLOVE SZ 7 BIOGEL PI MICRO - PF LF (50PR/BX 4BX/CA)

## (undated) DEVICE — TIP INTPLS HFLO ML ORFC BTRY - (12/CS)  FOR SURGILAV

## (undated) DEVICE — CONTAINER SPECIMEN BAG OR - STERILE 4 OZ W/LID (100EA/CA)

## (undated) DEVICE — SLEEVE, VASO, THIGH, MED

## (undated) DEVICE — MANIFOLD NEPTUNE 1 PORT (20/PK)

## (undated) DEVICE — GLOVE 7.0 LF PF PROTEXIS (50PR/BX)

## (undated) DEVICE — GLOVE BIOGEL INDICATOR SZ 7.5 SURGICAL PF LTX - (50PR/BX 4BX/CA)

## (undated) DEVICE — DRAPE LAPAROTOMY T SHEET - (12EA/CA)

## (undated) DEVICE — GLOVE BIOGEL PI INDICATOR SZ 6.5 SURGICAL PF LF - (50/BX 4BX/CA)

## (undated) DEVICE — WRAP CO-FLEX 4IN X 5YD STERIL - SELF-ADHERENT (18/CA)

## (undated) DEVICE — SUTURE 4-0 ETHILON FS-1 18 (36PK/BX)"

## (undated) DEVICE — SUTURE 4-0 VICRYL PLUS FS-2 - 27 INCH (36/BX)